# Patient Record
Sex: MALE | Race: WHITE | NOT HISPANIC OR LATINO | Employment: OTHER | URBAN - METROPOLITAN AREA
[De-identification: names, ages, dates, MRNs, and addresses within clinical notes are randomized per-mention and may not be internally consistent; named-entity substitution may affect disease eponyms.]

---

## 2017-02-25 ENCOUNTER — RADIANT APPOINTMENT (OUTPATIENT)
Dept: GENERAL RADIOLOGY | Facility: CLINIC | Age: 32
End: 2017-02-25
Attending: NURSE PRACTITIONER
Payer: COMMERCIAL

## 2017-02-25 ENCOUNTER — OFFICE VISIT (OUTPATIENT)
Dept: URGENT CARE | Facility: URGENT CARE | Age: 32
End: 2017-02-25
Payer: COMMERCIAL

## 2017-02-25 VITALS
HEART RATE: 84 BPM | OXYGEN SATURATION: 97 % | SYSTOLIC BLOOD PRESSURE: 141 MMHG | RESPIRATION RATE: 20 BRPM | TEMPERATURE: 99 F | DIASTOLIC BLOOD PRESSURE: 81 MMHG

## 2017-02-25 DIAGNOSIS — Z98.890 HISTORY OF CARPAL TUNNEL SURGERY: ICD-10-CM

## 2017-02-25 DIAGNOSIS — M25.531 RIGHT WRIST PAIN: Primary | ICD-10-CM

## 2017-02-25 DIAGNOSIS — M25.531 RIGHT WRIST PAIN: ICD-10-CM

## 2017-02-25 PROCEDURE — 73110 X-RAY EXAM OF WRIST: CPT | Mod: RT

## 2017-02-25 PROCEDURE — 99213 OFFICE O/P EST LOW 20 MIN: CPT | Performed by: NURSE PRACTITIONER

## 2017-02-25 NOTE — MR AVS SNAPSHOT
After Visit Summary   2/25/2017    Larry Bangura    MRN: [id number]           Patient Information     Date Of Birth          1985        Visit Information        Provider Department      2/25/2017 12:55 PM Sydnie Jackson APRN CNP [facility]        Today's Diagnoses     Right wrist pain    -  1    History of carpal tunnel surgery          Care Instructions    Rest your wrist. Take Ibuprofen and/or tylenol for pain.  See your primary care provider this next week for further plan of care.  This may be occupational therapy, bracing or referral to a different specialist but this needs to be discussed with your family provider.    We will call you if the radiologist sees anything on the x-ray that is abnormal.    Follow-up with your primary care provider next week and as needed.    Indications for emergent return to emergency department discussed with patient, who verbalized good understanding and agreement.  Patient understands the limitations of today's evaluation.               Follow-ups after your visit        Follow-up notes from your care team     See patient instructions section of the AVS Return in about 3 days (around 2/28/2017) for Follow up with your primary care provider.      Who to contact     If you have questions or need follow up information about today's clinic visit or your schedule please contact [facility] directly at [phone].  Normal or non-critical lab and imaging results will be communicated to you by MyChart, letter or phone within 4 business days after the clinic has received the results. If you do not hear from us within 7 days, please contact the clinic through [employer] or phone. If you have a critical or abnormal lab result, we will notify you by phone as soon as possible.  Submit refill requests through [employer] or call your pharmacy and they will forward the refill request to us. Please allow 3  business days for your refill to be completed.          Additional Information About Your Visit        MyChart Information     [employer] gives you secure access to your electronic health record. If you see a primary care provider, you can also send messages to your care team and make appointments. If you have questions, please call your primary care clinic.  If you do not have a primary care provider, please call [phone] and they will assist you.        Care EveryWhere ID     This is your Care EveryWhere ID. This could be used by other organizations to access your [place] medical records  [id number]        Your Vitals Were     Pulse Temperature Respirations Pulse Oximetry          84 99  F (37.2  C) (Tympanic) 20 97%         Blood Pressure from Last 3 Encounters:   02/25/17 141/81   11/17/16 130/88   10/21/16 129/76    Weight from Last 3 Encounters:   11/17/16 (!) 376 lb (170.6 kg)   09/13/16 (!) 379 lb 9.6 oz (172.2 kg)   09/07/16 (!) 379 lb 6.4 oz (172.1 kg)                 Today's Medication Changes          These changes are accurate as of: 2/25/17  2:44 PM.  If you have any questions, ask your nurse or doctor.               These medicines have changed or have updated prescriptions.        Dose/Directions    fluticasone 50 MCG/ACT spray   Commonly known as:  FLONASE   This may have changed:    - when to take this  - reasons to take this   Used for:  Seasonal allergic rhinitis        Dose:  1-2 spray   Spray 1-2 sprays into both nostrils daily   Quantity:  1 Package   Refills:  11                Primary Care Provider Office Phone # Fax #    CHANTALE Amador [phone]       [facility] [address] [facility] [address]        Thank you!     Thank you for choosing [facility]  for your care. Our goal is always to provide you with excellent care. Hearing back from our patients is one way we can continue to improve our services.  Please take a few minutes to complete the written survey that you may receive in the mail after your visit with us. Thank you!             Your Updated Medication List - Protect others around you: Learn how to safely use, store and throw away your medicines at www.disposemymeds.org.          This list is accurate as of: 2/25/17  2:44 PM.  Always use your most recent med list.                   Brand Name Dispense Instructions for use    blood glucose monitoring lancets     1 Box    Use to test blood sugar 1 times daily or as directed.  Ok to substitute alternative if insurance prefers.       blood glucose monitoring test strip    ACCU-CHEK CHERELLE PLUS    180 each    Use to test blood sugar 2 times daily or as directed.       fluticasone 50 MCG/ACT spray    FLONASE    1 Package    Spray 1-2 sprays into both nostrils daily       IBUPROFEN PO      Take 400 mg by mouth every 4 hours as needed for moderate pain       lisinopril-hydrochlorothiazide 10-12.5 MG per tablet    PRINZIDE/ZESTORETIC    90 tablet    Take 1 tablet by mouth daily       order for DME      Patient received a [employer] Airsense 10  Auto. Pressures were set at 13 cm H2O.       STATIN NOT PRESCRIBED (INTENTIONAL)      Statin not prescribed intentionally due to Other Statin not indicated per Dr. ward (This option does not exclude patient from measure).

## 2017-02-25 NOTE — PROGRESS NOTES
SUBJECTIVE:                                                    Larry Bangura is a 31 year old male who presents to clinic today for the following health issues:  Chief Complaint   Patient presents with     Musculoskeletal Problem     hx of carpal tunnel       States he had a malpractice lawsuit against hand specialist. Right wrist hurts and he wants an xray just to make sure it is not broken. No injury he is aware of.  Requests disc of his xray.          Problem list and histories reviewed & adjusted, as indicated.  Additional history: as documented    Patient Active Problem List   Diagnosis     Morbid obesity (H)     Seasonal allergic rhinitis     Obstructive sleep apnea     Type 2 diabetes mellitus without complication (H)     CARDIOVASCULAR SCREENING; LDL GOAL LESS THAN 160     Essential hypertension with goal blood pressure less than 140/90     Esophageal reflux     Past Surgical History   Procedure Laterality Date     Endoscopic release carpal tunnel  12/6/2013     Procedure: ENDOSCOPIC RELEASE CARPAL TUNNEL;  Right Hand Endoscopic Carpal Tunnel Release;  Surgeon: Kody Pittman MD;  Location: [facility] OR     Endoscopic release carpal tunnel  1/21/2014     Procedure: ENDOSCOPIC RELEASE CARPAL TUNNEL;  Left endoscopic carpal tunnel release, possible open;  Surgeon: Kody Pittman MD;  Location: [facility] OR       Social History   Substance Use Topics     Smoking status: Never Smoker     Smokeless tobacco: Never Used     Alcohol use No     Family History   Problem Relation Age of Onset     DIABETES Father      CANCER Sister      C.A.D. Maternal Grandfather          Current Outpatient Prescriptions   Medication Sig Dispense Refill     lisinopril-hydrochlorothiazide (PRINZIDE,ZESTORETIC) 10-12.5 MG per tablet Take 1 tablet by mouth daily 90 tablet 3     STATIN NOT PRESCRIBED, INTENTIONAL, Statin not prescribed intentionally due to Other Statin not indicated per Dr. ward (This option does not exclude patient from  measure).  0     blood glucose monitoring (ACCU-CHEK CHERELLE PLUS) test strip Use to test blood sugar 2 times daily or as directed. 180 each 11     blood glucose monitoring (ACCU-CHEK FASTCLIX) lancets Use to test blood sugar 1 times daily or as directed.  Ok to substitute alternative if insurance prefers. 1 Box prn     IBUPROFEN PO Take 400 mg by mouth every 4 hours as needed for moderate pain       ORDER FOR DME, SET TO FAX, Patient received a Resmed Airsense 10  Auto. Pressures were set at 13 cm H2O.       fluticasone (FLONASE) 50 MCG/ACT nasal spray Spray 1-2 sprays into both nostrils daily (Patient taking differently: Spray 1-2 sprays into both nostrils daily as needed ) 1 Package 11     No Known Allergies  Problem list, Medication list, Allergies, and Medical/Social/Surgical histories reviewed in [facility] and updated as appropriate.    ROS:  Constitutional, HEENT, cardiovascular, pulmonary, GI, , musculoskeletal, neuro, skin, endocrine and psych systems are negative, except as otherwise noted.    OBJECTIVE:                                                    /81  Pulse 84  Temp 99  F (37.2  C) (Tympanic)  Resp 20  SpO2 97%  There is no height or weight on file to calculate BMI.  GENERAL: healthy, alert and no distress, nontoxic in appearance  EYES: Eyes grossly normal to inspection,  conjunctivae and sclerae normal  HENT: normocephalic  NECK:supple with full ROM  Right wrist is nonswollen and nontender to palpation. Mildly limited ROM.    Diagnostic Test Results:  No results found for this or any previous visit (from the past 24 hour(s)).     ASSESSMENT/PLAN:                                                      Problem List Items Addressed This Visit     None      Visit Diagnoses     Right wrist pain    -  Primary    Relevant Orders    XR Wrist Right G/E 3 Views    History of carpal tunnel surgery                   Patient Instructions   Rest your wrist. Take Ibuprofen and/or tylenol for pain.  See your  primary care provider this next week for further plan of care.  This may be occupational therapy, bracing or referral to a different specialist but this needs to be discussed with your family provider.    We will call you if the radiologist sees anything on the x-ray that is abnormal.    Follow-up with your primary care provider next week and as needed.    Indications for emergent return to emergency department discussed with patient, who verbalized good understanding and agreement.  Patient understands the limitations of today's evaluation.           CIARAN Ortega    FLNB SAME DAY PROVIDER  [facility]

## 2018-02-14 ENCOUNTER — OFFICE VISIT (OUTPATIENT)
Dept: SLEEP MEDICINE | Facility: CLINIC | Age: 33
End: 2018-02-14
Payer: MEDICAID

## 2018-02-14 VITALS
BODY MASS INDEX: 45.1 KG/M2 | WEIGHT: 315 LBS | HEART RATE: 80 BPM | OXYGEN SATURATION: 96 % | HEIGHT: 70 IN | DIASTOLIC BLOOD PRESSURE: 89 MMHG | SYSTOLIC BLOOD PRESSURE: 148 MMHG

## 2018-02-14 DIAGNOSIS — G47.33 OSA (OBSTRUCTIVE SLEEP APNEA): Primary | ICD-10-CM

## 2018-02-14 PROCEDURE — 99214 OFFICE O/P EST MOD 30 MIN: CPT | Performed by: PHYSICIAN ASSISTANT

## 2018-02-14 NOTE — PATIENT INSTRUCTIONS

## 2018-02-14 NOTE — MR AVS SNAPSHOT
After Visit Summary   2/14/2018    Larry Bangura    MRN: [id number]           Patient Information     Date Of Birth          1985        Visit Information        Provider Department      2/14/2018 9:00 AM Tri Vasquez PA [facility]        Today's Diagnoses     IRWIN (obstructive sleep apnea)    -  1      Care Instructions      Your BMI is Body mass index is 53.95 kg/(m^2).  Weight management is a personal decision.  If you are interested in exploring weight loss strategies, the following discussion covers the approaches that may be successful. Body mass index (BMI) is one way to tell whether you are at a healthy weight, overweight, or obese. It measures your weight in relation to your height.  A BMI of 18.5 to 24.9 is in the healthy range. A person with a BMI of 25 to 29.9 is considered overweight, and someone with a BMI of 30 or greater is considered obese. More than two-thirds of American adults are considered overweight or obese.  Being overweight or obese increases the risk for further weight gain. Excess weight may lead to heart disease and diabetes.  Creating and following plans for healthy eating and physical activity may help you improve your health.  Weight control is part of healthy lifestyle and includes exercise, emotional health, and healthy eating habits. Careful eating habits lifelong are the mainstay of weight control. Though there are significant health benefits from weight loss, long-term weight loss with diet alone may be very difficult to achieve- studies show long-term success with dietary management in less than 10% of people. Attaining a healthy weight may be especially difficult to achieve in those with severe obesity. In some cases, medications, devices and surgical management might be considered.  What can you do?  If you are overweight or obese and are interested in methods for weight loss, you should discuss this with your provider.     Consider  reducing daily calorie intake by 500 calories.     Keep a food journal.     Avoiding skipping meals, consider cutting portions instead.    Diet combined with exercise helps maintain muscle while optimizing fat loss. Strength training is particularly important for building and maintaining muscle mass. Exercise helps reduce stress, increase energy, and improves fitness. Increasing exercise without diet control, however, may not burn enough calories to loose weight.       Start walking three days a week 10-20 minutes at a time    Work towards walking thirty minutes five days a week     Eventually, increase the speed of your walking for 1-2 minutes at time    In addition, we recommend that you review healthy lifestyles and methods for weight loss available through the National Institutes of Health patient information sites:  http://win.niddk.nih.gov/publications/index.htm    And look into health and wellness programs that may be available through your health insurance provider, employer, local community center, or [employer].    Weight management plan: Patient was referred to their PCP to discuss a diet and exercise plan.        Your Body mass index is 53.95 kg/(m^2).  Weight management is a personal decision.  If you are interested in exploring weight loss strategies, the following discussion covers the approaches that may be successful. Body mass index (BMI) is one way to tell whether you are at a healthy weight, overweight, or obese. It measures your weight in relation to your height.  A BMI of 18.5 to 24.9 is in the healthy range. A person with a BMI of 25 to 29.9 is considered overweight, and someone with a BMI of 30 or greater is considered obese. More than two-thirds of American adults are considered overweight or obese.  Being overweight or obese increases the risk for further weight gain. Excess weight may lead to heart disease and diabetes.  Creating and following plans for healthy eating and physical activity  may help you improve your health.  Weight control is part of healthy lifestyle and includes exercise, emotional health, and healthy eating habits. Careful eating habits lifelong are the mainstay of weight control. Though there are significant health benefits from weight loss, long-term weight loss with diet alone may be very difficult to achieve- studies show long-term success with dietary management in less than 10% of people. Attaining a healthy weight may be especially difficult to achieve in those with severe obesity. In some cases, medications, devices and surgical management might be considered.  What can you do?  If you are overweight or obese and are interested in methods for weight loss, you should discuss this with your provider.     Consider reducing daily calorie intake by 500 calories.     Keep a food journal.     Avoiding skipping meals, consider cutting portions instead.    Diet combined with exercise helps maintain muscle while optimizing fat loss. Strength training is particularly important for building and maintaining muscle mass. Exercise helps reduce stress, increase energy, and improves fitness. Increasing exercise without diet control, however, may not burn enough calories to loose weight.       Start walking three days a week 10-20 minutes at a time    Work towards walking thirty minutes five days a week     Eventually, increase the speed of your walking for 1-2 minutes at time    In addition, we recommend that you review healthy lifestyles and methods for weight loss available through the National Institutes of Health patient information sites:  http://win.niddk.nih.gov/publications/index.htm    And look into health and wellness programs that may be available through your health insurance provider, employer, local community center, or [employer].    Weight management plan: Patient was referred to their PCP to discuss a diet and exercise plan.          Follow-ups after your visit       "  Follow-up notes from your care team     Return in 1 year (on 2/14/2019) for PAP follow up.      Who to contact     If you have questions or need follow up information about today's clinic visit or your schedule please contact [facility] directly at [phone].  Normal or non-critical lab and imaging results will be communicated to you by MyChart, letter or phone within 4 business days after the clinic has received the results. If you do not hear from us within 7 days, please contact the clinic through [employer] or phone. If you have a critical or abnormal lab result, we will notify you by phone as soon as possible.  Submit refill requests through [employer] or call your pharmacy and they will forward the refill request to us. Please allow 3 business days for your refill to be completed.          Additional Information About Your Visit        MyChart Information     [employer] gives you secure access to your electronic health record. If you see a primary care provider, you can also send messages to your care team and make appointments. If you have questions, please call your primary care clinic.  If you do not have a primary care provider, please call [phone] and they will assist you.        Care EveryWhere ID     This is your Care EveryWhere ID. This could be used by other organizations to access your [place] medical records  [id number]        Your Vitals Were     Pulse Height Pulse Oximetry BMI (Body Mass Index)          80 1.778 m (5' 10\") 96% 53.95 kg/m2         Blood Pressure from Last 3 Encounters:   02/14/18 148/89   02/25/17 141/81   11/17/16 130/88    Weight from Last 3 Encounters:   02/14/18 (!) 170.6 kg (376 lb)   11/17/16 (!) 170.6 kg (376 lb)   09/13/16 (!) 172.2 kg (379 lb 9.6 oz)              We Performed the Following     Sleep Comprehensive DME          Today's Medication Changes          These changes are accurate as of 2/14/18  9:40 AM.  If you have any questions, ask your " nurse or doctor.               These medicines have changed or have updated prescriptions.        Dose/Directions    fluticasone 50 MCG/ACT spray   Commonly known as:  FLONASE   This may have changed:    - when to take this  - reasons to take this   Used for:  Seasonal allergic rhinitis        Dose:  1-2 spray   Spray 1-2 sprays into both nostrils daily   Quantity:  1 Package   Refills:  11                Primary Care Provider Office Phone # Fax #    Vahe Eason [phone] [address] [place] [id number]        Equal Access to Services     [facility] : Hadii aad ku hadasho Soomaali, waaxda luqadaha, qaybta kaalmada adeegyada, waxandrea blood . So [facility] [phone].    ATENCIÓN: Si habla español, tiene a alba disposición servicios gratuitos de asistencia lingüística. [facility] [phone].    We comply with applicable federal civil rights laws and [place] laws. We do not discriminate on the basis of race, color, national origin, age, disability, sex, sexual orientation, or gender identity.            Thank you!     Thank you for choosing [facility]  for your care. Our goal is always to provide you with excellent care. Hearing back from our patients is one way we can continue to improve our services. Please take a few minutes to complete the written survey that you may receive in the mail after your visit with us. Thank you!             Your Updated Medication List - Protect others around you: Learn how to safely use, store and throw away your medicines at www.disposemymeds.org.          This list is accurate as of 2/14/18  9:40 AM.  Always use your most recent med list.                   Brand Name Dispense Instructions for use Diagnosis    blood glucose monitoring lancets     1 Box    Use to test blood sugar 1 times daily or as directed.  Ok to substitute alternative if insurance prefers.    Type 2 diabetes mellitus without complication  (H)       blood glucose monitoring test strip    ACCU-CHEK CHERELLE PLUS    180 each    Use to test blood sugar 2 times daily or as directed.    Type 2 diabetes mellitus without complication (H)       fluticasone 50 MCG/ACT spray    FLONASE    1 Package    Spray 1-2 sprays into both nostrils daily    Seasonal allergic rhinitis       IBUPROFEN PO      Take 400 mg by mouth every 4 hours as needed for moderate pain        lisinopril-hydrochlorothiazide 10-12.5 MG per tablet    PRINZIDE/ZESTORETIC    90 tablet    Take 1 tablet by mouth daily    Essential hypertension with goal blood pressure less than 140/90       order for DME      Patient received a Resmed Airsense 10  Auto. Pressures were set at 13 cm H2O.        STATIN NOT PRESCRIBED (INTENTIONAL)      Statin not prescribed intentionally due to Other Statin not indicated per Dr. ward (This option does not exclude patient from measure).

## 2018-02-14 NOTE — PROGRESS NOTES
Obstructive Sleep Apnea- PAP Follow-Up Visit:    Chief Complaint   Patient presents with     CPAP Follow Up     Annual CPAP follow up       Larry Bangura comes in today for follow-up of their severe sleep apnea, managed with CPAP.     Larry Bangura is a 32 year old male with PMH significant for morbid obesity, asthma, HTN and GERD. He initially presented in clinic for evaluation of snoring, witnessed apnea and daytime sleepiness.     Sleep study recommended and completed on 6/2/015. AHI was 122.6. The lowest oxygen saturation was 51% and would frequently not normalize between events with zenith SpO2 often in high 60's to high 70's. .8. Periodic Limb Movement Index 0.0/hour.    Transcutaneous CO2 monitoring with pre-study ABG was performed. His ABG prior to the sleep study showed: pH: 7.39, pCO2 51, pO2 62, HCO3 31 and consistent with compensated respiratory acidosis and hypoxemia. Of note, head of bed elevated without appreciable change in events/saturations.   CPAP was titrated to zenith of 15 cm with CPAP of 13 cm with head of bed elevated 30 degrees appearing largely optimal including supine REM with elimination of apneas, hypopnea, desaturations and TCM stable in the high 50's. Trial of 14 cm with head of bed flat with residual events in supine REM. Brief trial of bilevel at end of study, 16/11 cm appeared effective in lateral NREM and REM. During the CPAP titration, the PLM index was 58.9.  June 11, 2015.    Patient received a Resmed Airsense 10 Auto. Pressures were set at 13 cm H2O. Changed to CPAP 20 on subsequent follow ups.     Overall, the patient rates his experience with PAP as 10 (0 poor, 10 great). The mask is comfortable. The mask is not leaking.  He is not snoring with the mask on. He is not having gasp arousals. He is not having any oral or nasal dryness. The pressure settings feel low.     His PAP interface is Nasal Mask.    Bedtime is typically 10 PM. Usually it takes about 10 minutes to fall  "asleep with the mask on. Wake time is typically 7 AM.  Patient is using PAP therapy 8-9 hours per night. The patient is usually getting 8 hours of sleep per night.    He does feel rested in the morning.    Total score - [place]: 1 (2/14/2018  9:14 AM)    ResMed   CPAP 20.0 cmH2O 30 day usage data:  94% of days with > 4 hours of use. 5/90days with no use. Average use 8 hours and 52 minutes per day.   95%ile Leak 20.35 L/min.   AHI 0.56 events per hour.       Patient reports that his CPAP 20 cm/H20 feels low, but not an old machine that he got from his dad also set to 20 cm/h20. He did not bring his CPAP to evaluate it today.     Past medical/surgical history, family history, social history, medications and allergies were reviewed.      Problem List:  Patient Active Problem List    Diagnosis Date Noted     Esophageal reflux 12/03/2013     Priority: Medium     Essential hypertension with goal blood pressure less than 140/90 09/10/2013     Priority: Medium     CARDIOVASCULAR SCREENING; LDL GOAL LESS THAN 160 08/14/2013     Priority: Medium     Seasonal allergic rhinitis 08/07/2013     Priority: Medium     Obstructive sleep apnea 08/07/2013     Priority: Medium     Severe IRWIN: PSG  6/2/2015 ( 414 lbs, , , adithya O2 51%.)  7/15/2016:Uses CPAP every day.          Type 2 diabetes mellitus without complication (H) 08/07/2013     Priority: Medium     Morbid obesity (H) 05/14/2012     Priority: Medium        /89  Pulse 80  Ht 1.778 m (5' 10\")  Wt (!) 170.6 kg (376 lb)  SpO2 96%  BMI 53.95 kg/m2      Impression/Plan:    1. Very severe obstructive sleep apnea-  Good compliance and appears well controlled on CPAP 20 cm/H20.  Continue current treatment.   He will bring his CPAP and meet with the DME provider to troubleshoot pressure delivery. If CPAP is working well, I will recommend an all night bilevel titration polysomnogram.     Larry Bangura will follow up in about 1 year, sooner if questions/concerns. "     Twenty-five minutes spent with patient, all of which were spent face-to-face counseling, consulting, coordinating plan of care regarding IRWIN.      Tri Vasquez PA-C    CC:  Vahe Eason

## 2018-02-14 NOTE — NURSING NOTE
"Chief Complaint   Patient presents with     CPAP Follow Up     Annual CPAP follow up       Initial BP (!) 165/100  Pulse 80  Ht 1.778 m (5' 10\")  Wt (!) 170.6 kg (376 lb)  SpO2 96%  BMI 53.95 kg/m2 Estimated body mass index is 53.95 kg/(m^2) as calculated from the following:    Height as of this encounter: 1.778 m (5' 10\").    Weight as of this encounter: 170.6 kg (376 lb).  Medication Reconciliation: complete    "

## 2018-02-15 DIAGNOSIS — E11.9 TYPE 2 DIABETES MELLITUS WITHOUT COMPLICATION (H): Primary | Chronic | ICD-10-CM

## 2018-02-16 ENCOUNTER — DOCUMENTATION ONLY (OUTPATIENT)
Dept: SLEEP MEDICINE | Facility: CLINIC | Age: 33
End: 2018-02-16
Payer: MEDICAID

## 2018-02-16 NOTE — PROGRESS NOTES
Patient came to [facility] for mask fitting appointment on February 16, 2018. Patient requested to switch masks because general discomfort .  Patient tried on the followings masks: ESON 2 MEDIUM AND LARGE, WISP LARGE AND XL, AIRFIT N20 LARGE.   Patient selected  [employer], type ESON 2 Nasal mask Medium. I ALSO CHECKED THE PRESSURES ON TERESA'S MACHINE TO MAKE SURE THEY ARE CORRECT. THE PRESSURES ARE ACCURATE TO THE SETTINGS, BUT WE TURNED OFF HIS EPR ON HIS AIRSENSE 10 SINCE HE FELT LIKE HE NEEDED MORE PRESSURE. HE WILL CALL BACK IF THAT DOESN'T HELP AND SCHEDULE A TITRATION STUDY PER ABASS' RECOMMENDATION.

## 2018-02-23 ENCOUNTER — TELEPHONE (OUTPATIENT)
Dept: SLEEP MEDICINE | Facility: CLINIC | Age: 33
End: 2018-02-23

## 2018-02-23 NOTE — TELEPHONE ENCOUNTER
RETURNED PATIENTS CALL REGARDING HIS MASK. PATIENT GOT A NEW MASK JUST LAST Friday (02/16/18) AND STATES THAT IT IS NOT WORKING AS WELL AS HIS OLD STYLE AND WOULD LIKE TO DO A 30 DAY MASK EXCHANGE TO GO BACK TO ORDER HIS PREVIOUS STYLE. 30 MASK EXCHANGE APPOINTMENT IS SCHEDULED FOR 02/28 AT 2:30 PM. PATIENT ALSO SAID HE WANTED TO REDO HIS SLEEP STUDY AND I TOLD HIM CONTACT CENTRAL SCHEDULING TO DO SO.

## 2018-02-28 ENCOUNTER — DOCUMENTATION ONLY (OUTPATIENT)
Dept: SLEEP MEDICINE | Facility: CLINIC | Age: 33
End: 2018-02-28
Payer: MEDICAID

## 2018-02-28 NOTE — PROGRESS NOTES
Patient came to [facility] for mask fitting appointment on February 28, 2018. Patient requested to switch masks because general discomfort .  Patient tried on the followings masks: DID NOT TRY ON MASKS, WANTED TO CONTINUE USING THE STYLE HE HAD PREVIOUSLY USED.   Patient selected  Resmed, type [employer] FX Nasal mask Wide.

## 2018-06-03 ENCOUNTER — HOSPITAL ENCOUNTER (EMERGENCY)
Facility: CLINIC | Age: 33
Discharge: HOME | End: 2018-06-03
Attending: NURSE PRACTITIONER | Admitting: NURSE PRACTITIONER
Payer: COMMERCIAL

## 2018-06-03 VITALS
TEMPERATURE: 98.3 F | DIASTOLIC BLOOD PRESSURE: 115 MMHG | SYSTOLIC BLOOD PRESSURE: 161 MMHG | OXYGEN SATURATION: 99 % | HEIGHT: 71 IN | HEART RATE: 89 BPM

## 2018-06-03 DIAGNOSIS — R06.2 WHEEZING: ICD-10-CM

## 2018-06-03 DIAGNOSIS — J01.90 ACUTE SINUSITIS WITH SYMPTOMS > 10 DAYS: ICD-10-CM

## 2018-06-03 PROCEDURE — 99213 OFFICE O/P EST LOW 20 MIN: CPT | Performed by: NURSE PRACTITIONER

## 2018-06-03 PROCEDURE — G0463 HOSPITAL OUTPT CLINIC VISIT: HCPCS

## 2018-06-03 RX ORDER — PREDNISONE 20 MG/1
TABLET ORAL
Qty: 10 TABLET | Refills: 0 | Status: SHIPPED | OUTPATIENT
Start: 2018-06-03 | End: 2018-09-10

## 2018-06-03 NOTE — DISCHARGE INSTRUCTIONS
Discharge Instructions  Sinus Infection    You have acute sinusitis, or an infection of the sinuses. The sinuses are the hollow areas within the facial bones that are connected to the nasal opening. The most common cause of acute sinusitis is a virus infection associated with the common cold. Bacterial sinusitis occurs much less commonly, usually as a complication of viral sinusitis. Experts say that most sinusitis is caused by a virus within the first 7-10 days of illness. Antibiotics do nothing to help with virus infections, so most people do not need antibiotics for acute sinusitis.     Return to the Emergency Department if:    Your vision changes.    You are confused or have difficulty thinking clearly.    You have swelling around your eye.    You develop a severe headache or neck stiffness.    You have a fever over 101 degrees.    Your symptoms get worse and you are unable to see your primary doctor.    Follow-up with your doctor:     See your primary doctor in one week if not improving.    Treatment:    Pain relief -- Non-prescription pain medications, such as Tylenol  (acetaminophen) or Motrin  or Advil  (ibuprofen) are recommended for pain.  Do not use a medicine that you are allergic to, or if your doctor has told you not to use it.       Nasal irrigation -- Flushing the nose and sinuses with a saline solution several times per day can help to decrease pain caused by congestion.    Nasal decongestants -- Nasal decongestant sprays, including Afrin  (oxymetazoline) and Chapito-Synephrine  (phenylephrine) can be used to temporarily treat congestion. However, these sprays should not be used for more than two to three days due to the risk of rebound congestion (when the nose is congested constantly unless the medication is used repeatedly).    Nasal glucocorticoids -- These are prescription steroids delivered by a nasal spray that can help to reduce swelling inside the nose, usually within two to three days. These  "drugs have few side effects and dramatically relieve symptoms in most people.  If you use these in conjunction with Afrin  you will need to use at least 15 minutes prior to the nasal decongestant.      Do I need an antibiotic? -- Sometimes, but not always, antibiotics are used along with the above treatments.    Antibiotic Warning:     If you have been placed on antibiotics - watch for signs of allergic reaction.  These include rash, lip swelling, difficulty breathing, wheezing, and dizziness.  If you develop any of these symptoms, stop the antibiotic immediately and go to an Emergency Department or Urgent Care for evaluation.    Probiotics: If you have been given an antibiotic, you may want to also take a probiotic pill or eat yogurt with live cultures. Probiotics have \"good bacteria\" to help your intestines stay healthy. Studies have shown that probiotics help prevent diarrhea and other intestine problems (including C. diff infection) when you take antibiotics. You can buy these without a prescription in the pharmacy section of the store.   If you were given a prescription for medicine here today, be sure to read all of the information (including the package insert) that comes with your prescription.  This will include important information about the medicine, its side effects, and any warnings that you need to know about.  The pharmacist who fills the prescription can provide more information and answer questions you may have about the medicine.  If you have questions or concerns that the pharmacist cannot address, please call or return to the Emergency Department.     Remember that you can always come back to the Emergency Department if you are not able to see your regular doctor in the amount of time listed above, if you get any new symptoms, or if there is anything that worries you.    "

## 2018-06-03 NOTE — ED AVS SNAPSHOT
[facility] Emergency Department    [address]    [facility] [place] [address]    Phone:  [phone]    Fax:  [phone]                                       Larry Bangura   MRN: [id number]    Department:  [facility] Emergency Department   Date of Visit:  6/3/2018           Patient Information     Date Of Birth          1985        Your diagnoses for this visit were:     Acute sinusitis with symptoms > 10 days     Wheezing        You were seen by Beatrice Wright APRN CNP.      Follow-up Information     Follow up with Vahe Eason MD.    Specialty:  Family Practice    Why:  As needed    Contact information:    [address] [place] [address]  [phone]          Discharge Instructions       Discharge Instructions  Sinus Infection    You have acute sinusitis, or an infection of the sinuses. The sinuses are the hollow areas within the facial bones that are connected to the nasal opening. The most common cause of acute sinusitis is a virus infection associated with the common cold. Bacterial sinusitis occurs much less commonly, usually as a complication of viral sinusitis. Experts say that most sinusitis is caused by a virus within the first 7-10 days of illness. Antibiotics do nothing to help with virus infections, so most people do not need antibiotics for acute sinusitis.     Return to the Emergency Department if:    Your vision changes.    You are confused or have difficulty thinking clearly.    You have swelling around your eye.    You develop a severe headache or neck stiffness.    You have a fever over 101 degrees.    Your symptoms get worse and you are unable to see your primary doctor.    Follow-up with your doctor:     See your primary doctor in one week if not improving.    Treatment:    Pain relief -- Non-prescription pain medications, such as Tylenol  (acetaminophen) or Motrin  or Advil  (ibuprofen) are recommended for pain.  Do not use a medicine that you are allergic  "to, or if your doctor has told you not to use it.       Nasal irrigation -- Flushing the nose and sinuses with a saline solution several times per day can help to decrease pain caused by congestion.    Nasal decongestants -- Nasal decongestant sprays, including Afrin  (oxymetazoline) and Chapito-Synephrine  (phenylephrine) can be used to temporarily treat congestion. However, these sprays should not be used for more than two to three days due to the risk of rebound congestion (when the nose is congested constantly unless the medication is used repeatedly).    Nasal glucocorticoids -- These are prescription steroids delivered by a nasal spray that can help to reduce swelling inside the nose, usually within two to three days. These drugs have few side effects and dramatically relieve symptoms in most people.  If you use these in conjunction with Afrin  you will need to use at least 15 minutes prior to the nasal decongestant.      Do I need an antibiotic? -- Sometimes, but not always, antibiotics are used along with the above treatments.    Antibiotic Warning:     If you have been placed on antibiotics - watch for signs of allergic reaction.  These include rash, lip swelling, difficulty breathing, wheezing, and dizziness.  If you develop any of these symptoms, stop the antibiotic immediately and go to an Emergency Department or Urgent Care for evaluation.    Probiotics: If you have been given an antibiotic, you may want to also take a probiotic pill or eat yogurt with live cultures. Probiotics have \"good bacteria\" to help your intestines stay healthy. Studies have shown that probiotics help prevent diarrhea and other intestine problems (including C. diff infection) when you take antibiotics. You can buy these without a prescription in the pharmacy section of the store.   If you were given a prescription for medicine here today, be sure to read all of the information (including the package insert) that comes with your " prescription.  This will include important information about the medicine, its side effects, and any warnings that you need to know about.  The pharmacist who fills the prescription can provide more information and answer questions you may have about the medicine.  If you have questions or concerns that the pharmacist cannot address, please call or return to the Emergency Department.     Remember that you can always come back to the Emergency Department if you are not able to see your regular doctor in the amount of time listed above, if you get any new symptoms, or if there is anything that worries you.      24 Hour Appointment Hotline       To make an appointment at any [facility], call [phone] ([phone]). If you don't have a family doctor or clinic, we will help you find one. [place] clinics are conveniently located to serve the needs of you and your family.             Review of your medicines      START taking        Dose / Directions Last dose taken    amoxicillin-clavulanate 875-125 MG per tablet   Commonly known as:  AUGMENTIN   Dose:  1 tablet   Quantity:  20 tablet        Take 1 tablet by mouth 2 times daily   Refills:  0        predniSONE 20 MG tablet   Commonly known as:  DELTASONE   Quantity:  10 tablet        Take two tablets (= 40mg) each day for 5 (five) days   Refills:  0          Our records show that you are taking the medicines listed below. If these are incorrect, please call your family doctor or clinic.        Dose / Directions Last dose taken    blood glucose monitoring lancets   Quantity:  1 Box        Use to test blood sugar 1 times daily or as directed.  Ok to substitute alternative if insurance prefers.   Refills:  prn        blood glucose monitoring test strip   Commonly known as:  ACCU-CHEK CHERELLE PLUS   Quantity:  180 each        Use to test blood sugar 2 times daily or as directed.   Refills:  11        fluticasone 50 MCG/ACT spray   Commonly known as:  FLONASE    Dose:  1-2 spray   Quantity:  1 Package        Spray 1-2 sprays into both nostrils daily   Refills:  11        IBUPROFEN PO   Dose:  400 mg        Take 400 mg by mouth every 4 hours as needed for moderate pain   Refills:  0        order for DME        Patient received a Resmed Airsense 10  Auto. Pressures were set at 13 cm H2O.   Refills:  0        STATIN NOT PRESCRIBED (INTENTIONAL)        Statin not prescribed intentionally due to Other Statin not indicated per Dr. ward (This option does not exclude patient from measure).   Refills:  0                Prescriptions were sent or printed at these locations (2 Prescriptions)                   [facility] [place], [place] - [address]   [address] [facility] [address] [facility] [address]    Telephone:  [phone]   Fax:  [phone]   Hours:                  E-Prescribed (2 of 2)         amoxicillin-clavulanate (AUGMENTIN) 875-125 MG per tablet               predniSONE (DELTASONE) 20 MG tablet                Orders Needing Specimen Collection     None      Pending Results     No orders found from 6/1/2018 to 6/4/2018.            Pending Culture Results     No orders found from 6/1/2018 to 6/4/2018.            Pending Results Instructions     If you had any lab results that were not finalized at the time of your Discharge, you can call the ED Lab Result RN at [phone]. You will be contacted by this team for any positive Lab results or changes in treatment. The nurses are available 7 days a week from 10A to 6:30P.  You can leave a message 24 hours per day and they will return your call.        Test Results From Your Hospital Stay               Thank you for choosing [facility]       Thank you for choosing [facility] for your care. Our goal is always to provide you with excellent care. Hearing back from our patients is one way we can continue to improve our services. Please take a few minutes to complete the written survey that you may receive in the mail after  you visit with us. Thank you!        [employer] Information     [facility] gives you secure access to your electronic health record. If you see a primary care provider, you can also send messages to your care team and make appointments. If you have questions, please call your primary care clinic.  If you do not have a primary care provider, please call [phone] and they will assist you.        Care EveryWhere ID     This is your Care EveryWhere ID. This could be used by other organizations to access your [facility] medical records  [id number]        Equal Access to Services     MICHAEL GRAFF : Yulisa Byrne, wadelia iqbal, andrew de la cruzalalirio ashraf, veena blood . So [facility] [phone].    ATENCIÓN: Si habla español, tiene a alba disposición servicios gratuitos de asistencia lingüística. Llame al [phone].    We comply with applicable federal civil rights laws and [place] laws. We do not discriminate on the basis of race, color, national origin, age, disability, sex, sexual orientation, or gender identity.            After Visit Summary       This is your record. Keep this with you and show to your community pharmacist(s) and doctor(s) at your next visit.

## 2018-06-03 NOTE — ED AVS SNAPSHOT
[facility] Emergency Department    [address] [facility] [address]    Phone:  [phone]    Fax:  [phone]                                       Larry Bangura   MRN: [id number]    Department:  [facility] Emergency Department   Date of Visit:  6/3/2018           After Visit Summary Signature Page     I have received my discharge instructions, and my questions have been answered. I have discussed any challenges I see with this plan with the nurse or doctor.    ..........................................................................................................................................  Patient/Patient Representative Signature      ..........................................................................................................................................  Patient Representative Print Name and Relationship to Patient    ..................................................               ................................................  Date                                            Time    ..........................................................................................................................................  Reviewed by Signature/Title    ...................................................              ..............................................  Date                                                            Time

## 2018-06-03 NOTE — ED TRIAGE NOTES
Patient here for URI, symptoms started 14 days ago.  Patient presents ambulatory to the urgent care.

## 2018-06-03 NOTE — ED PROVIDER NOTES
History     Chief Complaint   Patient presents with     Cough     sinus congestion and pressure.     HPI  Larry Bangura is a 33 year old male with history of htn, obesity, type II DM, and esophageal reflux who since to urgent care for evaluation of sinus congestion, sinus pressure, and cough.  Symptoms started 12 days ago.  Denies fever chills.  Denies chest pain or shortness of breath.  Cough is productive.  Denies nausea or vomiting.  Tolerating fluids.  Treating symptoms with over-the-counter cold medication.  Patient states he thought he was getting better 2 days ago, but worse today.  Exposed to wife who has similar symptoms.  Also exposed to his daughter who was in the hospital for a week with pneumonia.    Problem List:    Patient Active Problem List    Diagnosis Date Noted     Esophageal reflux 12/03/2013     Priority: Medium     Essential hypertension with goal blood pressure less than 140/90 09/10/2013     Priority: Medium     CARDIOVASCULAR SCREENING; LDL GOAL LESS THAN 160 08/14/2013     Priority: Medium     Seasonal allergic rhinitis 08/07/2013     Priority: Medium     Obstructive sleep apnea 08/07/2013     Priority: Medium     Severe IRWIN: PSG  6/2/2015 ( 414 lbs, , , adithya O2 51%.)  7/15/2016:Uses CPAP every day.          Type 2 diabetes mellitus without complication (H) 08/07/2013     Priority: Medium     Morbid obesity (H) 05/14/2012     Priority: Medium        Past Medical History:    Past Medical History:   Diagnosis Date     HTN (hypertension) 9/10/2013     Morbid obesity (H)        Past Surgical History:    Past Surgical History:   Procedure Laterality Date     ENDOSCOPIC RELEASE CARPAL TUNNEL  12/6/2013    Procedure: ENDOSCOPIC RELEASE CARPAL TUNNEL;  Right Hand Endoscopic Carpal Tunnel Release;  Surgeon: Kody Pittman MD;  Location: [facility]     ENDOSCOPIC RELEASE CARPAL TUNNEL  1/21/2014    Procedure: ENDOSCOPIC RELEASE CARPAL TUNNEL;  Left endoscopic carpal tunnel release,  "possible open;  Surgeon: Kody Pittman MD;  Location: [facility]       Family History:    Family History   Problem Relation Age of Onset     DIABETES Father      CANCER Sister      C.A.D. Maternal Grandfather        Social History:  Marital Status:  Single [1]  Social History   Substance Use Topics     Smoking status: Never Smoker     Smokeless tobacco: Never Used     Alcohol use No        No current facility-administered medications on file prior to encounter.   Current Outpatient Prescriptions on File Prior to Encounter:  blood glucose monitoring (ACCU-CHEK CHERELLE PLUS) test strip Use to test blood sugar 2 times daily or as directed.   blood glucose monitoring (ACCU-CHEK FASTCLIX) lancets Use to test blood sugar 1 times daily or as directed.  Ok to substitute alternative if insurance prefers.   fluticasone (FLONASE) 50 MCG/ACT nasal spray Spray 1-2 sprays into both nostrils daily (Patient taking differently: Spray 1-2 sprays into both nostrils daily as needed )   IBUPROFEN PO Take 400 mg by mouth every 4 hours as needed for moderate pain   ORDER FOR DME, SET TO FAX, Patient received a [employer] Airsense 10  Auto. Pressures were set at 13 cm H2O.   STATIN NOT PRESCRIBED, INTENTIONAL, Statin not prescribed intentionally due to Other Statin not indicated per Dr. ward (This option does not exclude patient from measure).       Review of Systems  As mentioned above in the history present illness. All other systems were reviewed and are negative.    Physical Exam   BP: (!) 161/115  Pulse: 89  Temp: 98.3  F (36.8  C)  Height: 180.3 cm (5' 11\")  SpO2: 99 %      Physical Exam    GENERAL APPEARANCE: healthy, alert and no distress  EYES: EOMI,  PERRL, conjunctiva clear  HENT: ear canals and TM's normal.  Rhinorrhea.  Mouth without ulcers, erythema or lesions  NECK: supple, nontender, no lymphadenopathy  RESP: Bilateral expiratory wheezing throughout.  No rales or rhonchi.  No tachypnea.  CV: regular rates and rhythm, normal S1 " S2, no murmur noted    ED Course     ED Course     Procedures             No results found for this or any previous visit (from the past 24 hour(s)).    Medications - No data to display    Assessments & Plan (with Medical Decision Making)   Acute sinusitis with symptoms >10 days:  -low suspicion for pneumonia (afebrile, no tachypnea, no tachycardia, no auscultated rales)  -likely bacterial sinusitis given the length of symptoms and sudden worsening.  -prescription for Augmentin and prednisone (due to wheezing)  -noted elevated BP today, history of htn but is not being treated. Instructed to follow-up with his PCP to address this and recheck BP when he is feeling well.    I have reviewed the nursing notes.    I have reviewed the findings, diagnosis, plan and need for follow up with the patient.    New Prescriptions    AMOXICILLIN-CLAVULANATE (AUGMENTIN) 875-125 MG PER TABLET    Take 1 tablet by mouth 2 times daily    PREDNISONE (DELTASONE) 20 MG TABLET    Take two tablets (= 40mg) each day for 5 (five) days       Final diagnoses:   Acute sinusitis with symptoms > 10 days   Wheezing       6/3/2018   [facility] EMERGENCY DEPARTMENT     Beatrice Wright APRN CNP  06/03/18 [id number]

## 2018-09-10 ENCOUNTER — OFFICE VISIT (OUTPATIENT)
Dept: FAMILY MEDICINE | Facility: CLINIC | Age: 33
End: 2018-09-10
Payer: COMMERCIAL

## 2018-09-10 VITALS
HEART RATE: 84 BPM | BODY MASS INDEX: 54.12 KG/M2 | SYSTOLIC BLOOD PRESSURE: 148 MMHG | DIASTOLIC BLOOD PRESSURE: 84 MMHG | TEMPERATURE: 98.5 F | WEIGHT: 315 LBS | RESPIRATION RATE: 18 BRPM

## 2018-09-10 DIAGNOSIS — J30.2 CHRONIC SEASONAL ALLERGIC RHINITIS, UNSPECIFIED TRIGGER: ICD-10-CM

## 2018-09-10 DIAGNOSIS — I10 ESSENTIAL HYPERTENSION WITH GOAL BLOOD PRESSURE LESS THAN 140/90: Chronic | ICD-10-CM

## 2018-09-10 DIAGNOSIS — E11.9 TYPE 2 DIABETES MELLITUS WITHOUT COMPLICATION, WITHOUT LONG-TERM CURRENT USE OF INSULIN (H): Primary | Chronic | ICD-10-CM

## 2018-09-10 DIAGNOSIS — Z83.2 FH: BLOOD DISORDER: ICD-10-CM

## 2018-09-10 LAB
ERYTHROCYTE [DISTWIDTH] IN BLOOD BY AUTOMATED COUNT: 13.7 % (ref 10–15)
HBA1C MFR BLD: 11.9 % (ref 0–5.6)
HCT VFR BLD AUTO: 42.7 % (ref 40–53)
HGB BLD-MCNC: 14.3 G/DL (ref 13.3–17.7)
MCH RBC QN AUTO: 28.1 PG (ref 26.5–33)
MCHC RBC AUTO-ENTMCNC: 33.5 G/DL (ref 31.5–36.5)
MCV RBC AUTO: 84 FL (ref 78–100)
PLATELET # BLD AUTO: 372 10E9/L (ref 150–450)
RBC # BLD AUTO: 5.08 10E12/L (ref 4.4–5.9)
WBC # BLD AUTO: 9.1 10E9/L (ref 4–11)

## 2018-09-10 PROCEDURE — 36415 COLL VENOUS BLD VENIPUNCTURE: CPT | Performed by: FAMILY MEDICINE

## 2018-09-10 PROCEDURE — 85027 COMPLETE CBC AUTOMATED: CPT | Performed by: FAMILY MEDICINE

## 2018-09-10 PROCEDURE — 83036 HEMOGLOBIN GLYCOSYLATED A1C: CPT | Performed by: FAMILY MEDICINE

## 2018-09-10 PROCEDURE — 99214 OFFICE O/P EST MOD 30 MIN: CPT | Performed by: FAMILY MEDICINE

## 2018-09-10 RX ORDER — LANCETS
EACH MISCELLANEOUS
Qty: 100 EACH | Refills: 3 | Status: SHIPPED | OUTPATIENT
Start: 2018-09-10 | End: 2018-12-10

## 2018-09-10 RX ORDER — LANCETS
EACH MISCELLANEOUS
Status: CANCELLED | OUTPATIENT
Start: 2018-09-10

## 2018-09-10 RX ORDER — FLUTICASONE PROPIONATE 50 MCG
1-2 SPRAY, SUSPENSION (ML) NASAL DAILY
Status: CANCELLED | OUTPATIENT
Start: 2018-09-10

## 2018-09-10 RX ORDER — LISINOPRIL 20 MG/1
20 TABLET ORAL DAILY
Qty: 90 TABLET | Refills: 3 | Status: SHIPPED | OUTPATIENT
Start: 2018-09-10 | End: 2018-10-13

## 2018-09-10 ASSESSMENT — PAIN SCALES - GENERAL: PAINLEVEL: NO PAIN (0)

## 2018-09-10 NOTE — PROGRESS NOTES
SUBJECTIVE:   Larry Bangura is a 33 year old male who presents to clinic today for the following health issues:      Diabetes Follow-up      Patient is checking blood sugars: not at all    Diabetic concerns: other - Urinating a lot, states has improved some but usually going once per hour     Symptoms of hypoglycemia (low blood sugar): none     Paresthesias (numbness or burning in feet) or sores: No     Date of last diabetic eye exam: [facility] 2/9/2018    BP Readings from Last 2 Encounters:   06/03/18 (!) 161/115   02/14/18 148/89     Hemoglobin A1C (%)   Date Value   11/17/2016 6.4 (H)   07/15/2016 6.8 (H)     LDL Cholesterol Calculated (mg/dL)   Date Value   07/15/2016 100 (H)   08/13/2013 139 (H)       Diabetes Management Resources    Hypertension Follow-up      Outpatient blood pressures are being checked at home occasional.  Results are 140's/100's.    Low Salt Diet: not monitoring salt        S :Larry Bangura is a 33 year old male, has been ignoring DM2.  More urination, more tired.    Problem list, med list, additional histories reviewed and updated, as indicated.      DM2: willing to get on meds.  Hasn't been on in past    Htn: elevated, willing to get back on BP meds.  lisionopril    Hyperlipidemia: not on statin    Problem list, med list, additional histories reviewed and updated, as indicated.      No cp or sob    O:/84 (BP Location: Right arm, Patient Position: Chair, Cuff Size: Adult Large)  Pulse 84  Temp 98.5  F (36.9  C) (Tympanic)  Resp 18  Wt (!) 388 lb (176 kg)  BMI 54.12 kg/m2  GEN: Alert and oriented, in no acute distress    A: DM2, not controlled      Htn, not at goal       Morbid obesity, BMi 54    P: metformin.  Back on lisinopril.  F/u in a few months, titrate to full dose metformin.      Weight management plan: diet/exercise

## 2018-09-10 NOTE — NURSING NOTE
"Chief Complaint   Patient presents with     Hypertension     Diabetes       Initial /84 (BP Location: Right arm, Patient Position: Chair, Cuff Size: Adult Large)  Pulse 84  Temp 98.5  F (36.9  C) (Tympanic)  Resp 18  Wt (!) 388 lb (176 kg)  BMI 54.12 kg/m2 Estimated body mass index is 54.12 kg/(m^2) as calculated from the following:    Height as of 6/3/18: 5' 11\" (1.803 m).    Weight as of this encounter: 388 lb (176 kg).      Health Maintenance that is potentially due pending provider review:  KIARRA Jama MA  11:31 AM 9/10/2018  .        "

## 2018-09-10 NOTE — MR AVS SNAPSHOT
After Visit Summary   9/10/2018    Larry Bangura    MRN: [id number]           Patient Information     Date Of Birth          1985        Visit Information        Provider Department      9/10/2018 11:20 AM Shelton Mcconnell MD [facility] [address]        Today's Diagnoses     Type 2 diabetes mellitus without complication, without long-term current use of insulin (H)    -  1    FH: blood disorder        Chronic seasonal allergic rhinitis, unspecified trigger        Essential hypertension with goal blood pressure less than 140/90           Follow-ups after your visit        Your next 10 appointments already scheduled     Dec 10, 2018 11:20 AM CST   Office Visit with Shelton Mcconnell MD   [facility] [address] ([facility])    [address] [place] [id number] [phone]           Bring a current list of meds and any records pertaining to this visit. For Physicals, please bring immunization records and any forms needing to be filled out. Please arrive 10 minutes early to complete paperwork.              Who to contact     If you have questions or need follow up information about today's clinic visit or your schedule please contact [facility] directly at [phone].  Normal or non-critical lab and imaging results will be communicated to you by MyChart, letter or phone within 4 business days after the clinic has received the results. If you do not hear from us within 7 days, please contact the clinic through [employer] or phone. If you have a critical or abnormal lab result, we will notify you by phone as soon as possible.  Submit refill requests through [employer] or call your pharmacy and they will forward the refill request to us. Please allow 3 business days for your refill to be completed.          Additional Information About Your Visit        MyChart Information     [employer] gives you secure access to your electronic health record. If  you see a primary care provider, you can also send messages to your care team and make appointments. If you have questions, please call your primary care clinic.  If you do not have a primary care provider, please call [phone] and they will assist you.        Care EveryWhere ID     This is your Care EveryWhere ID. This could be used by other organizations to access your [facility] medical records  [id number]        Your Vitals Were     Pulse Temperature Respirations BMI (Body Mass Index)          84 98.5  F (36.9  C) (Tympanic) 18 54.12 kg/m2         Blood Pressure from Last 3 Encounters:   09/10/18 148/84   06/03/18 (!) 161/115   02/14/18 148/89    Weight from Last 3 Encounters:   09/10/18 (!) 388 lb (176 kg)   02/14/18 (!) 376 lb (170.6 kg)   11/17/16 (!) 376 lb (170.6 kg)              We Performed the Following     CBC with platelets     Hemoglobin A1c          Today's Medication Changes          These changes are accurate as of 9/10/18 12:20 PM.  If you have any questions, ask your nurse or doctor.               Start taking these medicines.        Dose/Directions    lisinopril 20 MG tablet   Commonly known as:  PRINIVIL/ZESTRIL   Used for:  Type 2 diabetes mellitus without complication, without long-term current use of insulin (H)   Started by:  Shelton Mcconnell MD        Dose:  20 mg   Take 1 tablet (20 mg) by mouth daily   Quantity:  90 tablet   Refills:  3       metFORMIN 500 MG tablet   Commonly known as:  GLUCOPHAGE   Used for:  Type 2 diabetes mellitus without complication, without long-term current use of insulin (H)   Started by:  Shelton Mcconnell MD        Dose:  500 mg   Take 1 tablet (500 mg) by mouth 2 times daily (with meals)   Quantity:  60 tablet   Refills:  2            Where to get your medicines      These medications were sent to [facility] [address] [address]     Phone:  [phone]     blood glucose  monitoring lancets    blood glucose monitoring test strip    lisinopril 20 MG tablet    metFORMIN 500 MG tablet                Primary Care Provider Office Phone # Fax #    Vahe Eason [phone] [address] [place] [address]        Equal Access to Services     JOSSHELLEN PRERNA : Hadii aad ku hadjamaicao Soomaali, waaxda luqadaha, qaybta kaalmada adeegyada, waxay idiin haygusn aderekha chacko lamargretmarilin yovany. So [facility] [phone].    ATENCIÓN: Si habla español, tiene a alba disposición servicios gratuitos de asistencia lingüística. Llame al [phone].    We comply with applicable federal civil rights laws and [place] laws. We do not discriminate on the basis of race, color, national origin, age, disability, sex, sexual orientation, or gender identity.            Thank you!     Thank you for choosing [facility]  for your care. Our goal is always to provide you with excellent care. Hearing back from our patients is one way we can continue to improve our services. Please take a few minutes to complete the written survey that you may receive in the mail after your visit with us. Thank you!             Your Updated Medication List - Protect others around you: Learn how to safely use, store and throw away your medicines at www.disposemymeds.org.          This list is accurate as of 9/10/18 12:20 PM.  Always use your most recent med list.                   Brand Name Dispense Instructions for use Diagnosis    blood glucose monitoring lancets     100 each    Use to test blood sugar 1 times daily or as directed.  Ok to substitute alternative if insurance prefers.    Type 2 diabetes mellitus without complication, without long-term current use of insulin (H)       blood glucose monitoring test strip    ACCU-CHEK CHERELLE PLUS    180 each    Use to test blood sugar 2 times daily or as directed.    Type 2 diabetes mellitus without complication, without long-term current use of insulin (H)        fluticasone 50 MCG/ACT spray    FLONASE    1 Package    Spray 1-2 sprays into both nostrils daily    Seasonal allergic rhinitis       IBUPROFEN PO      Take 400 mg by mouth every 4 hours as needed for moderate pain        lisinopril 20 MG tablet    PRINIVIL/ZESTRIL    90 tablet    Take 1 tablet (20 mg) by mouth daily    Type 2 diabetes mellitus without complication, without long-term current use of insulin (H)       metFORMIN 500 MG tablet    GLUCOPHAGE    60 tablet    Take 1 tablet (500 mg) by mouth 2 times daily (with meals)    Type 2 diabetes mellitus without complication, without long-term current use of insulin (H)       order for DME      Patient received a [employer] Airsense 10  Auto. Pressures were set at 13 cm H2O.        STATIN NOT PRESCRIBED (INTENTIONAL)      Statin not prescribed intentionally due to Other Statin not indicated per Dr. ward (This option does not exclude patient from measure).

## 2018-09-19 ENCOUNTER — TELEPHONE (OUTPATIENT)
Dept: FAMILY MEDICINE | Facility: CLINIC | Age: 33
End: 2018-09-19

## 2018-09-19 NOTE — TELEPHONE ENCOUNTER
Dr. Mcconnell,  The patient says that since starting the Metformin he has had severe diarrhea from it, says is going every hour every time he takes it. Is taking the metformin with food, says he has abdominal pain only when defecating but the abdominal pain is not severe. Patient says is a little nauseated, says feels sick to his stomach and feels weak from all the diarrhea. Last dose is at lunch today, is taking 1000 mg daily. Please advise your recommendations and can the patient take anything for the diarrhea OTC?     DANNIELLE Morgan

## 2018-09-19 NOTE — TELEPHONE ENCOUNTER
Back to 500mg bid for a few weeks, then go to 1000mg AM, 500mg PM for a few weeks, then go to 1000mg bid again.  See if going slower on moving up dose helps his body get used to it.

## 2018-09-19 NOTE — TELEPHONE ENCOUNTER
Patient is calling stating he was started on Metformin 2 weeks ago and has had violent diarrhea every day since he has started taking it. Please advise.    Janny [place]

## 2018-09-19 NOTE — TELEPHONE ENCOUNTER
Dr. Mcconnell was consulted and PCP says to tell the patient it may take time for his body to adjust and to tell the patient to be taking the 500 mg twice a day for now, may use OTC Imodium.  Patient called and gave assurance per Dr. Mcconnell and advised him to be on the 500 mg twice per day and may use Imodium, patient says he does take the 500 mg twice a day already and thought it may be his body is adjusting. Patient told to monitor for signs of dehydration and patient says is drinking plenty of fluids. Told to call back if condition worsens.    DANNIELLE Morgan

## 2018-10-01 ENCOUNTER — TELEPHONE (OUTPATIENT)
Dept: FAMILY MEDICINE | Facility: CLINIC | Age: 33
End: 2018-10-01

## 2018-10-01 DIAGNOSIS — A60.01 HERPES SIMPLEX INFECTION OF PENIS: Primary | ICD-10-CM

## 2018-10-01 NOTE — TELEPHONE ENCOUNTER
Reason for Call:  Other     Detailed comments: Patient says he is waiting for the RX for Valtrex to be refilled - He talked about it at his last visit with Dr. Mcconnell but Dr. Eason gave it to him a year ago.- please call pt    Phone Number Patient can be reached at: Home number on file [phone] (home)    Best Time:     Can we leave a detailed message on this number? YES    Call taken on 10/1/2018 at 4:56 PM by Claire Alcantara

## 2018-10-02 NOTE — TELEPHONE ENCOUNTER
Valtrex not on medication list, here is dictation from herpes culture from 11/2016    The viral test did show you have the herpes virus.  It is not specific for type 1 or 2 but this is what was causing your penis rash.   PLAN: This infection should clear without treatment.  If you have recurrent infections, there are anti-viral medications that if used early in the infection could shorten the duration and lessen the severity.  If you have interest in having this on hand, I could send a prescription for you.     ITZ ALBERT MD

## 2018-10-03 PROBLEM — A60.01 HERPES SIMPLEX INFECTION OF PENIS: Status: ACTIVE | Noted: 2018-10-03

## 2018-10-03 RX ORDER — VALACYCLOVIR HYDROCHLORIDE 500 MG/1
500 TABLET, FILM COATED ORAL 2 TIMES DAILY
Qty: 6 TABLET | Refills: 3 | Status: SHIPPED | OUTPATIENT
Start: 2018-10-03 | End: 2018-10-13

## 2018-10-03 RX ORDER — VALACYCLOVIR HYDROCHLORIDE 500 MG/1
500 TABLET, FILM COATED ORAL 2 TIMES DAILY
Qty: 6 TABLET | Refills: 3 | Status: SHIPPED | OUTPATIENT
Start: 2018-10-03 | End: 2018-10-03

## 2018-10-03 NOTE — TELEPHONE ENCOUNTER
Done.  Sent to pharmacy, but [facility] said it failed.  May need to print out and fax?  Call pharmacy?

## 2018-10-13 ENCOUNTER — OFFICE VISIT (OUTPATIENT)
Dept: URGENT CARE | Facility: URGENT CARE | Age: 33
End: 2018-10-13
Payer: COMMERCIAL

## 2018-10-13 VITALS
SYSTOLIC BLOOD PRESSURE: 138 MMHG | WEIGHT: 315 LBS | HEART RATE: 88 BPM | DIASTOLIC BLOOD PRESSURE: 64 MMHG | BODY MASS INDEX: 53.56 KG/M2 | RESPIRATION RATE: 18 BRPM | TEMPERATURE: 99.1 F

## 2018-10-13 DIAGNOSIS — K42.9 UMBILICAL HERNIA WITHOUT OBSTRUCTION AND WITHOUT GANGRENE: ICD-10-CM

## 2018-10-13 DIAGNOSIS — A60.01 HERPES SIMPLEX INFECTION OF PENIS: ICD-10-CM

## 2018-10-13 DIAGNOSIS — I10 ESSENTIAL HYPERTENSION WITH GOAL BLOOD PRESSURE LESS THAN 140/90: Primary | Chronic | ICD-10-CM

## 2018-10-13 PROCEDURE — 99214 OFFICE O/P EST MOD 30 MIN: CPT | Performed by: FAMILY MEDICINE

## 2018-10-13 RX ORDER — VALACYCLOVIR HYDROCHLORIDE 500 MG/1
500 TABLET, FILM COATED ORAL 2 TIMES DAILY
Qty: 10 TABLET | Refills: 3 | Status: SHIPPED | OUTPATIENT
Start: 2018-10-13 | End: 2018-10-17

## 2018-10-13 RX ORDER — LISINOPRIL 20 MG/1
20 TABLET ORAL DAILY
Qty: 90 TABLET | Refills: 3 | Status: SHIPPED | OUTPATIENT
Start: 2018-10-13 | End: 2019-10-28

## 2018-10-13 ASSESSMENT — PAIN SCALES - GENERAL: PAINLEVEL: MILD PAIN (2)

## 2018-10-13 NOTE — NURSING NOTE
"Chief Complaint   Patient presents with     Abdominal Pain     Started yesterday in center of abdomen.  Slight bump.  Slightly improved today.        Initial /64 (BP Location: Right arm, Cuff Size: Adult Large)  Pulse 88  Temp 99.1  F (37.3  C) (Tympanic)  Resp 18  Wt (!) 384 lb (174.2 kg)  BMI 53.56 kg/m2 Estimated body mass index is 53.56 kg/(m^2) as calculated from the following:    Height as of 6/3/18: 5' 11\" (1.803 m).    Weight as of this encounter: 384 lb (174.2 kg).    Patient presents to the clinic using No DME    Health Maintenance that is potentially due pending provider review:  NONE    n/a    Is there anyone who you would like to be able to receive your results? Not Applicable  If yes have patient fill out GLADYS  Wild Shepherd M.A.      "

## 2018-10-13 NOTE — PATIENT INSTRUCTIONS
Hernia (Adult)    A hernia can happen when there is a weakness or defect in the wall of the abdomen or groin. Intestines or nearby tissues may move from their usual location and push through the weakness in the wall. This can cause a hernia (bulge) you may see or feel.  Causes and risk factors   A hernia may be present at birth. Or it may be caused by the wear and tear of daily living. Certain factors can make a hernia more likely. These can include:    Heavy lifting    Straining, whether from lifting, movement, or constipation    Chronic cough    Injury to the abdominal wall    Excess weight    Pregnancy    Prior surgery    Older age    Family history of hernia  Symptoms  Symptoms of a hernia may come on suddenly. Or they may appear slowly over time. Some common symptoms include:    Bulge in the groin area, around the navel, or in the scrotum (the bulge may get bigger when you stand and go away when you lie down)    Pain or pressure around the bulge    Pain during activities such as lifting, coughing, or sneezing    A feeling of weakness or pressure in the groin    Pain or swelling in the scrotum  Types of hernias  There are different types of hernia. The type you have depends on its location:    Inguinal. This type is in the groin or scrotum. It is more common in men.    Femoral. This type is in the groin, upper thigh (where the leg bends), or labia. It is more common in women.    Ventral. This type is in the abdominal wall.    Umbilical. This type occurs around the navel (belly button).    Incisional. This type occurs at the site of a previous surgery.  The condition of the hernia can help determine how urgently it needs to be treated.    Reducible. It goes back in by itself, or it can be pushed back in.    Irreducible. It can t be pushed back in.    Incarcerated/strangulated. The intestine is trapped (incarcerated). If this happens, you won t be able to push the bulge back in. If the incarcerated hernia isn t  treated, it may become strangulated. This means the area loses blood supply and the tissue may die. This requires emergency surgery. You need treatment right away.  In most cases, a hernia will not heal on its own. Surgery is usually needed to repair the defect in the abdominal wall or groin. You ll be told more about surgery, if needed.  If your symptoms are not severe, treatment may sometimes be delayed. In such cases, regular follow-up visits with the provider will be needed. You ll be asked to keep track of your symptoms and to watch for signs of more serious problems. You may also be given guidelines similar to the home care instructions below.  Home care  To help keep a hernia from getting worse, you may be advised to:    Avoid heavy lifting and straining as directed.    Take steps to prevent constipation, such as eating more fiber and drinking more water. This may help reduce straining that can occur when having a bowel movement. Reducing straining may help keep your symptoms from getting worse.    Maintain a healthy weight or lose excess weight. This can help reduce strain on abdominal muscles and tissues.    Stop smoking. This can help prevent coughing that may also strain abdominal muscles and tissues.  Follow-up care  Follow up with your healthcare provider, or as directed. If imaging tests were done, they will be reviewed a doctor. You will be told the results and any new findings that may affect your care.  When to seek medical advice  Call your healthcare provider right away if any of these occur:    Hernia hardens, swells, or grows larger    Hernia can no longer be pushed back in    Pain moves to the lower right abdomen (just below the waistline), or spreads to the back  Call 911  Call 911 if any of these occur:    Nausea and vomiting    Severe pain, redness, or tenderness in the area near the hernia    Pain worsens quickly and doesn t get better    Inability to have a bowel movement or pass  gas    Fever of 100.4 F (38 C) or higher, or as directed by your healthcare provider    Trouble breathing    Fainting    Rapid heart rate    Vomiting blood    Large amounts of blood in stool  Date Last Reviewed: 6/9/2015 2000-2017 [employer]. [address] [place] [address], [place] [address]. All rights reserved. This information is not intended as a substitute for professional medical care. Always follow your healthcare professional's instructions.

## 2018-10-13 NOTE — MR AVS SNAPSHOT
After Visit Summary   10/13/2018    Larry Bangura    MRN: [id number]           Patient Information     Date Of Birth          1985        Visit Information        Provider Department      10/13/2018 2:35 PM Veronica Nguyen MD [facility]        Today's Diagnoses     Essential hypertension with goal blood pressure less than 140/90    -  1    Herpes simplex infection of penis        Umbilical hernia without obstruction and without gangrene          Care Instructions      Hernia (Adult)    A hernia can happen when there is a weakness or defect in the wall of the abdomen or groin. Intestines or nearby tissues may move from their usual location and push through the weakness in the wall. This can cause a hernia (bulge) you may see or feel.  Causes and risk factors   A hernia may be present at birth. Or it may be caused by the wear and tear of daily living. Certain factors can make a hernia more likely. These can include:    Heavy lifting    Straining, whether from lifting, movement, or constipation    Chronic cough    Injury to the abdominal wall    Excess weight    Pregnancy    Prior surgery    Older age    Family history of hernia  Symptoms  Symptoms of a hernia may come on suddenly. Or they may appear slowly over time. Some common symptoms include:    Bulge in the groin area, around the navel, or in the scrotum (the bulge may get bigger when you stand and go away when you lie down)    Pain or pressure around the bulge    Pain during activities such as lifting, coughing, or sneezing    A feeling of weakness or pressure in the groin    Pain or swelling in the scrotum  Types of hernias  There are different types of hernia. The type you have depends on its location:    Inguinal. This type is in the groin or scrotum. It is more common in men.    Femoral. This type is in the groin, upper thigh (where the leg bends), or labia. It is more common in women.    Ventral.  This type is in the abdominal wall.    Umbilical. This type occurs around the navel (belly button).    Incisional. This type occurs at the site of a previous surgery.  The condition of the hernia can help determine how urgently it needs to be treated.    Reducible. It goes back in by itself, or it can be pushed back in.    Irreducible. It can t be pushed back in.    Incarcerated/strangulated. The intestine is trapped (incarcerated). If this happens, you won t be able to push the bulge back in. If the incarcerated hernia isn t treated, it may become strangulated. This means the area loses blood supply and the tissue may die. This requires emergency surgery. You need treatment right away.  In most cases, a hernia will not heal on its own. Surgery is usually needed to repair the defect in the abdominal wall or groin. You ll be told more about surgery, if needed.  If your symptoms are not severe, treatment may sometimes be delayed. In such cases, regular follow-up visits with the provider will be needed. You ll be asked to keep track of your symptoms and to watch for signs of more serious problems. You may also be given guidelines similar to the home care instructions below.  Home care  To help keep a hernia from getting worse, you may be advised to:    Avoid heavy lifting and straining as directed.    Take steps to prevent constipation, such as eating more fiber and drinking more water. This may help reduce straining that can occur when having a bowel movement. Reducing straining may help keep your symptoms from getting worse.    Maintain a healthy weight or lose excess weight. This can help reduce strain on abdominal muscles and tissues.    Stop smoking. This can help prevent coughing that may also strain abdominal muscles and tissues.  Follow-up care  Follow up with your healthcare provider, or as directed. If imaging tests were done, they will be reviewed a doctor. You will be told the results and any new findings  that may affect your care.  When to seek medical advice  Call your healthcare provider right away if any of these occur:    Hernia hardens, swells, or grows larger    Hernia can no longer be pushed back in    Pain moves to the lower right abdomen (just below the waistline), or spreads to the back  Call 911  Call 911 if any of these occur:    Nausea and vomiting    Severe pain, redness, or tenderness in the area near the hernia    Pain worsens quickly and doesn t get better    Inability to have a bowel movement or pass gas    Fever of 100.4 F (38 C) or higher, or as directed by your healthcare provider    Trouble breathing    Fainting    Rapid heart rate    Vomiting blood    Large amounts of blood in stool  Date Last Reviewed: 6/9/2015 2000-2017 [employer]. [address] [place] [address]. All rights reserved. This information is not intended as a substitute for professional medical care. Always follow your healthcare professional's instructions.                Follow-ups after your visit        Additional Services     GENERAL SURG ADULT REFERRAL       Your provider has referred you to: [facility]: [facility] [phone]   [email or web address]    Please be aware that coverage of these services is subject to the terms and limitations of your health insurance plan.  Call member services at your health plan with any benefit or coverage questions.      Please bring the following with you to your appointment:    (1) Any X-Rays, CTs or MRIs which have been performed.  Contact the facility where they were done to arrange for  prior to your scheduled appointment.   (2) List of current medications   (3) This referral request   (4) Any documents/labs given to you for this referral                  Your next 10 appointments already scheduled     Oct 17, 2018 10:20 AM CDT   SHORT with Shelton Mcconnell MD   [facility])    [address] [place] [id number]   [phone]            Dec 10, 2018 11:20 AM CST   Office Visit with Shelton Mcconnell MD   [facility] ([facility])    [address] [place] [id number]   [phone]           Bring a current list of meds and any records pertaining to this visit. For Physicals, please bring immunization records and any forms needing to be filled out. Please arrive 10 minutes early to complete paperwork.              Who to contact     If you have questions or need follow up information about today's clinic visit or your schedule please contact [facility] directly at [phone].  Normal or non-critical lab and imaging results will be communicated to you by [employer], letter or phone within 4 business days after the clinic has received the results. If you do not hear from us within 7 days, please contact the clinic through [employer] or phone. If you have a critical or abnormal lab result, we will notify you by phone as soon as possible.  Submit refill requests through [employer] or call your pharmacy and they will forward the refill request to us. Please allow 3 business days for your refill to be completed.          Additional Information About Your Visit        [employer] Information     [employer] gives you secure access to your electronic health record. If you see a primary care provider, you can also send messages to your care team and make appointments. If you have questions, please call your primary care clinic.  If you do not have a primary care provider, please call [phone] and they will assist you.        Care EveryWhere ID     This is your Care EveryWhere ID. This could be used by other organizations to access your [place] medical records  [id number]        Your Vitals Were     Pulse Temperature Respirations BMI (Body Mass Index)          88 99.1  F (37.3  C) (Tympanic) 18  53.56 kg/m2         Blood Pressure from Last 3 Encounters:   10/13/18 138/64   09/10/18 148/84   06/03/18 (!) 161/115    Weight from Last 3 Encounters:   10/13/18 (!) 384 lb (174.2 kg)   09/10/18 (!) 388 lb (176 kg)   02/14/18 (!) 376 lb (170.6 kg)              We Performed the Following     GENERAL SURG ADULT REFERRAL          Where to get your medicines      These medications were sent to [facility] [address] [place] [address] [address] [facility]  [address] [facility] [address]     Phone:  [phone]     lisinopril 20 MG tablet    valACYclovir 500 MG tablet          Primary Care Provider Office Phone # Fax #    Vahe Eason [phone] [address] [address]        Equal Access to Services     [facility]: Hadii ever domingo hadasho Soomaali, waaxda luqadaha, qaybta kaalmada adeegyada, veena bolod . So [facility] [phone].    ATENCIÓN: Si habla español, tiene a alba disposición servicios gratuitos de asistencia lingüística. Llame al [phone].    We comply with applicable federal civil rights laws and [place] laws. We do not discriminate on the basis of race, color, national origin, age, disability, sex, sexual orientation, or gender identity.            Thank you!     Thank you for choosing [facility]  for your care. Our goal is always to provide you with excellent care. Hearing back from our patients is one way we can continue to improve our services. Please take a few minutes to complete the written survey that you may receive in the mail after your visit with us. Thank you!             Your Updated Medication List - Protect others around you: Learn how to safely use, store and throw away your medicines at www.disposemymeds.org.          This list is accurate as of 10/13/18  2:57 PM.  Always use your most recent med list.                   Brand Name Dispense Instructions for use Diagnosis     blood glucose monitoring lancets     100 each    Use to test blood sugar 1 times daily or as directed.  Ok to substitute alternative if insurance prefers.    Type 2 diabetes mellitus without complication, without long-term current use of insulin (H)       blood glucose monitoring test strip    ACCU-CHEK CHERELLE PLUS    180 each    Use to test blood sugar 2 times daily or as directed.    Type 2 diabetes mellitus without complication, without long-term current use of insulin (H)       fluticasone 50 MCG/ACT spray    FLONASE    1 Package    Spray 1-2 sprays into both nostrils daily    Seasonal allergic rhinitis       IBUPROFEN PO      Take 400 mg by mouth every 4 hours as needed for moderate pain        lisinopril 20 MG tablet    PRINIVIL/ZESTRIL    90 tablet    Take 1 tablet (20 mg) by mouth daily        * metFORMIN 500 MG tablet    GLUCOPHAGE    60 tablet    Take 1 tablet (500 mg) by mouth 2 times daily (with meals)    Type 2 diabetes mellitus without complication, without long-term current use of insulin (H)       * metFORMIN 1000 MG tablet    GLUCOPHAGE    180 tablet    Take 1 tablet (1,000 mg) by mouth 2 times daily (with meals)    Type 2 diabetes mellitus without complication, without long-term current use of insulin (H)       order for DME      Patient received a Resmed Airsense 10  Auto. Pressures were set at 13 cm H2O.        STATIN NOT PRESCRIBED (INTENTIONAL)      Statin not prescribed intentionally due to Other Statin not indicated per Dr. ward (This option does not exclude patient from measure).        valACYclovir 500 MG tablet    VALTREX    10 tablet    Take 1 tablet (500 mg) by mouth 2 times daily for 5 days For 3 days for herpes outbreak    Herpes simplex infection of penis       * Notice:  This list has 2 medication(s) that are the same as other medications prescribed for you. Read the directions carefully, and ask your doctor or other care provider to review them with you.

## 2018-10-13 NOTE — PROGRESS NOTES
SUBJECTIVE  HPI:  Larry Bangura is a 33 year old male who presents with the CC of abdominal/pelvic pain.    Pain is located in the periumbilical area, with radiation to None.  The pain is characterized as dull, aching and burning, and at worst is a level 3-4 on a scale of 1-10.  Pain has been present for 3 day(s) and is stable.  EXACERBATING FACTORS: movement/walking and position   RELIEVING FACTORS: nothing.  ASSOCIATED SX: none.  Also wants refill on his blood pressure medication and Valtrex  Past Medical History:   Diagnosis Date     HTN (hypertension) 9/10/2013     Morbid obesity (H)      Current Outpatient Prescriptions   Medication Sig Dispense Refill     blood glucose monitoring (ACCU-CHEK CHERELLE PLUS) test strip Use to test blood sugar 2 times daily or as directed. 180 each 11     blood glucose monitoring (ACCU-CHEK FASTCLIX) lancets Use to test blood sugar 1 times daily or as directed.  Ok to substitute alternative if insurance prefers. 100 each 3     IBUPROFEN PO Take 400 mg by mouth every 4 hours as needed for moderate pain       lisinopril (PRINIVIL/ZESTRIL) 20 MG tablet Take 1 tablet (20 mg) by mouth daily 90 tablet 3     metFORMIN (GLUCOPHAGE) 1000 MG tablet Take 1 tablet (1,000 mg) by mouth 2 times daily (with meals) 180 tablet 3     metFORMIN (GLUCOPHAGE) 500 MG tablet Take 1 tablet (500 mg) by mouth 2 times daily (with meals) 60 tablet 2     ORDER FOR DME, SET TO FAX, Patient received a Resmed Airsense 10  Auto. Pressures were set at 13 cm H2O.       STATIN NOT PRESCRIBED, INTENTIONAL, Statin not prescribed intentionally due to Other Statin not indicated per Dr. ward (This option does not exclude patient from measure).  0     valACYclovir (VALTREX) 500 MG tablet Take 1 tablet (500 mg) by mouth 2 times daily For 3 days for herpes outbreak 6 tablet 3     fluticasone (FLONASE) 50 MCG/ACT nasal spray Spray 1-2 sprays into both nostrils daily (Patient not taking: Reported on 9/10/2018) 1 Package 11      Social History   Substance Use Topics     Smoking status: Never Smoker     Smokeless tobacco: Never Used     Alcohol use No       ROS:  CONSTITUTIONAL:NEGATIVE for fever, chills, change in weight  INTEGUMENTARY/SKIN: NEGATIVE for worrisome rashes, moles or lesions  EYES: NEGATIVE for vision changes or irritation  ENT/MOUTH: NEGATIVE for ear, mouth and throat problems  RESP:NEGATIVE for significant cough or SOB  CV: NEGATIVE for chest pain, palpitations or peripheral edema  GI: POSITIVE for abdominal pain periumbilical  MUSCULOSKELETAL: NEGATIVE for significant arthralgias or myalgia  NEURO: NEGATIVE for weakness, dizziness or paresthesias  PSYCHIATRIC: NEGATIVE for changes in mood or affect    OBJECTIVE:  /64 (BP Location: Right arm, Cuff Size: Adult Large)  Pulse 88  Temp 99.1  F (37.3  C) (Tympanic)  Resp 18  Wt (!) 384 lb (174.2 kg)  BMI 53.56 kg/m2  GENERAL APPEARANCE: healthy, alert and no distress  EYES: EOMI,  PERRL, conjunctiva clear  HENT: ear canals and TM's normal.  Nose and mouth without ulcers, erythema or lesions  NECK: supple, nontender, no lymphadenopathy  RESP: lungs clear to auscultation - no rales, rhonchi or wheezes  CV: regular rates and rhythm, normal S1 S2, no murmur noted  ABDOMEN: obese, soft, normal bowel sounds, tenderness mild and voluntary guarding periumbilical   NEURO: Normal strength and tone, sensory exam grossly normal,  normal speech and mentation  SKIN: no suspicious lesions or rashes    ASSESSMENT:  Umbilical hernia- general surgery referral  Hypertension - Medication refilled   Herpes- medication refilled    See Orders in Epic

## 2018-10-17 ENCOUNTER — OFFICE VISIT (OUTPATIENT)
Dept: FAMILY MEDICINE | Facility: CLINIC | Age: 33
End: 2018-10-17
Payer: COMMERCIAL

## 2018-10-17 VITALS
RESPIRATION RATE: 18 BRPM | TEMPERATURE: 98.2 F | WEIGHT: 315 LBS | SYSTOLIC BLOOD PRESSURE: 132 MMHG | HEART RATE: 64 BPM | BODY MASS INDEX: 53 KG/M2 | DIASTOLIC BLOOD PRESSURE: 78 MMHG

## 2018-10-17 DIAGNOSIS — A60.01 HERPES SIMPLEX INFECTION OF PENIS: ICD-10-CM

## 2018-10-17 DIAGNOSIS — E78.5 HYPERLIPIDEMIA LDL GOAL <100: ICD-10-CM

## 2018-10-17 DIAGNOSIS — I10 ESSENTIAL HYPERTENSION WITH GOAL BLOOD PRESSURE LESS THAN 140/90: Chronic | ICD-10-CM

## 2018-10-17 DIAGNOSIS — E66.01 MORBID OBESITY (H): Primary | Chronic | ICD-10-CM

## 2018-10-17 DIAGNOSIS — E11.9 TYPE 2 DIABETES MELLITUS WITHOUT COMPLICATION, WITHOUT LONG-TERM CURRENT USE OF INSULIN (H): Chronic | ICD-10-CM

## 2018-10-17 PROCEDURE — 99214 OFFICE O/P EST MOD 30 MIN: CPT | Performed by: FAMILY MEDICINE

## 2018-10-17 PROCEDURE — 99207 C FOOT EXAM  NO CHARGE: CPT | Performed by: FAMILY MEDICINE

## 2018-10-17 RX ORDER — VALACYCLOVIR HYDROCHLORIDE 500 MG/1
500 TABLET, FILM COATED ORAL 2 TIMES DAILY
Qty: 14 TABLET | Refills: 7 | Status: SHIPPED | OUTPATIENT
Start: 2018-10-17 | End: 2019-10-28

## 2018-10-17 ASSESSMENT — PAIN SCALES - GENERAL: PAINLEVEL: NO PAIN (0)

## 2018-10-17 NOTE — NURSING NOTE
"Chief Complaint   Patient presents with     Recheck Medication     Diabetes from 9/10/18       Initial /78 (BP Location: Right arm, Patient Position: Chair, Cuff Size: Adult Large)  Pulse 64  Temp 98.2  F (36.8  C) (Tympanic)  Resp 18  Wt (!) 380 lb (172.4 kg)  BMI 53 kg/m2 Estimated body mass index is 53 kg/(m^2) as calculated from the following:    Height as of 6/3/18: 5' 11\" (1.803 m).    Weight as of this encounter: 380 lb (172.4 kg).    Patient presents to the clinic using No DME    Health Maintenance that is potentially due pending provider review:  NONE    n/a        "

## 2018-10-17 NOTE — MR AVS SNAPSHOT
After Visit Summary   10/17/2018    Larry Bangura    MRN: [id number]           Patient Information     Date Of Birth          1985        Visit Information        Provider Department      10/17/2018 10:20 AM Shelton Mcconnell MD [facility]        Today's Diagnoses     Morbid obesity (H)    -  1    Type 2 diabetes mellitus without complication, without long-term current use of insulin (H)        Herpes simplex infection of penis        Hyperlipidemia LDL goal <100        Essential hypertension with goal blood pressure less than 140/90           Follow-ups after your visit        Your next 10 appointments already scheduled     Oct 22, 2018  9:15 AM CDT   New Visit with Jasen Solorzano MD   [facility] ([facility]    [address] [facility] [address]   [phone]            Dec 10, 2018 11:20 AM CST   Office Visit with Shelton Mcconnell MD   [facility] ([facility])    [address] [place] [id number] [phone]           Bring a current list of meds and any records pertaining to this visit. For Physicals, please bring immunization records and any forms needing to be filled out. Please arrive 10 minutes early to complete paperwork.              Who to contact     If you have questions or need follow up information about today's clinic visit or your schedule please contact [facility] directly at [phone].  Normal or non-critical lab and imaging results will be communicated to you by MyChart, letter or phone within 4 business days after the clinic has received the results. If you do not hear from us within 7 days, please contact the clinic through MyChart or phone. If you have a critical or abnormal lab result, we will notify you by phone as soon as possible.  Submit refill requests through [employer] or call your pharmacy and they will forward the refill request to us.  Please allow 3 business days for your refill to be completed.          Additional Information About Your Visit        MyChart Information     [employer] gives you secure access to your electronic health record. If you see a primary care provider, you can also send messages to your care team and make appointments. If you have questions, please call your primary care clinic.  If you do not have a primary care provider, please call [phone] and they will assist you.        Care EveryWhere ID     This is your Care EveryWhere ID. This could be used by other organizations to access your [place] medical records  [id number]        Your Vitals Were     Pulse Temperature Respirations BMI (Body Mass Index)          64 98.2  F (36.8  C) (Tympanic) 18 53 kg/m2         Blood Pressure from Last 3 Encounters:   10/17/18 132/78   10/13/18 138/64   09/10/18 148/84    Weight from Last 3 Encounters:   10/17/18 (!) 380 lb (172.4 kg)   10/13/18 (!) 384 lb (174.2 kg)   09/10/18 (!) 388 lb (176 kg)              We Performed the Following     Basic metabolic panel  (Ca, Cl, CO2, Creat, Gluc, K, Na, BUN)     FOOT EXAM          Today's Medication Changes          These changes are accurate as of 10/17/18 10:58 AM.  If you have any questions, ask your nurse or doctor.               These medicines have changed or have updated prescriptions.        Dose/Directions    metFORMIN 1000 MG tablet   Commonly known as:  GLUCOPHAGE   This may have changed:  Another medication with the same name was removed. Continue taking this medication, and follow the directions you see here.   Used for:  Type 2 diabetes mellitus without complication, without long-term current use of insulin (H)   Changed by:  Shelton Mcconnell MD        Dose:  1000 mg   Take 1 tablet (1,000 mg) by mouth 2 times daily (with meals)   Quantity:  180 tablet   Refills:  3       valACYclovir 500 MG tablet   Commonly known as:  VALTREX   This may have changed:  additional instructions    Used for:  Herpes simplex infection of penis   Changed by:  Shelton Mcconnell MD        Dose:  500 mg   Take 1 tablet (500 mg) by mouth 2 times daily   Quantity:  14 tablet   Refills:  7         Stop taking these medicines if you haven't already. Please contact your care team if you have questions.     fluticasone 50 MCG/ACT spray   Commonly known as:  FLONASE   Stopped by:  Shetlon Mcconnell MD                Where to get your medicines      These medications were sent to [facility] [address] [place] [address] [address] [facility]  [address] [facility] [address]     Phone:  [phone]     metFORMIN 1000 MG tablet    valACYclovir 500 MG tablet                Primary Care Provider Office Phone # Fax #    Vahe Eason [phone] [address] [place] [address]        Equal Access to Services     [facility]: Hadii ever domingo hadasho Soomaali, waaxda luqadaha, qaybta kaalmada nikhilyanadiya, veena blood . So [facility] [phone].    ATENCIÓN: Si habla español, tiene a alba disposición servicios gratuitos de asistencia lingüística. [facility] [phone].    We comply with applicable federal civil rights laws and [place] laws. We do not discriminate on the basis of race, color, national origin, age, disability, sex, sexual orientation, or gender identity.            Thank you!     Thank you for choosing [facility]  for your care. Our goal is always to provide you with excellent care. Hearing back from our patients is one way we can continue to improve our services. Please take a few minutes to complete the written survey that you may receive in the mail after your visit with us. Thank you!             Your Updated Medication List - Protect others around you: Learn how to safely use, store and throw away your medicines at www.disposemymeds.org.          This list is accurate as of 10/17/18 10:58 AM.  Always use your most recent med list.                    Brand Name Dispense Instructions for use Diagnosis    aspirin 81 MG tablet     30 tablet    Take 1 tablet (81 mg) by mouth daily        blood glucose monitoring lancets     100 each    Use to test blood sugar 1 times daily or as directed.  Ok to substitute alternative if insurance prefers.    Type 2 diabetes mellitus without complication, without long-term current use of insulin (H)       blood glucose monitoring test strip    ACCU-CHEK CHERELLE PLUS    180 each    Use to test blood sugar 2 times daily or as directed.    Type 2 diabetes mellitus without complication, without long-term current use of insulin (H)       IBUPROFEN PO      Take 400 mg by mouth every 4 hours as needed for moderate pain        lisinopril 20 MG tablet    PRINIVIL/ZESTRIL    90 tablet    Take 1 tablet (20 mg) by mouth daily        metFORMIN 1000 MG tablet    GLUCOPHAGE    180 tablet    Take 1 tablet (1,000 mg) by mouth 2 times daily (with meals)    Type 2 diabetes mellitus without complication, without long-term current use of insulin (H)       order for DME      Patient received a [employer] Airsense 10  Auto. Pressures were set at 13 cm H2O.        STATIN NOT PRESCRIBED (INTENTIONAL)      Statin not prescribed intentionally due to Other Statin not indicated per Dr. ward (This option does not exclude patient from measure).        valACYclovir 500 MG tablet    VALTREX    14 tablet    Take 1 tablet (500 mg) by mouth 2 times daily    Herpes simplex infection of penis

## 2018-10-17 NOTE — PROGRESS NOTES
SUBJECTIVE:   Larry Bangura is a 33 year old male who presents to clinic today for the following health issues:      Diabetes Follow-up      Patient is checking blood sugars: 2 times a week Ranging around 160's    Diabetic concerns: None     Symptoms of hypoglycemia (low blood sugar): none     Paresthesias (numbness or burning in feet) or sores: No     Date of last diabetic eye exam: 2/9/18 at [facility]    BP Readings from Last 2 Encounters:   10/13/18 138/64   09/10/18 148/84     Hemoglobin A1C (%)   Date Value   09/10/2018 11.9 (H)   11/17/2016 6.4 (H)     LDL Cholesterol Calculated (mg/dL)   Date Value   07/15/2016 100 (H)   08/13/2013 139 (H)       Diabetes Management Resources        S: Larry Bangura is a 33 year old male with DM2.  11.9 on A1C a month ago.  Has totally changed diet, now tolerating metformin.  Sugars almost always under 200 at home now.  Needs titration to 1000mg bid.    Htn: improved on lisionopril.  Update met panel    Genital herpes.: needs longer dose when has outbreak, 3 days not tenough.     Morbid obesity: improved a bit, lost 8 pounds.  Working on diet, motivated now    Willing to take ASA, statin discussion next visit    Problem list, med list, additional histories reviewed and updated, as indicated.      No cp or sob    O:/78 (BP Location: Right arm, Patient Position: Chair, Cuff Size: Adult Large)  Pulse 64  Temp 98.2  F (36.8  C) (Tympanic)  Resp 18  Wt (!) 380 lb (172.4 kg)  BMI 53 kg/m2  GEN: Alert and oriented, in no acute distress      A: DM2, improved on metformin and diet      Htn, improved on lisinopril       Herpes, new dose sent to pharmacy for outbreak medication        Morbid obesity, some mild improvement    P: going in right direction.  Up to 1000mg on metformin.  Continue lisinopril.  Back in 2 months for A1C and statin discussion.    Add asa

## 2018-10-22 ENCOUNTER — OFFICE VISIT (OUTPATIENT)
Dept: SURGERY | Facility: CLINIC | Age: 33
End: 2018-10-22
Payer: COMMERCIAL

## 2018-10-22 VITALS
SYSTOLIC BLOOD PRESSURE: 120 MMHG | WEIGHT: 315 LBS | BODY MASS INDEX: 44.1 KG/M2 | HEART RATE: 71 BPM | DIASTOLIC BLOOD PRESSURE: 74 MMHG | TEMPERATURE: 98 F | HEIGHT: 71 IN

## 2018-10-22 DIAGNOSIS — K42.9 UMBILICAL HERNIA WITHOUT OBSTRUCTION AND WITHOUT GANGRENE: Primary | ICD-10-CM

## 2018-10-22 PROCEDURE — 99203 OFFICE O/P NEW LOW 30 MIN: CPT | Performed by: SURGERY

## 2018-10-22 ASSESSMENT — PAIN SCALES - GENERAL: PAINLEVEL: MILD PAIN (2)

## 2018-10-22 NOTE — MR AVS SNAPSHOT
After Visit Summary   10/22/2018    Larry Bangura    MRN: [id number]           Patient Information     Date Of Birth          1985        Visit Information        Provider Department      10/22/2018 9:15 AM Jasen Solorzano MD [facility]        Today's Diagnoses     Umbilical hernia without obstruction and without gangrene    -  1      Care Instructions    Per Physician's instructions            Follow-ups after your visit        Your next 10 appointments already scheduled     Dec 10, 2018 11:20 AM CST   [facility]    [address] [place] [id number]   [phone]           Bring a current list of meds and any records pertaining to this visit. For Physicals, please bring immunization records and any forms needing to be filled out. Please arrive 10 minutes early to complete paperwork.              Who to contact     If you have questions or need follow up information about today's clinic visit or your schedule please contact [facility] directly at [phone].  Normal or non-critical lab and imaging results will be communicated to you by EventRegisthart, letter or phone within 4 business days after the clinic has received the results. If you do not hear from us within 7 days, please contact the clinic through Nichet or phone. If you have a critical or abnormal lab result, we will notify you by phone as soon as possible.  Submit refill requests through eVariant or call your pharmacy and they will forward the refill request to us. Please allow 3 business days for your refill to be completed.          Additional Information About Your Visit        EventRegisthart Information     eV[employer] gives you secure access to your electronic health record. If you see a primary care provider, you can also send messages to your care team and make appointments. If you have questions, please call your  "primary care clinic.  If you do not have a primary care provider, please call [phone] and they will assist you.        Care EveryWhere ID     This is your Care EveryWhere ID. This could be used by other organizations to access your [place] medical records  [id number]        Your Vitals Were     Pulse Temperature Height BMI (Body Mass Index)          71 98  F (36.7  C) (Tympanic) 1.803 m (5' 11\") 52.16 kg/m2         Blood Pressure from Last 3 Encounters:   10/22/18 120/74   10/17/18 132/78   10/13/18 138/64    Weight from Last 3 Encounters:   10/22/18 (!) 169.6 kg (374 lb)   10/17/18 (!) 172.4 kg (380 lb)   10/13/18 (!) 174.2 kg (384 lb)              Today, you had the following     No orders found for display       Primary Care Provider Office Phone # Fax #    Vahe Eason [phone] [address] [place], [place] [id number]        Equal Access to Services     [facility]: Hadii ever domingo hadasho Sostella, waaxda luqadaha, qaybta kaalmada andriy, veena blood . So [facility] [phone].    ATENCIÓN: Si habla español, tiene a alba disposición servicios gratuitos de asistencia lingüística. Llame al [phone].    We comply with applicable federal civil rights laws and [place] laws. We do not discriminate on the basis of race, color, national origin, age, disability, sex, sexual orientation, or gender identity.            Thank you!     Thank you for choosing [facility]  for your care. Our goal is always to provide you with excellent care. Hearing back from our patients is one way we can continue to improve our services. Please take a few minutes to complete the written survey that you may receive in the mail after your visit with us. Thank you!             Your Updated Medication List - Protect others around you: Learn how to safely use, store and throw away your medicines at www.disposemymeds.org.          This list is accurate as of 10/22/18  " 9:51 AM.  Always use your most recent med list.                   Brand Name Dispense Instructions for use Diagnosis    aspirin 81 MG tablet     30 tablet    Take 1 tablet (81 mg) by mouth daily        blood glucose monitoring lancets     100 each    Use to test blood sugar 1 times daily or as directed.  Ok to substitute alternative if insurance prefers.    Type 2 diabetes mellitus without complication, without long-term current use of insulin (H)       blood glucose monitoring test strip    ACCU-CHEK CHERELLE PLUS    180 each    Use to test blood sugar 2 times daily or as directed.    Type 2 diabetes mellitus without complication, without long-term current use of insulin (H)       IBUPROFEN PO      Take 400 mg by mouth every 4 hours as needed for moderate pain        lisinopril 20 MG tablet    PRINIVIL/ZESTRIL    90 tablet    Take 1 tablet (20 mg) by mouth daily        metFORMIN 1000 MG tablet    GLUCOPHAGE    180 tablet    Take 1 tablet (1,000 mg) by mouth 2 times daily (with meals)    Type 2 diabetes mellitus without complication, without long-term current use of insulin (H)       order for DME      Patient received a [employer] Airsense 10  Auto. Pressures were set at 13 cm H2O.        STATIN NOT PRESCRIBED (INTENTIONAL)      Statin not prescribed intentionally due to Other Statin not indicated per Dr. ward (This option does not exclude patient from measure).        valACYclovir 500 MG tablet    VALTREX    14 tablet    Take 1 tablet (500 mg) by mouth 2 times daily    Herpes simplex infection of penis

## 2018-10-22 NOTE — NURSING NOTE
"Initial /74 (BP Location: Right arm, Patient Position: Sitting, Cuff Size: Adult Large)  Pulse 71  Temp 98  F (36.7  C) (Tympanic)  Ht 1.803 m (5' 11\")  Wt (!) 169.6 kg (374 lb)  BMI 52.16 kg/m2 Estimated body mass index is 52.16 kg/(m^2) as calculated from the following:    Height as of this encounter: 1.803 m (5' 11\").    Weight as of this encounter: 169.6 kg (374 lb). .    Katiana Rivera CMA    "

## 2018-10-22 NOTE — LETTER
10/22/2018         RE: Larry Bangura  [address] [address]        Dear Colleague,    Thank you for referring your patient, Larry Bangura, to the [facility]. Please see a copy of my visit note below.    PCP:  Vahe Eason    Chief complaint: Umbilical hernia    History of Present Illness: Patient is 33-year-old man with morbid obesity, obstructive sleep apnea, and type 2 diabetes who presents with a symptomatic umbilical hernia. He only noticed this about a week or 10 days ago. It caused him some discomfort for a couple days and lately it has bothered him at all. He feels a lump at the superior aspect of his umbilicus. It doesn't go beyond skin level. No bowel or bladder obstructive symptoms. He does lifting at work.    He is noted with umbilical hernias as his father had lung, and his child had one at 2 year of age.    Other than the couple of days last week, he doesn't report significant discomfort with this hernia.    Histories:  Past Medical History:   Diagnosis Date     HTN (hypertension) 9/10/2013     Morbid obesity (H)        Past Surgical History:   Procedure Laterality Date     ENDOSCOPIC RELEASE CARPAL TUNNEL  12/6/2013    Procedure: ENDOSCOPIC RELEASE CARPAL TUNNEL;  Right Hand Endoscopic Carpal Tunnel Release;  Surgeon: Kody Pittman MD;  Location: [facility]     ENDOSCOPIC RELEASE CARPAL TUNNEL  1/21/2014    Procedure: ENDOSCOPIC RELEASE CARPAL TUNNEL;  Left endoscopic carpal tunnel release, possible open;  Surgeon: Kody Pittman MD;  Location: [facility] OR       Family History   Problem Relation Age of Onset     Diabetes Father      Cancer Sister      C.A.D. Maternal Grandfather        Social History   Substance Use Topics     Smoking status: Never Smoker     Smokeless tobacco: Never Used     Alcohol use No       Current Outpatient Prescriptions   Medication Sig Dispense Refill     aspirin 81 MG tablet Take 1 tablet (81 mg) by mouth daily 30 tablet      blood  glucose monitoring (ACCU-CHEK CHERELLE PLUS) test strip Use to test blood sugar 2 times daily or as directed. 180 each 11     blood glucose monitoring (ACCU-CHEK FASTCLIX) lancets Use to test blood sugar 1 times daily or as directed.  Ok to substitute alternative if insurance prefers. 100 each 3     IBUPROFEN PO Take 400 mg by mouth every 4 hours as needed for moderate pain       lisinopril (PRINIVIL/ZESTRIL) 20 MG tablet Take 1 tablet (20 mg) by mouth daily 90 tablet 3     metFORMIN (GLUCOPHAGE) 1000 MG tablet Take 1 tablet (1,000 mg) by mouth 2 times daily (with meals) 180 tablet 3     ORDER FOR DME, SET TO FAX, Patient received a [employer] Airsense 10  Auto. Pressures were set at 13 cm H2O.       STATIN NOT PRESCRIBED, INTENTIONAL, Statin not prescribed intentionally due to Other Statin not indicated per Dr. ward (This option does not exclude patient from measure).  0     valACYclovir (VALTREX) 500 MG tablet Take 1 tablet (500 mg) by mouth 2 times daily 14 tablet 7       No Known Allergies    Images:  No results found for this or any previous visit (from the past 744 hour(s)).    Labs:  Results for orders placed or performed in visit on 09/10/18   Hemoglobin A1c   Result Value Ref Range    Hemoglobin A1C 11.9 (H) 0 - 5.6 %   CBC with platelets   Result Value Ref Range    WBC 9.1 4.0 - 11.0 10e9/L    RBC Count 5.08 4.4 - 5.9 10e12/L    Hemoglobin 14.3 13.3 - 17.7 g/dL    Hematocrit 42.7 40.0 - 53.0 %    MCV 84 78 - 100 fl    MCH 28.1 26.5 - 33.0 pg    MCHC 33.5 31.5 - 36.5 g/dL    RDW 13.7 10.0 - 15.0 %    Platelet Count 372 150 - 450 10e9/L       ROS:  Constitutional - Denies fevers, weight loss, malaise, lethargy  Neuro - Denies tremors or seizures  Pulmon - Denies SOB, dyspnea, hemoptysis, chronic cough or use of an inhaler  CV - Denies CP, SOB, lower extremity edema, difficulty w/ stairs, has never used NTG  GI - Denies hematemesis, BRBPR, melena, chronic diarrhea   - Denies hematuria, difficulty voiding, h/o  "STDs  Hematology - Denies blood clotting disorders, chronic anemias  Dermatology - No melanomas or skin cancers  Rheumatology - No h/o RA  Pysch - Denies depression, bipolar d/o or schizophrenia    /74 (BP Location: Right arm, Patient Position: Sitting, Cuff Size: Adult Large)  Pulse 71  Temp 98  F (36.7  C) (Tympanic)  Ht 1.803 m (5' 11\")  Wt (!) 169.6 kg (374 lb)  BMI 52.16 kg/m2    Exam:  General - Alert and Oriented X4, NAD, well nourished  Neck - supple, no LAD  Lungs - Clear to auscultation bilaterally with good inspiratory effort, no tactile fremitus  CV - Heart RRR  Abdomen - Soft, non-tender, +BS, no hepatosplenomegaly, small reducible hernia on the superior aspect of the umbilicus  Groins - deferred  Rectal -deferred  Neuro - Full ROM, Strength 5/5 and major muscle groups, sensation intact  Extremities - No cyanosis, clubbing or edema.      Assessment and Plan: Small umbilical hernia which is marginally symptomatic. At this point he is not interested in repair and I don't see a reason to recommend. Doesn't bother him, is clearly not at risk for bowel involvement because of its small size.    Recommendation is to continue to monitor and let me know in the future when he would like it repaired. He was warned about incarceration and what to do if that should happen.        Jasen Solorzano MD FACS            Again, thank you for allowing me to participate in the care of your patient.        Sincerely,        Jasen Solorzano MD    "

## 2018-10-22 NOTE — PROGRESS NOTES
PCP:  Vahe Eason    Chief complaint: Umbilical hernia    History of Present Illness: Patient is 33-year-old man with morbid obesity, obstructive sleep apnea, and type 2 diabetes who presents with a symptomatic umbilical hernia. He only noticed this about a week or 10 days ago. It caused him some discomfort for a couple days and lately it has bothered him at all. He feels a lump at the superior aspect of his umbilicus. It doesn't go beyond skin level. No bowel or bladder obstructive symptoms. He does lifting at work.    He is noted with umbilical hernias as his father had lung, and his child had one at 2 year of age.    Other than the couple of days last week, he doesn't report significant discomfort with this hernia.    Histories:  Past Medical History:   Diagnosis Date     HTN (hypertension) 9/10/2013     Morbid obesity (H)        Past Surgical History:   Procedure Laterality Date     ENDOSCOPIC RELEASE CARPAL TUNNEL  12/6/2013    Procedure: ENDOSCOPIC RELEASE CARPAL TUNNEL;  Right Hand Endoscopic Carpal Tunnel Release;  Surgeon: Kody Pittman MD;  Location: [facility]     ENDOSCOPIC RELEASE CARPAL TUNNEL  1/21/2014    Procedure: ENDOSCOPIC RELEASE CARPAL TUNNEL;  Left endoscopic carpal tunnel release, possible open;  Surgeon: Kody Pittman MD;  Location: [facility] OR       Family History   Problem Relation Age of Onset     Diabetes Father      Cancer Sister      C.A.D. Maternal Grandfather        Social History   Substance Use Topics     Smoking status: Never Smoker     Smokeless tobacco: Never Used     Alcohol use No       Current Outpatient Prescriptions   Medication Sig Dispense Refill     aspirin 81 MG tablet Take 1 tablet (81 mg) by mouth daily 30 tablet      blood glucose monitoring (ACCU-CHEK CHERELLE PLUS) test strip Use to test blood sugar 2 times daily or as directed. 180 each 11     blood glucose monitoring (ACCU-CHEK FASTCLIX) lancets Use to test blood sugar 1 times daily or as directed.  Ok to  substitute alternative if insurance prefers. 100 each 3     IBUPROFEN PO Take 400 mg by mouth every 4 hours as needed for moderate pain       lisinopril (PRINIVIL/ZESTRIL) 20 MG tablet Take 1 tablet (20 mg) by mouth daily 90 tablet 3     metFORMIN (GLUCOPHAGE) 1000 MG tablet Take 1 tablet (1,000 mg) by mouth 2 times daily (with meals) 180 tablet 3     ORDER FOR DME, SET TO FAX, Patient received a [employer] Airsense 10  Auto. Pressures were set at 13 cm H2O.       STATIN NOT PRESCRIBED, INTENTIONAL, Statin not prescribed intentionally due to Other Statin not indicated per Dr. ward (This option does not exclude patient from measure).  0     valACYclovir (VALTREX) 500 MG tablet Take 1 tablet (500 mg) by mouth 2 times daily 14 tablet 7       No Known Allergies    Images:  No results found for this or any previous visit (from the past 744 hour(s)).    Labs:  Results for orders placed or performed in visit on 09/10/18   Hemoglobin A1c   Result Value Ref Range    Hemoglobin A1C 11.9 (H) 0 - 5.6 %   CBC with platelets   Result Value Ref Range    WBC 9.1 4.0 - 11.0 10e9/L    RBC Count 5.08 4.4 - 5.9 10e12/L    Hemoglobin 14.3 13.3 - 17.7 g/dL    Hematocrit 42.7 40.0 - 53.0 %    MCV 84 78 - 100 fl    MCH 28.1 26.5 - 33.0 pg    MCHC 33.5 31.5 - 36.5 g/dL    RDW 13.7 10.0 - 15.0 %    Platelet Count 372 150 - 450 10e9/L       ROS:  Constitutional - Denies fevers, weight loss, malaise, lethargy  Neuro - Denies tremors or seizures  Pulmon - Denies SOB, dyspnea, hemoptysis, chronic cough or use of an inhaler  CV - Denies CP, SOB, lower extremity edema, difficulty w/ stairs, has never used NTG  GI - Denies hematemesis, BRBPR, melena, chronic diarrhea   - Denies hematuria, difficulty voiding, h/o STDs  Hematology - Denies blood clotting disorders, chronic anemias  Dermatology - No melanomas or skin cancers  Rheumatology - No h/o RA  Pysch - Denies depression, bipolar d/o or schizophrenia    /74 (BP Location: Right arm, Patient  "Position: Sitting, Cuff Size: Adult Large)  Pulse 71  Temp 98  F (36.7  C) (Tympanic)  Ht 1.803 m (5' 11\")  Wt (!) 169.6 kg (374 lb)  BMI 52.16 kg/m2    Exam:  General - Alert and Oriented X4, NAD, well nourished  Neck - supple, no LAD  Lungs - Clear to auscultation bilaterally with good inspiratory effort, no tactile fremitus  CV - Heart RRR  Abdomen - Soft, non-tender, +BS, no hepatosplenomegaly, small reducible hernia on the superior aspect of the umbilicus  Groins - deferred  Rectal -deferred  Neuro - Full ROM, Strength 5/5 and major muscle groups, sensation intact  Extremities - No cyanosis, clubbing or edema.      Assessment and Plan: Small umbilical hernia which is marginally symptomatic. At this point he is not interested in repair and I don't see a reason to recommend. Doesn't bother him, is clearly not at risk for bowel involvement because of its small size.    Recommendation is to continue to monitor and let me know in the future when he would like it repaired. He was warned about incarceration and what to do if that should happen.        Jasen Solorzano MD FACS          "

## 2018-12-10 ENCOUNTER — OFFICE VISIT (OUTPATIENT)
Dept: FAMILY MEDICINE | Facility: CLINIC | Age: 33
End: 2018-12-10
Payer: COMMERCIAL

## 2018-12-10 VITALS
RESPIRATION RATE: 18 BRPM | WEIGHT: 315 LBS | SYSTOLIC BLOOD PRESSURE: 124 MMHG | BODY MASS INDEX: 44.1 KG/M2 | HEART RATE: 68 BPM | DIASTOLIC BLOOD PRESSURE: 78 MMHG | TEMPERATURE: 97.8 F | HEIGHT: 71 IN

## 2018-12-10 DIAGNOSIS — I10 ESSENTIAL HYPERTENSION WITH GOAL BLOOD PRESSURE LESS THAN 140/90: Chronic | ICD-10-CM

## 2018-12-10 DIAGNOSIS — E78.5 HYPERLIPIDEMIA LDL GOAL <100: ICD-10-CM

## 2018-12-10 DIAGNOSIS — E11.9 TYPE 2 DIABETES MELLITUS WITHOUT COMPLICATION, WITHOUT LONG-TERM CURRENT USE OF INSULIN (H): Primary | Chronic | ICD-10-CM

## 2018-12-10 LAB — HBA1C MFR BLD: 7.5 % (ref 0–5.6)

## 2018-12-10 PROCEDURE — 83036 HEMOGLOBIN GLYCOSYLATED A1C: CPT | Performed by: FAMILY MEDICINE

## 2018-12-10 PROCEDURE — 36415 COLL VENOUS BLD VENIPUNCTURE: CPT | Performed by: FAMILY MEDICINE

## 2018-12-10 PROCEDURE — 99214 OFFICE O/P EST MOD 30 MIN: CPT | Performed by: FAMILY MEDICINE

## 2018-12-10 RX ORDER — LANCETS
EACH MISCELLANEOUS
Qty: 100 EACH | Refills: 3 | Status: SHIPPED | OUTPATIENT
Start: 2018-12-10 | End: 2020-09-30

## 2018-12-10 ASSESSMENT — MIFFLIN-ST. JEOR: SCORE: 2645.9

## 2018-12-10 NOTE — PROGRESS NOTES
"  SUBJECTIVE:   Larry Bangura is a 33 year old male who presents to clinic today for the following health issues:      Diabetes Follow-up      Patient is checking blood sugars: 3 times a week. 146-170    Diabetic concerns: None     Symptoms of hypoglycemia (low blood sugar): none     Paresthesias (numbness or burning in feet) or sores: No     Date of last diabetic eye exam:  [facility]- Due in January     BP Readings from Last 2 Encounters:   10/22/18 120/74   10/17/18 132/78     Hemoglobin A1C (%)   Date Value   09/10/2018 11.9 (H)   11/17/2016 6.4 (H)     LDL Cholesterol Calculated (mg/dL)   Date Value   07/15/2016 100 (H)   08/13/2013 139 (H)       Diabetes Management Resources  Hypertension Follow-up      Outpatient blood pressures are not being checked.    Low Salt Diet: low salt      Amount of exercise or physical activity: just work    Problems taking medications regularly: No    Medication side effects: lots of diarreha from the metformin     Diet: low salt        S: Larry Bangura is a 33 year old male with htn.  Doing well.    DM2 :recheck, 7.5 A1C today much improved.   Hyperlipidemia: 34 y/o not on statin.    Morbid obesity: ongoing.  Dropped a few points.      He has stopped drinking soda pop, so that's a big change    Problem list, med list, additional histories reviewed and updated, as indicated.      No cp or sob    O:/78 (Cuff Size: Adult Large)   Pulse 68   Temp 97.8  F (36.6  C) (Tympanic)   Resp 18   Ht 1.803 m (5' 11\")   Wt (!) 167.4 kg (369 lb)   BMI 51.47 kg/m    GEN: Alert and oriented, in no acute distress  CV: RRR, no murmur  EXT: no edema or lesions noted in lower extremities    A :  htn.  Doing well.    DM2 :recheck, 7.5 A1C today much improved.   Hyperlipidemia: 34 y/o not on statin.    Morbid obesity: ongoing.      P: continue meds.  Encouraged him on his improvement.  Hold off on asa/statin for now given age.    F/u 3 months    Weight management plan: diet/exercise     "

## 2019-05-02 ENCOUNTER — TELEPHONE (OUTPATIENT)
Dept: SLEEP MEDICINE | Facility: CLINIC | Age: 34
End: 2019-05-02

## 2019-05-02 DIAGNOSIS — G47.33 OBSTRUCTIVE SLEEP APNEA (ADULT) (PEDIATRIC): Primary | ICD-10-CM

## 2019-05-02 DIAGNOSIS — G47.33 OSA (OBSTRUCTIVE SLEEP APNEA): Primary | ICD-10-CM

## 2019-05-02 NOTE — TELEPHONE ENCOUNTER
Reason for call:  Other   Patient called regarding (reason for call): prescription  Additional comments: Patient is requesting a new prescription for his mask as soon as possible. He is scheduled to see DELL Lopez on 05/20/19, first available as of today. Please call patient to discuss if this can be done.    Phone number to reach patient:  Cell number on file:    Telephone Information:   Mobile [phone]       Best Time:  any    Can we leave a detailed message on this number?  YES     Isabel DOBSON  Central Scheduler

## 2019-10-28 DIAGNOSIS — E11.9 TYPE 2 DIABETES MELLITUS WITHOUT COMPLICATION, WITHOUT LONG-TERM CURRENT USE OF INSULIN (H): Chronic | ICD-10-CM

## 2019-10-28 DIAGNOSIS — I10 ESSENTIAL HYPERTENSION WITH GOAL BLOOD PRESSURE LESS THAN 140/90: Primary | ICD-10-CM

## 2019-10-28 DIAGNOSIS — A60.01 HERPES SIMPLEX INFECTION OF PENIS: ICD-10-CM

## 2019-10-28 NOTE — TELEPHONE ENCOUNTER
"ValACYclovir 500MG  TAB  Last Written Prescription Date:  10/17/18  Last Fill Quantity: 14,  # refills: 7   Last office visit: 12/10/2018 with prescribing provider:  RAMÍREZ   Future Office Visit:        Metformin  Last Written Prescription Date:  10/17/2018  Last Fill Quantity: 180,  # refills: 3   Last office visit: 12/10/2018 with prescribing provider:  RAMÍREZ   Future Office Visit:    Requested Prescriptions   Pending Prescriptions Disp Refills     valACYclovir (VALTREX) 500 MG tablet [Pharmacy Med Name: ValACYclovir 500MG  TAB] 14 tablet 7     Sig: TAKE 1 TABLET BY MOUTH TWICE DAILY       Antivirals for Herpes Protocol Failed - 10/28/2019  5:51 PM        Failed - Normal serum creatinine on file in past 12 months     Recent Labs   Lab Test 07/15/16  1417   CR 0.67             Passed - Patient is age 12 or older        Passed - Recent (12 mo) or future (30 days) visit within the authorizing provider's specialty     Patient has had an office visit with the authorizing provider or a provider within the authorizing providers department within the previous 12 mos or has a future within next 30 days. See \"Patient Info\" tab in inbasket, or \"Choose Columns\" in Meds & Orders section of the refill encounter.              Passed - Medication is active on med list        metFORMIN (GLUCOPHAGE) 1000 MG tablet [Pharmacy Med Name: METFORMIN 1000MG TAB] 60 tablet 11     Sig: TAKE 1 TABLET BY MOUTH TWICE DAILY WITH MEALS       Biguanide Agents Failed - 10/28/2019  5:51 PM        Failed - Blood pressure less than 140/90 in past 6 months     BP Readings from Last 3 Encounters:   12/10/18 124/78   10/22/18 120/74   10/17/18 132/78                 Failed - Patient has documented LDL within the past 12 mos.     Recent Labs   Lab Test 07/15/16  1417   *             Failed - Patient has had a Microalbumin in the past 15 mos.     No lab results found.          Failed - Patient has documented A1c within the specified period of time.     If " "HgbA1C is 8 or greater, it needs to be on file within the past 3 months.  If less than 8, must be on file within the past 6 months.     Recent Labs   Lab Test 12/10/18  1153   A1C 7.5*             Failed - Patient's CR is NOT>1.4 OR Patient's EGFR is NOT<45 within past 12 mos.     Recent Labs   Lab Test 07/15/16  1417   GFRESTIMATED >90  Non  GFR Calc     GFRESTBLACK >90   GFR Calc         Recent Labs   Lab Test 07/15/16  1417   CR 0.67             Failed - Recent (6 mo) or future (30 days) visit within the authorizing provider's specialty     Patient had office visit in the last 6 months or has a visit in the next 30 days with authorizing provider or within the authorizing provider's specialty.  See \"Patient Info\" tab in inbasket, or \"Choose Columns\" in Meds & Orders section of the refill encounter.            Passed - Patient is age 10 or older        Passed - Patient does NOT have a diagnosis of CHF.        Passed - Medication is active on med list          "

## 2019-10-29 RX ORDER — VALACYCLOVIR HYDROCHLORIDE 500 MG/1
TABLET, FILM COATED ORAL
Qty: 14 TABLET | Refills: 0 | Status: SHIPPED | OUTPATIENT
Start: 2019-10-29 | End: 2019-11-30

## 2019-10-29 RX ORDER — LISINOPRIL 20 MG/1
TABLET ORAL
Qty: 30 TABLET | Refills: 0 | Status: SHIPPED | OUTPATIENT
Start: 2019-10-29 | End: 2019-11-30

## 2019-10-29 NOTE — TELEPHONE ENCOUNTER
"Requested Prescriptions   Pending Prescriptions Disp Refills     lisinopril (PRINIVIL/ZESTRIL) 20 MG tablet [Pharmacy Med Name: LISINOPRIL 20MG     TAB] 30 tablet 11     Sig: TAKE 1 TABLET BY MOUTH ONCE DAILY       ACE Inhibitors (Including Combos) Protocol Failed - 10/28/2019  5:51 PM        Failed - Recent (12 mo) or future (30 days) visit within the authorizing provider's specialty     Patient has had an office visit with the authorizing provider or a provider within the authorizing providers department within the previous 12 mos or has a future within next 30 days. See \"Patient Info\" tab in inbasket, or \"Choose Columns\" in Meds & Orders section of the refill encounter.              Failed - Normal serum creatinine on file in past 12 months     Recent Labs   Lab Test 07/15/16  1417   CR 0.67             Failed - Normal serum potassium on file in past 12 months     Recent Labs   Lab Test 07/15/16  1417   POTASSIUM 4.0             Passed - Blood pressure under 140/90 in past 12 months     BP Readings from Last 3 Encounters:   12/10/18 124/78   10/22/18 120/74   10/17/18 132/78                 Passed - Medication is active on med list        Passed - Patient is age 18 or older        Last Written Prescription Date:  10/13/2018  Last Fill Quantity: 90,  # refills: 3   Last office visit: 10/13/2018 with prescribing provider:  Patrick    Future Office Visit:      "

## 2019-11-07 ENCOUNTER — HEALTH MAINTENANCE LETTER (OUTPATIENT)
Age: 34
End: 2019-11-07

## 2019-11-30 DIAGNOSIS — A60.01 HERPES SIMPLEX INFECTION OF PENIS: ICD-10-CM

## 2019-11-30 DIAGNOSIS — I10 ESSENTIAL HYPERTENSION WITH GOAL BLOOD PRESSURE LESS THAN 140/90: ICD-10-CM

## 2019-11-30 DIAGNOSIS — E11.9 TYPE 2 DIABETES MELLITUS WITHOUT COMPLICATION, WITHOUT LONG-TERM CURRENT USE OF INSULIN (H): Chronic | ICD-10-CM

## 2019-11-30 NOTE — TELEPHONE ENCOUNTER
"Requested Prescriptions   Pending Prescriptions Disp Refills     metFORMIN (GLUCOPHAGE) 1000 MG tablet [Pharmacy Med Name: METFORMIN 1000MG TAB] 60 tablet 0     Sig: TAKE 1 TABLET BY MOUTH TWICE DAILY WITH MEALS . APPOINTMENT REQUIRED FOR FUTURE REFILLS .       Biguanide Agents Failed - 11/30/2019 10:06 AM        Failed - Blood pressure less than 140/90 in past 6 months     BP Readings from Last 3 Encounters:   12/10/18 124/78   10/22/18 120/74   10/17/18 132/78                 Failed - Patient has documented LDL within the past 12 mos.     Recent Labs   Lab Test 07/15/16  1417   *             Failed - Patient has had a Microalbumin in the past 15 mos.     No lab results found.          Failed - Patient has documented A1c within the specified period of time.     If HgbA1C is 8 or greater, it needs to be on file within the past 3 months.  If less than 8, must be on file within the past 6 months.     Recent Labs   Lab Test 12/10/18  1153   A1C 7.5*             Failed - Patient's CR is NOT>1.4 OR Patient's EGFR is NOT<45 within past 12 mos.     Recent Labs   Lab Test 07/15/16  1417   GFRESTIMATED >90  Non  GFR Calc     GFRESTBLACK >90   GFR Calc         Recent Labs   Lab Test 07/15/16  1417   CR 0.67             Failed - Recent (6 mo) or future (30 days) visit within the authorizing provider's specialty     Patient had office visit in the last 6 months or has a visit in the next 30 days with authorizing provider or within the authorizing provider's specialty.  See \"Patient Info\" tab in inbasket, or \"Choose Columns\" in Meds & Orders section of the refill encounter.      Last Written Prescription Date:  10/29/19  Last Fill Quantity: 60,  # refills: 0   Last office visit: 12/10/2018 with prescribing provider:     Future Office Visit:              Passed - Patient is age 10 or older        Passed - Patient does NOT have a diagnosis of CHF.        Passed - Medication is active on med " "list        valACYclovir (VALTREX) 500 MG tablet [Pharmacy Med Name: ValACYclovir 500MG  TAB] 14 tablet 0     Sig: TAKE 1 TABLET BY MOUTH TWICE DAILY . APPOINTMENT REQUIRED FOR FUTURE REFILLS       Antivirals for Herpes Protocol Failed - 11/30/2019 10:06 AM        Failed - Normal serum creatinine on file in past 12 months     Recent Labs   Lab Test 07/15/16  1417   CR 0.67             Passed - Patient is age 12 or older        Passed - Recent (12 mo) or future (30 days) visit within the authorizing provider's specialty     Patient has had an office visit with the authorizing provider or a provider within the authorizing providers department within the previous 12 mos or has a future within next 30 days. See \"Patient Info\" tab in inbasket, or \"Choose Columns\" in Meds & Orders section of the refill encounter.      Last Written Prescription Date:  10/29/19  Last Fill Quantity: 14,  # refills: 0   Last office visit: 12/10/2018 with prescribing provider:     Future Office Visit:                Passed - Medication is active on med list        lisinopril (PRINIVIL/ZESTRIL) 20 MG tablet [Pharmacy Med Name: LISINOPRIL 20MG     TAB] 30 tablet 0     Sig: TAKE 1 TABLET BY MOUTH ONCE DAILY . APPOINTMENT REQUIRED FOR FUTURE REFILLS       ACE Inhibitors (Including Combos) Protocol Failed - 11/30/2019 10:06 AM        Failed - Normal serum creatinine on file in past 12 months     Recent Labs   Lab Test 07/15/16  1417   CR 0.67             Failed - Normal serum potassium on file in past 12 months     Recent Labs   Lab Test 07/15/16  1417   POTASSIUM 4.0             Passed - Blood pressure under 140/90 in past 12 months     BP Readings from Last 3 Encounters:   12/10/18 124/78   10/22/18 120/74   10/17/18 132/78                 Passed - Recent (12 mo) or future (30 days) visit within the authorizing provider's specialty     Patient has had an office visit with the authorizing provider or a provider within the authorizing providers " "department within the previous 12 mos or has a future within next 30 days. See \"Patient Info\" tab in inbasket, or \"Choose Columns\" in Meds & Orders section of the refill encounter.      Last Written Prescription Date:  10/29/19  Last Fill Quantity: 30,  # refills: 0   Last office visit: 12/10/2018 with prescribing provider:     Future Office Visit:                Passed - Medication is active on med list        Passed - Patient is age 18 or older          "

## 2019-12-02 RX ORDER — LISINOPRIL 20 MG/1
TABLET ORAL
Qty: 30 TABLET | Refills: 0 | Status: SHIPPED | OUTPATIENT
Start: 2019-12-02 | End: 2019-12-11

## 2019-12-02 RX ORDER — VALACYCLOVIR HYDROCHLORIDE 500 MG/1
TABLET, FILM COATED ORAL
Qty: 14 TABLET | Refills: 0 | Status: SHIPPED | OUTPATIENT
Start: 2019-12-02 | End: 2019-12-11

## 2019-12-11 ENCOUNTER — OFFICE VISIT (OUTPATIENT)
Dept: FAMILY MEDICINE | Facility: CLINIC | Age: 34
End: 2019-12-11
Payer: COMMERCIAL

## 2019-12-11 VITALS
BODY MASS INDEX: 44.1 KG/M2 | WEIGHT: 315 LBS | HEIGHT: 71 IN | TEMPERATURE: 98.6 F | DIASTOLIC BLOOD PRESSURE: 76 MMHG | OXYGEN SATURATION: 99 % | HEART RATE: 82 BPM | SYSTOLIC BLOOD PRESSURE: 134 MMHG

## 2019-12-11 DIAGNOSIS — Z23 NEED FOR PROPHYLACTIC VACCINATION AND INOCULATION AGAINST INFLUENZA: ICD-10-CM

## 2019-12-11 DIAGNOSIS — E11.9 TYPE 2 DIABETES MELLITUS WITHOUT COMPLICATION, WITHOUT LONG-TERM CURRENT USE OF INSULIN (H): Chronic | ICD-10-CM

## 2019-12-11 DIAGNOSIS — A60.01 HERPES SIMPLEX INFECTION OF PENIS: ICD-10-CM

## 2019-12-11 DIAGNOSIS — E66.01 MORBID OBESITY (H): Primary | Chronic | ICD-10-CM

## 2019-12-11 DIAGNOSIS — G47.33 OBSTRUCTIVE SLEEP APNEA: Chronic | ICD-10-CM

## 2019-12-11 DIAGNOSIS — I10 ESSENTIAL HYPERTENSION WITH GOAL BLOOD PRESSURE LESS THAN 140/90: ICD-10-CM

## 2019-12-11 LAB
ANION GAP SERPL CALCULATED.3IONS-SCNC: 3 MMOL/L (ref 3–14)
BUN SERPL-MCNC: 12 MG/DL (ref 7–30)
CALCIUM SERPL-MCNC: 9.1 MG/DL (ref 8.5–10.1)
CHLORIDE SERPL-SCNC: 105 MMOL/L (ref 94–109)
CO2 SERPL-SCNC: 31 MMOL/L (ref 20–32)
CREAT SERPL-MCNC: 0.66 MG/DL (ref 0.66–1.25)
CREAT UR-MCNC: 110 MG/DL
ERYTHROCYTE [DISTWIDTH] IN BLOOD BY AUTOMATED COUNT: 14.1 % (ref 10–15)
GFR SERPL CREATININE-BSD FRML MDRD: >90 ML/MIN/{1.73_M2}
GLUCOSE SERPL-MCNC: 157 MG/DL (ref 70–99)
HBA1C MFR BLD: 6.5 % (ref 0–5.6)
HCT VFR BLD AUTO: 42.8 % (ref 40–53)
HGB BLD-MCNC: 13.8 G/DL (ref 13.3–17.7)
MCH RBC QN AUTO: 27.7 PG (ref 26.5–33)
MCHC RBC AUTO-ENTMCNC: 32.2 G/DL (ref 31.5–36.5)
MCV RBC AUTO: 86 FL (ref 78–100)
MICROALBUMIN UR-MCNC: 14 MG/L
MICROALBUMIN/CREAT UR: 12.36 MG/G CR (ref 0–17)
PLATELET # BLD AUTO: 363 10E9/L (ref 150–450)
POTASSIUM SERPL-SCNC: 4.2 MMOL/L (ref 3.4–5.3)
RBC # BLD AUTO: 4.99 10E12/L (ref 4.4–5.9)
SODIUM SERPL-SCNC: 139 MMOL/L (ref 133–144)
WBC # BLD AUTO: 9.7 10E9/L (ref 4–11)

## 2019-12-11 PROCEDURE — 85027 COMPLETE CBC AUTOMATED: CPT | Performed by: FAMILY MEDICINE

## 2019-12-11 PROCEDURE — 80048 BASIC METABOLIC PNL TOTAL CA: CPT | Performed by: FAMILY MEDICINE

## 2019-12-11 PROCEDURE — 90471 IMMUNIZATION ADMIN: CPT | Performed by: FAMILY MEDICINE

## 2019-12-11 PROCEDURE — 90732 PPSV23 VACC 2 YRS+ SUBQ/IM: CPT | Performed by: FAMILY MEDICINE

## 2019-12-11 PROCEDURE — 90686 IIV4 VACC NO PRSV 0.5 ML IM: CPT | Performed by: FAMILY MEDICINE

## 2019-12-11 PROCEDURE — 83036 HEMOGLOBIN GLYCOSYLATED A1C: CPT | Performed by: FAMILY MEDICINE

## 2019-12-11 PROCEDURE — 36415 COLL VENOUS BLD VENIPUNCTURE: CPT | Performed by: FAMILY MEDICINE

## 2019-12-11 PROCEDURE — 99214 OFFICE O/P EST MOD 30 MIN: CPT | Mod: 25 | Performed by: FAMILY MEDICINE

## 2019-12-11 PROCEDURE — 90472 IMMUNIZATION ADMIN EACH ADD: CPT | Performed by: FAMILY MEDICINE

## 2019-12-11 PROCEDURE — 82043 UR ALBUMIN QUANTITATIVE: CPT | Performed by: FAMILY MEDICINE

## 2019-12-11 RX ORDER — VALACYCLOVIR HYDROCHLORIDE 500 MG/1
TABLET, FILM COATED ORAL
Qty: 90 TABLET | Refills: 3 | Status: SHIPPED | OUTPATIENT
Start: 2019-12-11 | End: 2020-11-09

## 2019-12-11 RX ORDER — LISINOPRIL 20 MG/1
TABLET ORAL
Qty: 90 TABLET | Refills: 3 | Status: SHIPPED | OUTPATIENT
Start: 2019-12-11 | End: 2020-09-21

## 2019-12-11 RX ORDER — METFORMIN HCL 500 MG
2000 TABLET, EXTENDED RELEASE 24 HR ORAL
Qty: 360 TABLET | Refills: 3 | Status: SHIPPED | OUTPATIENT
Start: 2019-12-11 | End: 2020-02-26 | Stop reason: SINTOL

## 2019-12-11 ASSESSMENT — MIFFLIN-ST. JEOR: SCORE: 2722.55

## 2019-12-11 NOTE — LETTER
December 11, 2019      Larry Bangura  [address] [id number]        Dear ,    We are writing to inform you of your test results.    Diabetes improved, good job.  Other labs OK, blood counts normal too.     I hope things are going well.  See you in 6 months.     Resulted Orders   Hemoglobin A1c   Result Value Ref Range    Hemoglobin A1C 6.5 (H) 0 - 5.6 %      Comment:      Normal <5.7% Prediabetes 5.7-6.4%  Diabetes 6.5% or higher - adopted from ADA   consensus guidelines.     Basic metabolic panel  (Ca, Cl, CO2, Creat, Gluc, K, Na, BUN)   Result Value Ref Range    Sodium 139 133 - 144 mmol/L    Potassium 4.2 3.4 - 5.3 mmol/L    Chloride 105 94 - 109 mmol/L    Carbon Dioxide 31 20 - 32 mmol/L    Anion Gap 3 3 - 14 mmol/L    Glucose 157 (H) 70 - 99 mg/dL      Comment:      Non Fasting    Urea Nitrogen 12 7 - 30 mg/dL    Creatinine 0.66 0.66 - 1.25 mg/dL    GFR Estimate >90 >60 mL/min/[1.73_m2]      Comment:      Non  GFR Calc  Starting 12/18/2018, serum creatinine based estimated GFR (eGFR) will be   calculated using the Chronic Kidney Disease Epidemiology Collaboration   (CKD-EPI) equation.      GFR Estimate If Black >90 >60 mL/min/[1.73_m2]      Comment:       GFR Calc  Starting 12/18/2018, serum creatinine based estimated GFR (eGFR) will be   calculated using the Chronic Kidney Disease Epidemiology Collaboration   (CKD-EPI) equation.      Calcium 9.1 8.5 - 10.1 mg/dL   CBC with platelets   Result Value Ref Range    WBC 9.7 4.0 - 11.0 10e9/L      Comment:      CORRECTED ON 12/11 AT 1030: PREVIOUSLY REPORTED AS 9.5    RBC Count 4.99 4.4 - 5.9 10e12/L      Comment:      CORRECTED ON 12/11 AT 1030: PREVIOUSLY REPORTED AS 4.95    Hemoglobin 13.8 13.3 - 17.7 g/dL      Comment:      CORRECTED ON 12/11 AT 1030: PREVIOUSLY REPORTED AS 13.7    Hematocrit 42.8 40.0 - 53.0 %      Comment:      CORRECTED ON 12/11 AT 1030: PREVIOUSLY REPORTED AS 42.6    MCV 86 78 - 100 fl     MCH 27.7 26.5 - 33.0 pg    MCHC 32.2 31.5 - 36.5 g/dL    RDW 14.1 10.0 - 15.0 %      Comment:      CORRECTED ON 12/11 AT 1030: PREVIOUSLY REPORTED AS 14.0    Platelet Count 363 150 - 450 10e9/L      Comment:      CORRECTED ON 12/11 AT 1030: PREVIOUSLY REPORTED        If you have any questions or concerns, please call the clinic at the number listed above.       Sincerely,        Shelton Mcconnell MD / elsa

## 2019-12-11 NOTE — PROGRESS NOTES
"Larry Bangura is a 34 year old male comes in today with the following concerns.      1. Diabetes follow up. Does not check blood sugars. Denies any numbness, burning, or sores in feet. Pt reports eye exam completed earlier this year. No concerns today.    Discuss pneumovax.    Vanessa Schmidt CMA([facility])    *    S: Larry Bangura is a 34 year old male with DM2.  Recheck. Metformin. Takes all at night, likes doingit that way, need to switch to long acting    Htn: stable on meds.  Fills and labs    Morbid obesity: wants to talk about bariatric surgery    Hsv: valtrex suppression is what he wants, outbreaks are a burden    Sleep apnea: cpap    Problem list, med list, additional histories reviewed and updated, as indicated.      No cp or sob    O:/76   Pulse 82   Temp 98.6  F (37  C) (Tympanic)   Ht 1.803 m (5' 11\")   Wt (!) 175.5 kg (387 lb)   SpO2 99%   BMI 53.98 kg/m    GEN: Alert and oriented, in no acute distress  CV: RRR, no murmur  RESP: lungs clear bilaterally, good effort  EXT: no edema or lesions noted in lower extremities    A:   DM2.  Recheck. Metformin. recheck  Htn: stable on meds.  Fills and labs  Morbid obesity: wants to talk about bariatric surgery  Hsv: valtrex suppression is what he wants, outbreaks are a burden  Sleep apnea: cpap    P: labs.  Fills.  Valtrex now daily  Bariatric referral, I think this is medically necessary given comorbidities and 54 BMI.  Add glipizide if A1C up over 8    Weight management plan: diet/exercise    "

## 2019-12-11 NOTE — NURSING NOTE
"Chief Complaint   Patient presents with     Diabetes       Initial /76   Pulse 82   Temp 98.6  F (37  C) (Tympanic)   Ht 1.803 m (5' 11\")   Wt (!) 175.5 kg (387 lb)   SpO2 99%   BMI 53.98 kg/m   Estimated body mass index is 53.98 kg/m  as calculated from the following:    Height as of this encounter: 1.803 m (5' 11\").    Weight as of this encounter: 175.5 kg (387 lb).    Patient presents to the clinic using No DME    Health Maintenance that is potentially due pending provider review:  Labs    Possibly completing today per provider review.    Is there anyone who you would like to be able to receive your results? No  If yes have patient fill out GLADYS    Vanessa Schmidt CMA([facility])    "

## 2020-02-25 ENCOUNTER — TELEPHONE (OUTPATIENT)
Dept: FAMILY MEDICINE | Facility: CLINIC | Age: 35
End: 2020-02-25

## 2020-02-25 DIAGNOSIS — E11.9 TYPE 2 DIABETES MELLITUS WITHOUT COMPLICATION, WITHOUT LONG-TERM CURRENT USE OF INSULIN (H): Chronic | ICD-10-CM

## 2020-02-25 DIAGNOSIS — A60.01 HERPES SIMPLEX INFECTION OF PENIS: Primary | ICD-10-CM

## 2020-02-25 NOTE — TELEPHONE ENCOUNTER
Please advise. Pt complaint is not listed as a s/e of metformin XR, valacyclovir or lisinopril, according to [employer].    metFORMIN (GLUCOPHAGE-XR)  Common  Endocrine metabolic: Cobalamin deficiency (7% to 17.4% )  Gastrointestinal: Diarrhea (53% (immediate-release) ; 10% to 13% (extended-release) ), Flatulence (12% ), Indigestion (7% ), Malabsorption syndrome (Up to 9.9% ), Nausea (7% (extended-release) ), Vomiting (Up to 25.5% )  Neurologic: Asthenia (9% ), Headache (6% )  Serious  Endocrine metabolic: Lactic acidosis  Hepatic: Hepatitis    valACYclovir (VALTREX)   Common  Dermatologic: Rash (8% )  Gastrointestinal: Abdominal pain (1% to 11% ), Nausea (5% to 15% ), Vomiting (less than 1% to 6% )  Neurologic: Headache (13% to 38% )  Other: Fatigue (8% )  Serious  Hematologic: Thrombotic thrombocytopenic purpura  Neurologic: Aseptic meningitis, Encephalopathy, Seizure  Renal: Hemolytic uremic syndrome    lisinopril (PRINIVIL/ZESTRIL) 20 MG tablet  Common  Cardiovascular: Chest pain, Hypotension (up to 11% ), Syncope (5% to 7% )  Neurologic: Dizziness (12% to 19% ), Headache  Respiratory: Cough  Serious  Cardiovascular: Hypotension (Severe) (9% )  Dermatologic: Culver-Luis Alfredo syndrome (1% or more ), Toxic epidermal necrolysis (1% or more )  Endocrine metabolic: Hyperkalemia (2.2% to 6% )  Gastrointestinal: Intestinal angioedema  Immunologic: Anaphylaxis due to hymenoptera venom, Dialysis membrane-induced anaphylactoid reaction  Renal: Acute renal failure, Renal impairment (2.4% )  Other: Angioedema, Head and Neck  France KHAN RN

## 2020-02-25 NOTE — TELEPHONE ENCOUNTER
Reason for call:  Patient reporting a symptom    Symptom or request: Pt has not been sleeping as well. Pt was recently switched to Metformin time Released medication.  Pt said he would like to go back to regular Metformin 2 tabs of 1000 mg  At bedtime. Pt said he feels better on this medication.  Pt is aware Dr. Mcconnell is not in until 2/26/20      Phone Number patient can be reached at:  Home number on file [phone] (home)    Best Time:  Any Time      Can we leave a detailed message on this number:  YES    Call taken on 2/25/2020 at 1:15 PM by Anaya Iglesias

## 2020-02-26 RX ORDER — VALACYCLOVIR HYDROCHLORIDE 1 G/1
TABLET, FILM COATED ORAL
Qty: 12 TABLET | Refills: 1 | Status: SHIPPED | OUTPATIENT
Start: 2020-02-26 | End: 2020-06-01

## 2020-02-26 NOTE — TELEPHONE ENCOUNTER
Metformin is a 12 hour acting medication.  If he wants the immediate release, he needs to take that twice daily, 1000mg bid.    If he wants to take all at once, he needs the metformin XR.   He says that's not helping him with sleep, so if he wants to go back to regular acting, needs to be bid as above.     OK to fill long term once you figure out what he wants.

## 2020-05-01 DIAGNOSIS — G47.33 OBSTRUCTIVE SLEEP APNEA (ADULT) (PEDIATRIC): Primary | ICD-10-CM

## 2020-05-30 DIAGNOSIS — A60.01 HERPES SIMPLEX INFECTION OF PENIS: ICD-10-CM

## 2020-06-01 RX ORDER — VALACYCLOVIR HYDROCHLORIDE 1 G/1
TABLET, FILM COATED ORAL
Qty: 12 TABLET | Refills: 0 | Status: SHIPPED | OUTPATIENT
Start: 2020-06-01 | End: 2020-08-04

## 2020-08-04 DIAGNOSIS — A60.01 HERPES SIMPLEX INFECTION OF PENIS: ICD-10-CM

## 2020-08-04 RX ORDER — VALACYCLOVIR HYDROCHLORIDE 1 G/1
TABLET, FILM COATED ORAL
Qty: 12 TABLET | Refills: 0 | Status: SHIPPED | OUTPATIENT
Start: 2020-08-04 | End: 2020-11-09

## 2020-08-12 ENCOUNTER — OFFICE VISIT (OUTPATIENT)
Dept: URGENT CARE | Facility: URGENT CARE | Age: 35
End: 2020-08-12
Payer: COMMERCIAL

## 2020-08-12 ENCOUNTER — ANCILLARY PROCEDURE (OUTPATIENT)
Dept: GENERAL RADIOLOGY | Facility: CLINIC | Age: 35
End: 2020-08-12
Attending: NURSE PRACTITIONER
Payer: COMMERCIAL

## 2020-08-12 VITALS
HEIGHT: 71 IN | HEART RATE: 86 BPM | OXYGEN SATURATION: 98 % | SYSTOLIC BLOOD PRESSURE: 142 MMHG | BODY MASS INDEX: 44.1 KG/M2 | RESPIRATION RATE: 18 BRPM | DIASTOLIC BLOOD PRESSURE: 96 MMHG | WEIGHT: 315 LBS

## 2020-08-12 DIAGNOSIS — S99.911A ANKLE INJURY, RIGHT, INITIAL ENCOUNTER: ICD-10-CM

## 2020-08-12 DIAGNOSIS — S93.401A SPRAIN OF RIGHT ANKLE, UNSPECIFIED LIGAMENT, INITIAL ENCOUNTER: Primary | ICD-10-CM

## 2020-08-12 PROCEDURE — 73610 X-RAY EXAM OF ANKLE: CPT | Mod: RT

## 2020-08-12 PROCEDURE — 99214 OFFICE O/P EST MOD 30 MIN: CPT | Performed by: NURSE PRACTITIONER

## 2020-08-12 ASSESSMENT — MIFFLIN-ST. JEOR: SCORE: 2753.84

## 2020-08-12 NOTE — PROGRESS NOTES
Subjective     Larry Bangura is a 35 year old male who presents to clinic today for the following health issues:    HPI     Chief Complaint   Patient presents with     Musculoskeletal Problem     right ankle pain for a few weeks, sharp pains, swollen              Patient Active Problem List   Diagnosis     Morbid obesity (H)     Seasonal allergic rhinitis     Obstructive sleep apnea     Type 2 diabetes mellitus without complication (H)     CARDIOVASCULAR SCREENING; LDL GOAL LESS THAN 160     Essential hypertension with goal blood pressure less than 140/90     Esophageal reflux     Herpes simplex infection of penis     Hyperlipidemia LDL goal <100     Past Surgical History:   Procedure Laterality Date     ENDOSCOPIC RELEASE CARPAL TUNNEL  12/6/2013    Procedure: ENDOSCOPIC RELEASE CARPAL TUNNEL;  Right Hand Endoscopic Carpal Tunnel Release;  Surgeon: Kody Pittman MD;  Location: [facility]     ENDOSCOPIC RELEASE CARPAL TUNNEL  1/21/2014    Procedure: ENDOSCOPIC RELEASE CARPAL TUNNEL;  Left endoscopic carpal tunnel release, possible open;  Surgeon: Kody Pittman MD;  Location: [facility]       Social History     Tobacco Use     Smoking status: Never Smoker     Smokeless tobacco: Never Used   Substance Use Topics     Alcohol use: No     Alcohol/week: 0.0 standard drinks     Family History   Problem Relation Age of Onset     Diabetes Father      Cancer Sister      C.A.D. Maternal Grandfather          Current Outpatient Medications   Medication Sig Dispense Refill     IBUPROFEN PO Take 400 mg by mouth every 4 hours as needed for moderate pain       lisinopril (PRINIVIL/ZESTRIL) 20 MG tablet TAKE 1 TABLET BY MOUTH ONCE DAILY . 90 tablet 3     metFORMIN (GLUCOPHAGE) 1000 MG tablet Take one tablet twice daily with meals 180 tablet 1     valACYclovir (VALTREX) 1000 mg tablet TAKE 1 TABLET BY MOUTH TWICE DAILY FOR 2 DAYS FOR  OUTBREAKS 12 tablet 0     valACYclovir (VALTREX) 500 MG tablet TAKE 1 TABLET BY MOUTH TWICE DAILY 90  "tablet 3     aspirin 81 MG tablet Take 1 tablet (81 mg) by mouth daily 30 tablet      blood glucose monitoring (ACCU-CHEK CHERELLE PLUS) test strip Use to test blood sugar 2 times daily or as directed. (Patient not taking: Reported on 12/11/2019) 180 each 11     blood glucose monitoring (ACCU-CHEK FASTCLIX) lancets Use to test blood sugar 1 times daily or as directed.  Ok to substitute alternative if insurance prefers. (Patient not taking: Reported on 12/11/2019) 100 each 3     ORDER FOR DME, SET TO FAX, Patient received a [employer] Airsense 10  Auto. Pressures were set at 13 cm H2O.       STATIN NOT PRESCRIBED, INTENTIONAL, Statin not prescribed intentionally due to Other Statin not indicated per Dr. ward (This option does not exclude patient from measure).  0     No Known Allergies    Reviewed and updated as needed this visit by Provider  Tobacco  Allergies  Meds  Problems  Med Hx  Surg Hx  Fam Hx         Review of Systems   Constitutional, HEENT, cardiovascular, pulmonary, GI, , musculoskeletal, neuro, skin, endocrine and psych systems are negative, except as otherwise noted.      Objective    BP (!) 142/96   Pulse 86   Resp 18   Ht 1.803 m (5' 11\")   Wt (!) 179.2 kg (395 lb)   SpO2 98%   BMI 55.09 kg/m    Body mass index is 55.09 kg/m .  Physical Exam   GENERAL: healthy, alert and no distress, nontoxic in appearance  EYES: Eyes grossly normal to inspection, PERRL and conjunctivae and sclerae normal  HENT: normocephalic  NECK: supple with full ROM  ABDOMEN: soft, nontender  MS: no gross musculoskeletal defects noted, no edema, right ankle is tender on and in front of medial malleolus. It is mildly tender to palpation. No swelling or redness or bruising.    Diagnostic Test Results: I do not see any fractures.    XR ANKLE RT G/E 3 VW 8/12/2020 4:59 PM      HISTORY: Pain following injury.     COMPARISON: None.                                                                      IMPRESSION: No acute " "fractures are identified. Minimal hypertrophic  change in the tibiotalar joint. The ankle mortise is intact. The talar  dome is unremarkable.      ALFRED MANZO MD  Labs reviewed in Epic  No results found for this or any previous visit (from the past 24 hour(s)).        Assessment & Plan  Reviewed case and xrays with Dr. Stephenson who is in clinic today. Most likely ligamentous injury and will give him a triloc ankle brace and he will follow up in 2-3 weeks with PCP if worse or no better. 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 He understands and will try.  Problem List Items Addressed This Visit     None      Visit Diagnoses     Sprain of right ankle, unspecified ligament, initial encounter    -  Primary    Relevant Orders    Ankle/Foot Bracing Supplies Order for DME - ONLY FOR DME    Ankle injury, right, initial encounter        Relevant Orders    XR Ankle Right G/E 3 Views (Completed)             BMI:   Estimated body mass index is 55.09 kg/m  as calculated from the following:    Height as of this encounter: 1.803 m (5' 11\").    Weight as of this encounter: 179.2 kg (395 lb).   Weight management plan: Patient was referred to their PCP to discuss a diet and exercise plan.          Patient Instructions     Follow up in 2-3 weeks with primary care provider if worse or no better.    Follow-up with your primary care provider next week and as needed.    Indications for emergent return to emergency department discussed with patient, who verbalized good understanding and agreement.  Patient understands the limitations of today's evaluation.         Patient Education     Understanding Ankle Sprain    The ankle is the joint where the leg and foot meet. Bones are held in place by connective tissue called ligaments. When ankle ligaments are stretched to the point of pain and injury, it is called an ankle sprain. A sprain can tear the " ligaments. These tears can be very small but still cause pain. Ankle sprains can be mild or severe.  What causes an ankle sprain?  A sprain may occur when you twist your ankle or bend it too far. This can happen when you stumble or fall. Things that can make an ankle sprain more likely include:    Having had an ankle sprain before    Playing sports that involve running and jumping. Or playing contact sports such as football or hockey.    Wearing shoes that don t support your feet and ankles well    Having ankles with poor strength and flexibility  Symptoms of an ankle sprain  Symptoms may include:    Pain or soreness in the ankle    Swelling    Redness or bruising    Not being able to walk or put weight on the affected foot    Reduced range of motion in the ankle    A popping or tearing feeling at the time the sprain occurs    An abnormal or dislocated look to the ankle    Instability or too much range of motion in the ankle  Treatment for an ankle sprain  Treatment focuses on reducing pain and swelling, and avoiding further injury. Treatments may include:    Resting the ankle. Avoid putting weight on it. This may mean using crutches until the sprain heals.    Prescription or over-the-counter pain medicines. These help reduce swelling and pain.    Cold packs. These help reduce pain and swelling.    Raising your ankle above your heart. This helps reduce swelling.    Wrapping the ankle with an elastic bandage or ankle brace. This helps reduce swelling and gives some support to the ankle. In rare cases, you may need a cast or boot.    Stretching and other exercises. These improve flexibility and strength.    Heat packs. These may be recommended before doing ankle exercises.  Possible complications of an ankle sprain  An ankle that has been weakened by a sprain can be more likely to have repeated sprains afterward. Doing exercises to strengthen your ankle and improve balance can reduce your risk for repeated sprains.  Other possible complications are long-term (chronic) pain or an ankle that remains unstable.  When to call your healthcare provider  Call your healthcare provider right away if you have any of these:    Fever of 100.4 F (38 C) or higher, or as directed    Pain, numbness, discoloration, or coldness in the foot or toes    Pain that gets worse    Symptoms that don t get better, or get worse    New symptoms   Date Last Reviewed: 3/10/2016    2322-7830 [employer]. [address] [place] [address]. All rights reserved. This information is not intended as a substitute for professional medical care. Always follow your healthcare professional's instructions.           Patient Education     Treating Ankle Sprains  Treatment will depend on how bad your sprain is. For a severe sprain, healing may take 3 months or more.  Right after your injury: Use R.I.C.E.    BIG: Rest: At first, keep weight off the ankle as much as you can. You may be given crutches to help you walk without putting weight on the ankle.    BIG: Ice: Put an ice pack on the ankle for 20 minutes. Remove the pack and wait at least 30 minutes. Repeat for up to 3 days. This helps reduce swelling.    BIG: Compression: To reduce swelling and keep the joint stable, you may need to wrap the ankle with an elastic bandage. For more severe sprains, you may need an ankle brace, a boot, or a cast.    BIG: Elevation: To reduce swelling, keep your ankle raised above your heart when you sit or lie down.  Medicine  Your healthcare provider may suggest oral nonsteroidal anti-inflammatory medicine (NSAIDs), such as ibuprofen. This relieves the pain and helps reduce swelling. Be sure to take your medicine as directed.  Exercises    After about 2 to 3 weeks, you may be given exercises to strengthen the ligaments and muscles in the ankle. Doing these exercises will help prevent another ankle sprain. Exercises may include standing on your toes and then on your  heels and doing ankle curls.    Sit on the edge of a sturdy table or lie on your back.    Pull your toes toward you. Then point them away from you. Repeat for 2 to 3 minutes.  Date Last Reviewed: 1/1/2018 2000-2019 [employer]. [address] [place] [id number]. All rights reserved. This information is not intended as a substitute for professional medical care. Always follow your healthcare professional's instructions.           Patient Education     Ankle Sprain (Adult)    An ankle sprain is a stretching or tearing of the ligaments that hold the ankle joint together. There are no broken bones.  An ankle sprain is a common injury for both children and adults. It happens when the ankle turns, twists, or rolls in an awkward way. This can be caused by a sports injury. Or it can happen from doing something as simple as stepping on an uneven surface.  Ligaments are made of tough connective tissue. Normally, ligaments stretch a certain amount and then go back to their normal place. A sprain happens when a ligament is forced to stretch more than the normal amount. A severe sprain can actually tear the ligaments. If you have a severe sprain, you may have felt or heard something like a pop when you were injured.  Ankle sprains are given a grade depending on whether they are mild, moderate, or severe:    Grade 1 sprain. A mild sprain with minor stretching and damage to the ligament.    Grade 2 sprain. A moderate sprain where the ligament is partly torn.    Grade 3 sprain. The most severe kind of sprain. The ligament is completely torn.  Most sprains take about 4 to 6 weeks to heal. A severe sprain can take several months to recover.  Your healthcare provider may order X-rays to be sure you don t have a fracture, or broken bone.  The injured area will feel sore. Swelling and pain may make it hard to walk. You may need crutches if walking is painful. Or your provider may have you use a cast boot or air  splint. This will depend on the grade of ankle sprain that you have.  Home care    For a Grade 1 sprain, use RICE (rest, ice, compression, and elevation):    Rest your ankle. Don t walk on it.    Ice should be used right away to help control swelling. Place an ice pack over the injured area for 20 minutes. Do this every 3 to 6 hours for the first 24 to 48 hours. Keep using ice packs to ease pain and swelling as needed. To make an ice pack, put ice cubes in a plastic bag that seals at the top. Wrap the bag in a clean, thin towel or cloth. Never put ice or an ice pack directly on the skin. The ice pack can be put right on the cast, bandage, or splint. As the ice melts, be careful that the cast, bandage, or splint doesn t get wet. If you have a boot, open it to apply an ice pack, unless told otherwise by your provider.    Compression devices help to control swelling. They also keep the ankle from moving and support your injured ankle. These devices include dressings, bandages, and wraps.    Elevate or raise your ankle above the level of your heart when sitting or lying down. This is very important for the first 48 hours.    Follow the RICE guidelines for a Grade 2 sprain. This type of sprain will take longer to heal. Your provider may have you wear a splint, cast, or brace to keep your ankle from moving.     If you have a Grade 3 sprain, you are at risk for long-term ankle instability. In rare cases, surgery may be needed. Your provider may have you wear a short leg cast or a walking boot for 2 to 3 weeks.    After 48 hours, it may be helpful to apply heat for 20 minutes several times a day. You can do this with a heating pad or warm compress. Or you may want to go back and forth between using ice and heat. Never apply heat directly to the skin. Always wrap the heating pad or warm compress in a clean, thin towel or cloth.    You may use over-the-counter pain medicine (NSAIDS or nonsteroidal anti-inflammatory drugs) to  control pain, unless another pain medicine was prescribed. Talk with your provider before using these medicines if you have chronic liver or kidney disease, or have ever had a stomach ulcer or gastrointestinal bleeding.    Follow any rehabilitation exercises your provider gives you. These can help you be more flexible and improve your balance and coordination. This is helpful in preventing long-term ankle problems.  Prevention  To help prevent ankle sprains, it s important to have good strength, balance, and flexibility. Be sure to:    Always warm up before you exercise or do something very active    Be careful when walking or running on uneven or cracked surfaces    Wear shoes that are in good condition and fit well    Listen to your body s signals to slow down when you are in pain or tired  Follow-up care  Any X-rays you had today don t show any broken bones, breaks, or fractures. Sometimes fractures don t show up on the first X-ray. Bruises and sprains can sometimes hurt as much as a fracture. These injuries can take time to heal completely. If your symptoms don t get better or they get worse, talk with your healthcare provider. You may need a repeat X-ray.  Follow up with your healthcare provider, or as advised. Check for any warning signs listed below.  When to seek medical advice  Call your healthcare provider right away if any of these occur:    Fever of 100.4 F (38 C) or higher, or as directed by your healthcare provider    Chills    The injury doesn t seem to be healing    The swelling comes back    The cast or splint has a bad smell    The plaster cast or splint gets wet or soft    The fiberglass cast or splint gets wet and does not dry for 24 hours    The pain or swelling increases, or redness appears    Your toes become cold, blue, numb, or tingly    The skin is discolored (looks blue, purple, or gray), has blisters, or is irritated    You re-injure your ankle  Date Last Reviewed: 5/1/2018 2000-2019  [employer] [address] [place] [address], [place] [address]. All rights reserved. This information is not intended as a substitute for professional medical care. Always follow your healthcare professional's instructions.             Return in about 3 weeks (around 9/2/2020) for Follow up with your primary care provider.    CHANTALE Barillas [facility]

## 2020-08-12 NOTE — PATIENT INSTRUCTIONS
Follow up in 2-3 weeks with primary care provider if worse or no better.    Follow-up with your primary care provider next week and as needed.    Indications for emergent return to emergency department discussed with patient, who verbalized good understanding and agreement.  Patient understands the limitations of today's evaluation.         Patient Education     Understanding Ankle Sprain    The ankle is the joint where the leg and foot meet. Bones are held in place by connective tissue called ligaments. When ankle ligaments are stretched to the point of pain and injury, it is called an ankle sprain. A sprain can tear the ligaments. These tears can be very small but still cause pain. Ankle sprains can be mild or severe.  What causes an ankle sprain?  A sprain may occur when you twist your ankle or bend it too far. This can happen when you stumble or fall. Things that can make an ankle sprain more likely include:    Having had an ankle sprain before    Playing sports that involve running and jumping. Or playing contact sports such as football or hockey.    Wearing shoes that don t support your feet and ankles well    Having ankles with poor strength and flexibility  Symptoms of an ankle sprain  Symptoms may include:    Pain or soreness in the ankle    Swelling    Redness or bruising    Not being able to walk or put weight on the affected foot    Reduced range of motion in the ankle    A popping or tearing feeling at the time the sprain occurs    An abnormal or dislocated look to the ankle    Instability or too much range of motion in the ankle  Treatment for an ankle sprain  Treatment focuses on reducing pain and swelling, and avoiding further injury. Treatments may include:    Resting the ankle. Avoid putting weight on it. This may mean using crutches until the sprain heals.    Prescription or over-the-counter pain medicines. These help reduce swelling and pain.    Cold packs. These help reduce pain and  swelling.    Raising your ankle above your heart. This helps reduce swelling.    Wrapping the ankle with an elastic bandage or ankle brace. This helps reduce swelling and gives some support to the ankle. In rare cases, you may need a cast or boot.    Stretching and other exercises. These improve flexibility and strength.    Heat packs. These may be recommended before doing ankle exercises.  Possible complications of an ankle sprain  An ankle that has been weakened by a sprain can be more likely to have repeated sprains afterward. Doing exercises to strengthen your ankle and improve balance can reduce your risk for repeated sprains. Other possible complications are long-term (chronic) pain or an ankle that remains unstable.  When to call your healthcare provider  Call your healthcare provider right away if you have any of these:    Fever of 100.4 F (38 C) or higher, or as directed    Pain, numbness, discoloration, or coldness in the foot or toes    Pain that gets worse    Symptoms that don t get better, or get worse    New symptoms   Date Last Reviewed: 3/10/2016    1566-8979 [employer]. [address] [place] [address]. All rights reserved. This information is not intended as a substitute for professional medical care. Always follow your healthcare professional's instructions.           Patient Education     Treating Ankle Sprains  Treatment will depend on how bad your sprain is. For a severe sprain, healing may take 3 months or more.  Right after your injury: Use R.I.C.E.    BIG: Rest: At first, keep weight off the ankle as much as you can. You may be given crutches to help you walk without putting weight on the ankle.    BIG: Ice: Put an ice pack on the ankle for 20 minutes. Remove the pack and wait at least 30 minutes. Repeat for up to 3 days. This helps reduce swelling.    BIG: Compression: To reduce swelling and keep the joint stable, you may need to wrap the ankle with an elastic  bandage. For more severe sprains, you may need an ankle brace, a boot, or a cast.    BIG: Elevation: To reduce swelling, keep your ankle raised above your heart when you sit or lie down.  Medicine  Your healthcare provider may suggest oral nonsteroidal anti-inflammatory medicine (NSAIDs), such as ibuprofen. This relieves the pain and helps reduce swelling. Be sure to take your medicine as directed.  Exercises    After about 2 to 3 weeks, you may be given exercises to strengthen the ligaments and muscles in the ankle. Doing these exercises will help prevent another ankle sprain. Exercises may include standing on your toes and then on your heels and doing ankle curls.    Sit on the edge of a sturdy table or lie on your back.    Pull your toes toward you. Then point them away from you. Repeat for 2 to 3 minutes.  Date Last Reviewed: 1/1/2018 2000-2019 [employer]. [address] [place] [address]. All rights reserved. This information is not intended as a substitute for professional medical care. Always follow your healthcare professional's instructions.           Patient Education     Ankle Sprain (Adult)    An ankle sprain is a stretching or tearing of the ligaments that hold the ankle joint together. There are no broken bones.  An ankle sprain is a common injury for both children and adults. It happens when the ankle turns, twists, or rolls in an awkward way. This can be caused by a sports injury. Or it can happen from doing something as simple as stepping on an uneven surface.  Ligaments are made of tough connective tissue. Normally, ligaments stretch a certain amount and then go back to their normal place. A sprain happens when a ligament is forced to stretch more than the normal amount. A severe sprain can actually tear the ligaments. If you have a severe sprain, you may have felt or heard something like a pop when you were injured.  Ankle sprains are given a grade depending on whether  they are mild, moderate, or severe:    Grade 1 sprain. A mild sprain with minor stretching and damage to the ligament.    Grade 2 sprain. A moderate sprain where the ligament is partly torn.    Grade 3 sprain. The most severe kind of sprain. The ligament is completely torn.  Most sprains take about 4 to 6 weeks to heal. A severe sprain can take several months to recover.  Your healthcare provider may order X-rays to be sure you don t have a fracture, or broken bone.  The injured area will feel sore. Swelling and pain may make it hard to walk. You may need crutches if walking is painful. Or your provider may have you use a cast boot or air splint. This will depend on the grade of ankle sprain that you have.  Home care    For a Grade 1 sprain, use RICE (rest, ice, compression, and elevation):    Rest your ankle. Don t walk on it.    Ice should be used right away to help control swelling. Place an ice pack over the injured area for 20 minutes. Do this every 3 to 6 hours for the first 24 to 48 hours. Keep using ice packs to ease pain and swelling as needed. To make an ice pack, put ice cubes in a plastic bag that seals at the top. Wrap the bag in a clean, thin towel or cloth. Never put ice or an ice pack directly on the skin. The ice pack can be put right on the cast, bandage, or splint. As the ice melts, be careful that the cast, bandage, or splint doesn t get wet. If you have a boot, open it to apply an ice pack, unless told otherwise by your provider.    Compression devices help to control swelling. They also keep the ankle from moving and support your injured ankle. These devices include dressings, bandages, and wraps.    Elevate or raise your ankle above the level of your heart when sitting or lying down. This is very important for the first 48 hours.    Follow the RICE guidelines for a Grade 2 sprain. This type of sprain will take longer to heal. Your provider may have you wear a splint, cast, or brace to keep  your ankle from moving.     If you have a Grade 3 sprain, you are at risk for long-term ankle instability. In rare cases, surgery may be needed. Your provider may have you wear a short leg cast or a walking boot for 2 to 3 weeks.    After 48 hours, it may be helpful to apply heat for 20 minutes several times a day. You can do this with a heating pad or warm compress. Or you may want to go back and forth between using ice and heat. Never apply heat directly to the skin. Always wrap the heating pad or warm compress in a clean, thin towel or cloth.    You may use over-the-counter pain medicine (NSAIDS or nonsteroidal anti-inflammatory drugs) to control pain, unless another pain medicine was prescribed. Talk with your provider before using these medicines if you have chronic liver or kidney disease, or have ever had a stomach ulcer or gastrointestinal bleeding.    Follow any rehabilitation exercises your provider gives you. These can help you be more flexible and improve your balance and coordination. This is helpful in preventing long-term ankle problems.  Prevention  To help prevent ankle sprains, it s important to have good strength, balance, and flexibility. Be sure to:    Always warm up before you exercise or do something very active    Be careful when walking or running on uneven or cracked surfaces    Wear shoes that are in good condition and fit well    Listen to your body s signals to slow down when you are in pain or tired  Follow-up care  Any X-rays you had today don t show any broken bones, breaks, or fractures. Sometimes fractures don t show up on the first X-ray. Bruises and sprains can sometimes hurt as much as a fracture. These injuries can take time to heal completely. If your symptoms don t get better or they get worse, talk with your healthcare provider. You may need a repeat X-ray.  Follow up with your healthcare provider, or as advised. Check for any warning signs listed below.  When to seek medical  advice  Call your healthcare provider right away if any of these occur:    Fever of 100.4 F (38 C) or higher, or as directed by your healthcare provider    Chills    The injury doesn t seem to be healing    The swelling comes back    The cast or splint has a bad smell    The plaster cast or splint gets wet or soft    The fiberglass cast or splint gets wet and does not dry for 24 hours    The pain or swelling increases, or redness appears    Your toes become cold, blue, numb, or tingly    The skin is discolored (looks blue, purple, or gray), has blisters, or is irritated    You re-injure your ankle  Date Last Reviewed: 5/1/2018 2000-2019 [employer]. [address] [place] [address], [place] [address]. All rights reserved. This information is not intended as a substitute for professional medical care. Always follow your healthcare professional's instructions.

## 2020-09-11 ENCOUNTER — TRANSFERRED RECORDS (OUTPATIENT)
Dept: HEALTH INFORMATION MANAGEMENT | Facility: CLINIC | Age: 35
End: 2020-09-11

## 2020-09-11 LAB — RETINOPATHY: NEGATIVE

## 2020-09-14 ENCOUNTER — TELEPHONE (OUTPATIENT)
Dept: FAMILY MEDICINE | Facility: CLINIC | Age: 35
End: 2020-09-14

## 2020-09-14 NOTE — TELEPHONE ENCOUNTER
Panel Management Review      Patient has the following on his problem list:     Diabetes      Summary:        Action needed:   Patient needs office visit for DM per Dr Mcconnell.    Type of outreach:    Phone, spoke to patient.  appointment schedule    Questions for provider review:    None                                                                                                                                         Chart routed to Care Team .

## 2020-09-21 ENCOUNTER — OFFICE VISIT (OUTPATIENT)
Dept: FAMILY MEDICINE | Facility: CLINIC | Age: 35
End: 2020-09-21
Payer: COMMERCIAL

## 2020-09-21 VITALS
RESPIRATION RATE: 18 BRPM | WEIGHT: 315 LBS | SYSTOLIC BLOOD PRESSURE: 130 MMHG | DIASTOLIC BLOOD PRESSURE: 78 MMHG | BODY MASS INDEX: 55.59 KG/M2 | HEART RATE: 90 BPM | TEMPERATURE: 97.7 F

## 2020-09-21 DIAGNOSIS — G47.33 OBSTRUCTIVE SLEEP APNEA: Chronic | ICD-10-CM

## 2020-09-21 DIAGNOSIS — Z23 NEED FOR PROPHYLACTIC VACCINATION AND INOCULATION AGAINST INFLUENZA: ICD-10-CM

## 2020-09-21 DIAGNOSIS — E78.5 HYPERLIPIDEMIA LDL GOAL <100: ICD-10-CM

## 2020-09-21 DIAGNOSIS — E11.9 TYPE 2 DIABETES MELLITUS WITHOUT COMPLICATION, WITHOUT LONG-TERM CURRENT USE OF INSULIN (H): Chronic | ICD-10-CM

## 2020-09-21 DIAGNOSIS — I10 ESSENTIAL HYPERTENSION WITH GOAL BLOOD PRESSURE LESS THAN 140/90: ICD-10-CM

## 2020-09-21 DIAGNOSIS — E66.01 MORBID OBESITY (H): Primary | Chronic | ICD-10-CM

## 2020-09-21 LAB
ANION GAP SERPL CALCULATED.3IONS-SCNC: 5 MMOL/L (ref 3–14)
BUN SERPL-MCNC: 10 MG/DL (ref 7–30)
CALCIUM SERPL-MCNC: 8.8 MG/DL (ref 8.5–10.1)
CHLORIDE SERPL-SCNC: 101 MMOL/L (ref 94–109)
CHOLEST SERPL-MCNC: 155 MG/DL
CO2 SERPL-SCNC: 30 MMOL/L (ref 20–32)
CREAT SERPL-MCNC: 0.64 MG/DL (ref 0.66–1.25)
GFR SERPL CREATININE-BSD FRML MDRD: >90 ML/MIN/{1.73_M2}
GLUCOSE SERPL-MCNC: 198 MG/DL (ref 70–99)
HBA1C MFR BLD: 7.7 % (ref 0–5.6)
HDLC SERPL-MCNC: 42 MG/DL
LDLC SERPL CALC-MCNC: 76 MG/DL
NONHDLC SERPL-MCNC: 113 MG/DL
POTASSIUM SERPL-SCNC: 4 MMOL/L (ref 3.4–5.3)
SODIUM SERPL-SCNC: 136 MMOL/L (ref 133–144)
TRIGL SERPL-MCNC: 184 MG/DL

## 2020-09-21 PROCEDURE — 83036 HEMOGLOBIN GLYCOSYLATED A1C: CPT | Performed by: FAMILY MEDICINE

## 2020-09-21 PROCEDURE — 90471 IMMUNIZATION ADMIN: CPT | Performed by: FAMILY MEDICINE

## 2020-09-21 PROCEDURE — 99214 OFFICE O/P EST MOD 30 MIN: CPT | Mod: 25 | Performed by: FAMILY MEDICINE

## 2020-09-21 PROCEDURE — 80061 LIPID PANEL: CPT | Performed by: FAMILY MEDICINE

## 2020-09-21 PROCEDURE — 90686 IIV4 VACC NO PRSV 0.5 ML IM: CPT | Performed by: FAMILY MEDICINE

## 2020-09-21 PROCEDURE — 99207 C FOOT EXAM  NO CHARGE: CPT | Performed by: FAMILY MEDICINE

## 2020-09-21 PROCEDURE — 36415 COLL VENOUS BLD VENIPUNCTURE: CPT | Performed by: FAMILY MEDICINE

## 2020-09-21 PROCEDURE — 80048 BASIC METABOLIC PNL TOTAL CA: CPT | Performed by: FAMILY MEDICINE

## 2020-09-21 RX ORDER — LISINOPRIL 20 MG/1
TABLET ORAL
Qty: 90 TABLET | Refills: 3 | Status: SHIPPED | OUTPATIENT
Start: 2020-09-21 | End: 2021-10-07

## 2020-09-21 NOTE — PROGRESS NOTES
"Larry Bangura is a 35 year old male comes in today with the following concerns.      *Diabetes     *    S: Larry Bangura is a 35 year old male with every day metformin.  Due for fills.  A1C due t oo.  Willing to go to bid on metformin    Morbid obesity: 55 BMI.  Interested in bariatric surgery.  \"weight not coming off otherwise.\"     Hyperlipidemia: not needing statin due to age.  Will recheck lipids to make sure not deo high    Htn: lisinopril.  Fills/labs    Edgar: cpap daily    Problem list, med list, additional histories reviewed and updated, as indicated.      No cp or sob    O:/78 (BP Location: Right arm, Patient Position: Sitting, Cuff Size: Adult Large)   Pulse 90   Temp 97.7  F (36.5  C) (Tympanic)   Resp 18   Wt (!) 180.8 kg (398 lb 9.6 oz)   BMI 55.59 kg/m    GEN: Alert and oriented, in no acute distress  CV: RRR, no murmur  RESP: lungs clear bilaterally, good effort  EXT: no edema or lesions noted in lower extremities    A: DM2: stable, recheck       Htn, stable, recheck       Hyperlipidemia, recheck       Morbid obesity: ongoing       Sleep apnea, cpap, stable    P: up to 1000mg bid on metformin.  Labs and fills.  Bariatric surg referral.  F/u in 6 months.    Weight management plan: diet/exercise     "

## 2020-09-21 NOTE — LETTER
September 21, 2020      Larry Bangura  [address] [facility] [address]        Dear ,    We are writing to inform you of your test results.    A1C took a jump, so good you've gone to 1000mg twice daily on the metformin.  I hope things are going well.     Resulted Orders   Lipid panel reflex to direct LDL Fasting   Result Value Ref Range    Cholesterol 155 <200 mg/dL    Triglycerides 184 (H) <150 mg/dL      Comment:      Borderline high:  150-199 mg/dl  High:             200-499 mg/dl  Very high:       >499 mg/dl  Non Fasting      HDL Cholesterol 42 >39 mg/dL    LDL Cholesterol Calculated 76 <100 mg/dL      Comment:      Desirable:       <100 mg/dl    Non HDL Cholesterol 113 <130 mg/dL   Hemoglobin A1c   Result Value Ref Range    Hemoglobin A1C 7.7 (H) 0 - 5.6 %      Comment:      Normal <5.7% Prediabetes 5.7-6.4%  Diabetes 6.5% or higher - adopted from ADA   consensus guidelines.     Basic metabolic panel  (Ca, Cl, CO2, Creat, Gluc, K, Na, BUN)   Result Value Ref Range    Sodium 136 133 - 144 mmol/L    Potassium 4.0 3.4 - 5.3 mmol/L    Chloride 101 94 - 109 mmol/L    Carbon Dioxide 30 20 - 32 mmol/L    Anion Gap 5 3 - 14 mmol/L    Glucose 198 (H) 70 - 99 mg/dL      Comment:      Non Fasting    Urea Nitrogen 10 7 - 30 mg/dL    Creatinine 0.64 (L) 0.66 - 1.25 mg/dL    GFR Estimate >90 >60 mL/min/[1.73_m2]      Comment:      Non  GFR Calc  Starting 12/18/2018, serum creatinine based estimated GFR (eGFR) will be   calculated using the Chronic Kidney Disease Epidemiology Collaboration   (CKD-EPI) equation.      GFR Estimate If Black >90 >60 mL/min/[1.73_m2]      Comment:       GFR Calc  Starting 12/18/2018, serum creatinine based estimated GFR (eGFR) will be   calculated using the Chronic Kidney Disease Epidemiology Collaboration   (CKD-EPI) equation.      Calcium 8.8 8.5 - 10.1 mg/dL       If you have any questions or concerns, please call the clinic at the number listed  above.       Sincerely,        Shelton Mcconnell MD

## 2020-09-21 NOTE — PROGRESS NOTES
Larry Willem is a 35 year old male comes in today with the following concerns.      *Diabetes     *flu shot

## 2020-09-29 ENCOUNTER — TELEPHONE (OUTPATIENT)
Dept: SURGERY | Facility: CLINIC | Age: 35
End: 2020-09-29

## 2020-09-30 ENCOUNTER — VIRTUAL VISIT (OUTPATIENT)
Dept: SURGERY | Facility: CLINIC | Age: 35
End: 2020-09-30
Payer: COMMERCIAL

## 2020-09-30 VITALS — WEIGHT: 315 LBS | BODY MASS INDEX: 44.1 KG/M2 | HEIGHT: 71 IN

## 2020-09-30 DIAGNOSIS — E66.813 CLASS 3 SEVERE OBESITY DUE TO EXCESS CALORIES WITH SERIOUS COMORBIDITY AND BODY MASS INDEX (BMI) OF 50.0 TO 59.9 IN ADULT (H): ICD-10-CM

## 2020-09-30 DIAGNOSIS — G89.29 CHRONIC PAIN OF BOTH KNEES: ICD-10-CM

## 2020-09-30 DIAGNOSIS — M25.561 CHRONIC PAIN OF BOTH KNEES: ICD-10-CM

## 2020-09-30 DIAGNOSIS — Z71.3 NUTRITIONAL COUNSELING: ICD-10-CM

## 2020-09-30 DIAGNOSIS — M25.562 CHRONIC PAIN OF BOTH KNEES: ICD-10-CM

## 2020-09-30 DIAGNOSIS — R53.81 PHYSICAL DECONDITIONING: ICD-10-CM

## 2020-09-30 DIAGNOSIS — E66.01 CLASS 3 SEVERE OBESITY DUE TO EXCESS CALORIES WITH SERIOUS COMORBIDITY AND BODY MASS INDEX (BMI) OF 50.0 TO 59.9 IN ADULT (H): Primary | ICD-10-CM

## 2020-09-30 DIAGNOSIS — E66.01 CLASS 3 SEVERE OBESITY DUE TO EXCESS CALORIES WITH SERIOUS COMORBIDITY AND BODY MASS INDEX (BMI) OF 50.0 TO 59.9 IN ADULT (H): ICD-10-CM

## 2020-09-30 DIAGNOSIS — E66.01 MORBID OBESITY (H): Primary | Chronic | ICD-10-CM

## 2020-09-30 DIAGNOSIS — E66.813 CLASS 3 SEVERE OBESITY DUE TO EXCESS CALORIES WITH SERIOUS COMORBIDITY AND BODY MASS INDEX (BMI) OF 50.0 TO 59.9 IN ADULT (H): Primary | ICD-10-CM

## 2020-09-30 DIAGNOSIS — E11.9 TYPE 2 DIABETES MELLITUS WITHOUT COMPLICATION, WITHOUT LONG-TERM CURRENT USE OF INSULIN (H): Chronic | ICD-10-CM

## 2020-09-30 RX ORDER — PHENTERMINE HYDROCHLORIDE 15 MG/1
15 CAPSULE ORAL EVERY MORNING
Qty: 30 CAPSULE | Refills: 0 | Status: SHIPPED | OUTPATIENT
Start: 2020-09-30 | End: 2020-12-27

## 2020-09-30 RX ORDER — TOPIRAMATE 25 MG/1
TABLET, FILM COATED ORAL
Qty: 90 TABLET | Refills: 1 | Status: SHIPPED | OUTPATIENT
Start: 2020-09-30 | End: 2020-12-30

## 2020-09-30 ASSESSMENT — MIFFLIN-ST. JEOR: SCORE: 2776.52

## 2020-09-30 ASSESSMENT — PAIN SCALES - GENERAL: PAINLEVEL: SEVERE PAIN (6)

## 2020-09-30 NOTE — LETTER
"9/30/2020       RE: Larry Bangura  [address] [facility] [address]     Dear Colleague,    Thank you for referring your patient, Larry Bangura, to the [facility] SURG[facility]NAGEMENT at [facility]. Please see a copy of my visit note below.  New Bariatric Nutrition Consultation Note    Reason For Visit: Nutrition Assessment    Larry Bangura is a 35 year old presenting today for new bariatric nutrition consult.   Pt is interested in laparoscopic sleeve gastrectomy with Dr. Liriano expected surgery in March 2021.  Patient is accompanied by self.  This is pt's first of 6 required nutrition visits prior to surgery.     Pt referred by DELL Duckworth on September 30, 2020.  Patient with Co-morbidities of obesity including:  Type II DM yes  Renal Failure no  Sleep apnea yes  Hypertension yes   Dyslipidemia yes  Joint pain no  Back pain no  GERD no     Support System Reviewed With Patient 9/29/2020   Who do you have in your support network that can be available to help you for the first 2 weeks after surgery? wife   Who can you count on for support throughout your weight loss surgery journey? wife       ANTHROPOMETRICS:  Estimated body mass index is 55.79 kg/m  as calculated from the following:    Height as of an earlier encounter on 9/30/20: 1.803 m (5' 11\").    Weight as of an earlier encounter on 9/30/20: 181.4 kg (400 lb).    Required weight loss goal pre-op: 40 lbs from initial consult weight (goal weight 360 lbs or less before surgery)       9/29/2020   I have tried the following methods to lose weight Watching portions or calories, Exercise, Weight Watchers, Atkins type diet (low carb/high protein), Pre packaged meals ex: Nutrisystem, Slimfast     Weight Loss Questions Reviewed With Patient 9/29/2020   How long have you been overweight? 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 He typically picks up breakfast on his way to work, 2 [place] sausage egg burritos and a diet coke: sometimes a muffin. For lunch it is leftovers from dinner OR fast food, usually the sandwich not the fries and a diet coke.   Dinner is a meat vegetables and bread and he tries to have popsicle after dinner for his snack.      He has been sleeping better since he started using his CPAP machine.     Recall Diet Questions Reviewed With Patient 9/29/2020   Describe what you typically consume for breakfast (typical or most recent): 2 sausage burritos diet coke   Describe what you typically consume for lunch (typical or most recent): leftovers   Describe what you typically consume for supper (typical or most recent): meet veg bread   Describe what you typically consume as snacks (typical or most recent): chocolate covered almans   How many ounces of water, or other low calorie drinks, do you drink daily (8 oz=1 glass)? 32 oz- water   How many ounces of caffeine (coffee, tea, pop) do you drink daily (8 oz=1 glass)? 32 oz- soda    How many ounces of carbonated (pop, beer, sparkling water) drinks do you drinky daily (8 oz=1 glass)? 32 oz- dt mt dew and diet coke    How many ounces of juice, pop, sweet tea, sports drinks, protein drinks, other sweetened drinks, do you drink daily (8 oz=1 glass)? 8 oz- not usually    How many ounces of milk do you drink daily (8 oz=1 glass) 8 oz- with cereal    Please indicate the type of milk: skim   How often do you drink alcohol? Monthly or less- not usually.    If you do drink alcohol, how many drinks might you have in a day? (one drink = 5 oz. wine, 1 can/bottle of beer, 1 shot liquor) 1 or 2     Eating Habits 9/29/2020   Do you have any dietary restrictions? No   Do you currently binge eat (eat a large amount of food in a short time)? Yes   Are you an emotional eater? Yes   Do you get up to eat after falling asleep? No   What foods do you crave? all     ADDITIONAL  "INFORMATION:  Dining Out History Reviewed With Patient 9/29/2020   How often do you dine out? Nearly every day.   Where do you dine out? (select all that apply) fast food chains, take out, other   What types of food do you order when you dine out? salvador reese     Physical Activity Reviewed With Patient 9/29/2020   How often do you exercise? Never   What keeps you from being more active?  Pain, I should be more active but I just have not gotten around to it, Lack of Time, Too tired     MALNUTRITION  Video Visit: Visual NFPE  % Intake: No decreased intake noted  % Weight Loss: None noted  Subcutaneous Fat Loss: None observed  Muscle Loss: None observed  Fluid Accumulation/Edema: Unable to assess  Malnutrition Diagnosis: Patient does not meet two of the established criteria necessary for diagnosing malnutrition    NUTRITION DIAGNOSIS:  Obesity r/t long history of self-monitoring deficit and excessive energy intake aeb BMI >30 kg/m2.    INTERVENTION:  Intervention Provided/Education Provided on post-op diet guidelines, vitamins/minerals essential post-operatively, GI anatomy of bariatric surgeries, ways to help prepare for post-op diet guidelines pre-operatively, portion/calorie-control, mindful eating and sources of protein.  Provided pt with list of goals RD contact information.      Questions Reviewed With Patient 9/29/2020   How ready are you to make changes regarding your weight? Number 1 = Not ready at all to make changes up to 10 = very ready. 10   How confident are you that you can change? 1 = Not confident that you will be successful making changes up to 10 = very confident. 8       Patient Understanding: good  Expected Compliance: fair-good    GOALS:  Relating To Eating:  Eat slowly (20-30 minutes per meal), chewing foods well (25 chews per bite/applesauce consistency)  9\" Plate method (1/2 plate non-starchy vegetables/fruit, 1/4 plate lean protein, 1/4 plate whole grain starch - no more than 1/2 cup " carb/meal)    Relating to beverages:  Reduce caffeine/carbonation/calorie containing beverages  Separate fluids from meals by 30 minutes before, during, and after eating    Relating to activity:  Increase activity as able    Initial Consult / Weight Loss     My Plate Planner_English - Pt Education  [email or web address]    Protein Sources for Weight Loss  [email or web address]     Carbohydrates  [email or web address]     Time spent with patient: 24 minutes.    Again, thank you for allowing me to participate in the care of your patient.  Sincerely,    Amy Guthrie, MS, RD, LD

## 2020-09-30 NOTE — NURSING NOTE
"Chief Complaint   Patient presents with     Consult     New appointment.       Vitals:    09/30/20 0749   Weight: (!) 181.4 kg (400 lb)   Height: 1.803 m (5' 11\")       Body mass index is 55.79 kg/m .                            MARZENA WHITNEY, EMT    "

## 2020-09-30 NOTE — PATIENT INSTRUCTIONS
"Thank you for allowing us the privilege of caring for you. We hope we provided you with the excellent service you deserve.   Please let us know if there is anything else we can do for you so that we can be sure you are completely satisfied with your care experience.    To ensure the quality of our services you may be receiving a patient satisfaction survey from an independent patient satisfaction monitoring company.    The greatest compliment you can give is a \"Likely to Recommend\"    Your visit was with Sada Beach PA-C today.    Instructions per today's visit:     If you have not already watched our online seminar please go to [email or web address]    Bariatric labs ordered, call for a lab only appointment at [facility] lab. To find a lab location near you, please call [phone].    Schedule bariatric psych eval as soon as possible.  List of psychologists will be sent to you via [employer] or given to you in clinic.     Call Oliver Judd at [phone] to discuss insurance coverage for bariatric surgery.  Please check with your insurance regarding bariatric surgery coverage also. Oliver can also help you with scheduling psych eval if you are having difficulties.    The following clearance letters are needed: Letters will be sent to you via [employer] or given to you in clinic  - Sleep clearance    A  should reach out to you to schedule the following appointments.  If they do not reach you please call [phone] to schedule the following appointments:    -See dietitian in 1 month and monthly for 6 months    -See Lauren Bloch MTM pharmacist in 1 month to follow up on weight loss medications    -See Sada in 3 months to follow up on pre-op weight loss and weight loss medications    -See Dr Liriano in 1 month for bariatric surgeon visit    -[facility] referral entered today, knee pain, deconditioning    Bariatric Task List  Status:  Is patient a candidate for bariatric surgery?:  patient is a " "candidate for bariatric surgery -     Cleared to schedule surgeon consult?:    -     Status:  surgery evaluation in process -     Surgeon: Heri -     Tentative surgery month/year: March 2021 -        Insurance: Insurance:  [employer] -        Patient Info: Initial Weight:  400 -     Date of Initial Weight/Height:  9/30/2020 -     Goal Weight (lbs):  360 -     Required Weight Loss:  40 -     Surgery Type:  sleeve gastrectomy -        Dietician Visits: Structured weight loss required by insurance?:  structured weight loss required -     Dietician Visit 1:  Completed -     Dietician Visit 2:  Needed -     Dietician Visit 3:  Needed -     Dietician Visit 4:  Needed -     Dietician Visit 5:  Needed -     Dietician Visit 6:  Needed -        Psychological Evaluation: Psych eval:  Needed -        Lab Work: Complete Blood Count:  Needed -     Comprehensive Metabolic Panel:  Needed -     Vitamin D:  Needed -     Hgb A1c:  Completed -     PTH:  Needed -        Consults/ Clearance: Sleep Medicine:  Needed - planning to follow up at [facility] sleep clinic, already established in  and uses CPAP nightly      Testing:    PCP: Establish care with PCP:  Completed -     PCP letter of support:  Needed -        Smoking: Quit tobacco use (3 months smoke free)?:    -     Quit date:    -        Patient Education:  Information Session:  Completed -     Given \"Making your decision\" handout?:  Given \"\"Making your decision\"\" handout? -     Given support group information?:  support group contact information provided -     Attended support group?:    -     Support plan in place?:  Completed -     Research consents signed?:  research consent not signed -        Final Tasks:  Before surgery online class:  Needed -     Before surgery online class website link:  [email or web address]   After surgery online class:  Needed -     After surgery online class website link:  [email or web address]   Nurse visit " for weigh-in and information:  Needed -     Pre-assessment clinic visit with anesthesia team for H&P:  Needed -     Final labs (Hgb, plt, T&S, UA):  Needed -        Notes:   -           Please call our contact center at [phone] to schedule your next appointments.  To find a lab location near you, please call [phone].  For any nursing questions or concerns call Dorys Donahue LPN at [phone] or Latisha Chavez RN at [phone]  Please call during clinic hours Monday through Friday 8:00a - 4:00p if you have questions or you can contact us via [employer] at anytime and we will reply during clinic hours.    Lab results will be communicated through My Chart or letter (if My Chart not used). Please call the clinic if you have not received communication after 1 week or if you have any questions.?  Clinic Fax: [phone]  ______________________________________________________________________________________________________________________  Meal Replacement Products:    Here is the link to our new e-store where you can purchase our meal replacement products    [facility] E-Store  [email or web address]    The one week starter kit is a great way to sample a variety of products and see what works for you.    If you want more information about the product go to: [email or web address]    If you are an employee or [facility] or [facility] please contact your care team for a 10% estore discount    Free Shipping for orders over $75     Benefits of meal replacements products:    Portion and calorie control  Improved nutrition  Structured eating  Simplified food choices  Avoid contact with trigger foods  _________________________________________________________________________________________________________________________________  Interested in working with a health ?  Health coaches work with you to improve your overall health and wellbeing.  They look at  the whole person, and may involve discussion of different areas of life, including, but not limited to the four pillars of health (sleep, exercise, nutrition, and stress management). Discuss with your care team if you would like to start working a health .  Health Coaching-3 Pack: Schedule by calling [phone]    $99 for three health coaching visits    Visits may be done in person or via phone    Coaching is a partnership between the  and the client; Coaches do not prescribe or diagnose    Coaching helps inspire the client to reach his/her personal goals   _________________________________________________________________________________________________________________________________  Healthy Lifestyle Support Group   [facility] Weight Management Program  Virtual Support Group Now Available    60 minutes of small group guided discussion, support and resources    Led by Health , Galilea Duncan    For questions, and to receive the invite information, contact Galilea at [email or web address]    All our welcome!    One Friday per month, 12:30pm to 1:30pm  SELF COMPASSION: July 31st  CREATING MY WELLNESS VISION: August 28th  MINDFULNESS PRACTICES FOR A CALM BODY & MIND: September 25th  MINDFUL EATING TOOLS: October 30th  HEALTHY& HAPPY HOLIDAYS: November 20th  OPEN FORUM: December 18th  _______________________________________________________________________________________________________________________________  Connections: Bariatric Care support group  Due to the Covid-19 restrictions, we will be doing our support group virtually using Microsoft Teams. You will need to get an invitation to the group from Gino Cordova, Ph.D., LP at [email or web address] and to learn about using Microsoft Teams. The next meeting is on Tuesday, July 14 between 6:30 and 8 PM and there will be no formal speaker.    This group meets the second Tuesday of each month from 6:30 to 8 PM and will be held again at [facility] in the [address] [place] [address] (A-B room) when the Covid-19 restrictions are lifted. The group is led by Gino Cordova, Ph.D., Licensed Psychologist, [facility] Comprehensive Weight Management Program. There is no cost for group participation and is open to all [facility] (and those external to this program) pre- and post-operative bariatric surgery patients as well as their support system.   _________________________________________________________________________________________________________________________________  [place] [facility] Get Moving Program  Our team of physical therapists is trained to help you understand and take control of your condition. They will perform a thorough evaluation to determine your ability for activity and develop a customized plan to fit your goals and physical ability.  Scheduling: Unsure if the Get Moving program is right for you? Discuss the program with your medical provider or diabetes educator. You can also call us at [phone] to ask questions or schedule an appointment.   [facility] Get Moving Program  ______________________________________________________________________________________________________________________  [employer] Diabetes Prevention Program (DPP)  If you have prediabetes and Medicare please contact us by MyChart or phone to learn more about our Diabetes Prevention Program  _______________________________________________________________________________________________________________________  Bluetooth Scale:    We hope to provide you with high quality telephone and virtual healthcare visits while social distancing for COVID-19 is necessary, as well as in the future when virtual visits may be more convenient for you.     Our technology team made it possible for Bluetooth scales to send weight measurements to our electronic medical record. This allows weights from you weighing at home to securely flow  into the medical record, which will improve telephone and virtual visits.   Additionally, studies have shown that adults actually lose more weight when their weights are automatically sent to someone else, and also that this process is not stressful for those adults.    Below is a link for purchasing the scale, with a discount code for our patients. You may call your insurance company to see if they will reimburse you for the cost of the scale, as a piece of durable medical equipment. The scales only go up to a weight of 400 pounds. This is an issue and we are working with the developer on increasing this. We found no scales that go over 400lb that have blue-tooth for connecting to [employer].    Scale to purchase: the [employer]  Body  Scale: [email or web address].ds    Discount Code: We have a discount code for our patients to bring the cost down to $50, Discount code is: [place]_Scale_20%off  Thank you,   [facility] Comprehensive Weight Management Team                              MEDICATION STARTED AT THIS APPOINTMENT    We are starting Phentermine. Take one tablet in the morning.  Call the nurse at [phone] if you have any questions or concerns. (Do not stop taking it if you don't think it's working. For some people it works without them knowing it.)    Phentermine is being prescribed because you identified hunger as one of the main causes for your extra weight.      Our patients on Phentermine find that they:    >feel less hunger    >find it easier to push the plate away   >have an easier time eating less    For some of our patients, these feelings are very real and immediate. For other patients, the feelings are less obvious. They don't feel much of a change but find they've lost weight. Like all weight loss medications, Phentermine  works best when you help it work. This means:  1. Having less tempting high  calorie (fattening) food around the house or office. (For people with strong cravings this is very important.)   2. Staying away from situations or people that may trigger your cravings .   3. Eating out only one time or less each week.  4. Eating your meals at a table with the TV or computer off.    Side-effects. Phentermine is generally well tolerated. The main side-effects we see are feelings of racing pulse or rapid heart beat. Some people can get an elevated blood pressure. Because of this we may have you come back within a week or so of starting the medication for a blood pressure check.         In order to get refills of this or any medication we prescribe you must be seen in the medical weight mgmt clinic every 2-3 months. Please have your pharmacy fax a refill request to [phone].      MEDICATION STARTED AT THIS APPOINTMENT  We are starting topiramate at bedtime.  Start one tab, 25 mg, for a week. Go up to 50 mg (2 tabs) for the next week. At the third week, take   3 tabs (75 mg).  Stay at 3 tabs until you are seen again. Call the nurse at [phone] if you have any questions or concerns. (Do not stop taking it if you don't think it's working. For some people it works even though they do not feel much different.)    Topiramate (Topamax) is a medication that is used most often to treat migraine headaches or for seizures. It has also been found to help with weight loss. Although it's not currently FDA approved for weight loss, it has been used safely for a number of years to help people who are carrying extra weight.     Just how topiramate helps with weight loss has not been exactly determined. However it seems to work on areas of the brain to quiet down signals related to eating.      Topiramate may make you:    >feel less interest in eating in between meals   >think less about food and eating   >find it easier to push the plate away   >find giving up pop easier    >have an easier time eating  less    For some of our patients, the pills work right away. They feel and think quite differently about food. Other patients don't feel much of a change but find in fact they have lost weight! Like all weight loss medications, topiramate works best when you help it work.  This means:    1) Have less tempting high calorie (fattening) food around the house or office    2) Have lower calorie food (fruits, vegetables,low fat meats and dairy) for snacks    3) Eat out only one time or less each week.   4) Eat your meals at a table with the TV or computer off.    Side-effects. Topiramate is generally well tolerated. The main side-effects we see are:   Tingling in hands,feet, or face (usually not very troublesome)   Mental confusion and word finding trouble (about 10% of patients have this.)     Feeling sleepy or a bit dopey- this goes away very soon after starting.    One of the dangers of topiramate is the possibility of birth defects--if you get pregnant when you are on it, there is the risk that your baby will be born with a cleft lip or palate.  If you are on topiramate and of child bearing age, you need to be on a reliable form of birth control or refrain from sexual intercourse.     Please refer to the pharmacy insert for more information on side-effects. Since many pharmacists are not familiar with the use of topiramate in weight loss, calling the clinic will get you the most accurate information on the use of this medication for weight loss.     In order to get refills of this or any medication we prescribe you must be seen in the medical weight mgmt clinic every 2-3 months. Please have your pharmacy fax a refill request to [phone].

## 2020-09-30 NOTE — LETTER
"2020       RE: Larry Bangura  [address] [facility] [address]     Dear Colleague,    Thank you for referring your patient, Larry Bangura, to the [facility] SURGICAL WEIGHT MANAGEMENT at [facility]. Please see a copy of my visit note below.  New Bariatric Surgery Consultation Note    2020    RE: Larry Bangura  MR#: [id number]  : 1985    Referring provider:       2020   Who referred you? Dr Mcconnell       Chief Complaint/Reason for visit: evaluation for possible weight loss surgery    Dear Shelton Mcconnell MD (General),    I had the pleasure of seeing your patient, Larry Bangura, to evaluate his obesity and consider him for possible weight loss surgery. As you know, Larry Bangura is 35 year old.  He has a height of 5' 11\", a weight of 400 lbs 0 oz, and calculated Body mass index is 55.79 kg/m .    Referred by Dr Shelton Naik [facility]  He has thought about surgery since his 20's  Lost 150 lbs senior year of high school        HISTORY OF PRESENT ILLNESS:  Weight Loss History Reviewed with Patient 2020   How long have you been overweight? Since early childhood   What is the most that you have ever weighed? 450   What is the most weight you have lost? 150   I have tried the following methods to lose weight Watching portions or calories, Exercise, Weight Watchers, Atkins type diet (low carb/high protein), Pre packaged meals ex: Nutrisystem, Slimfast   Have you ever had weight loss surgery? No     CO-MORBIDITIES OF OBESITY INCLUDE:     2020   I have the following health issues associated with obesity: Type II Diabetes, Pre-Diabetes, High Blood Pressure, High Cholesterol, Sleep Apnea, Stress Incontinence   T2 DM takes metformin 1000 mg twice daily. Recent A1C 7.7  HTN takes lisinopril  IRWIN: CPAP daily. Goes to [facility], now has to go to [facility] for follow up.    PCP last week 130/78  Weight 398 lbs    No hx of kidney stones or " glaucoma    PAST MEDICAL HISTORY:  Past Medical History:   Diagnosis Date     HTN (hypertension) 9/10/2013     Morbid obesity (H)      PAST SURGICAL HISTORY:  Past Surgical History:   Procedure Laterality Date     ENDOSCOPIC RELEASE CARPAL TUNNEL  12/6/2013    Procedure: ENDOSCOPIC RELEASE CARPAL TUNNEL;  Right Hand Endoscopic Carpal Tunnel Release;  Surgeon: Kody Pittman MD;  Location: [facility] OR     ENDOSCOPIC RELEASE CARPAL TUNNEL  1/21/2014    Procedure: ENDOSCOPIC RELEASE CARPAL TUNNEL;  Left endoscopic carpal tunnel release, possible open;  Surgeon: Kody Pittman MD;  Location: [facility] OR     FAMILY HISTORY:   Family History   Problem Relation Age of Onset     Diabetes Father      Cancer Sister      C.A.D. 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 Yes   Who do you have in your support network that can be available to help you for the first 2 weeks after surgery? wife   Who can you count on for support throughout your weight loss surgery journey? wife   Can you afford 3 meals a day?  Yes   Can you afford 50-60 dollars a month for vitamins? Yes   Self employed   2 children ages 6 and 7    HABITS:     9/29/2020   How often do you drink alcohol? Monthly or less   If you do drink alcohol, how many drinks might you have in a day? (one drink = 5 oz. wine, 1 can/bottle of beer, 1 shot liquor) 1 or 2   Have you or are you currently using street drugs or prescription strength medication for which you do not have a prescription for? No   Do you have a history of chemical dependency (alcohol or drug abuse)? No   Smoked when he was in his 20's when drinking. Quit 4-5 years ago    PSYCHOLOGICAL HISTORY:    Psychological History Reviewed With Patient 9/29/2020   Have you ever attempted suicide? Never.   Have you had thoughts of suicide in the past year? No   Have you ever been hospitalized for mental illness or a suicide attempt? Never.   Do you have a history of chronic pain? Yes   Have you ever been diagnosed with fibromyalgia? No   Are you currently seeing a therapist or counselor?  No   Are you currently seeing a psychiatrist? No     ROS:     9/29/2020   Skin:  None of the above   HEENT: Dizziness/lightheadedness, Missing teeth   Musculoskeletal: Joint Pain, Back pain, Limited mobility, Swelling of legs, Arthritis   Cardiovascular: Shortness of breath with activity   Pulmonary: Snoring, People have told me I stop breathing while asleep   Gastrointestinal: Heartburn, Diarrhea, Constipation   Genitourinary: None of the above   Hematological: None of the above   Neurological: None of the above     EATING BEHAVIORS:     9/29/2020   Have you or anyone else thought that you had an eating disorder? Yes   If you answered yes to the previous eating disorder question, select the types that apply from this list: Binge Eating   Do you currently binge eat (eat a large amount of food in a short time)? Yes   Are you an emotional eater? Yes   Do you get up to eat after falling asleep? No     EXERCISE:     9/29/2020   How often do you exercise? Never   What keeps you from being more active?  Pain, I should be more active but I just have not gotten around to it, Lack of Time, Too tired     MEDICATIONS:  Current Outpatient Medications   Medication Sig Dispense Refill     lisinopril (ZESTRIL) 20 MG tablet TAKE 1 TABLET BY MOUTH ONCE DAILY . 90 tablet 3     metFORMIN (GLUCOPHAGE) 1000 MG tablet Take one tablet twice daily with meals 180 tablet 3     ORDER FOR DME, SET TO FAX, Patient received a [employer] 10  Auto. Pressures were set at 13 cm H2O.       valACYclovir (VALTREX) 1000 mg tablet TAKE 1 TABLET BY MOUTH TWICE DAILY FOR  "2 DAYS FOR  OUTBREAKS 12 tablet 0     valACYclovir (VALTREX) 500 MG tablet TAKE 1 TABLET BY MOUTH TWICE DAILY 90 tablet 3     ALLERGIES:  No Known Allergies    LABS/IMAGING/MEDICAL RECORDS REVIEW:   Results for LARRY FIGUEROA (MRN [id number]) as of 9/30/2020 08:33   Ref. Range 9/21/2020 08:51   Sodium Latest Ref Range: 133 - 144 mmol/L 136   Potassium Latest Ref Range: 3.4 - 5.3 mmol/L 4.0   Chloride Latest Ref Range: 94 - 109 mmol/L 101   Carbon Dioxide Latest Ref Range: 20 - 32 mmol/L 30   Urea Nitrogen Latest Ref Range: 7 - 30 mg/dL 10   Creatinine Latest Ref Range: 0.66 - 1.25 mg/dL 0.64 (L)   GFR Estimate Latest Ref Range: >60 mL/min/1.73_m2 >90   GFR Estimate If Black Latest Ref Range: >60 mL/min/1.73_m2 >90   Calcium Latest Ref Range: 8.5 - 10.1 mg/dL 8.8   Anion Gap Latest Ref Range: 3 - 14 mmol/L 5   Hemoglobin A1C Latest Ref Range: 0 - 5.6 % 7.7 (H)   Cholesterol Latest Ref Range: <200 mg/dL 155   HDL Cholesterol Latest Ref Range: >39 mg/dL 42   LDL Cholesterol Calculated Latest Ref Range: <100 mg/dL 76   Non HDL Cholesterol Latest Ref Range: <130 mg/dL 113   Triglycerides Latest Ref Range: <150 mg/dL 184 (H)   Glucose Latest Ref Range: 70 - 99 mg/dL 198 (H)     PHYSICAL EXAM:  Ht 1.803 m (5' 11\")   Wt (!) 181.4 kg (400 lb)   BMI 55.79 kg/m      In summary, Larry Figueroa has Class III obesity with a body mass index of Body mass index is 55.79 kg/m . kg/m2 and the comorbidities stated above. He completed an informational seminar and is a candidate for the laparoscopic gastric sleeve.  He will have to complete the following pre-requisites:    If you have not already watched our online seminar please go to [email or web address]  Bariatric labs ordered, call for a lab only appointment at [facility]. To find a lab location near you, please call [phone].    Schedule bariatric psych eval as soon as possible.  List of psychologists will be sent to you via Perfect Channel or given to you in clinic.     Call " Oliver Judd at [phone] to discuss insurance coverage for bariatric surgery.  Please check with your insurance regarding bariatric surgery coverage also. Oliver can also help you with scheduling psych eval if you are having difficulties.    The following clearance letters are needed: Letters will be sent to you via [email or web address] or given to you in clinic  - Sleep clearance    A  should reach out to you to schedule the following appointments.  If they do not reach you please call [phone] to schedule the following appointments:    -See dietitian in 1 month and monthly for 6 months    -See Lauren Bloch MTM pharmacist in 1 month to follow up on weight loss medications    -See Sada in 3 months to follow up on pre-op weight loss and weight loss medications    -See Dr Liriano in 1 month for bariatric surgeon visit    -ALEISHA referral entered today, knee pain, deconditioning    Bariatric Task List  Status:  Is patient a candidate for bariatric surgery?:  patient is a candidate for bariatric surgery -     Cleared to schedule surgeon consult?:    -     Status:  surgery evaluation in process -     Surgeon: Heri -     Tentative surgery month/year: March 2021 -        Insurance: Insurance:  Blue Plus -        Patient Info: Initial Weight:  400 -     Date of Initial Weight/Height:  9/30/2020 -     Goal Weight (lbs):  360 -     Required Weight Loss:  40 -     Surgery Type:  sleeve gastrectomy -        Dietician Visits: Structured weight loss required by insurance?:  structured weight loss required -     Dietician Visit 1:  Completed -     Dietician Visit 2:  Needed -     Dietician Visit 3:  Needed -     Dietician Visit 4:  Needed -     Dietician Visit 5:  Needed -     Dietician Visit 6:  Needed -        Psychological Evaluation: Psych eval:  Needed -        Lab Work: Complete Blood Count:  Needed -     Comprehensive Metabolic Panel:  Needed -     Vitamin D:  Needed -     Hgb A1c:  Completed -     PTH:  Needed -    "     Consults/ Clearance: Sleep Medicine:  Needed - planning to follow up at [facility] sleep clinic, already established in  and uses CPAP nightly      Testing:    PCP: Establish care with PCP:  Completed -     PCP letter of support:  Needed -        Smoking: Quit tobacco use (3 months smoke free)?:    -     Quit date:    -        Patient Education:  Information Session:  Completed -     Given \"Making your decision\" handout?:  Given \"\"Making your decision\"\" handout? -     Given support group information?:  support group contact information provided -     Attended support group?:    -     Support plan in place?:  Completed -     Research consents signed?:  research consent not signed -        Final Tasks:  Before surgery online class:  Needed -     Before surgery online class website link:  [email or web address]   After surgery online class:  Needed -     After surgery online class website link:  [email or web address]   Nurse visit for weigh-in and information:  Needed -     Pre-assessment clinic visit with anesthesia team for H&P:  Needed -     Final labs (Hgb, plt, T&S, UA):  Needed -        Notes:   -       Is patient currently taking narcotic/opioid medications? no    Today in the office we discussed gastric sleeve surgery. Preoperative, perioperative, and postoperative processes, management, and follow up were addressed.  Risks and benefits were outlined including the risk of death, staple line leak (1-2%), PE, DVT, ulcer, worsening GERD, N/V, stricture, hernia, wound infection, weight regain, and vitamin deficiencies. I emphasized exercise and activity along with appropriate food choice as the main foundation for weight loss with surgery providing surgical reinforcement of this.  All questions were answered.  A goal sheet and support group handout were given to the patient.      Once the patient has completed the requirements in their task list and there are no further " recommendations, the pt will be allowed to see the surgeon of her choice for consultation on the laparoscopic gastric sleeve surgery. Patient verbalizes understanding of the process to surgery and expectations for the postoperative period including the need for lifelong lifestyle changes, vitamin supplementation, and laboratory monitoring.      Again, thank you for allowing me to participate in the care of your patient.  Sincerely,    Sada Beach PA-C

## 2020-09-30 NOTE — Clinical Note
New Bariatric pt I saw today:    Oliver, please check insurance and confirm 6 RD needed  Flor, please add to dashboard  Latisha, please send all needed My Chart messages  Clinic schedulers please call pt to schedule 1 mo with Dr ross Pitt and RD and me in 3 months    THanks! Sada

## 2020-09-30 NOTE — LETTER
"9/30/2020       RE: Larry Bangura  [address] [facility] [address]     Dear Colleague,    Thank you for referring your patient, Larry Bangura, to the [facility] SURGICAL WEIGHT MANAGEMENT at [facility]. Please see a copy of my visit note below.    Larry Bangura is a 35 year old male who is being evaluated via a billable video visit.      The patient has been notified of following:     \"This video visit will be conducted via a call between you and your physician/provider. We have found that certain health care needs can be provided without the need for an in-person physical exam.  This service lets us provide the care you need with a video conversation.  If a prescription is necessary we can send it directly to your pharmacy.  If lab work is needed we can place an order for that and you can then stop by our lab to have the test done at a later time.    Video visits are billed at different rates depending on your insurance coverage.  Please reach out to your insurance provider with any questions.    If during the course of the call the physician/provider feels a video visit is not appropriate, you will not be charged for this service.\"    Patient has given verbal consent for Video visit? Yes  How would you like to obtain your AVS? MyChart  If you are dropped from the video visit, the video invite should be resent to: Text to cell phone: [phone]  Will anyone else be joining your video visit? No        Video-Visit Details    Type of service:  Video Visit    Video Start Time: 8:36 AM  Video End Time: 9:00 AM    Originating Location (pt. Location): Home    Distant Location (provider location):  [facility] SURGICAL WEIGHT MANAGEMENT     Platform used for Video Visit: Tapingo    During this virtual visit the patient is located in [place], patient verifies this as the location during the entirety of this visit.     New Bariatric Nutrition Consultation Note    Reason For Visit: Nutrition " "Assessment    Larry Bangura is a 35 year old presenting today for new bariatric nutrition consult.   Pt is interested in laparoscopic sleeve gastrectomy with Dr. Liriano expected surgery in March 2021.  Patient is accompanied by self.  This is pt's first of 6 required nutrition visits prior to surgery.     Pt referred by DELL Duckworth on September 30, 2020.  Patient with Co-morbidities of obesity including:  Type II DM yes  Renal Failure no  Sleep apnea yes  Hypertension yes   Dyslipidemia yes  Joint pain no  Back pain no  GERD no     Support System Reviewed With Patient 9/29/2020   Who do you have in your support network that can be available to help you for the first 2 weeks after surgery? wife   Who can you count on for support throughout your weight loss surgery journey? wife       ANTHROPOMETRICS:  Estimated body mass index is 55.79 kg/m  as calculated from the following:    Height as of an earlier encounter on 9/30/20: 1.803 m (5' 11\").    Weight as of an earlier encounter on 9/30/20: 181.4 kg (400 lb).    Required weight loss goal pre-op: 40 lbs from initial consult weight (goal weight 360 lbs or less before surgery)       9/29/2020   I have tried the following methods to lose weight Watching portions or calories, Exercise, Weight Watchers, Atkins type diet (low carb/high protein), Pre packaged meals ex: Nutrisystem, Slimfast       Weight Loss Questions Reviewed With Patient 9/29/2020   How long have you been overweight? 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 He typically picks up breakfast on his way to work, 2 [place] sausage egg burritos and a diet coke: sometimes a muffin. For lunch it is leftovers from dinner OR fast food, usually the sandwich not the fries and a diet coke.   Dinner is a meat vegetables and bread and he tries to have popsicle after dinner for his snack.      He has been " sleeping better since he started using his CPAP machine.     Recall Diet Questions Reviewed With Patient 9/29/2020   Describe what you typically consume for breakfast (typical or most recent): 2 sausage burritos diet coke   Describe what you typically consume for lunch (typical or most recent): leftovers   Describe what you typically consume for supper (typical or most recent): meet veg bread   Describe what you typically consume as snacks (typical or most recent): chocolate covered almans   How many ounces of water, or other low calorie drinks, do you drink daily (8 oz=1 glass)? 32 oz- water   How many ounces of caffeine (coffee, tea, pop) do you drink daily (8 oz=1 glass)? 32 oz- soda    How many ounces of carbonated (pop, beer, sparkling water) drinks do you drinky daily (8 oz=1 glass)? 32 oz- dt mt dew and diet coke    How many ounces of juice, pop, sweet tea, sports drinks, protein drinks, other sweetened drinks, do you drink daily (8 oz=1 glass)? 8 oz- not usually    How many ounces of milk do you drink daily (8 oz=1 glass) 8 oz- with cereal    Please indicate the type of milk: skim   How often do you drink alcohol? Monthly or less- not usually.    If you do drink alcohol, how many drinks might you have in a day? (one drink = 5 oz. wine, 1 can/bottle of beer, 1 shot liquor) 1 or 2       Eating Habits 9/29/2020   Do you have any dietary restrictions? No   Do you currently binge eat (eat a large amount of food in a short time)? Yes   Are you an emotional eater? Yes   Do you get up to eat after falling asleep? No   What foods do you crave? all       ADDITIONAL INFORMATION:     Dining Out History Reviewed With Patient 9/29/2020   How often do you dine out? Nearly every day.   Where do you dine out? (select all that apply) fast food chains, take out, other   What types of food do you order when you dine out? salvador reese       Physical Activity Reviewed With Patient 9/29/2020   How often do you exercise? Never  "  What keeps you from being more active?  Pain, I should be more active but I just have not gotten around to it, Lack of Time, Too tired       MALNUTRITION  Video Visit: Visual NFPE  % Intake: No decreased intake noted  % Weight Loss: None noted  Subcutaneous Fat Loss: None observed  Muscle Loss: None observed  Fluid Accumulation/Edema: Unable to assess  Malnutrition Diagnosis: Patient does not meet two of the established criteria necessary for diagnosing malnutrition    NUTRITION DIAGNOSIS:  Obesity r/t long history of self-monitoring deficit and excessive energy intake aeb BMI >30 kg/m2.    INTERVENTION:  Intervention Provided/Education Provided on post-op diet guidelines, vitamins/minerals essential post-operatively, GI anatomy of bariatric surgeries, ways to help prepare for post-op diet guidelines pre-operatively, portion/calorie-control, mindful eating and sources of protein.  Provided pt with list of goals RD contact information.      Questions Reviewed With Patient 9/29/2020   How ready are you to make changes regarding your weight? Number 1 = Not ready at all to make changes up to 10 = very ready. 10   How confident are you that you can change? 1 = Not confident that you will be successful making changes up to 10 = very confident. 8       Patient Understanding: good  Expected Compliance: fair-good    GOALS:  Relating To Eating:  Eat slowly (20-30 minutes per meal), chewing foods well (25 chews per bite/applesauce consistency)  9\" Plate method (1/2 plate non-starchy vegetables/fruit, 1/4 plate lean protein, 1/4 plate whole grain starch - no more than 1/2 cup carb/meal)    Relating to beverages:  Reduce caffeine/carbonation/calorie containing beverages  Separate fluids from meals by 30 minutes before, during, and after eating    Relating to activity:  Increase activity as able    Initial Consult / Weight Loss     My Plate Planner_English - Pt Education  [email or web address]    Protein Sources " for Weight Loss  [email or web address]     Carbohydrates  [email or web address]     Time spent with patient: 24 minutes.  Amy Guthrie MS, RD, LD        Again, thank you for allowing me to participate in the care of your patient.      Sincerely,    Amy Guthrie RD

## 2020-09-30 NOTE — PATIENT INSTRUCTIONS
"GOALS:  Relating To Eating:  Eat slowly (20-30 minutes per meal), chewing foods well (25 chews per bite/applesauce consistency)  9\" Plate method (1/2 plate non-starchy vegetables/fruit, 1/4 plate lean protein, 1/4 plate whole grain starch - no more than 1/2 cup carb/meal)    Relating to beverages:  Reduce caffeine/carbonation/calorie containing beverages  Separate fluids from meals by 30 minutes before, during, and after eating    Relating to activity:  Increase activity as able    Initial Consult / Weight Loss     My Plate Planner_English - Pt Education  [email or web address]    Protein Sources for Weight Loss  [email or web address]     Carbohydrates  [email or web address]     Follow up with RD in one month    Amy Guthrie, MS, RD, LD  If you need to schedule or reschedule with a dietitian please call [phone].  "

## 2020-09-30 NOTE — PROGRESS NOTES
"Larry Bangura is a 35 year old male who is being evaluated via a billable video visit.      The patient has been notified of following:     \"This video visit will be conducted via a call between you and your physician/provider. We have found that certain health care needs can be provided without the need for an in-person physical exam.  This service lets us provide the care you need with a video conversation.  If a prescription is necessary we can send it directly to your pharmacy.  If lab work is needed we can place an order for that and you can then stop by our lab to have the test done at a later time.    Video visits are billed at different rates depending on your insurance coverage.  Please reach out to your insurance provider with any questions.    If during the course of the call the physician/provider feels a video visit is not appropriate, you will not be charged for this service.\"    Patient has given verbal consent for Video visit? Yes  How would you like to obtain your AVS? MyChart  If you are dropped from the video visit, the video invite should be resent to: Text to cell phone: [phone]  Will anyone else be joining your video visit? No    During this virtual visit the patient is located in [place], patient verifies this as the location during the entirety of this visit.     New Bariatric Surgery Consultation Note    2020    RE: Larry Bangura  MR#: [id number]  : 1985      Referring provider:       2020   Who referred you? Dr Mcconnell       Chief Complaint/Reason for visit: evaluation for possible weight loss surgery    Dear Shelton Mcconnell MD (General),    I had the pleasure of seeing your patient, Larry Bangura, to evaluate his obesity and consider him for possible weight loss surgery. As you know, Larry Bangura is 35 year old.  He has a height of 5' 11\", a weight of 400 lbs 0 oz, and calculated Body mass index is 55.79 kg/m .      Referred by Dr Pe[facility]  He has " thought about surgery since his 20's  Lost 150 lbs senior year of high school      HISTORY OF PRESENT ILLNESS:  Weight Loss History Reviewed with Patient 9/29/2020   How long have you been overweight? Since early childhood   What is the most that you have ever weighed? 450   What is the most weight you have lost? 150   I have tried the following methods to lose weight Watching portions or calories, Exercise, Weight Watchers, Atkins type diet (low carb/high protein), Pre packaged meals ex: Nutrisystem, Slimfast   Have you ever had weight loss surgery? No       CO-MORBIDITIES OF OBESITY INCLUDE:     9/29/2020   I have the following health issues associated with obesity: Type II Diabetes, Pre-Diabetes, High Blood Pressure, High Cholesterol, Sleep Apnea, Stress Incontinence   T2 DM takes metformin 1000 mg twice daily. Recent A1C 7.7  HTN takes lisinopril  IRWIN: CPAP daily. Goes to [facility], now has to go to [facility] for follow up.    PCP last week 130/78  Weight 398 lbs    No hx of kidney stones or glaucoma      PAST MEDICAL HISTORY:  Past Medical History:   Diagnosis Date     HTN (hypertension) 9/10/2013     Morbid obesity (H)        PAST SURGICAL HISTORY:  Past Surgical History:   Procedure Laterality Date     ENDOSCOPIC RELEASE CARPAL TUNNEL  12/6/2013    Procedure: ENDOSCOPIC RELEASE CARPAL TUNNEL;  Right Hand Endoscopic Carpal Tunnel Release;  Surgeon: Kody Pittman MD;  Location: [facility] OR     ENDOSCOPIC RELEASE CARPAL TUNNEL  1/21/2014    Procedure: ENDOSCOPIC RELEASE CARPAL TUNNEL;  Left endoscopic carpal tunnel release, possible open;  Surgeon: Kody Pittman MD;  Location: [facility] OR       FAMILY HISTORY:   Family History   Problem Relation Age of Onset     Diabetes Father      Cancer Sister      C.A.D. 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 Yes   Who do you have in your support network that can be available to help you for the first 2 weeks after surgery? wife   Who can you count on for support throughout your weight loss surgery journey? wife   Can you afford 3 meals a day?  Yes   Can you afford 50-60 dollars a month for vitamins? Yes   Self employed   2 children ages 6 and 7    HABITS:     9/29/2020   How often do you drink alcohol? Monthly or less   If you do drink alcohol, how many drinks might you have in a day? (one drink = 5 oz. wine, 1 can/bottle of beer, 1 shot liquor) 1 or 2   Have you or are you currently using street drugs or prescription strength medication for which you do not have a prescription for? No   Do you have a history of chemical dependency (alcohol or drug abuse)? No   Smoked when he was in his 20's when drinking. Quit 4-5 years ago    PSYCHOLOGICAL HISTORY:   Psychological History Reviewed With Patient 9/29/2020   Have you ever attempted suicide? Never.   Have you had thoughts of suicide in the past year? No   Have you ever been hospitalized for mental illness or a suicide attempt? Never.   Do you have a history of chronic pain? Yes   Have you ever been diagnosed with fibromyalgia? No   Are you currently seeing a therapist or counselor?  No   Are you currently seeing a psychiatrist? No       ROS:     9/29/2020   Skin:  None of the above   HEENT: Dizziness/lightheadedness, Missing teeth   Musculoskeletal: Joint Pain, Back pain, Limited mobility, Swelling of legs, Arthritis   Cardiovascular: Shortness of breath with activity   Pulmonary: Snoring, People have told me I stop breathing while asleep   Gastrointestinal: Heartburn, Diarrhea, Constipation   Genitourinary: None of the above   Hematological: None of the above   Neurological: None of the above        EATING BEHAVIORS:     9/29/2020   Have you or anyone else thought that you had an eating disorder? Yes   If you answered yes to the previous eating disorder question, select the types that apply from this list: Binge Eating   Do you currently binge eat (eat a large amount of food in a short time)? Yes   Are you an emotional eater? Yes   Do you get up to eat after falling asleep? No       EXERCISE:     9/29/2020   How often do you exercise? Never   What keeps you from being more active?  Pain, I should be more active but I just have not gotten around to it, Lack of Time, Too tired       MEDICATIONS:  Current Outpatient Medications   Medication Sig Dispense Refill     lisinopril (ZESTRIL) 20 MG tablet TAKE 1 TABLET BY MOUTH ONCE DAILY . 90 tablet 3     metFORMIN (GLUCOPHAGE) 1000 MG tablet Take one tablet twice daily with meals 180 tablet 3     ORDER FOR DME, SET TO FAX, Patient received a reQwip Airsense 10  Auto. Pressures were set at 13 cm H2O.       valACYclovir (VALTREX) 1000 mg tablet TAKE 1 TABLET BY MOUTH TWICE DAILY FOR 2 DAYS FOR  OUTBREAKS 12 tablet 0     valACYclovir (VALTREX) 500 MG tablet TAKE 1 TABLET BY MOUTH TWICE DAILY 90 tablet 3       ALLERGIES:  No Known Allergies    LABS/IMAGING/MEDICAL RECORDS REVIEW:   Results for HENRY TERESA (MRN [id number]) as of 9/30/2020 08:33   Ref. Range 9/21/2020 08:51   Sodium Latest Ref Range: 133 - 144 mmol/L 136   Potassium Latest Ref Range: 3.4 - 5.3 mmol/L 4.0   Chloride Latest Ref Range: 94 - 109 mmol/L 101   Carbon Dioxide Latest Ref Range: 20 - 32 mmol/L 30   Urea Nitrogen Latest Ref Range: 7 - 30 mg/dL 10   Creatinine Latest Ref Range: 0.66 - 1.25 mg/dL 0.64 (L)   GFR Estimate Latest Ref Range: >60 mL/min/1.73_m2 >90   GFR Estimate If Black Latest Ref Range: >60 mL/min/1.73_m2 >90   Calcium Latest Ref Range: 8.5 - 10.1 mg/dL 8.8   Anion Gap Latest Ref Range: 3 - 14 mmol/L 5   Hemoglobin A1C Latest Ref Range: 0 - 5.6 % 7.7 (H)   Cholesterol Latest Ref  "Range: <200 mg/dL 155   HDL Cholesterol Latest Ref Range: >39 mg/dL 42   LDL Cholesterol Calculated Latest Ref Range: <100 mg/dL 76   Non HDL Cholesterol Latest Ref Range: <130 mg/dL 113   Triglycerides Latest Ref Range: <150 mg/dL 184 (H)   Glucose Latest Ref Range: 70 - 99 mg/dL 198 (H)       PHYSICAL EXAM:  Ht 1.803 m (5' 11\")   Wt (!) 181.4 kg (400 lb)   BMI 55.79 kg/m        In summary, Larry Bangura has Class III obesity with a body mass index of Body mass index is 55.79 kg/m . kg/m2 and the comorbidities stated above. He completed an informational seminar and is a candidate for the laparoscopic gastric sleeve.  He will have to complete the following pre-requisites:    If you have not already watched our online seminar please go to [email or web address]    Bariatric labs ordered, call for a lab only appointment at [facility] lab. To find a lab location near you, please call [phone].    Schedule bariatric psych eval as soon as possible.  List of psychologists will be sent to you via mobiDEOS or given to you in clinic.     Call Oliver Judd at [phone] to discuss insurance coverage for bariatric surgery.  Please check with your insurance regarding bariatric surgery coverage also. Oliver can also help you with scheduling psych eval if you are having difficulties.    The following clearance letters are needed: Letters will be sent to you via mobiDEOS or given to you in clinic  - Sleep clearance    A  should reach out to you to schedule the following appointments.  If they do not reach you please call [phone] to schedule the following appointments:    -See dietitian in 1 month and monthly for 6 months    -See Lauren Bloch MTM pharmacist in 1 month to follow up on weight loss medications    -See Sada in 3 months to follow up on pre-op weight loss and weight loss medications    -See Dr Liriano in 1 month for bariatric surgeon visit    -ALEISHA referral entered today, knee pain, " "deconditioning    Bariatric Task List  Status:  Is patient a candidate for bariatric surgery?:  patient is a candidate for bariatric surgery -     Cleared to schedule surgeon consult?:    -     Status:  surgery evaluation in process -     Surgeon: Heri -     Tentative surgery month/year: March 2021 -        Insurance: Insurance:  [employer] Plus -        Patient Info: Initial Weight:  400 -     Date of Initial Weight/Height:  9/30/2020 -     Goal Weight (lbs):  360 -     Required Weight Loss:  40 -     Surgery Type:  sleeve gastrectomy -        Dietician Visits: Structured weight loss required by insurance?:  structured weight loss required -     Dietician Visit 1:  Completed -     Dietician Visit 2:  Needed -     Dietician Visit 3:  Needed -     Dietician Visit 4:  Needed -     Dietician Visit 5:  Needed -     Dietician Visit 6:  Needed -        Psychological Evaluation: Psych eval:  Needed -        Lab Work: Complete Blood Count:  Needed -     Comprehensive Metabolic Panel:  Needed -     Vitamin D:  Needed -     Hgb A1c:  Completed -     PTH:  Needed -        Consults/ Clearance: Sleep Medicine:  Needed - planning to follow up at [facility] sleep clinic, already established in  and uses CPAP nightly      Testing:    PCP: Establish care with PCP:  Completed -     PCP letter of support:  Needed -        Smoking: Quit tobacco use (3 months smoke free)?:    -     Quit date:    -        Patient Education:  Information Session:  Completed -     Given \"Making your decision\" handout?:  Given \"\"Making your decision\"\" handout? -     Given support group information?:  support group contact information provided -     Attended support group?:    -     Support plan in place?:  Completed -     Research consents signed?:  research consent not signed -        Final Tasks:  Before surgery online class:  Needed -     Before surgery online class website link:  [email or web address]   After surgery online class:  " Needed -     After surgery online class website link:  [email or web address]   Nurse visit for weigh-in and information:  Needed -     Pre-assessment clinic visit with anesthesia team for H&P:  Needed -     Final labs (Hgb, plt, T&S, UA):  Needed -        Notes:   -         Is patient currently taking narcotic/opioid medications? no    Today in the office we discussed gastric sleeve surgery. Preoperative, perioperative, and postoperative processes, management, and follow up were addressed.  Risks and benefits were outlined including the risk of death, staple line leak (1-2%), PE, DVT, ulcer, worsening GERD, N/V, stricture, hernia, wound infection, weight regain, and vitamin deficiencies. I emphasized exercise and activity along with appropriate food choice as the main foundation for weight loss with surgery providing surgical reinforcement of this.  All questions were answered.  A goal sheet and support group handout were given to the patient.      Once the patient has completed the requirements in their task list and there are no further recommendations, the pt will be allowed to see the surgeon of her choice for consultation on the laparoscopic gastric sleeve surgery. Patient verbalizes understanding of the process to surgery and expectations for the postoperative period including the need for lifelong lifestyle changes, vitamin supplementation, and laboratory monitoring.      Sincerely,     Sada Beach PA-C      Video-Visit Details    Type of service:  Video Visit    Video Start Time: 802  Video End Time: 832    Originating Location (pt. Location): Home    Distant Location (provider location):  [facility]     Platform used for Video Visit: [facility]    Sada Beach PA-C

## 2020-09-30 NOTE — PROGRESS NOTES
"Larry Bangura is a 35 year old male who is being evaluated via a billable video visit.      The patient has been notified of following:     \"This video visit will be conducted via a call between you and your physician/provider. We have found that certain health care needs can be provided without the need for an in-person physical exam.  This service lets us provide the care you need with a video conversation.  If a prescription is necessary we can send it directly to your pharmacy.  If lab work is needed we can place an order for that and you can then stop by our lab to have the test done at a later time.    Video visits are billed at different rates depending on your insurance coverage.  Please reach out to your insurance provider with any questions.    If during the course of the call the physician/provider feels a video visit is not appropriate, you will not be charged for this service.\"    Patient has given verbal consent for Video visit? Yes  How would you like to obtain your AVS? MyChart  If you are dropped from the video visit, the video invite should be resent to: Text to cell phone: [phone]  Will anyone else be joining your video visit? No        Video-Visit Details    Type of service:  Video Visit    Video Start Time: 8:36 AM  Video End Time: 9:00 AM    Originating Location (pt. Location): Home    Distant Location (provider location):  [facility]     Platform used for Video Visit: [employer]    During this virtual visit the patient is located in [place], patient verifies this as the location during the entirety of this visit.     New Bariatric Nutrition Consultation Note    Reason For Visit: Nutrition Assessment    Larry Bangura is a 35 year old presenting today for new bariatric nutrition consult.   Pt is interested in laparoscopic sleeve gastrectomy with Dr. Liriano expected surgery in March 2021.  Patient is accompanied by self.  This is pt's first of 6 required nutrition visits " "prior to surgery.     Pt referred by DELL Duckworth on September 30, 2020.  Patient with Co-morbidities of obesity including:  Type II DM yes  Renal Failure no  Sleep apnea yes  Hypertension yes   Dyslipidemia yes  Joint pain no  Back pain no  GERD no     Support System Reviewed With Patient 9/29/2020   Who do you have in your support network that can be available to help you for the first 2 weeks after surgery? wife   Who can you count on for support throughout your weight loss surgery journey? wife       ANTHROPOMETRICS:  Estimated body mass index is 55.79 kg/m  as calculated from the following:    Height as of an earlier encounter on 9/30/20: 1.803 m (5' 11\").    Weight as of an earlier encounter on 9/30/20: 181.4 kg (400 lb).    Required weight loss goal pre-op: 40 lbs from initial consult weight (goal weight 360 lbs or less before surgery)       9/29/2020   I have tried the following methods to lose weight Watching portions or calories, Exercise, Weight Watchers, Atkins type diet (low carb/high protein), Pre packaged meals ex: Nutrisystem, Slimfast       Weight Loss Questions Reviewed With Patient 9/29/2020   How long have you been overweight? 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 He typically picks up breakfast on his way to work, 2 [place] sausage egg burritos and a diet coke: sometimes a muffin. For lunch it is leftovers from dinner OR fast food, usually the sandwich not the fries and a diet coke.   Dinner is a meat vegetables and bread and he tries to have popsicle after dinner for his snack.      He has been sleeping better since he started using his CPAP machine.     Recall Diet Questions Reviewed With Patient 9/29/2020   Describe what you typically consume for breakfast (typical or most recent): 2 sausage burritos diet coke   Describe what you typically consume for lunch (typical or most " recent): leftovers   Describe what you typically consume for supper (typical or most recent): meet veg bread   Describe what you typically consume as snacks (typical or most recent): chocolate covered almans   How many ounces of water, or other low calorie drinks, do you drink daily (8 oz=1 glass)? 32 oz- water   How many ounces of caffeine (coffee, tea, pop) do you drink daily (8 oz=1 glass)? 32 oz- soda    How many ounces of carbonated (pop, beer, sparkling water) drinks do you drinky daily (8 oz=1 glass)? 32 oz- dt mt dew and diet coke    How many ounces of juice, pop, sweet tea, sports drinks, protein drinks, other sweetened drinks, do you drink daily (8 oz=1 glass)? 8 oz- not usually    How many ounces of milk do you drink daily (8 oz=1 glass) 8 oz- with cereal    Please indicate the type of milk: skim   How often do you drink alcohol? Monthly or less- not usually.    If you do drink alcohol, how many drinks might you have in a day? (one drink = 5 oz. wine, 1 can/bottle of beer, 1 shot liquor) 1 or 2       Eating Habits 9/29/2020   Do you have any dietary restrictions? No   Do you currently binge eat (eat a large amount of food in a short time)? Yes   Are you an emotional eater? Yes   Do you get up to eat after falling asleep? No   What foods do you crave? all       ADDITIONAL INFORMATION:     Dining Out History Reviewed With Patient 9/29/2020   How often do you dine out? Nearly every day.   Where do you dine out? (select all that apply) fast food chains, take out, other   What types of food do you order when you dine out? salvador reese       Physical Activity Reviewed With Patient 9/29/2020   How often do you exercise? Never   What keeps you from being more active?  Pain, I should be more active but I just have not gotten around to it, Lack of Time, Too tired       MALNUTRITION  Video Visit: Visual NFPE  % Intake: No decreased intake noted  % Weight Loss: None noted  Subcutaneous Fat Loss: None  "observed  Muscle Loss: None observed  Fluid Accumulation/Edema: Unable to assess  Malnutrition Diagnosis: Patient does not meet two of the established criteria necessary for diagnosing malnutrition    NUTRITION DIAGNOSIS:  Obesity r/t long history of self-monitoring deficit and excessive energy intake aeb BMI >30 kg/m2.    INTERVENTION:  Intervention Provided/Education Provided on post-op diet guidelines, vitamins/minerals essential post-operatively, GI anatomy of bariatric surgeries, ways to help prepare for post-op diet guidelines pre-operatively, portion/calorie-control, mindful eating and sources of protein.  Provided pt with list of goals RD contact information.      Questions Reviewed With Patient 9/29/2020   How ready are you to make changes regarding your weight? Number 1 = Not ready at all to make changes up to 10 = very ready. 10   How confident are you that you can change? 1 = Not confident that you will be successful making changes up to 10 = very confident. 8       Patient Understanding: good  Expected Compliance: fair-good    GOALS:  Relating To Eating:  Eat slowly (20-30 minutes per meal), chewing foods well (25 chews per bite/applesauce consistency)  9\" Plate method (1/2 plate non-starchy vegetables/fruit, 1/4 plate lean protein, 1/4 plate whole grain starch - no more than 1/2 cup carb/meal)    Relating to beverages:  Reduce caffeine/carbonation/calorie containing beverages  Separate fluids from meals by 30 minutes before, during, and after eating    Relating to activity:  Increase activity as able    Initial Consult / Weight Loss     My Plate Planner_English - Pt Education  [email or web address]    Protein Sources for Weight Loss  [email or web address]     Carbohydrates  [email or web address]     Time spent with patient: 24 minutes.  Amy Guthrie, MS, RD, LD      "

## 2020-09-30 NOTE — LETTER
"2020       RE: Larry Bangura  [address] [address]     Dear Colleague,    Thank you for referring your patient, Larry Bangura, to the [facility] SURGICAL WEIGHT MANAGEMENT at [facility]. Please see a copy of my visit note below.    Larry Bangura is a 35 year old male who is being evaluated via a billable video visit.      The patient has been notified of following:     \"This video visit will be conducted via a call between you and your physician/provider. We have found that certain health care needs can be provided without the need for an in-person physical exam.  This service lets us provide the care you need with a video conversation.  If a prescription is necessary we can send it directly to your pharmacy.  If lab work is needed we can place an order for that and you can then stop by our lab to have the test done at a later time.    Video visits are billed at different rates depending on your insurance coverage.  Please reach out to your insurance provider with any questions.    If during the course of the call the physician/provider feels a video visit is not appropriate, you will not be charged for this service.\"    Patient has given verbal consent for Video visit? Yes  How would you like to obtain your AVS? MyChart  If you are dropped from the video visit, the video invite should be resent to: Text to cell phone: [phone]  Will anyone else be joining your video visit? No    During this virtual visit the patient is located in [place], patient verifies this as the location during the entirety of this visit.     New Bariatric Surgery Consultation Note    2020    RE: Larry Bangura  MR#: [id number]  : 1985      Referring provider:       2020   Who referred you? Dr Mcconnell       Chief Complaint/Reason for visit: evaluation for possible weight loss surgery    Dear Shelton Mcconnell MD (General),    I had the pleasure of seeing your patient, " "Larry Bangura, to evaluate his obesity and consider him for possible weight loss surgery. As you know, Larry Bangura is 35 year old.  He has a height of 5' 11\", a weight of 400 lbs 0 oz, and calculated Body mass index is 55.79 kg/m .      Referred by Dr Shelton Naik [facility]  He has thought about surgery since his 20's  Lost 150 lbs senior year of high school      HISTORY OF PRESENT ILLNESS:  Weight Loss History Reviewed with Patient 9/29/2020   How long have you been overweight? Since early childhood   What is the most that you have ever weighed? 450   What is the most weight you have lost? 150   I have tried the following methods to lose weight Watching portions or calories, Exercise, Weight Watchers, Atkins type diet (low carb/high protein), Pre packaged meals ex: Nutrisystem, Slimfast   Have you ever had weight loss surgery? No       CO-MORBIDITIES OF OBESITY INCLUDE:     9/29/2020   I have the following health issues associated with obesity: Type II Diabetes, Pre-Diabetes, High Blood Pressure, High Cholesterol, Sleep Apnea, Stress Incontinence   T2 DM takes metformin 1000 mg twice daily. Recent A1C 7.7  HTN takes lisinopril  IRWIN: CPAP daily. Goes to [facility], now has to go to [facility] for follow up.    PCP last week 130/78  Weight 398 lbs    No hx of kidney stones or glaucoma      PAST MEDICAL HISTORY:  Past Medical History:   Diagnosis Date     HTN (hypertension) 9/10/2013     Morbid obesity (H)        PAST SURGICAL HISTORY:  Past Surgical History:   Procedure Laterality Date     ENDOSCOPIC RELEASE CARPAL TUNNEL  12/6/2013    Procedure: ENDOSCOPIC RELEASE CARPAL TUNNEL;  Right Hand Endoscopic Carpal Tunnel Release;  Surgeon: Kody Pittman MD;  Location: [facility] OR     ENDOSCOPIC RELEASE CARPAL TUNNEL  1/21/2014    Procedure: ENDOSCOPIC RELEASE CARPAL TUNNEL;  Left endoscopic carpal tunnel release, possible open;  Surgeon: Kody Pittman MD;  Location: [facility] OR       FAMILY HISTORY:   Family " History   Problem Relation Age of Onset     Diabetes Father      Cancer Sister      C.A.D. 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 Yes   Who do you have in your support network that can be available to help you for the first 2 weeks after surgery? wife   Who can you count on for support throughout your weight loss surgery journey? wife   Can you afford 3 meals a day?  Yes   Can you afford 50-60 dollars a month for vitamins? Yes   Self employed   2 children ages 6 and 7    HABITS:     9/29/2020   How often do you drink alcohol? Monthly or less   If you do drink alcohol, how many drinks might you have in a day? (one drink = 5 oz. wine, 1 can/bottle of beer, 1 shot liquor) 1 or 2   Have you or are you currently using street drugs or prescription strength medication for which you do not have a prescription for? No   Do you have a history of chemical dependency (alcohol or drug abuse)? No   Smoked when he was in his 20's when drinking. Quit 4-5 years ago    PSYCHOLOGICAL HISTORY:   Psychological History Reviewed With Patient 9/29/2020   Have you ever attempted suicide? Never.   Have you had thoughts of suicide in the past year? No   Have you ever been hospitalized for mental illness or a suicide attempt? Never.   Do you have a history of chronic pain? Yes   Have you ever been diagnosed with fibromyalgia? No   Are you currently seeing a therapist or counselor?  No   Are you currently seeing a psychiatrist? No       ROS:     9/29/2020   Skin:  None of the above   HEENT: Dizziness/lightheadedness, Missing teeth   Musculoskeletal: Joint Pain, Back pain, Limited mobility, Swelling of legs, Arthritis    Cardiovascular: Shortness of breath with activity   Pulmonary: Snoring, People have told me I stop breathing while asleep   Gastrointestinal: Heartburn, Diarrhea, Constipation   Genitourinary: None of the above   Hematological: None of the above   Neurological: None of the above       EATING BEHAVIORS:     9/29/2020   Have you or anyone else thought that you had an eating disorder? Yes   If you answered yes to the previous eating disorder question, select the types that apply from this list: Binge Eating   Do you currently binge eat (eat a large amount of food in a short time)? Yes   Are you an emotional eater? Yes   Do you get up to eat after falling asleep? No       EXERCISE:     9/29/2020   How often do you exercise? Never   What keeps you from being more active?  Pain, I should be more active but I just have not gotten around to it, Lack of Time, Too tired       MEDICATIONS:  Current Outpatient Medications   Medication Sig Dispense Refill     lisinopril (ZESTRIL) 20 MG tablet TAKE 1 TABLET BY MOUTH ONCE DAILY . 90 tablet 3     metFORMIN (GLUCOPHAGE) 1000 MG tablet Take one tablet twice daily with meals 180 tablet 3     ORDER FOR DME, SET TO FAX, Patient received a [employer] Airsense 10  Auto. Pressures were set at 13 cm H2O.       valACYclovir (VALTREX) 1000 mg tablet TAKE 1 TABLET BY MOUTH TWICE DAILY FOR 2 DAYS FOR  OUTBREAKS 12 tablet 0     valACYclovir (VALTREX) 500 MG tablet TAKE 1 TABLET BY MOUTH TWICE DAILY 90 tablet 3       ALLERGIES:  No Known Allergies    LABS/IMAGING/MEDICAL RECORDS REVIEW:   Results for HENRY TERESA (MRN [id number]) as of 9/30/2020 08:33   Ref. Range 9/21/2020 08:51   Sodium Latest Ref Range: 133 - 144 mmol/L 136   Potassium Latest Ref Range: 3.4 - 5.3 mmol/L 4.0   Chloride Latest Ref Range: 94 - 109 mmol/L 101   Carbon Dioxide Latest Ref Range: 20 - 32 mmol/L 30   Urea Nitrogen Latest Ref Range: 7 - 30 mg/dL 10   Creatinine Latest Ref Range: 0.66 - 1.25 mg/dL 0.64 (L)   GFR Estimate  "Latest Ref Range: >60 mL/min/1.73_m2 >90   GFR Estimate If Black Latest Ref Range: >60 mL/min/1.73_m2 >90   Calcium Latest Ref Range: 8.5 - 10.1 mg/dL 8.8   Anion Gap Latest Ref Range: 3 - 14 mmol/L 5   Hemoglobin A1C Latest Ref Range: 0 - 5.6 % 7.7 (H)   Cholesterol Latest Ref Range: <200 mg/dL 155   HDL Cholesterol Latest Ref Range: >39 mg/dL 42   LDL Cholesterol Calculated Latest Ref Range: <100 mg/dL 76   Non HDL Cholesterol Latest Ref Range: <130 mg/dL 113   Triglycerides Latest Ref Range: <150 mg/dL 184 (H)   Glucose Latest Ref Range: 70 - 99 mg/dL 198 (H)       PHYSICAL EXAM:  Ht 1.803 m (5' 11\")   Wt (!) 181.4 kg (400 lb)   BMI 55.79 kg/m        In summary, Larry Bangura has Class III obesity with a body mass index of Body mass index is 55.79 kg/m . kg/m2 and the comorbidities stated above. He completed an informational seminar and is a candidate for the laparoscopic gastric sleeve.  He will have to complete the following pre-requisites:    If you have not already watched our online seminar please go to [email or web address]    Bariatric labs ordered, call for a lab only appointment at [facility] lab. To find a lab location near you, please call [phone].    Schedule bariatric psych eval as soon as possible.  List of psychologists will be sent to you via [employer] or given to you in clinic.     Call Oliver Judd at [phone] to discuss insurance coverage for bariatric surgery.  Please check with your insurance regarding bariatric surgery coverage also. Oliver can also help you with scheduling psych eval if you are having difficulties.    The following clearance letters are needed: Letters will be sent to you via [employer] or given to you in clinic  - Sleep clearance    A  should reach out to you to schedule the following appointments.  If they do not reach you please call [phone] to schedule the following appointments:    -See dietitian in 1 month and monthly for 6 months    -See Sweetie " "Bloch MTM pharmacist in 1 month to follow up on weight loss medications    -See Sada in 3 months to follow up on pre-op weight loss and weight loss medications    -See Dr Liriano in 1 month for bariatric surgeon visit    -ALEISHA referral entered today, knee pain, deconditioning    Bariatric Task List  Status:  Is patient a candidate for bariatric surgery?:  patient is a candidate for bariatric surgery -     Cleared to schedule surgeon consult?:    -     Status:  surgery evaluation in process -     Surgeon: Heri -     Tentative surgery month/year: March 2021 -        Insurance: Insurance:  [employer] -        Patient Info: Initial Weight:  400 -     Date of Initial Weight/Height:  9/30/2020 -     Goal Weight (lbs):  360 -     Required Weight Loss:  40 -     Surgery Type:  sleeve gastrectomy -        Dietician Visits: Structured weight loss required by insurance?:  structured weight loss required -     Dietician Visit 1:  Completed -     Dietician Visit 2:  Needed -     Dietician Visit 3:  Needed -     Dietician Visit 4:  Needed -     Dietician Visit 5:  Needed -     Dietician Visit 6:  Needed -        Psychological Evaluation: Psych eval:  Needed -        Lab Work: Complete Blood Count:  Needed -     Comprehensive Metabolic Panel:  Needed -     Vitamin D:  Needed -     Hgb A1c:  Completed -     PTH:  Needed -        Consults/ Clearance: Sleep Medicine:  Needed - planning to follow up at [facility] sleep clinic, already established in  and uses CPAP nightly      Testing:    PCP: Establish care with PCP:  Completed -     PCP letter of support:  Needed -        Smoking: Quit tobacco use (3 months smoke free)?:    -     Quit date:    -        Patient Education:  Information Session:  Completed -     Given \"Making your decision\" handout?:  Given \"\"Making your decision\"\" handout? -     Given support group information?:  support group contact information provided -     Attended support group?:    -     Support " plan in place?:  Completed -     Research consents signed?:  research consent not signed -        Final Tasks:  Before surgery online class:  Needed -     Before surgery online class website link:  [email or web address]   After surgery online class:  Needed -     After surgery online class website link:  [email or web address]   Nurse visit for weigh-in and information:  Needed -     Pre-assessment clinic visit with anesthesia team for H&P:  Needed -     Final labs (Hgb, plt, T&S, UA):  Needed -        Notes:   -         Is patient currently taking narcotic/opioid medications? no    Today in the office we discussed gastric sleeve surgery. Preoperative, perioperative, and postoperative processes, management, and follow up were addressed.  Risks and benefits were outlined including the risk of death, staple line leak (1-2%), PE, DVT, ulcer, worsening GERD, N/V, stricture, hernia, wound infection, weight regain, and vitamin deficiencies. I emphasized exercise and activity along with appropriate food choice as the main foundation for weight loss with surgery providing surgical reinforcement of this.  All questions were answered.  A goal sheet and support group handout were given to the patient.      Once the patient has completed the requirements in their task list and there are no further recommendations, the pt will be allowed to see the surgeon of her choice for consultation on the laparoscopic gastric sleeve surgery. Patient verbalizes understanding of the process to surgery and expectations for the postoperative period including the need for lifelong lifestyle changes, vitamin supplementation, and laboratory monitoring.      Sincerely,     Sada Beach PA-C      Video-Visit Details    Type of service:  Video Visit    Video Start Time: 802  Video End Time: 832    Originating Location (pt. Location): Home    Distant Location (provider location):  [facility] SURGICAL WEIGHT  MANAGEMENT     Platform used for Video Visit: Jenny Beach PA-C        Again, thank you for allowing me to participate in the care of your patient.      Sincerely,    Sada Beach PA-C

## 2020-10-14 ENCOUNTER — TELEPHONE (OUTPATIENT)
Dept: SURGERY | Facility: CLINIC | Age: 35
End: 2020-10-14

## 2020-11-05 DIAGNOSIS — A60.01 HERPES SIMPLEX INFECTION OF PENIS: ICD-10-CM

## 2020-11-09 RX ORDER — VALACYCLOVIR HYDROCHLORIDE 1 G/1
TABLET, FILM COATED ORAL
Qty: 12 TABLET | Refills: 0 | Status: SHIPPED | OUTPATIENT
Start: 2020-11-09 | End: 2021-01-06

## 2020-11-09 RX ORDER — VALACYCLOVIR HYDROCHLORIDE 500 MG/1
TABLET, FILM COATED ORAL
Qty: 90 TABLET | Refills: 0 | Status: SHIPPED | OUTPATIENT
Start: 2020-11-09 | End: 2021-02-03

## 2020-11-26 ENCOUNTER — HOSPITAL ENCOUNTER (EMERGENCY)
Facility: CLINIC | Age: 35
Discharge: HOME | End: 2020-11-26
Attending: EMERGENCY MEDICINE | Admitting: EMERGENCY MEDICINE
Payer: COMMERCIAL

## 2020-11-26 VITALS
BODY MASS INDEX: 44.1 KG/M2 | TEMPERATURE: 98.1 F | HEIGHT: 71 IN | RESPIRATION RATE: 22 BRPM | OXYGEN SATURATION: 94 % | WEIGHT: 315 LBS | HEART RATE: 125 BPM | SYSTOLIC BLOOD PRESSURE: 187 MMHG | DIASTOLIC BLOOD PRESSURE: 105 MMHG

## 2020-11-26 DIAGNOSIS — M54.41 ACUTE RIGHT-SIDED LOW BACK PAIN WITH RIGHT-SIDED SCIATICA: ICD-10-CM

## 2020-11-26 PROCEDURE — 99282 EMERGENCY DEPT VISIT SF MDM: CPT | Performed by: EMERGENCY MEDICINE

## 2020-11-26 PROCEDURE — 99284 EMERGENCY DEPT VISIT MOD MDM: CPT | Performed by: EMERGENCY MEDICINE

## 2020-11-26 RX ORDER — HYDROCODONE BITARTRATE AND ACETAMINOPHEN 5; 325 MG/1; MG/1
1 TABLET ORAL EVERY 6 HOURS PRN
Qty: 10 TABLET | Refills: 0 | Status: SHIPPED | OUTPATIENT
Start: 2020-11-26 | End: 2020-11-30

## 2020-11-26 RX ORDER — LORAZEPAM 1 MG/1
1 TABLET ORAL EVERY 8 HOURS PRN
Qty: 5 TABLET | Refills: 0 | Status: SHIPPED | OUTPATIENT
Start: 2020-11-26 | End: 2020-11-30

## 2020-11-26 ASSESSMENT — ENCOUNTER SYMPTOMS
WEAKNESS: 0
SHORTNESS OF BREATH: 0
DIFFICULTY URINATING: 0
FEVER: 0
DIARRHEA: 0
CONFUSION: 0
ABDOMINAL PAIN: 0
CHILLS: 0
BRUISES/BLEEDS EASILY: 0
CONSTIPATION: 0
BACK PAIN: 1

## 2020-11-26 ASSESSMENT — MIFFLIN-ST. JEOR: SCORE: 2685.8

## 2020-11-26 NOTE — ED AVS SNAPSHOT
[facility] Emergency Dept  [address] [address]  Phone: [phone]  Fax: [phone]                                    Larry Bangura   MRN: [id number]    Department: [facility] Emergency Dept   Date of Visit: 11/26/2020           After Visit Summary Signature Page    I have received my discharge instructions, and my questions have been answered. I have discussed any challenges I see with this plan with the nurse or doctor.    ..........................................................................................................................................  Patient/Patient Representative Signature      ..........................................................................................................................................  Patient Representative Print Name and Relationship to Patient    ..................................................               ................................................  Date                                   Time    ..........................................................................................................................................  Reviewed by Signature/Title    ...................................................              ..............................................  Date                                               Time          22EPIC Rev 08/18

## 2020-11-26 NOTE — DISCHARGE INSTRUCTIONS
Do not take Norco and Ativan within six hours of each other.    Do not mix them with alcohol. Do not drive while taking these. Do not operated equipment while taking these.     Please rest today.

## 2020-11-26 NOTE — ED PROVIDER NOTES
History     Chief Complaint   Patient presents with     Back Pain     since last friday. has known herniated disc.      HPI  Larry Bangura is a 35 year old male who presents with back pain.  has known umbar disc protrusion.  He hurt his back moving something last week and this improved over the next few days.  Yesterday he got up and the injury reoccurred.  He was confined to bed today because of pain.  Denies urinary incontinence or retention, bowel dysfunction, difficulty walking, loss of sensation in his perianal region.     He has a history of lower back pain with documented lumbar disc protrusion.    Denies fevers, chills, IVDU, abdominal pain, diarrhea, cp, sob, congestion, vision changes, weakness, bleeding problems, immunocompromised state, confusion, drug or drinking problems, and rash.    Of note, I am not related to this patient to the best of my knowledge and have not met him before.      Allergies:  No Known Allergies    Problem List:    Patient Active Problem List    Diagnosis Date Noted     Hyperlipidemia LDL goal <100 10/17/2018     Priority: Medium     Statin at 35-40.  Same with ASA.         Herpes simplex infection of penis 10/03/2018     Priority: Medium     Esophageal reflux 12/03/2013     Priority: Medium     Essential hypertension with goal blood pressure less than 140/90 09/10/2013     Priority: Medium     CARDIOVASCULAR SCREENING; LDL GOAL LESS THAN 160 08/14/2013     Priority: Medium     Seasonal allergic rhinitis 08/07/2013     Priority: Medium     Obstructive sleep apnea 08/07/2013     Priority: Medium     Severe IRWIN: PSG  6/2/2015 ( 414 lbs, , , adithya O2 51%.)  7/15/2016:Uses CPAP every day.          Type 2 diabetes mellitus without complication (H) 08/07/2013     Priority: Medium     Morbid obesity (H) 05/14/2012     Priority: Medium        Past Medical History:    Past Medical History:   Diagnosis Date     HTN (hypertension) 9/10/2013     Morbid obesity (H)        Past  Surgical History:    Past Surgical History:   Procedure Laterality Date     ENDOSCOPIC RELEASE CARPAL TUNNEL  12/6/2013    Procedure: ENDOSCOPIC RELEASE CARPAL TUNNEL;  Right Hand Endoscopic Carpal Tunnel Release;  Surgeon: Kody Pittman MD;  Location: [facility] OR     ENDOSCOPIC RELEASE CARPAL TUNNEL  1/21/2014    Procedure: ENDOSCOPIC RELEASE CARPAL TUNNEL;  Left endoscopic carpal tunnel release, possible open;  Surgeon: Kody Pittman MD;  Location: [facility] OR       Family History:    Family History   Problem Relation Age of Onset     Diabetes Father      Cancer Sister      C.A.D. Maternal Grandfather        Social History:  Marital Status:  Single [1]  Social History     Tobacco Use     Smoking status: Never Smoker     Smokeless tobacco: Never Used   Substance Use Topics     Alcohol use: No     Alcohol/week: 0.0 standard drinks     Drug use: No        Medications:         HYDROcodone-acetaminophen (NORCO) 5-325 MG tablet       LORazepam (ATIVAN) 1 MG tablet       lisinopril (ZESTRIL) 20 MG tablet       metFORMIN (GLUCOPHAGE) 1000 MG tablet       ORDER FOR DME, SET TO FAX,       phentermine (ADIPEX-P) 15 MG capsule       topiramate (TOPAMAX) 25 MG tablet       valACYclovir (VALTREX) 1000 mg tablet       valACYclovir (VALTREX) 500 MG tablet          Review of Systems   Constitutional: Negative for chills and fever.   HENT: Negative for congestion.    Eyes: Negative for visual disturbance.   Respiratory: Negative for shortness of breath.    Cardiovascular: Negative for chest pain.   Gastrointestinal: Negative for abdominal pain, constipation and diarrhea.   Genitourinary: Negative for decreased urine volume and difficulty urinating.        Denies loss of sensation in perianal region when wiping   Musculoskeletal: Positive for back pain. Negative for gait problem.   Skin: Negative for rash.   Allergic/Immunologic: Negative for immunocompromised state.   Neurological: Negative for weakness.   Hematological: Does not  "bruise/bleed easily.   Psychiatric/Behavioral: Negative for confusion.       Physical Exam   BP: (!) 187/105  Pulse: 125  Temp: 98.1  F (36.7  C)  Resp: 22  Height: 180.3 cm (5' 11\")  Weight: (!) 172.4 kg (380 lb)  SpO2: 94 %      Physical Exam  Constitutional:       General: He is not in acute distress.     Appearance: Normal appearance. He is obese. He is not ill-appearing.   HENT:      Right Ear: External ear normal.      Left Ear: External ear normal.      Nose: No congestion.      Mouth/Throat:      Mouth: Mucous membranes are moist.   Eyes:      General:         Right eye: No discharge.   Cardiovascular:      Rate and Rhythm: Tachycardia present.   Pulmonary:      Breath sounds: No stridor.   Abdominal:      Palpations: Abdomen is soft.   Musculoskeletal:         General: Tenderness present. No swelling.      Comments: TTP over right lower lumbar region and buttock  No midline ttp  Able to flex gluteal muscles  Walking with some pain   Lymphadenopathy:      Cervical: No cervical adenopathy.   Skin:     Coloration: Skin is not pale.   Neurological:      General: No focal deficit present.      Mental Status: He is oriented to person, place, and time.      Gait: Gait normal.      Comments: Normal gait, some pain per patient   Psychiatric:         Mood and Affect: Mood normal.         ED Course       245 - gave patient Rxs for Ativan (5 tabs) and Norco (10 tabs) and cautioned patient about mixing with alcohol, other meds, together (I specifically said not to take them within six hours of each other as it could be incredibly dangerous), or driving or machinery. He expressed understanding of this. Non-toxic. Walking normally, ok to discharge at this time.        No results found for this or any previous visit (from the past 24 hour(s)).    Medications - No data to display    Assessments & Plan (with Medical Decision Making)       No signs of central cord issue or infection. Non-toxic. Benign neuro exam. Hx " consistent with lumbar strain/muscle spasm/unlitaral disc issue. I did advise him to lose weight. Given how debilitating this condition is for him, I'm going to give him Norco and Ativan (and explained never to take them within six hours of each other) and advised resting today. He is clearly in a great deal of pain and will need some medication to feel well enough to get back to work. Strong likelihood of muscle spasm component, thus ativan is reasonable. I gave him strict return precautions and he expressed understanding.    I have reviewed the nursing notes.    I have reviewed the findings, diagnosis, plan and need for follow up with the patient.      Discharge Medication List as of 11/26/2020  2:47 PM      START taking these medications    Details   HYDROcodone-acetaminophen (NORCO) 5-325 MG tablet Take 1 tablet by mouth every 6 hours as needed for pain, Disp-10 tablet, R-0, Local Print      LORazepam (ATIVAN) 1 MG tablet Take 1 tablet (1 mg) by mouth every 8 hours as needed for muscle spasms or pain (DO NOT MIX WITH ALCOHOL OR OTHER MEDICATIONS), Disp-5 tablet, R-0, Local Print             Final diagnoses:   Acute right-sided low back pain with right-sided sciatica       11/26/2020   [facility] EMERGENCY DEPT     Js Bangura MD  11/27/20 0656

## 2020-11-26 NOTE — ED NOTES
Patient standing in room, states to uncomfortable to sit. C/o low back pain. Injured back a few years ago at work. Tylenol and Motrin not working for pain control. MRI on file.

## 2020-11-29 ENCOUNTER — HEALTH MAINTENANCE LETTER (OUTPATIENT)
Age: 35
End: 2020-11-29

## 2020-11-30 ENCOUNTER — OFFICE VISIT (OUTPATIENT)
Dept: FAMILY MEDICINE | Facility: CLINIC | Age: 35
End: 2020-11-30
Payer: COMMERCIAL

## 2020-11-30 VITALS
OXYGEN SATURATION: 99 % | BODY MASS INDEX: 44.1 KG/M2 | TEMPERATURE: 99.4 F | SYSTOLIC BLOOD PRESSURE: 138 MMHG | DIASTOLIC BLOOD PRESSURE: 88 MMHG | HEIGHT: 71 IN | WEIGHT: 315 LBS | HEART RATE: 96 BPM | RESPIRATION RATE: 16 BRPM

## 2020-11-30 DIAGNOSIS — S39.012S STRAIN OF LUMBAR REGION, SEQUELA: Primary | ICD-10-CM

## 2020-11-30 PROCEDURE — 99213 OFFICE O/P EST LOW 20 MIN: CPT | Performed by: FAMILY MEDICINE

## 2020-11-30 RX ORDER — LORAZEPAM 1 MG/1
1 TABLET ORAL EVERY 8 HOURS PRN
Qty: 7 TABLET | Refills: 0 | Status: SHIPPED | OUTPATIENT
Start: 2020-11-30 | End: 2021-03-08

## 2020-11-30 RX ORDER — HYDROCODONE BITARTRATE AND ACETAMINOPHEN 5; 325 MG/1; MG/1
1 TABLET ORAL EVERY 6 HOURS PRN
Qty: 30 TABLET | Refills: 0 | Status: SHIPPED | OUTPATIENT
Start: 2020-11-30 | End: 2021-03-08

## 2020-11-30 ASSESSMENT — MIFFLIN-ST. JEOR: SCORE: 2722.09

## 2020-12-01 NOTE — PROGRESS NOTES
"S :Larry Bangura is a 35 year old male with back pain, recurrent.  Feels like previous.  Improving some, slow.  Went to ED, given  Some pain meds, some ativan.  Helped. Needs some more.      Morbid obesity.  Ongoing.  Has dropped some wt.  Doing gastric bypass  Soon.  Motivated for that    No bowel/bladder issues or leg weakness.  Pain/spasm main issue    Problem list, med list, additional histories reviewed and updated, as indicated.      O:/88   Pulse 96   Temp 99.4  F (37.4  C) (Tympanic)   Resp 16   Ht 1.803 m (5' 11\")   Wt (!) 176 kg (388 lb)   SpO2 99%   BMI 54.12 kg/m    GEN: Alert and oriented, in no acute distress  Gait slow, but steady.  Hard to bend over.  Legs with equal strength    A: lumbar strain  Morbid obesity    P: 30 more vicodin. Not giving more.  3/day on avg for 7-10 more days is reasonable.  He aagres.      He really has benefitted from ativan at night, doesn't like the opioids at night.  Told him I would do 7 more tabs.  He agreed that was reasonable.    F/u if not improving.      "

## 2020-12-27 ENCOUNTER — VIRTUAL VISIT (OUTPATIENT)
Dept: URGENT CARE | Facility: CLINIC | Age: 35
End: 2020-12-27
Payer: COMMERCIAL

## 2020-12-27 DIAGNOSIS — Z20.822 EXPOSURE TO COVID-19 VIRUS: Primary | ICD-10-CM

## 2020-12-27 PROCEDURE — 99213 OFFICE O/P EST LOW 20 MIN: CPT | Mod: 95 | Performed by: NURSE PRACTITIONER

## 2020-12-27 NOTE — PROGRESS NOTES
"  The patient has been notified of following:     \"This telephone visit will be conducted via a call between you and your physician/provider. We have found that certain health care needs can be provided without the need for a physical exam.  This service lets us provide the care you need with a short phone conversation.  If a prescription is necessary we can send it directly to your pharmacy.  If lab work is needed we can place an order for that and you can then stop by our lab to have the test done at a later time.    Telephone visits are billed at different rates depending on your insurance coverage. During this emergency period, for some insurers they may be billed the same as an in-person visit.  Please reach out to your insurance provider with any questions.    If during the course of the call the physician/provider feels a telephone visit is not appropriate, you will not be charged for this service.\"    Patient has given verbal consent for Telephone visit?  Yes    What phone number would you like to be contacted at? cell    How would you like to obtain your AVS? MyChart    Subjective   CC: Larry Bangura  is a 35 year old male who presents via phone visit today for the following health issues:   Chief Complaint   Patient presents with     Infection     friend with exposure     Infection        Concern for COVID-19  About how many days ago did these symptoms start? 3-4 days  Is this your first visit for this illness? Yes  In the 14 days before your symptoms started, have you had close contact with someone with COVID-19 (Coronavirus)? Yes, I have been in contact with someone who has COVID-19/Coronavirus (confirmed by lab test).  Do you have a fever or chills? Yes, the highest temperature was 99  Are you having new or worsening difficulty breathing? No  Do you have new or worsening cough? No  Have you had any new or unexplained body aches? No    Have you experienced any of the following NEW symptoms?    Headache: " YES    Sore throat: No    Loss of taste or smell: No    Chest pain: No    Diarrhea: No    Rash: No  What treatments have you tried? Tylenol   Who do you live with? Wife and 2 children  Are you, or a household member, a healthcare worker or a ? No  Do you live in a nursing home, group home, or shelter? No  Do you have a way to get food/medications if quarantined? Yes, I have a friend or family member who can help me.    Reviewed and updated as needed this visit by Provider   Allergies               Review of Systems   Constitutional, HEENT, cardiovascular, pulmonary, gi and gu systems are negative, except as otherwise noted.      Objective    Gen: Patient is alert, oriented            Assessment/Plan:  1. Exposure to COVID-19 virus  OTC and home intervention reviewed. Normal progression and complications reviewed. If no improvement follow up for a face to face visit, if chest pain and/or difficulty breathing go to ER.   - Symptomatic COVID-19 Virus (Coronavirus) by PCR; Future    Phone call duration:  12 minutes    CHANTALE Coombs CNP

## 2020-12-27 NOTE — PATIENT INSTRUCTIONS
Instructions for Patients  It is recommended that you have a test for coronavirus (COVID-19). This illness can cause fever, cough and trouble breathing. Many people get a mild case and get better on their own. Some people can get very sick.     Please follow these steps:    1. We will call to schedule your test.  2. A member of our care team will ask you some questions. Then, they will use a swab to collect samples from your nose and throat.     Our testing team will send you your test results.    How can I protect others?    Stay home and away from others (self-isolate) until:    You ve had no fever--and no medicine that reduces fever--for 1 full day (24 hours). And      Your other symptoms have resolved (gotten better). For example, your cough or breathing has improved. And     At least 10 days have passed since your symptoms started.    Stay at least 6 feet away from others. (If someone will drive you to your test, stay in the backseat, as far away from the  as you can.)     Don t go to work, school or anywhere else. When it s time for your test, go straight to the testing site. Don t make any stops on the way there or back.     Wash your hands and face often. Use soap and water.     Cover your mouth and nose with a mask, tissue or washcloth.     Don t touch anyone. No hugging, kissing or handshakes.    How can I take care of myself?    1. Get lots of rest. Drink extra fluids (unless a doctor has told you not to).     2. Take Tylenol (acetaminophen) for fever or pain. If you have liver or kidney problems, ask your family doctor if it's okay to take Tylenol.     Adults can take either:     650 mg (two 325 mg pills) every 4 to 6 hours, or     1,000 mg (two 500 mg pills) every 8 hours as needed.     Note: Don't take more than 3,000 mg in one day.   Acetaminophen is found in many medicines (both prescribed and over-the-counter medicines). Read all labels to be sure you don't take too much.   For children,  check the Tylenol bottle for the right dose. The dose is based on  the child's age or weight.    3. If you have other health problems (like cancer, heart failure, an organ transplant or severe kidney disease): Call your specialty clinic if you don't feel better in the next 2 days.    4. Know when to call 911: If your breathing is so bad that it keeps you from doing normal activities, call 911 or go to the emergency room. Tell them that you've been staying home and may have COVID-19.      Thank you for limiting contact with others, wearing a simple mask to cover your cough, practice good hand hygiene habits and accessing our virtual services where possible to limit the spread of this virus.    For more information about COVID19 and options for caring for yourself at home, please visit the CDC website at https://www.cdc.gov/coronavirus/2019-ncov/about/steps-when-sick.html  For more options for care at [facility], please visit our website at [email or web address]

## 2020-12-28 DIAGNOSIS — Z20.822 EXPOSURE TO COVID-19 VIRUS: ICD-10-CM

## 2020-12-28 PROCEDURE — U0003 INFECTIOUS AGENT DETECTION BY NUCLEIC ACID (DNA OR RNA); SEVERE ACUTE RESPIRATORY SYNDROME CORONAVIRUS 2 (SARS-COV-2) (CORONAVIRUS DISEASE [COVID-19]), AMPLIFIED PROBE TECHNIQUE, MAKING USE OF HIGH THROUGHPUT TECHNOLOGIES AS DESCRIBED BY CMS-2020-01-R: HCPCS | Performed by: NURSE PRACTITIONER

## 2020-12-29 LAB
SARS-COV-2 RNA SPEC QL NAA+PROBE: NOT DETECTED
SPECIMEN SOURCE: NORMAL

## 2020-12-30 DIAGNOSIS — E66.01 CLASS 3 SEVERE OBESITY DUE TO EXCESS CALORIES WITH SERIOUS COMORBIDITY AND BODY MASS INDEX (BMI) OF 50.0 TO 59.9 IN ADULT (H): ICD-10-CM

## 2020-12-30 DIAGNOSIS — E66.813 CLASS 3 SEVERE OBESITY DUE TO EXCESS CALORIES WITH SERIOUS COMORBIDITY AND BODY MASS INDEX (BMI) OF 50.0 TO 59.9 IN ADULT (H): ICD-10-CM

## 2020-12-30 RX ORDER — TOPIRAMATE 25 MG/1
75 TABLET, FILM COATED ORAL AT BEDTIME
Qty: 90 TABLET | Refills: 0 | Status: SHIPPED | OUTPATIENT
Start: 2020-12-30 | End: 2021-01-07

## 2020-12-30 NOTE — TELEPHONE ENCOUNTER
Refill request from pharmacy for Topiramate. Patient has appointment with Sada Beach PA-C on 1/7/21. Sending refill to pharmacy.

## 2021-01-04 DIAGNOSIS — A60.01 HERPES SIMPLEX INFECTION OF PENIS: ICD-10-CM

## 2021-01-05 NOTE — TELEPHONE ENCOUNTER
"Requested Prescriptions   Pending Prescriptions Disp Refills     valACYclovir (VALTREX) 1000 mg tablet [Pharmacy Med Name: valACYclovir HCl 1 GM Oral Tablet] 12 tablet 0     Sig: TAKE 1 TABLET BY MOUTH TWICE DAILY FOR 2 DAYS FOR  OUTBREAKS       Antivirals for Herpes Protocol Failed - 1/4/2021  4:47 PM        Failed - Normal serum creatinine on file in past 12 months     Recent Labs   Lab Test 09/21/20  0851   CR 0.64*       Ok to refill medication if creatinine is low          Passed - Patient is age 12 or older        Passed - Recent (12 mo) or future (30 days) visit within the authorizing provider's specialty     Patient has had an office visit with the authorizing provider or a provider within the authorizing providers department within the previous 12 mos or has a future within next 30 days. See \"Patient Info\" tab in inbasket, or \"Choose Columns\" in Meds & Orders section of the refill encounter.              Passed - Medication is active on med list             "

## 2021-01-06 RX ORDER — VALACYCLOVIR HYDROCHLORIDE 1 G/1
TABLET, FILM COATED ORAL
Qty: 12 TABLET | Refills: 3 | Status: SHIPPED | OUTPATIENT
Start: 2021-01-06 | End: 2022-03-16

## 2021-01-07 ENCOUNTER — VIRTUAL VISIT (OUTPATIENT)
Dept: ENDOCRINOLOGY | Facility: CLINIC | Age: 36
End: 2021-01-07
Payer: COMMERCIAL

## 2021-01-07 VITALS — WEIGHT: 315 LBS | BODY MASS INDEX: 44.1 KG/M2 | HEIGHT: 71 IN

## 2021-01-07 DIAGNOSIS — E66.01 CLASS 3 SEVERE OBESITY DUE TO EXCESS CALORIES WITH SERIOUS COMORBIDITY AND BODY MASS INDEX (BMI) OF 50.0 TO 59.9 IN ADULT (H): ICD-10-CM

## 2021-01-07 DIAGNOSIS — E66.813 CLASS 3 SEVERE OBESITY DUE TO EXCESS CALORIES WITH SERIOUS COMORBIDITY AND BODY MASS INDEX (BMI) OF 50.0 TO 59.9 IN ADULT (H): Primary | ICD-10-CM

## 2021-01-07 DIAGNOSIS — E11.9 TYPE 2 DIABETES MELLITUS WITHOUT COMPLICATION, WITHOUT LONG-TERM CURRENT USE OF INSULIN (H): Chronic | ICD-10-CM

## 2021-01-07 DIAGNOSIS — E66.813 CLASS 3 SEVERE OBESITY DUE TO EXCESS CALORIES WITH SERIOUS COMORBIDITY AND BODY MASS INDEX (BMI) OF 50.0 TO 59.9 IN ADULT (H): ICD-10-CM

## 2021-01-07 DIAGNOSIS — Z71.3 NUTRITIONAL COUNSELING: ICD-10-CM

## 2021-01-07 DIAGNOSIS — E66.01 CLASS 3 SEVERE OBESITY DUE TO EXCESS CALORIES WITH SERIOUS COMORBIDITY AND BODY MASS INDEX (BMI) OF 50.0 TO 59.9 IN ADULT (H): Primary | ICD-10-CM

## 2021-01-07 DIAGNOSIS — E11.9 TYPE 2 DIABETES MELLITUS WITHOUT COMPLICATION, WITHOUT LONG-TERM CURRENT USE OF INSULIN (H): ICD-10-CM

## 2021-01-07 DIAGNOSIS — G47.33 OBSTRUCTIVE SLEEP APNEA: Primary | Chronic | ICD-10-CM

## 2021-01-07 PROCEDURE — 97803 MED NUTRITION INDIV SUBSEQ: CPT | Mod: 95 | Performed by: DIETITIAN, REGISTERED

## 2021-01-07 PROCEDURE — 99214 OFFICE O/P EST MOD 30 MIN: CPT | Mod: 95 | Performed by: PHYSICIAN ASSISTANT

## 2021-01-07 RX ORDER — PHENTERMINE HYDROCHLORIDE 15 MG/1
15 CAPSULE ORAL EVERY MORNING
Qty: 30 CAPSULE | Refills: 1 | Status: SHIPPED | OUTPATIENT
Start: 2021-01-07 | End: 2021-01-11

## 2021-01-07 RX ORDER — TOPIRAMATE 25 MG/1
50 TABLET, FILM COATED ORAL 2 TIMES DAILY
Qty: 120 TABLET | Refills: 3 | Status: SHIPPED | OUTPATIENT
Start: 2021-01-07 | End: 2021-04-07

## 2021-01-07 ASSESSMENT — PAIN SCALES - GENERAL: PAINLEVEL: NO PAIN (0)

## 2021-01-07 ASSESSMENT — MIFFLIN-ST. JEOR: SCORE: 2699.41

## 2021-01-07 NOTE — PROGRESS NOTES
"Larry Bangura is a 35 year old male who is being evaluated via a billable video visit.      The patient has been notified of following:     \"This video visit will be conducted via a call between you and your physician/provider. We have found that certain health care needs can be provided without the need for an in-person physical exam.  This service lets us provide the care you need with a video conversation.  If a prescription is necessary we can send it directly to your pharmacy.  If lab work is needed we can place an order for that and you can then stop by our lab to have the test done at a later time.    Video visits are billed at different rates depending on your insurance coverage.  Please reach out to your insurance provider with any questions.    If during the course of the call the physician/provider feels a video visit is not appropriate, you will not be charged for this service.\"    Patient has given verbal consent for Video visit? Yes  How would you like to obtain your AVS? MyChart  If you are dropped from the video visit, the video invite should be resent to: Text to cell phone: [phone]  Will anyone else be joining your video visit? No        Video-Visit Details    Type of service:  Video Visit    Video Start Time: 10:11 AM  Video End Time: 10:29 AM    Originating Location (pt. Location): Home    Distant Location (provider location):  [facility]     Platform used for Video Visit: AngelPrime    During this virtual visit the patient is located in [place], patient verifies this as the location during the entirety of this visit.     Return Bariatric Nutrition Consultation Note    Reason For Visit: Nutrition Assessment    Larry Bangura is a 35 year old presenting today for follow-up bariatric nutrition consult.   Pt is interested in laparoscopic sleeve gastrectomy with Dr. Liriano expected surgery in March 2021.  Patient is accompanied by self.  This is pt's 2nd of 6 required nutrition " "visits prior to surgery.     - Insurance missed a month    Pt referred by DELL Duckworth on September 30, 2020.  Patient with Co-morbidities of obesity including:  Type II DM yes  Renal Failure no  Sleep apnea yes  Hypertension yes   Dyslipidemia yes  Joint pain no  Back pain no  GERD no     Support System Reviewed With Patient 9/29/2020   Who do you have in your support network that can be available to help you for the first 2 weeks after surgery? wife   Who can you count on for support throughout your weight loss surgery journey? wife       ANTHROPOMETRICS:  Estimated body mass index is 54.12 kg/m  as calculated from the following:    Height as of 11/30/20: 1.803 m (5' 11\").    Weight as of 11/30/20: 176 kg (388 lb).   Current weight : 383 lb (per pt's report)    Required weight loss goal pre-op: 40 lbs from initial consult weight (goal weight 360 lbs or less before surgery)       9/29/2020   I have tried the following methods to lose weight Watching portions or calories, Exercise, Weight Watchers, Atkins type diet (low carb/high protein), Pre packaged meals ex: Nutrisystem, Slimfast       Weight Loss Questions Reviewed With Patient 9/29/2020   How long have you been overweight? 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 He typically picks up breakfast on his way to work, 2 [place] sausage egg burritos and a diet coke: sometimes a muffin. For lunch it is leftovers from dinner OR fast food, usually the sandwich not the fries and a diet coke.   Dinner is a meat vegetables and bread and he tries to have popsicle after dinner for his snack.     Today: Pt states he is doing well, focused a lot on decreasing soda, use to drink ~1 case per day, drinking only a few sodas per week.     Progress Towards Previous Goals:  Relating To Eating:  Eat slowly (20-30 minutes per meal), chewing foods " "well (25 chews per bite/applesauce consistency)-Continues,   9\" Plate method (1/2 plate non-starchy vegetables/fruit, 1/4 plate lean protein, 1/4 plate whole grain starch - no more than 1/2 cup carb/meal)-Continues, still having a little trouble on     Relating to beverages:  Reduce caffeine/carbonation/calorie containing beverages-Improving, cut out pop completely, water and flavored packet, most days no diet coke.   Separate fluids from meals by 30 minutes before, during, and after eating-Improving, drink less with meals.     Relating to activity:  Increase activity as able-No Change.      Recall Diet Questions Reviewed With Patient 9/29/2020   Describe what you typically consume for breakfast (typical or most recent): 2 sausage burritos diet coke   Describe what you typically consume for lunch (typical or most recent): leftovers   Describe what you typically consume for supper (typical or most recent): meet veg bread   Describe what you typically consume as snacks (typical or most recent): chocolate covered almans   How many ounces of water, or other low calorie drinks, do you drink daily (8 oz=1 glass)? 32 oz- water   How many ounces of caffeine (coffee, tea, pop) do you drink daily (8 oz=1 glass)? 32 oz- soda    How many ounces of carbonated (pop, beer, sparkling water) drinks do you drinky daily (8 oz=1 glass)? 32 oz- dt mt dew and diet coke    How many ounces of juice, pop, sweet tea, sports drinks, protein drinks, other sweetened drinks, do you drink daily (8 oz=1 glass)? 8 oz- not usually    How many ounces of milk do you drink daily (8 oz=1 glass) 8 oz- with cereal    Please indicate the type of milk: skim   How often do you drink alcohol? Monthly or less- not usually.    If you do drink alcohol, how many drinks might you have in a day? (one drink = 5 oz. wine, 1 can/bottle of beer, 1 shot liquor) 1 or 2       Eating Habits 9/29/2020   Do you have any dietary restrictions? No   Do you currently binge eat " (eat a large amount of food in a short time)? Yes   Are you an emotional eater? Yes   Do you get up to eat after falling asleep? No   What foods do you crave? all       ADDITIONAL INFORMATION:     Dining Out History Reviewed With Patient 9/29/2020   How often do you dine out? Nearly every day.   Where do you dine out? (select all that apply) fast food chains, take out, other   What types of food do you order when you dine out? salvador reese       Physical Activity Reviewed With Patient 9/29/2020   How often do you exercise? Never   What keeps you from being more active?  Pain, I should be more active but I just have not gotten around to it, Lack of Time, Too tired       NUTRITION DIAGNOSIS:  Obesity r/t long history of self-monitoring deficit and excessive energy intake aeb BMI >30 kg/m2.- Improving     INTERVENTION:  Intervention Provided/Education:   1. Reviewed and modified goals  2. Reviewed on post-op diet guidelines, vitamins/minerals essential post-operatively, GI anatomy of bariatric surgeries, ways to help prepare for post-op diet guidelines pre-operatively, portion/calorie-control, mindful eating and sources of protein.   3. Praised patient for behavior and diet modifications.   4. AVS via [employer]     Questions Reviewed With Patient 9/29/2020   How ready are you to make changes regarding your weight? Number 1 = Not ready at all to make changes up to 10 = very ready. 10   How confident are you that you can change? 1 = Not confident that you will be successful making changes up to 10 = very confident. 8       Patient Understanding: good  Expected Compliance: fair-good    GOALS:  Relating To Eating:  Eat slowly (20-30 minutes per meal), chewing foods well (25 chews per bite/applesauce consistency)  - Try setting a timer   - Try pausing residential through your meal: take a deep breath. Notice if you are satisfied, hungry or overfull and/or are you feeling tired, sluggish, energized, neutral.   - Try a remind with  "a sticky note near wear you eat.  9\" Plate method (1/2 plate non-starchy vegetables/fruit, 1/4 plate lean protein, 1/4 plate whole grain starch - no more than 1/2 cup carb/meal)    Relating to beverages:  Reduce caffeine/carbonation/calorie containing beverages  Separate fluids from meals by 30 minutes before, during, and after eating    Relating to activity:  Increase activity as able    Initial Consult / Weight Loss     My Plate Planner_English - Pt Education  [email or web address]    Protein Sources for Weight Loss  [email or web address]     Carbohydrates  [email or web address]     Time spent with patient: 18 minutes.  Amy Guthrie, MS, RD, LD    "

## 2021-01-07 NOTE — LETTER
2021       RE: Larry Bangura  [address] [facility] [id number]     Dear Colleague,    Thank you for referring your patient, Larry Bangura, to the [facility] WEIGHT MANAGEMENT CLINIC [place] at [facility]. Please see a copy of my visit note below.      Pre-Bariatric Surgery Note    Shelton Mcconnell    Date: 2021     RE: Larry Bangura    MR#: [id number]   : 1985   Date of Visit: 2021    REASON FOR VISIT: Pre-operative evaluation for possible weight loss surgery    Dear Shelton Cota,    I had the pleasure of seeing your patient, Larry Bangura, in my preoperative bariatric clinic.    As you know, he is morbidly obese and considering weight loss surgery to treat obesity in association with his medical conditions of obesity.  His consult weight was 400.  He has lost 18 pounds since his consult weight. He has not met his required pre-surgery weight. Please refer to initial consult note from date 20 for patient's weight history and co-morbidities.    Assessment & Plan   Problem List Items Addressed This Visit        Endocrine Diagnoses    Type 2 diabetes mellitus without complication (H) (Chronic)     Managed on metformin 1000 mg daily  Last A1C increased to 7.7 three mo ago and PCP recommended metformin twice daily but patient not able to tolerate due to diarrhea side effect  Could consider GLP1 if needed  Will recheck A1C with bariatric labs. May have decreased with recent weight loss.         Relevant Orders    Hemoglobin A1c    Class 3 severe obesity due to excess calories with serious comorbidity and body mass index (BMI) of 50.0 to 59.9 in adult (H)     Pre bariatric surgery visit 20 Sada Beach  Lost 18 lbs since consult  Needs to schedule follow up visits and continue with tasklist for surgery.  Increased topiramate to 50mg BID and started phentermine 15mg daily         Relevant Medications    phentermine (ADIPEX-P) 15 MG  capsule    topiramate (TOPAMAX) 25 MG tablet       Other    Obstructive sleep apnea - Primary (Chronic)     Need clearance for bariatric surgery  Uses CPAP regularly         Relevant Orders    SLEEP EVALUATION & MANAGEMENT REFERRAL - ADULT -[facility] [place]  [phone] (Age 15 and up)         30 minutes spent on the date of the encounter doing chart review, history and exam, documentation and further activities as noted above  0956    See RD in February, March, April, May, June, July  See Dr Liriano in 1 month video visit bariatric surgeon visit  See [facility] pharmacist Lauren Bloch in 1 month to follow up phentermine/topiramate  See Sada in 3 months pre bariatric surgery visit  See sleep clinic for clearance for bariatric surgery  Increase topiramate to 50mg twice daily  Start phentermine 15mg daily in the am  Check blood pressure 1 week at any  pharmacy  Call to schedule psych eval as soon as possible.  Need letter of support from primary care provider  Bariatric labs still needed.  Go to any [facility] lab. To find a lab location near you, please call [phone].    Reviewed tasklist with patient: yes    Most recent weights:  Wt Readings from Last 4 Encounters:   01/07/21 (!) 173.7 kg (383 lb)   11/30/20 (!) 176 kg (388 lb)   11/26/20 (!) 172.4 kg (380 lb)   09/30/20 (!) 181.4 kg (400 lb)         ROS    Past Medical History:   Diagnosis Date     HTN (hypertension) 9/10/2013     Morbid obesity (H)        Past Surgical History:   Procedure Laterality Date     ENDOSCOPIC RELEASE CARPAL TUNNEL  12/6/2013    Procedure: ENDOSCOPIC RELEASE CARPAL TUNNEL;  Right Hand Endoscopic Carpal Tunnel Release;  Surgeon: Kody Pittman MD;  Location: [facility] OR     ENDOSCOPIC RELEASE CARPAL TUNNEL  1/21/2014    Procedure: ENDOSCOPIC RELEASE CARPAL TUNNEL;  Left endoscopic carpal tunnel release, possible open;  Surgeon: Kody Pittman MD;  Location: [facility] OR       Current Outpatient Medications   Medication  "    HYDROcodone-acetaminophen (NORCO) 5-325 MG tablet     lisinopril (ZESTRIL) 20 MG tablet     LORazepam (ATIVAN) 1 MG tablet     metFORMIN (GLUCOPHAGE) 1000 MG tablet     ORDER FOR DME, SET TO FAX,     phentermine (ADIPEX-P) 15 MG capsule     topiramate (TOPAMAX) 25 MG tablet     valACYclovir (VALTREX) 1000 mg tablet     valACYclovir (VALTREX) 500 MG tablet     No current facility-administered medications for this visit.        No Known Allergies        PHYSICAL EXAM:  Objective    Ht 1.803 m (5' 11\")   Wt (!) 173.7 kg (383 lb)   BMI 53.42 kg/m    Vitals - Patient Reported  Pain Score: No Pain (0)        Physical Exam   GENERAL: Healthy, alert and no distress  EYES: Eyes grossly normal to inspection.  No discharge or erythema, or obvious scleral/conjunctival abnormalities.  RESP: No audible wheeze, cough, or visible cyanosis.  No visible retractions or increased work of breathing.    SKIN: Visible skin clear. No significant rash, abnormal pigmentation or lesions.  NEURO: Cranial nerves grossly intact.  Mentation and speech appropriate for age.  PSYCH: Mentation appears normal, affect normal/bright, judgement and insight intact, normal speech and appearance well-groomed.    Sleeve Gastrectomy: Risks and Side Effects    The complications or risks of surgery include but are not limited to: death, heart attack, infection in the surgical site (wound infection), abdomen (abscess), bladder (urinary tract infection), lungs (pneumonia), clots in legs (deep vein thrombosis) or lungs (pulmonary emboli),  injury to the bowels or other organs, bowel obstruction, hernia at the incision and gastrointestional bleeding.    More specific risks related to vertical sleeve gastrectomy were detailed at the bariatric informational seminar and include the following: leak at the vertical sleeve staple line, leak at the anastomoses,  nausea, vomiting, and dehydration for several months,  adhesions causing bowel obstruction, rapid weight " loss causing a higher rate of gallstone formation during the first 6 months after surgery, decreased absorption of vitamins and protein because of the reduced stomach size, weight regain if inappropriate food intake occurs, stricture, injury to other organs, hernia,  and ulcers.       Side effects of bariatric surgery include but are not limited to: abdominal pain, cramping, bloating, constipation, nausea, vomiting, diarrhea, difficulty swallowing,  dehydration, hair loss, excess skin, protein, iron and vitamin deficiencies, heartburn, transfer of addictions, increased anxiety and worsening depression.       I emphasized exercise and activity behavior along with appropriate food choice as the main foundation for weight loss with surgery providing surgical reinforcement of the appropriate behavior set.        Review of general surgery weight loss process    1. Complete preoperative requirements, including weight loss.  Final weight check to confirm MANDATORY weight loss requirement must be documented on a clinic scale.    2. Discuss prior authorization with .    3. History and physical evaluation by PCP of PAC clinic within 30 days of surgery date, preoperative class, and weight check (weigh-in visit) to be scheduled by patient.  Pre-anesthesia clinic for risk evaluation to be scheduled by anesthesia clinic.    4. We cannot guarantee that patient will qualify for surgery unless all preoperative requirements are met, prior authorization from primary insurance company is granted, and insurance changes do not occur.    5. It is possible for patients to regain all weight after weight loss surgery unless they follow guidelines prescribed by our bariatric center.    6. All patients with gastrointestinal complaints after weight loss surgery must have complaints conveyed to the bariatric team for appropriate treatment.    7. Vitamin deficiencies may develop post-bariatric surgery and annual laboratory  testing should be performed.    8. Persistent nausea/vomiting after bariatric surgery entails risk of thiamine deficiency and should be treated early.  Vitamin B12 deficiency may develop, especially after gastric bypass surgery and must be recognized.        If you have any questions about our plans please don't hesitate to contact me.    Sincerely,    Sada Beach PA-C

## 2021-01-07 NOTE — LETTER
"1/7/2021       RE: Larry Bangura  [address] [facility] [address]     Dear Colleague,    Thank you for referring your patient, Larry Bangura, to the [facility] WEIGHT MANAGEMENT CLINIC [place] at [facility]. Please see a copy of my visit note below.    Larry Bangura is a 35 year old male who is being evaluated via a billable video visit.      The patient has been notified of following:     \"This video visit will be conducted via a call between you and your physician/provider. We have found that certain health care needs can be provided without the need for an in-person physical exam.  This service lets us provide the care you need with a video conversation.  If a prescription is necessary we can send it directly to your pharmacy.  If lab work is needed we can place an order for that and you can then stop by our lab to have the test done at a later time.    Video visits are billed at different rates depending on your insurance coverage.  Please reach out to your insurance provider with any questions.    If during the course of the call the physician/provider feels a video visit is not appropriate, you will not be charged for this service.\"    Patient has given verbal consent for Video visit? Yes  How would you like to obtain your AVS? MyChart  If you are dropped from the video visit, the video invite should be resent to: Text to cell phone: [phone]  Will anyone else be joining your video visit? No        Video-Visit Details    Type of service:  Video Visit    Video Start Time: 10:11 AM  Video End Time: 10:29 AM    Originating Location (pt. Location): Home    Distant Location (provider location):  [facility] SURGICAL WEIGHT MANAGEMENT     Platform used for Video Visit: Pixowl    During this virtual visit the patient is located in [place], patient verifies this as the location during the entirety of this visit.     Return Bariatric Nutrition Consultation " "Note    Reason For Visit: Nutrition Assessment    Larry Bangura is a 35 year old presenting today for follow-up bariatric nutrition consult.   Pt is interested in laparoscopic sleeve gastrectomy with Dr. Liirano expected surgery in March 2021.  Patient is accompanied by self.  This is pt's 2nd of 6 required nutrition visits prior to surgery.     - Insurance missed a month    Pt referred by DELL Duckworth on September 30, 2020.  Patient with Co-morbidities of obesity including:  Type II DM yes  Renal Failure no  Sleep apnea yes  Hypertension yes   Dyslipidemia yes  Joint pain no  Back pain no  GERD no     Support System Reviewed With Patient 9/29/2020   Who do you have in your support network that can be available to help you for the first 2 weeks after surgery? wife   Who can you count on for support throughout your weight loss surgery journey? wife       ANTHROPOMETRICS:  Estimated body mass index is 54.12 kg/m  as calculated from the following:    Height as of 11/30/20: 1.803 m (5' 11\").    Weight as of 11/30/20: 176 kg (388 lb).   Current weight : 383 lb (per pt's report)    Required weight loss goal pre-op: 40 lbs from initial consult weight (goal weight 360 lbs or less before surgery)       9/29/2020   I have tried the following methods to lose weight Watching portions or calories, Exercise, Weight Watchers, Atkins type diet (low carb/high protein), Pre packaged meals ex: Nutrisystem, Slimfast       Weight Loss Questions Reviewed With Patient 9/29/2020   How long have you been overweight? 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 He typically picks up breakfast on his way to work, 2 [place] sausage egg burritos and a diet coke: sometimes a muffin. For lunch it is leftovers from dinner OR fast food, usually the sandwich not the fries and a diet coke.   Dinner is a meat " "vegetables and bread and he tries to have popsicle after dinner for his snack.     Today: Pt states he is doing well, focused a lot on decreasing soda, use to drink ~1 case per day, drinking only a few sodas per week.     Progress Towards Previous Goals:  Relating To Eating:  Eat slowly (20-30 minutes per meal), chewing foods well (25 chews per bite/applesauce consistency)-Continues,   9\" Plate method (1/2 plate non-starchy vegetables/fruit, 1/4 plate lean protein, 1/4 plate whole grain starch - no more than 1/2 cup carb/meal)-Continues, still having a little trouble on     Relating to beverages:  Reduce caffeine/carbonation/calorie containing beverages-Improving, cut out pop completely, water and flavored packet, most days no diet coke.   Separate fluids from meals by 30 minutes before, during, and after eating-Improving, drink less with meals.     Relating to activity:  Increase activity as able-No Change.      Recall Diet Questions Reviewed With Patient 9/29/2020   Describe what you typically consume for breakfast (typical or most recent): 2 jackson escalera diet coke   Describe what you typically consume for lunch (typical or most recent): leftovers   Describe what you typically consume for supper (typical or most recent): meet veg bread   Describe what you typically consume as snacks (typical or most recent): chocolate covered almans   How many ounces of water, or other low calorie drinks, do you drink daily (8 oz=1 glass)? 32 oz- water   How many ounces of caffeine (coffee, tea, pop) do you drink daily (8 oz=1 glass)? 32 oz- soda    How many ounces of carbonated (pop, beer, sparkling water) drinks do you drinky daily (8 oz=1 glass)? 32 oz- dt mt dew and diet coke    How many ounces of juice, pop, sweet tea, sports drinks, protein drinks, other sweetened drinks, do you drink daily (8 oz=1 glass)? 8 oz- not usually    How many ounces of milk do you drink daily (8 oz=1 glass) 8 oz- with cereal    Please indicate " the type of milk: skim   How often do you drink alcohol? Monthly or less- not usually.    If you do drink alcohol, how many drinks might you have in a day? (one drink = 5 oz. wine, 1 can/bottle of beer, 1 shot liquor) 1 or 2       Eating Habits 9/29/2020   Do you have any dietary restrictions? No   Do you currently binge eat (eat a large amount of food in a short time)? Yes   Are you an emotional eater? Yes   Do you get up to eat after falling asleep? No   What foods do you crave? all       ADDITIONAL INFORMATION:     Dining Out History Reviewed With Patient 9/29/2020   How often do you dine out? Nearly every day.   Where do you dine out? (select all that apply) fast food chains, take out, other   What types of food do you order when you dine out? salvador reese       Physical Activity Reviewed With Patient 9/29/2020   How often do you exercise? Never   What keeps you from being more active?  Pain, I should be more active but I just have not gotten around to it, Lack of Time, Too tired       NUTRITION DIAGNOSIS:  Obesity r/t long history of self-monitoring deficit and excessive energy intake aeb BMI >30 kg/m2.- Improving     INTERVENTION:  Intervention Provided/Education:   1. Reviewed and modified goals  2. Reviewed on post-op diet guidelines, vitamins/minerals essential post-operatively, GI anatomy of bariatric surgeries, ways to help prepare for post-op diet guidelines pre-operatively, portion/calorie-control, mindful eating and sources of protein.   3. Praised patient for behavior and diet modifications.   4. AVS via [employer]     Questions Reviewed With Patient 9/29/2020   How ready are you to make changes regarding your weight? Number 1 = Not ready at all to make changes up to 10 = very ready. 10   How confident are you that you can change? 1 = Not confident that you will be successful making changes up to 10 = very confident. 8       Patient Understanding: good  Expected Compliance: fair-good    GOALS:  Relating  "To Eating:  Eat slowly (20-30 minutes per meal), chewing foods well (25 chews per bite/applesauce consistency)  - Try setting a timer   - Try pausing retirement through your meal: take a deep breath. Notice if you are satisfied, hungry or overfull and/or are you feeling tired, sluggish, energized, neutral.   - Try a remind with a sticky note near wear you eat.  9\" Plate method (1/2 plate non-starchy vegetables/fruit, 1/4 plate lean protein, 1/4 plate whole grain starch - no more than 1/2 cup carb/meal)    Relating to beverages:  Reduce caffeine/carbonation/calorie containing beverages  Separate fluids from meals by 30 minutes before, during, and after eating    Relating to activity:  Increase activity as able    Initial Consult / Weight Loss     My Plate Planner_English - Pt Education  [email or web address]    Protein Sources for Weight Loss  [email or web address]     Carbohydrates  [email or web address]     Time spent with patient: 18 minutes.  Amy Guthrie, MS, RD, LD      "

## 2021-01-07 NOTE — ASSESSMENT & PLAN NOTE
Managed on metformin 1000 mg daily  Last A1C increased to 7.7 three mo ago and PCP recommended metformin twice daily but patient not able to tolerate due to diarrhea side effect  Could consider GLP1 if needed  Will recheck A1C with bariatric labs. May have decreased with recent weight loss.

## 2021-01-07 NOTE — NURSING NOTE
"Chief Complaint   Patient presents with     RECHECK     Pre surg follow up.       Vitals:    01/07/21 0907   Weight: (!) 173.7 kg (383 lb)   Height: 1.803 m (5' 11\")       Body mass index is 53.42 kg/m .                            MARZENA WHITNEY, EMT    "

## 2021-01-07 NOTE — PATIENT INSTRUCTIONS
"GOALS:  Relating To Eating:  Eat slowly (20-30 minutes per meal), chewing foods well (25 chews per bite/applesauce consistency)  - Try setting a timer   - Try pausing FPC through your meal: take a deep breath. Notice if you are satisfied, hungry or overfull and/or are you feeling tired, sluggish, energized, neutral.   - Try a remind with a sticky note near wear you eat.  9\" Plate method (1/2 plate non-starchy vegetables/fruit, 1/4 plate lean protein, 1/4 plate whole grain starch - no more than 1/2 cup carb/meal)    Relating to beverages:  Reduce caffeine/carbonation/calorie containing beverages  Separate fluids from meals by 30 minutes before, during, and after eating    Relating to activity:  Increase activity as able    Initial Consult / Weight Loss     My Plate Planner_English - Pt Education  [email or web address]    Protein Sources for Weight Loss  [email or web address]     Carbohydrates  [email or web address]     Follow up with RD in one month    Amy Guthrie, MS, RD, LD  If you need to schedule or reschedule with a dietitian please call [phone].  "

## 2021-01-07 NOTE — ASSESSMENT & PLAN NOTE
Pre bariatric surgery visit 1/7/20 Sada Beach  Lost 18 lbs since consult  Needs to schedule follow up visits and continue with tasklist for surgery.  Increased topiramate to 50mg BID and started phentermine 15mg daily

## 2021-01-07 NOTE — PATIENT INSTRUCTIONS
See RD in February, March, April, May, June, July (need 6 consecutive)  See Dr Liriano in 1 month video visit bariatric surgeon visit  See [facility] pharmacist Lauren Bloch in 1 month to follow up phentermine/topiramate  See Sada in 3 months pre bariatric surgery visit  See sleep clinic for clearance for bariatric surgery  Increase topiramate to 50mg twice daily  Start phentermine 15mg daily in the am  Check blood pressure 1 week at any  pharmacy  Call to schedule psych eval as soon as possible.  Need letter of support from primary care provider  Bariatric labs still needed.  Go to any [facility] lab. To find a lab location near you, please call [phone].      MEDICATION STARTED AT THIS APPOINTMENT    We are starting Phentermine. Take one tablet in the morning. Contact the nurse via [employer] or call [phone] if you have any questions or concerns. (Do not stop taking it if you don't think it's working. For some people it works without them knowing it.)    Phentermine is being prescribed because you identified hunger as one of the main causes for your extra weight.      Our patients on Phentermine find that they:    >feel less hunger    >find it easier to push the plate away   >have an easier time eating less    For some of our patients, these feelings are very real and immediate. For other patients, the feelings are less obvious. They don't feel much of a change but find they've lost weight. Like all weight loss medications, Phentermine  works best when you help it work. This means:  1. Having less tempting high calorie (fattening) food around the house or office. (For people with strong cravings this is very important.)   2. Staying away from situations or people that may trigger your cravings .   3. Eating out only one time or less each week.  4. Eating your meals at a table with the TV or computer off.    Side-effects. Phentermine is generally well tolerated. The main side-effects we see are feelings of racing  pulse or rapid heart beat. Some people can get an elevated blood pressure. Because of this we may have you come back within a week or so of starting the medication for a blood pressure check.         In order to get refills of this or any medication we prescribe you must be seen in the medical weight mgmt clinic every 2-3 months.

## 2021-01-07 NOTE — PROGRESS NOTES
Larry is a 35 year old who is being evaluated via a billable video visit.      How would you like to obtain your AVS? MyChart  If the video visit is dropped, the invitation should be resent by: Text to cell phone: cell  Will anyone else be joining your video visit? No    Video Start Time:   Video-Visit Details    Type of service:  Video Visit    Video End Time:102    Originating Location (pt. Location): Home    Distant Location (provider location):  [facility] [place]     Platform used for Video Visit: Doximity    Pre-Bariatric Surgery Note    Shelton Mcconnell    Date: 2021     RE: Larry Bangura    MR#: [id number]   : 1985   Date of Visit: 2021    REASON FOR VISIT: Pre-operative evaluation for possible weight loss surgery    Dear Shelton Cota,    I had the pleasure of seeing your patient, Larry Bangura, in my preoperative bariatric clinic.    As you know, he is morbidly obese and considering weight loss surgery to treat obesity in association with his medical conditions of obesity.  His consult weight was 400.  He has lost 18 pounds since his consult weight. He has not met his required pre-surgery weight. Please refer to initial consult note from date 20 for patient's weight history and co-morbidities.    Assessment & Plan   Problem List Items Addressed This Visit        Endocrine Diagnoses    Type 2 diabetes mellitus without complication (H) (Chronic)     Managed on metformin 1000 mg daily  Last A1C increased to 7.7 three mo ago and PCP recommended metformin twice daily but patient not able to tolerate due to diarrhea side effect  Could consider GLP1 if needed  Will recheck A1C with bariatric labs. May have decreased with recent weight loss.         Relevant Orders    Hemoglobin A1c    Class 3 severe obesity due to excess calories with serious comorbidity and body mass index (BMI) of 50.0 to 59.9 in adult (H)     Pre bariatric surgery visit 20  Sada Beach  Lost 18 lbs since consult  Needs to schedule follow up visits and continue with tasklist for surgery.  Increased topiramate to 50mg BID and started phentermine 15mg daily         Relevant Medications    phentermine (ADIPEX-P) 15 MG capsule    topiramate (TOPAMAX) 25 MG tablet       Other    Obstructive sleep apnea - Primary (Chronic)     Need clearance for bariatric surgery  Uses CPAP regularly         Relevant Orders    SLEEP EVALUATION & MANAGEMENT REFERRAL - [facility]  [phone] (Age 15 and up)         30 minutes spent on the date of the encounter doing chart review, history and exam, documentation and further activities as noted above  0908    See RD in February, March, April, May, June, July  See Dr Liriano in 1 month video visit bariatric surgeon visit  See [facility] pharmacist Lauren Bloch in 1 month to follow up phentermine/topiramate  See Sada in 3 months pre bariatric surgery visit  See sleep clinic for clearance for bariatric surgery  Increase topiramate to 50mg twice daily  Start phentermine 15mg daily in the am  Check blood pressure 1 week at any [facility] pharmacy  Call to schedule psych eval as soon as possible.  Need letter of support from primary care provider  Bariatric labs still needed.  Go to any [facility] lab. To find a lab location near you, please call [phone].    Reviewed tasklist with patient: yes    Most recent weights:  Wt Readings from Last 4 Encounters:   01/07/21 (!) 173.7 kg (383 lb)   11/30/20 (!) 176 kg (388 lb)   11/26/20 (!) 172.4 kg (380 lb)   09/30/20 (!) 181.4 kg (400 lb)         ROS    Past Medical History:   Diagnosis Date     HTN (hypertension) 9/10/2013     Morbid obesity (H)        Past Surgical History:   Procedure Laterality Date     ENDOSCOPIC RELEASE CARPAL TUNNEL  12/6/2013    Procedure: ENDOSCOPIC RELEASE CARPAL TUNNEL;  Right Hand Endoscopic Carpal Tunnel Release;  Surgeon: Kody Pittman MD;  Location: [facility] OR      "ENDOSCOPIC RELEASE CARPAL TUNNEL  1/21/2014    Procedure: ENDOSCOPIC RELEASE CARPAL TUNNEL;  Left endoscopic carpal tunnel release, possible open;  Surgeon: Kody Pittman MD;  Location: [facility] OR       Current Outpatient Medications   Medication     HYDROcodone-acetaminophen (NORCO) 5-325 MG tablet     lisinopril (ZESTRIL) 20 MG tablet     LORazepam (ATIVAN) 1 MG tablet     metFORMIN (GLUCOPHAGE) 1000 MG tablet     ORDER FOR DME, SET TO FAX,     phentermine (ADIPEX-P) 15 MG capsule     topiramate (TOPAMAX) 25 MG tablet     valACYclovir (VALTREX) 1000 mg tablet     valACYclovir (VALTREX) 500 MG tablet     No current facility-administered medications for this visit.        No Known Allergies        PHYSICAL EXAM:  Objective    Ht 1.803 m (5' 11\")   Wt (!) 173.7 kg (383 lb)   BMI 53.42 kg/m    Vitals - Patient Reported  Pain Score: No Pain (0)        Physical Exam   GENERAL: Healthy, alert and no distress  EYES: Eyes grossly normal to inspection.  No discharge or erythema, or obvious scleral/conjunctival abnormalities.  RESP: No audible wheeze, cough, or visible cyanosis.  No visible retractions or increased work of breathing.    SKIN: Visible skin clear. No significant rash, abnormal pigmentation or lesions.  NEURO: Cranial nerves grossly intact.  Mentation and speech appropriate for age.  PSYCH: Mentation appears normal, affect normal/bright, judgement and insight intact, normal speech and appearance well-groomed.    Sleeve Gastrectomy: Risks and Side Effects    The complications or risks of surgery include but are not limited to: death, heart attack, infection in the surgical site (wound infection), abdomen (abscess), bladder (urinary tract infection), lungs (pneumonia), clots in legs (deep vein thrombosis) or lungs (pulmonary emboli),  injury to the bowels or other organs, bowel obstruction, hernia at the incision and gastrointestional bleeding.    More specific risks related to vertical sleeve gastrectomy were " detailed at the bariatric informational seminar and include the following: leak at the vertical sleeve staple line, leak at the anastomoses,  nausea, vomiting, and dehydration for several months,  adhesions causing bowel obstruction, rapid weight loss causing a higher rate of gallstone formation during the first 6 months after surgery, decreased absorption of vitamins and protein because of the reduced stomach size, weight regain if inappropriate food intake occurs, stricture, injury to other organs, hernia,  and ulcers.       Side effects of bariatric surgery include but are not limited to: abdominal pain, cramping, bloating, constipation, nausea, vomiting, diarrhea, difficulty swallowing,  dehydration, hair loss, excess skin, protein, iron and vitamin deficiencies, heartburn, transfer of addictions, increased anxiety and worsening depression.       I emphasized exercise and activity behavior along with appropriate food choice as the main foundation for weight loss with surgery providing surgical reinforcement of the appropriate behavior set.        Review of general surgery weight loss process    1. Complete preoperative requirements, including weight loss.  Final weight check to confirm MANDATORY weight loss requirement must be documented on a clinic scale.    2. Discuss prior authorization with .    3. History and physical evaluation by PCP of PAC clinic within 30 days of surgery date, preoperative class, and weight check (weigh-in visit) to be scheduled by patient.  Pre-anesthesia clinic for risk evaluation to be scheduled by anesthesia clinic.    4. We cannot guarantee that patient will qualify for surgery unless all preoperative requirements are met, prior authorization from primary insurance company is granted, and insurance changes do not occur.    5. It is possible for patients to regain all weight after weight loss surgery unless they follow guidelines prescribed by our bariatric  [place].    6. All patients with gastrointestinal complaints after weight loss surgery must have complaints conveyed to the bariatric team for appropriate treatment.    7. Vitamin deficiencies may develop post-bariatric surgery and annual laboratory testing should be performed.    8. Persistent nausea/vomiting after bariatric surgery entails risk of thiamine deficiency and should be treated early.  Vitamin B12 deficiency may develop, especially after gastric bypass surgery and must be recognized.        If you have any questions about our plans please don't hesitate to contact me.    Sincerely,    Sada Beach PA-C

## 2021-01-11 ENCOUNTER — TELEPHONE (OUTPATIENT)
Dept: SURGERY | Facility: CLINIC | Age: 36
End: 2021-01-11

## 2021-01-11 ENCOUNTER — TELEPHONE (OUTPATIENT)
Dept: ENDOCRINOLOGY | Facility: CLINIC | Age: 36
End: 2021-01-11

## 2021-01-11 DIAGNOSIS — E66.813 CLASS 3 SEVERE OBESITY DUE TO EXCESS CALORIES WITH SERIOUS COMORBIDITY AND BODY MASS INDEX (BMI) OF 50.0 TO 59.9 IN ADULT (H): ICD-10-CM

## 2021-01-11 DIAGNOSIS — E66.01 CLASS 3 SEVERE OBESITY DUE TO EXCESS CALORIES WITH SERIOUS COMORBIDITY AND BODY MASS INDEX (BMI) OF 50.0 TO 59.9 IN ADULT (H): ICD-10-CM

## 2021-01-11 RX ORDER — PHENTERMINE HYDROCHLORIDE 15 MG/1
15 CAPSULE ORAL EVERY MORNING
Qty: 30 CAPSULE | Refills: 1 | Status: SHIPPED | OUTPATIENT
Start: 2021-01-11 | End: 2021-02-03 | Stop reason: SINTOL

## 2021-01-11 NOTE — TELEPHONE ENCOUNTER
Reason for call:  Other   Patient called regarding (reason for call): returning call  Additional comments: Patient returning call from DANNIELLE Magallanes. Please call the patient back.    Phone number to reach patient:  Home number on file [phone] (home)    Best Time:  Anytime     Can we leave a detailed message on this number?  YES    Travel screening: Not Applicable

## 2021-01-11 NOTE — TELEPHONE ENCOUNTER
Reason for call:  Medication   If this is a refill request, has the caller requested the refill from the pharmacy already? No  Will the patient be using a [facility]? Yes  Name of the pharmacy and phone number for the current request: [facility]    Name of the medication requested: Phentermine    Other request:     Phone number to reach patient:  Home number on file [phone] (home)    Best Time:      Can we leave a detailed message on this number?  YES    Travel screening: Not Applicable

## 2021-01-15 ENCOUNTER — HEALTH MAINTENANCE LETTER (OUTPATIENT)
Age: 36
End: 2021-01-15

## 2021-01-27 ENCOUNTER — TELEPHONE (OUTPATIENT)
Dept: ENDOCRINOLOGY | Facility: CLINIC | Age: 36
End: 2021-01-27

## 2021-01-27 NOTE — TELEPHONE ENCOUNTER
Patient transferred from the Call Center called to discuss scheduling appointments.    He was seen for an NBS on 9/30/20 with Sada Beach PA-C. Instructions were that a  would call the patient to schedule multiple appointments. This did not happen. He should have had Oct, Nov & Dec dietitian appointments scheduled.     I asked if the patient had scheduled a psychological evaluation and he had not. I scheduled him on 3/9/21 at 9 a.m. with Dr. Montanez. Discussed this appointment would be for consultation as well as MMPI testing but the testing portion would now show up as a visit in Epic. To plan on 2 hours.     Scheduled an appointment with Dr. iLriano on 3/3 at 8:30 a.m. for video visit.     Patient discussed his need for sleep medicine clearance, he has the phone number and will get that scheduled. He has been diagnosed with sleep apnea.

## 2021-02-01 ENCOUNTER — VIRTUAL VISIT (OUTPATIENT)
Dept: ENDOCRINOLOGY | Facility: CLINIC | Age: 36
End: 2021-02-01
Payer: COMMERCIAL

## 2021-02-01 DIAGNOSIS — E11.9 TYPE 2 DIABETES MELLITUS WITHOUT COMPLICATION, WITHOUT LONG-TERM CURRENT USE OF INSULIN (H): ICD-10-CM

## 2021-02-01 DIAGNOSIS — E66.813 CLASS 3 SEVERE OBESITY DUE TO EXCESS CALORIES WITH SERIOUS COMORBIDITY AND BODY MASS INDEX (BMI) OF 50.0 TO 59.9 IN ADULT (H): Primary | ICD-10-CM

## 2021-02-01 DIAGNOSIS — E66.01 CLASS 3 SEVERE OBESITY DUE TO EXCESS CALORIES WITH SERIOUS COMORBIDITY AND BODY MASS INDEX (BMI) OF 50.0 TO 59.9 IN ADULT (H): Primary | ICD-10-CM

## 2021-02-01 DIAGNOSIS — Z71.3 NUTRITIONAL COUNSELING: ICD-10-CM

## 2021-02-01 PROCEDURE — 97803 MED NUTRITION INDIV SUBSEQ: CPT | Mod: 95 | Performed by: DIETITIAN, REGISTERED

## 2021-02-01 NOTE — PROGRESS NOTES
"Larry Bangura is a 35 year old who is being evaluated via a billable telephone visit.     The patient has been notified of following:     \"This telephone visit will be conducted via a call between you and your physician/provider. We have found that certain health care needs can be provided without the need for a physical exam.  This service lets us provide the care you need with a short phone conversation.  If a prescription is necessary we can send it directly to your pharmacy.  If lab work is needed we can place an order for that and you can then stop by our lab to have the test done at a later time.    Telephone visits are billed at different rates depending on your insurance coverage. During this emergency period, for some insurers they may be billed the same as an in-person visit.  Please reach out to your insurance provider with any questions.    If during the course of the call the physician/provider feels a telephone visit is not appropriate, you will not be charged for this service.\"    Patient has given verbal consent for Telephone visit?  Yes    What phone number would you like to be contacted at? [phone]     How would you like to obtain your AVS? MyChart    Phone call duration: 9 minutes    During this virtual visit the patient is located in [place], patient verifies this as the location during the entirety of this visit.     Phone Start Time: 8:45 AM  Phone End Time: 8:54 AM     Return Bariatric Nutrition Consultation Note    Reason For Visit: Nutrition Assessment    Larry Bangura is a 35 year old presenting today for follow-up bariatric nutrition consult.   Pt is interested in laparoscopic sleeve gastrectomy with Dr. Liriano expected surgery in March 2021.  Patient is accompanied by self. This is pt's 3rd of 6 required nutrition visits prior to surgery.     - Insurance missed a month, pt reports he was instructed that a  would call him, this did not occur.     Pt referred by DELL Duckworth " "on September 30, 2020.  Patient with Co-morbidities of obesity including:  Type II DM yes  Renal Failure no  Sleep apnea yes  Hypertension yes   Dyslipidemia yes  Joint pain no  Back pain no  GERD no     Support System Reviewed With Patient 9/29/2020   Who do you have in your support network that can be available to help you for the first 2 weeks after surgery? wife   Who can you count on for support throughout your weight loss surgery journey? wife       ANTHROPOMETRICS:  Estimated body mass index is 53.42 kg/m  as calculated from the following:    Height as of 1/7/21: 1.803 m (5' 11\").    Weight as of 1/7/21: 173.7 kg (383 lb).   Current weight : Weight from  1/7/21 is his most recent weight  Initial weight: 400 lb   Required weight loss goal pre-op: 40 lbs from initial consult weight (goal weight 360 lbs or less before surgery)       9/29/2020   I have tried the following methods to lose weight Watching portions or calories, Exercise, Weight Watchers, Atkins type diet (low carb/high protein), Pre packaged meals ex: Nutrisystem, Slimfast       Weight Loss Questions Reviewed With Patient 9/29/2020   How long have you been overweight? 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 He typically picks up breakfast on his way to work, 2 [place] sausage egg burritos and a diet coke: sometimes a muffin. For lunch it is leftovers from dinner OR fast food, usually the sandwich not the fries and a diet coke.   Dinner is a meat vegetables and bread and he tries to have popsicle after dinner for his snack.     Today:   Stopped taking phentermine as he could not sleep at night. Pt continues to work on the goals, he has been focusing on chewing more and slowing down when eating.     Progress Towards Previous Goals:  Relating To Eating:  Eat slowly (20-30 minutes per meal), chewing foods well (25 chews " "per bite/applesauce consistency)-Improving, chewing ~15 times per bite, deos not like the texture, working on slower.   - Try setting a timer   - Try pausing [facility] through your meal: take a deep breath. Notice if you are satisfied, hungry or overfull and/or are you feeling tired, sluggish, energized, neutral.   - Try a remind with a sticky note near wear you eat.  9\" Plate method (1/2 plate non-starchy vegetables/fruit, 1/4 plate lean protein, 1/4 plate whole grain starch - no more than 1/2 cup carb/meal)-Improving.   Diet Recall:   B: Burrito: sasage eggs and pepers onions or potatoes  L: Skips Or left over or pizza.   D: meat and vegetable for dinner   Snack: a small piece of chocoalte or granola or none    Relating to beverages:  Reduce caffeine/carbonation/calorie containing beverages- Improving, water, diet soda ocassionally   Separate fluids from meals by 30 minutes before, during, and after eating-continue s    Relating to activity:  Increase activity as able-No change, did  an eliptcal.     Recall Diet Questions Reviewed With Patient 9/29/2020   Describe what you typically consume for breakfast (typical or most recent): 2 jackson escalera diet coveronique   Describe what you typically consume for lunch (typical or most recent): leftovers   Describe what you typically consume for supper (typical or most recent): meet veg bread   Describe what you typically consume as snacks (typical or most recent): chocolate covered almans   How many ounces of water, or other low calorie drinks, do you drink daily (8 oz=1 glass)? 32 oz- water   How many ounces of caffeine (coffee, tea, pop) do you drink daily (8 oz=1 glass)? 32 oz- soda    How many ounces of carbonated (pop, beer, sparkling water) drinks do you drinky daily (8 oz=1 glass)? 32 oz- dt mt dew and diet coke    How many ounces of juice, pop, sweet tea, sports drinks, protein drinks, other sweetened drinks, do you drink daily (8 oz=1 glass)? 8 oz- not usually  "   How many ounces of milk do you drink daily (8 oz=1 glass) 8 oz- with cereal    Please indicate the type of milk: skim   How often do you drink alcohol? Monthly or less- not usually.    If you do drink alcohol, how many drinks might you have in a day? (one drink = 5 oz. wine, 1 can/bottle of beer, 1 shot liquor) 1 or 2       Eating Habits 9/29/2020   Do you have any dietary restrictions? No   Do you currently binge eat (eat a large amount of food in a short time)? Yes   Are you an emotional eater? Yes   Do you get up to eat after falling asleep? No   What foods do you crave? all       ADDITIONAL INFORMATION:     Dining Out History Reviewed With Patient 9/29/2020   How often do you dine out? Nearly every day.   Where do you dine out? (select all that apply) fast food chains, take out, other   What types of food do you order when you dine out? salvador reese       Physical Activity Reviewed With Patient 9/29/2020   How often do you exercise? Never   What keeps you from being more active?  Pain, I should be more active but I just have not gotten around to it, Lack of Time, Too tired       NUTRITION DIAGNOSIS:  Obesity r/t long history of self-monitoring deficit and excessive energy intake aeb BMI >30 kg/m2.- Improving     INTERVENTION:  Intervention Provided/Education:   1. Reviewed and modified goals  2. Reviewed on post-op diet guidelines, vitamins/minerals essential post-operatively, GI anatomy of bariatric surgeries, ways to help prepare for post-op diet guidelines pre-operatively, portion/calorie-control, mindful eating and sources of protein.   3. Praised patient for behavior and diet modifications.   4. AVS via MyChart     Questions Reviewed With Patient 9/29/2020   How ready are you to make changes regarding your weight? Number 1 = Not ready at all to make changes up to 10 = very ready. 10   How confident are you that you can change? 1 = Not confident that you will be successful making changes up to 10 = very  "confident. 8       Patient Understanding: good  Expected Compliance: fair-good    GOALS:  Relating To Eating:  Eat slowly (20-30 minutes per meal), chewing foods well (25 chews per bite/applesauce consistency)  - Try setting a timer   - Try pausing MCC through your meal: take a deep breath. Notice if you are satisfied, hungry or overfull and/or are you feeling tired, sluggish, energized, neutral.   - Try a remind with a sticky note near wear you eat.  9\" Plate method (1/2 plate non-starchy vegetables/fruit, 1/4 plate lean protein, 1/4 plate whole grain starch - no more than 1/2 cup carb/meal)    Relating to beverages:  Reduce caffeine/carbonation/calorie containing beverages  Separate fluids from meals by 30 minutes before, during, and after eating    Relating to activity:  Increase activity as able    Initial Consult / Weight Loss     My Plate Planner_English - Pt Education  [email or web address]    Protein Sources for Weight Loss  [email or web address]     Carbohydrates  [email or web address]     Time spent with patient: 9 minutes.  Amy Guthrie, MS, RD, LD  "

## 2021-02-01 NOTE — PATIENT INSTRUCTIONS
"GOALS:  Relating To Eating:  Eat slowly (20-30 minutes per meal), chewing foods well (25 chews per bite/applesauce consistency)  - Try setting a timer   - Try pausing retirement through your meal: take a deep breath. Notice if you are satisfied, hungry or overfull and/or are you feeling tired, sluggish, energized, neutral.   - Try a remind with a sticky note near wear you eat.  9\" Plate method (1/2 plate non-starchy vegetables/fruit, 1/4 plate lean protein, 1/4 plate whole grain starch - no more than 1/2 cup carb/meal)    Relating to beverages:  Reduce caffeine/carbonation/calorie containing beverages  Separate fluids from meals by 30 minutes before, during, and after eating    Relating to activity:  Increase activity as able    Initial Consult / Weight Loss     My Plate Planner_English - Pt Education  [email or web address]    Protein Sources for Weight Loss  [email or web address]     Carbohydrates  [email or web address]     "

## 2021-02-01 NOTE — LETTER
"2/1/2021       RE: Larry Bangura  [address] [id number]     Dear Colleague,    Thank you for referring your patient, Larry Bangura, to the [facility]. Please see a copy of my visit note below.    Larry Bangura is a 35 year old who is being evaluated via a billable telephone visit.     The patient has been notified of following:     \"This telephone visit will be conducted via a call between you and your physician/provider. We have found that certain health care needs can be provided without the need for a physical exam.  This service lets us provide the care you need with a short phone conversation.  If a prescription is necessary we can send it directly to your pharmacy.  If lab work is needed we can place an order for that and you can then stop by our lab to have the test done at a later time.    Telephone visits are billed at different rates depending on your insurance coverage. During this emergency period, for some insurers they may be billed the same as an in-person visit.  Please reach out to your insurance provider with any questions.    If during the course of the call the physician/provider feels a telephone visit is not appropriate, you will not be charged for this service.\"    Patient has given verbal consent for Telephone visit?  Yes    What phone number would you like to be contacted at? [phone]     How would you like to obtain your AVS? [email or web address]    Phone call duration: 9 minutes    During this virtual visit the patient is located in [place], patient verifies this as the location during the entirety of this visit.     Phone Start Time: 8:45 AM  Phone End Time: 8:54 AM     Return Bariatric Nutrition Consultation Note    Reason For Visit: Nutrition Assessment    Larry Bangura is a 35 year old presenting today for follow-up bariatric nutrition consult.   Pt is interested in laparoscopic sleeve " "gastrectomy with Dr. Liriano expected surgery in March 2021.  Patient is accompanied by self. This is pt's 3rd of 6 required nutrition visits prior to surgery.     - Insurance missed a month, pt reports he was instructed that a  would call him, this did not occur.     Pt referred by DELL Duckworth on September 30, 2020.  Patient with Co-morbidities of obesity including:  Type II DM yes  Renal Failure no  Sleep apnea yes  Hypertension yes   Dyslipidemia yes  Joint pain no  Back pain no  GERD no     Support System Reviewed With Patient 9/29/2020   Who do you have in your support network that can be available to help you for the first 2 weeks after surgery? wife   Who can you count on for support throughout your weight loss surgery journey? wife       ANTHROPOMETRICS:  Estimated body mass index is 53.42 kg/m  as calculated from the following:    Height as of 1/7/21: 1.803 m (5' 11\").    Weight as of 1/7/21: 173.7 kg (383 lb).   Current weight : Weight from  1/7/21 is his most recent weight  Initial weight: 400 lb   Required weight loss goal pre-op: 40 lbs from initial consult weight (goal weight 360 lbs or less before surgery)       9/29/2020   I have tried the following methods to lose weight Watching portions or calories, Exercise, Weight Watchers, Atkins type diet (low carb/high protein), Pre packaged meals ex: Nutrisystem, Slimfast       Weight Loss Questions Reviewed With Patient 9/29/2020   How long have you been overweight? 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 He typically picks up breakfast on his way to work, 2 [place] sausage egg burritos and a diet coke: sometimes a muffin. For lunch it is leftovers from dinner OR fast food, usually the sandwich not the fries and a diet coke.   Dinner is a meat vegetables and bread and he tries to have popsicle after dinner " "for his snack.     Today:   Stopped taking phentermine as he could not sleep at night. Pt continues to work on the goals, he has been focusing on chewing more and slowing down when eating.     Progress Towards Previous Goals:  Relating To Eating:  Eat slowly (20-30 minutes per meal), chewing foods well (25 chews per bite/applesauce consistency)-Improving, chewing ~15 times per bite, deos not like the texture, working on slower.   - Try setting a timer   - Try pausing shelter through your meal: take a deep breath. Notice if you are satisfied, hungry or overfull and/or are you feeling tired, sluggish, energized, neutral.   - Try a remind with a sticky note near wear you eat.  9\" Plate method (1/2 plate non-starchy vegetables/fruit, 1/4 plate lean protein, 1/4 plate whole grain starch - no more than 1/2 cup carb/meal)-Improving.   Diet Recall:   B: Burrito: sasage eggs and pepers onions or potatoes  L: Skips Or left over or pizza.   D: meat and vegetable for dinner   Snack: a small piece of chocoalte or granola or none    Relating to beverages:  Reduce caffeine/carbonation/calorie containing beverages- Improving, water, diet soda ocassionally   Separate fluids from meals by 30 minutes before, during, and after eating-continue s    Relating to activity:  Increase activity as able-No change, did  an eliptcal.     Recall Diet Questions Reviewed With Patient 9/29/2020   Describe what you typically consume for breakfast (typical or most recent): 2 sausage lali diet coke   Describe what you typically consume for lunch (typical or most recent): leftovers   Describe what you typically consume for supper (typical or most recent): meet veg bread   Describe what you typically consume as snacks (typical or most recent): chocolate covered almans   How many ounces of water, or other low calorie drinks, do you drink daily (8 oz=1 glass)? 32 oz- water   How many ounces of caffeine (coffee, tea, pop) do you drink daily (8 " oz=1 glass)? 32 oz- soda    How many ounces of carbonated (pop, beer, sparkling water) drinks do you drinky daily (8 oz=1 glass)? 32 oz- dt mt dew and diet coke    How many ounces of juice, pop, sweet tea, sports drinks, protein drinks, other sweetened drinks, do you drink daily (8 oz=1 glass)? 8 oz- not usually    How many ounces of milk do you drink daily (8 oz=1 glass) 8 oz- with cereal    Please indicate the type of milk: skim   How often do you drink alcohol? Monthly or less- not usually.    If you do drink alcohol, how many drinks might you have in a day? (one drink = 5 oz. wine, 1 can/bottle of beer, 1 shot liquor) 1 or 2       Eating Habits 9/29/2020   Do you have any dietary restrictions? No   Do you currently binge eat (eat a large amount of food in a short time)? Yes   Are you an emotional eater? Yes   Do you get up to eat after falling asleep? No   What foods do you crave? all       ADDITIONAL INFORMATION:     Dining Out History Reviewed With Patient 9/29/2020   How often do you dine out? Nearly every day.   Where do you dine out? (select all that apply) fast food chains, take out, other   What types of food do you order when you dine out? salvador reese       Physical Activity Reviewed With Patient 9/29/2020   How often do you exercise? Never   What keeps you from being more active?  Pain, I should be more active but I just have not gotten around to it, Lack of Time, Too tired       NUTRITION DIAGNOSIS:  Obesity r/t long history of self-monitoring deficit and excessive energy intake aeb BMI >30 kg/m2.- Improving     INTERVENTION:  Intervention Provided/Education:   1. Reviewed and modified goals  2. Reviewed on post-op diet guidelines, vitamins/minerals essential post-operatively, GI anatomy of bariatric surgeries, ways to help prepare for post-op diet guidelines pre-operatively, portion/calorie-control, mindful eating and sources of protein.   3. Praised patient for behavior and diet modifications.   4.  "AVS via [employer]ue[employer]     Questions Reviewed With Patient 9/29/2020   How ready are you to make changes regarding your weight? Number 1 = Not ready at all to make changes up to 10 = very ready. 10   How confident are you that you can change? 1 = Not confident that you will be successful making changes up to 10 = very confident. 8       Patient Understanding: good  Expected Compliance: fair-good    GOALS:  Relating To Eating:  Eat slowly (20-30 minutes per meal), chewing foods well (25 chews per bite/applesauce consistency)  - Try setting a timer   - Try pausing [facility] through your meal: take a deep breath. Notice if you are satisfied, hungry or overfull and/or are you feeling tired, sluggish, energized, neutral.   - Try a remind with a sticky note near wear you eat.  9\" Plate method (1/2 plate non-starchy vegetables/fruit, 1/4 plate lean protein, 1/4 plate whole grain starch - no more than 1/2 cup carb/meal)    Relating to beverages:  Reduce caffeine/carbonation/calorie containing beverages  Separate fluids from meals by 30 minutes before, during, and after eating    Relating to activity:  Increase activity as able    Initial Consult / Weight Loss     My Plate Planner_English - Pt Education  [email or web address]    Protein Sources for Weight Loss  [email or web address]     Carbohydrates  [email or web address]     Time spent with patient: 9 minutes.  Amy Guthrie, MS, RD, LD      "

## 2021-02-03 ENCOUNTER — VIRTUAL VISIT (OUTPATIENT)
Dept: ENDOCRINOLOGY | Facility: CLINIC | Age: 36
End: 2021-02-03
Payer: COMMERCIAL

## 2021-02-03 VITALS — HEIGHT: 71 IN | BODY MASS INDEX: 44.1 KG/M2 | WEIGHT: 315 LBS

## 2021-02-03 DIAGNOSIS — K21.9 GASTROESOPHAGEAL REFLUX DISEASE WITHOUT ESOPHAGITIS: Chronic | ICD-10-CM

## 2021-02-03 DIAGNOSIS — E11.9 TYPE 2 DIABETES MELLITUS WITHOUT COMPLICATION, WITHOUT LONG-TERM CURRENT USE OF INSULIN (H): ICD-10-CM

## 2021-02-03 DIAGNOSIS — E66.01 CLASS 3 SEVERE OBESITY DUE TO EXCESS CALORIES WITH SERIOUS COMORBIDITY AND BODY MASS INDEX (BMI) OF 50.0 TO 59.9 IN ADULT (H): Primary | ICD-10-CM

## 2021-02-03 DIAGNOSIS — E66.813 CLASS 3 SEVERE OBESITY DUE TO EXCESS CALORIES WITH SERIOUS COMORBIDITY AND BODY MASS INDEX (BMI) OF 50.0 TO 59.9 IN ADULT (H): Primary | ICD-10-CM

## 2021-02-03 DIAGNOSIS — G47.33 OBSTRUCTIVE SLEEP APNEA: Chronic | ICD-10-CM

## 2021-02-03 PROCEDURE — 99214 OFFICE O/P EST MOD 30 MIN: CPT | Mod: 95 | Performed by: NURSE PRACTITIONER

## 2021-02-03 ASSESSMENT — PAIN SCALES - GENERAL: PAINLEVEL: MODERATE PAIN (4)

## 2021-02-03 ASSESSMENT — MIFFLIN-ST. JEOR: SCORE: 2699.41

## 2021-02-03 NOTE — ASSESSMENT & PLAN NOTE
Continue metformin. Add ozempic- will help with additional weight loss pre-bariatric surgery. Repeat A1C due and ordered.

## 2021-02-03 NOTE — PROGRESS NOTES
Carlos is a 35 year old who is being evaluated via a billable video visit.      How would you like to obtain your AVS? MyChart  If the video visit is dropped, the invitation should be resent by: Text to cell phone: [phone]  Will anyone else be joining your video visit? No      Video Start Time: 1225  Video-Visit Details    Type of service:  Video Visit    Video End Time:1245    Originating Location (pt. Location): Home    Distant Location (provider location):  [facility] [place]     Platform used for Video Visit: StreetOwl

## 2021-02-03 NOTE — PATIENT INSTRUCTIONS
"It was a pleasure meeting with you today.    Thank you for allowing us the privilege of caring for you. We hope we provided you with the excellent service you deserve.   Please let us know if there is anything else we can do for you so that we can be sure you are leaving completely satisfied with your care experience.    To ensure the quality of our services you may be receiving a patient satisfaction survey from an independent patient satisfaction monitoring company.    The greatest compliment you can give is a \"Likely to Recommend\"      Your visit was with Vale Weeks NP today.     Instructions per today's visit:   -continue topiramate 50mg twice daily  -try 8mg phentermine (lomira) in the mornings  -start ozempic - injection (semaglutide)   -follow up with Lauren Bloch (pharmacist) in 4 weeks       To schedule appointments with our team, please call [phone] option #1    Please call during clinic hours Monday through Friday 8:00a - 4:00p if you have questions or you can contact us via Transcriptic at anytime.      Nurses: [phone]  Fax: [phone]  Surgery Scheduler: [phone]    Please call the hospital at [phone] to speak with our on call MDs if you have urgent needs after hours, during weekends, or holidays.    **Please note if you need to go to the Emergency Room for any reason in the first 90 days after surgery it is important to go to the [facility] ER on the [place] on [facility] off of the [address] off [address]    *For patients who are currently enrolled in our program and have not yet had weight loss surgery please note*:    You must be at your required goal weight at the time of your Anesthesia clinic visit as well as the day of surgery or your surgery will be canceled. You will not be able to obtain a new surgery date until you have met your goal weight.    Lab results will be communicated through My Chart or letter (if My Chart not used). Please call the clinic if you " have not received communication after 1 week or if you have any questions.?    Main follow-up parameters for all bariatric patients     Patients who have undergone Bariatric surgery:    1. Follow-up interval during the first year: ~1 week, 1 month, 3 month, 6 month,12 months.  Every 6 months until 5 years; and then annually thereafter.  The goal of follow-up sessions is to ensure that food intake behavior (choice of food and eating speed) and exercise/activity level are set appropriately.    2. Yearly lab tests ordered by our clinic.      3. The bariatric team should be aware and potentially evaluate all adverse gastrointestinal symptoms (dysphagia, abdominal pain, nausea, vomiting, diarrhea, heartburn, reflux, etc), which can be a sign of complication.  The bariatric surgeons perform general surgery procedures in addition to bariatric surgery (laparoscopic cholecystectomy, etc.)    4. Inability to tolerate textured food (chicken, steak, fish, eggs, beans) is NOT normal and may need to be evaluated by endoscopy performed by the bariatric surgeon.    _____________________________________________________________________________________________________________________________    Meal Replacement Products:    Here is the link to our new e-store where you can purchase our meal replacement products    [facility] E-Store  [facility]/store    The one week starter kit is a great way to sample a variety of products and see what works for you.    If you want more information about the product go to: [employer]    Free Shipping for orders over $75     Benefits of meal replacements products:    Portion and calorie control  Improved nutrition  Structured eating  Simplified food choices  Avoid contact with trigger foods  ______________________________________________________________________________________________________________________________    Healthy Lifestyle Support Group   [facility]  [place] [facility]  Virtual Support Group Now Available    60 minutes of small group guided discussion, support and resources    Led by Health , Galilea Duncan    For questions, and to receive the invite information, contact Galilea at [email or web address]    All our welcome!    One Friday per month, 12:30pm to 1:30pm  SELF COMPASSION: July 31st  CREATING MY WELLNESS VISION: August 28th  MINDFULNESS PRACTICES FOR A CALM BODY & MIND: September 25th  MINDFUL EATING TOOLS: October 30th  HEALTHY& HAPPY HOLIDAYS: November 20th  OPEN FORUM: December 18th  _______________________________________________________________________________________________________________________________    Connections: Bariatric Care support group  Due to the Covid-19 restrictions, we will be doing our support group virtually using Microsoft Teams. You will need to get an invitation to the group from Gino Cordova, Ph.D., LP at [email or web address] and to learn about using Microsoft Teams. The next meeting is on Tuesday, July 14 between 6:30 and 8 PM and there will be no formal speaker.    This group meets the second Tuesday of each month from 6:30 to 8 PM and will be held again at [facility] in the [address] [place] [address] (A-B room) when the Covid-19 restrictions are lifted. The group is led by Gino Cordova, Ph.D., Licensed Psychologist, [facility]. There is no cost for group participation and is open to all [facility] (and those external to this program) pre- and post-operative bariatric surgery patients as well as their support system.   _________________________________________________________________________________________________________________________________      Interested in working with a health ?  Health coaches work with you to improve your overall health and wellbeing.  They look at the whole person, and may involve discussion  of different areas of life, including, but not limited to the four pillars of health (sleep, exercise, nutrition, and stress management). Discuss with your care team if you would like to start working a health .     Health Coaching-3 Pack:      $99 for three health coaching visits    Visits may be done in person or via phone    Coaching is a partnership between the  and the client; Coaches do not prescribe or diagnose    Coaching helps inspire the client to reach his/her personal goals   __________________________________________________________________________________________________________________________________    [facility] Get Moving Program    Our team of physical therapists is trained to help you understand and take control of your condition. They will perform a thorough evaluation to determine your ability for activity and develop a customized plan to fit your goals and physical ability.  Scheduling: Unsure if the Get Moving program is right for you? Discuss the program with your medical provider or diabetes educator. You can also call us at [phone] to ask questions or schedule an appointment.   [facility] Get Moving Program  ______________________________________________________________________________________________________________________  Bluetooth Scale:    We hope to provide you with high quality telephone and virtual healthcare visits while social distancing for COVID-19 is necessary, as well as in the future when virtual visits may be more convenient for you.     Our technology team made it possible for Bluetooth scales to send weight measurements to our electronic medical record. This allows weights from you weighing at home to securely flow into the medical record, which will improve telephone and virtual visits.   Additionally, studies have shown that adults actually lose more weight when their weights are automatically sent to someone else, and also that this  process is not stressful for those adults.    Below is a link for purchasing the scale, with a discount code for our patients. You may call your insurance company to see if they will reimburse you for the cost of the scale, as a piece of durable medical equipment. The scales only go up to a weight of 400 pounds. This is an issue and we are working with the developer on increasing this. We found no scales that go over 400lb that have blue-tooth for connecting to BCKSTGR.    Scale to purchase: the [employer]  Body  Scale: [email or web address]/body/[email or web address]=aw.ds    Discount Code: We have a discount code for our patients to bring the cost down to $50, the code is: [place]_Scale_20%off    Steps to link the scale to BCKSTGR via an Android Phone (you can always disconnect at any point in the future):  1. The order must be placed first before the patient can access Track My Health within BCKSTGR.  2. Download Google Fit akin from the Google Play Store   a. Log in or register using your Google account   3. Download the BCKSTGR akin from Google Play Store  a. Select add organization   b. Search for [employer] and select it   c. Log into BCKSTGR  d. Select Track My Health   e. Select the green connect my account button   f. When prompted log into your Google account   g. Select okay to confirm the account   4. Download the [employer] Health Mate akin from Google Play Store  a. [place] for [employer]   b. Go to profile   c. Tap google fit under the Apps section  d. Select the option to activate Google Fit integration   e. Select the same Google account   f. Select okay to confirm the account  g.   Steps to link the scale to BCKSTGR via an iPhone (you can always disconnect at any point in the future):  **Note WhoKnows is not available for download on an iPad**  1. The order must be placed first before the patient can access Track My Health within  Glassful.  2. Locate the Health jody on your iPhone.  a. Set up your Apple Health account as prompted  b. The Sources page will show Apps that communicate with your Health jody. Once all steps are completed, you should see [employer] and H-umust listed under the Apps section and your iPhone under the devices section.  i. Select Health Mate  1. Under 'ALLOW  [employer]  TO WRITE DATA ensure the toggle is on for Weight.  2. This will allow the scale to add your weight to the [employer] [facility]  ii. Select MyChart  1. Under 'ALLOW  [employer]  TO READ DATA ensure the toggle is on for Weight.  2. This allows Glassful to grab the weight from [employer] so your provider can see your weights.  3. Download the Glassful jody from the Jody Store   a. Select add organization                                                  b. Search for [employer] and select it  c. Log into Glassful  d. Select Track My Health   e. Select the green connect my account button   f. Follow prompts to link your device to Glassful.  4. Download the Withings health mate jody in the Jody Store   a. [place] for [employer]   b. Go to profile   c. Tap Health under the Apps section  d. If prompted to allow access with the Health Jody, toggle weight on for read and write access.         MEDICATION STARTED AT THIS APPOINTMENT    We are starting a GLP-1 (Glucagon-like Peptide-1) medication. Examples of this medication include Byetta, Bydureon, or Victoza, etc. The medication chosen will depend on insurance coverage, and can be changed to the medication that is covered under your plan. These medications work the same, in that they can help you lose weight and control your blood sugar. One of the ways it works is by slowing down the rate that food leaves your stomach. You feel mariano and will eat less.  It also helps regulate hormones that can help improve your blood sugars.    Usually short acting insulins or oral agents are stopped or decreased by the doctor at the appointment.  Low blood sugars are rare but can happen if patients are on insulin or other oral agents. If you notice consistent low sugars or high sugars, your medication may need to be adjusted after your appointment. If this is the case, please call RN and provide her your blood sugar record from the last 3-4 days. The RN will get in touch with the doctor and call you back/Mychart message with recommendations. We tolerate high sugars for a bit, so if sugars are running 180-200, this is ok. As weight starts dropping the blood sugars should too. If readings are consistently over 200 for 1-2 weeks, then you should call the doctor/nurse.    Types of GLP-1 medications include:    Victoza: Starting dose is 0.6 mg for a week, then 1.2 mg for a week, and then 1.8 mg thereafter (if prescribed by doctor). This medication is given daily. Pen needles need to be ordered also.    Ozempic: Starting dose is 0.25 mg once weekly for 4 weeks, and then increase to 0.5 mg weekly. If after 4 weeks of being on 0.5 mg weekly dosing and still wanting additional weight loss and/or blood sugar benefits, check with your doctor to see if they want to increase the dose to 1 mg weekly. Pen needles come in the Ozempic pen box.    Trulicity: Starting dose is 0.75 mg once weekly.  If after 1-3 months of being on 0.75 mg weekly and still wanting additional weight loss and/or blood sugar benefits, check with your doctor to see if they want to increase the dose to 1.5 mg weekly.    Bydureon: Starting dose is 2 mg and is given weekly. The patient should pick one day a week and give the medication on that day. Pen needles need to be ordered also.    Byetta: Starting dose is 5 mcg twice daily. This is given for the first month. Check with the doctor after a month to see if they want the dose increased to 10 mcg BID. Pen needles need to be ordered also.     Side effects of GLP- Medications include: The most common side effects are all GI related and consist of:  nausea, constipation, diarrhea, burping, or gassiness. Patients are advised to eat slowly and less, and nausea typically passes if people can stick it out.     The risk of pancreatitis (inflammation of the pancreas) has been associated with this type of medication, but is very rare.  If you have had pancreatitis in the past, this medication may not be for you. Please let us know about any past history of pancreas problems.      Symptoms of pancreatitis include: Pain in your upper stomach area which  may travel to your back and be worse after eating. Your stomach area may be tender to the touch.  You may have vomiting or nausea and/or have a fever. If you should develop any of these symptoms, stop the medication and contact your primary care doctor. They will do a blood test to check for pancreatitis.         There is a small chance you may have some low blood sugar after taking the medication.   The signs of low blood sugar are:  o Weakness  o Shaky   o Hungry  o Sweating  o Confusion      See below for ways to treat low blood sugar without adding in lots of extra calories.      Treating Low Blood Sugar    If you have symptoms of low blood sugar (sweating, shaking, dizzy, confused) eat 15 grams of carbs and wait 15 minutes:      Glucose Tabs are best for sugars under 70 -  Dex4 or BD Glucose tablets are good, you will need to take 3-4 of these to equal 15 grams.       One small box of raisins    4 oz fruit juice box or   cup fruit juice    1 small apple    1 small banana      cup canned fruit in water      English muffin or a slice of bread with jelly     1 low fat frozen waffle with sugar-free syrup      cup cottage cheese with   cup frozen or fresh blueberries    1 cup skim or low-fat milk      cup whole grain cereal    4-6 crackers such as Triscuits      This medication is usually covered by insurance for patients with a diagnosis of Type 2 Diabetes. Depending on insurance coverage, the medication may be changed  to a different formulary, but they all work in the same way. Sometimes a prior authorization is required, which may take up to 1-2 weeks for an insurance company to make a decision if they will cover the medication. Please be patient, you will be notified either way.     Contact the nurse via Coinfloor or call [phone] if you have any questions or concerns. (Do not stop taking it if you don't think it's working. For some people it works without them knowing it.)     In order to get refills of this or any medication we prescribe you must be seen in the medical weight mgmt clinic every 2-4 months.

## 2021-02-03 NOTE — Clinical Note
Latisha or Flor, Patient is having a hard time getting sleep medicine to call him back. He thinks the clinic he originally went to consolidated? When he called the clinic for help, he was told that his primary care could do his sleep clearance. Already uses CPAP and is compliant. Just needs follow up to get clearance. Anyone we can send his name to that will call [facility] and get him scheduled for what he needs? Hes getting discouraged. Thanks!

## 2021-02-03 NOTE — Clinical Note
Patient of Sada's referred him to you in 4 weeks for ozempic start- ordered today. He was comfortable figuring out how to administere- wife a nurse. DMII on metformin. Insomnia with phentermine so we switched to 8mg phentermine - needs a lot of weight loss presurgery and kendal stuck. Thanks!

## 2021-02-03 NOTE — NURSING NOTE
"Chief Complaint   Patient presents with     RECHECK     Pre surgery appt.        Vitals:    02/03/21 1147   Height: 1.803 m (5' 11\")       Body mass index is 53.42 kg/m .                            MARZENA WHITNEY, EMT    "

## 2021-02-03 NOTE — LETTER
2/3/2021       RE: Larry Bangura  [address] [place] [address]     Dear Colleague,    Thank you for referring your patient, Larry Bangura, to the [facility] [place] at [facility]. Please see a copy of my visit note below.      Pre-Bariatric Surgery Note    Shelton Mcconnell    Date: 2/3/2021     RE: Larry Bangura    MR#: [id number]   : 1985   Date of Visit: Feb 3, 2021    REASON FOR VISIT: Preoperative evaluation for possible weight loss surgery    Dear Shelton Cota,    I had the pleasure of seeing your patient, Larry Bangura, in my preoperative bariatric clinic.    As you know, he is morbidly obese and considering weight loss surgery to treat obesity in association with his medical conditions of obesity.  His consult weight was 400.  He has lost 17 pounds since his consult weight. He has not met his required pre-surgery weight. Please refer to initial consult note from date 2020 for patient's weight history and co-morbidities.    Continue metformin 1000mg , increased topiramate to 50mg BID, started phentermine 15mg, rechecked A1C to consider GLP1    Phentermine caused insomnia- has stopped   Topiramate- some fatigue and tingling. Has been out of the medication for 2 days, some increased hunger since stopping.     Has completely cut out caffeine, 1-2 cans of soda a week     Heri scheduled 3/3/2021  Psych with Tarik scheduled 3/9/2021  Sleep clinic- - difficulty getting this scheduled     Assessment & Plan   Problem List Items Addressed This Visit        Respiratory    Obstructive sleep apnea (Chronic)     Compliant with CPAP per patient report             Digestive    Esophageal reflux (Chronic)     Manageable          Class 3 severe obesity due to excess calories with serious comorbidity and body mass index (BMI) of 50.0 to 59.9 in adult (H) - Primary     Stop phentermine 15mg due to insomnia. Trial of 8mg  phentermine in the morning. Continue topiramate 50mg twice daily. Start ozempic. MTM in 4 weeks for follow up. Continue with RD.          Relevant Medications    Semaglutide,0.25 or 0.5MG/DOS, 2 MG/1.5ML SOPN    phentermine (LOMAIRA) 8 MG tablet    Other Relevant Orders    MED THERAPY MANAGE REFERRAL       Endocrine    Type 2 diabetes mellitus without complication (H) (Chronic)     Continue metformin. Add ozempic- will help with additional weight loss pre-bariatric surgery. Repeat A1C due and ordered.          Relevant Medications    Semaglutide,0.25 or 0.5MG/DOS, 2 MG/1.5ML SOPN    Other Relevant Orders    MED THERAPY MANAGE REFERRAL           37 minutes spent on the date of the encounter doing chart review, history and exam, documentation and further activities as noted above  0956}  MEDICATIONS:        - Trial of ozempic- will send to pharmacy       - Discontinue phentermine 15mg- insomnia       - Start taking phentermine 8mg once daily in AM       - Continue other medications without change- metformin 1000mg daily and topiramate 50mg twice daily   FUTURE LABS:       - Schedule a non-fasting blood draw - ordered  FUTURE APPOINTMENTS:       - Follow-up visit in [facility] 1 month to follow up on ozempic        - sleep medicine     Reviewed tasklist with patient     Most recent weights:  Wt Readings from Last 4 Encounters:   02/03/21 (!) 173.7 kg (383 lb)   01/07/21 (!) 173.7 kg (383 lb)   11/30/20 (!) 176 kg (388 lb)   11/26/20 (!) 172.4 kg (380 lb)         ROS    Past Medical History:   Diagnosis Date     HTN (hypertension) 9/10/2013     Morbid obesity (H)        Past Surgical History:   Procedure Laterality Date     ENDOSCOPIC RELEASE CARPAL TUNNEL  12/6/2013    Procedure: ENDOSCOPIC RELEASE CARPAL TUNNEL;  Right Hand Endoscopic Carpal Tunnel Release;  Surgeon: Kody Pittman MD;  Location: [facility] OR     ENDOSCOPIC RELEASE CARPAL TUNNEL  1/21/2014    Procedure: ENDOSCOPIC RELEASE CARPAL TUNNEL;  Left endoscopic carpal  tunnel release, possible open;  Surgeon: Kody Pittman MD;  Location: [facility] OR       Current Outpatient Medications   Medication     HYDROcodone-acetaminophen (NORCO) 5-325 MG tablet     lisinopril (ZESTRIL) 20 MG tablet     LORazepam (ATIVAN) 1 MG tablet     metFORMIN (GLUCOPHAGE) 1000 MG tablet     ORDER FOR DME, SET TO FAX,     phentermine (LOMAIRA) 8 MG tablet     Semaglutide,0.25 or 0.5MG/DOS, 2 MG/1.5ML SOPN     topiramate (TOPAMAX) 25 MG tablet     valACYclovir (VALTREX) 1000 mg tablet     valACYclovir (VALTREX) 500 MG tablet     No current facility-administered medications for this visit.        No Known Allergies    Orders Only on 12/28/2020   Component Date Value Ref Range Status     COVID-19 Virus PCR to [facility] - So* 12/28/2020 Nasopharyngeal   Final     COVID-19 Virus PCR to [facility] - Re* 12/28/2020 Not Detected   Final    Comment: Collection of multiple specimens from the same patient may be necessary to   detect the virus. The possibility of a false negative should be considered if   the patient's recent exposure or clinical presentation suggests 2019 nCOV   infection and diagnostic tests for other causes of illness are negative.   Repeat testing may be considered in this setting.  Patient sample was heat inactivated and amplified using the HDPCR SARS-CoV-2   assay ([employer] The HDPCRTM SARS-CoV-2 assay is a reverse   transcription real-time polymerase chain reaction (qRT-PCR) test intended for   the qualitative detection of nucleic acid  from SARS-CoV-2 in human nasopharyngeal swabs, oropharyngeal swabs, anterior   nasal swabs, mid-turbinate nasal swabs as well as nasal aspirate, nasal wash,   and bronchoalveolar lavage (BAL) specimens from individuals who are suspected   of COVID-19 by their healthcare provider.  A negative result does not rule out the presence of real-time PCR inhibitors   in the sp                           ecimen or COVID-19 RNA in concentrations below the limit of  "detection   of the assay. The possibility of a false negative should be considered if the   patients recent exposure or clinical presentation suggests COVID-19.   Additional testing or repeat testing requires consultation with the   laboratory.  Nasopharyngeal specimen is the preferred choice for swab-based SARS CoV2   testing. When collection of a nasopharyngeal swab is not possible the   following are acceptable alternatives:  an oropharyngeal (OP) specimen collected by a healthcare professional, or a   nasal mid-turbinate (NMT) swab collected by a healthcare professional or by   onsite self-collection (using a flocked tapered swab), or an anterior nares   specimen collected by a healthcare professional or by onsite self-collection   (using a round foam swab). (Centers for Disease Control)  Testing performed by [facility]   [facility] ([facility]) [address] [place] [address]  The test performance characteristics were determined by [facility]. It has not been   cleared or approved by the FDA.  The laboratory is regulated under the Clinical Laboratory Improvement   Amendments of 1988 (CLIA-88) as qualified to perform high-complexity testing.   This test is used for clinical purposes. It should not be regarded as   investigational or for research.           PHYSICAL EXAM:  Objective    Ht 1.803 m (5' 11\")   Wt (!) 173.7 kg (383 lb)   BMI 53.42 kg/m    Vitals - Patient Reported  Pain Score: Moderate Pain (4)  Pain Loc: Foot      Vitals:  No vitals were obtained today due to virtual visit.    Physical Exam   GENERAL: Healthy, alert and no distress  EYES: Eyes grossly normal to inspection.  No discharge or erythema, or obvious scleral/conjunctival abnormalities.  RESP: No audible wheeze, cough, or visible cyanosis.  No visible retractions or increased work of breathing.    SKIN: Visible skin clear. No significant rash, abnormal " pigmentation or lesions.  NEURO: Cranial nerves grossly intact.  Mentation and speech appropriate for age.  PSYCH: Mentation appears normal, affect normal/bright, judgement and insight intact, normal speech and appearance well-groomed.    Sleeve Gastrectomy: Risks and Side Effects    The complications or risks of surgery include but are not limited to: death, heart attack, infection in the surgical site (wound infection), abdomen (abscess), bladder (urinary tract infection), lungs (pneumonia), clots in legs (deep vein thrombosis) or lungs (pulmonary emboli),  injury to the bowels or other organs, bowel obstruction, hernia at the incision and gastrointestional bleeding.    More specific risks related to vertical sleeve gastrectomy were detailed at the bariatric informational seminar and include the following: leak at the vertical sleeve staple line, leak at the anastomoses,  nausea, vomiting, and dehydration for several months,  adhesions causing bowel obstruction, rapid weight loss causing a higher rate of gallstone formation during the first 6 months after surgery, decreased absorption of vitamins and protein because of the reduced stomach size, weight regain if inappropriate food intake occurs, stricture, injury to other organs, hernia,  and ulcers.       Side effects of bariatric surgery include but are not limited to: abdominal pain, cramping, bloating, constipation, nausea, vomiting, diarrhea, difficulty swallowing,  dehydration, hair loss, excess skin, protein, iron and vitamin deficiencies, heartburn, transfer of addictions, increased anxiety and worsening depression.       I emphasized exercise and activity behavior along with appropriate food choice as the main foundation for weight loss with surgery providing surgical reinforcement of the appropriate behavior set.        Review of general surgery weight loss process    1. Complete preoperative requirements, including weight loss.  Final weight check to  confirm MANDATORY weight loss requirement must be documented on a clinic scale.    2. Discuss prior authorization with .    3. History and physical evaluation by PCP of PAC clinic within 30 days of surgery date, preoperative class, and weight check (weigh-in visit) to be scheduled by patient.  Pre-anesthesia clinic for risk evaluation to be scheduled by anesthesia clinic.    4. We cannot guarantee that patient will qualify for surgery unless all preoperative requirements are met, prior authorization from primary insurance company is granted, and insurance changes do not occur.    5. It is possible for patients to regain all weight after weight loss surgery unless they follow guidelines prescribed by our bariatric center.    6. All patients with gastrointestinal complaints after weight loss surgery must have complaints conveyed to the bariatric team for appropriate treatment.    7. Vitamin deficiencies may develop post-bariatric surgery and annual laboratory testing should be performed.    8. Persistent nausea/vomiting after bariatric surgery entails risk of thiamine deficiency and should be treated early.  Vitamin B12 deficiency may develop, especially after gastric bypass surgery and must be recognized.        If you have any questions about our plans please don't hesitate to contact me.    Sincerely,    Vale Weeks NP            Carlos is a 35 year old who is being evaluated via a billable video visit.      How would you like to obtain your AVS? MyChart  If the video visit is dropped, the invitation should be resent by: Text to cell phone: [phone]  Will anyone else be joining your video visit? No    Video Start Time: 1225  Video-Visit Details    Type of service:  Video Visit    Video End Time:1245    Originating Location (pt. Location): Home    Distant Location (provider location):  [facility] [place]   Platform used for Video Visit: [employer]

## 2021-02-03 NOTE — PROGRESS NOTES
Pre-Bariatric Surgery Note    Shelton Mcconnell    Date: 2/3/2021     RE: Larry Bangura    MR#: [id number]   : 1985   Date of Visit: Feb 3, 2021    REASON FOR VISIT: Preoperative evaluation for possible weight loss surgery    Dear Shelton Cota,    I had the pleasure of seeing your patient, Larry Bangura, in my preoperative bariatric clinic.    As you know, he is morbidly obese and considering weight loss surgery to treat obesity in association with his medical conditions of obesity.  His consult weight was 400.  He has lost 17 pounds since his consult weight. He has not met his required pre-surgery weight. Please refer to initial consult note from date 2020 for patient's weight history and co-morbidities.    Continue metformin 1000mg , increased topiramate to 50mg BID, started phentermine 15mg, rechecked A1C to consider GLP1    Phentermine caused insomnia- has stopped   Topiramate- some fatigue and tingling. Has been out of the medication for 2 days, some increased hunger since stopping.     Has completely cut out caffeine, 1-2 cans of soda a week     Jonathanramtam scheduled 3/3/2021  Psych with Tarik scheduled 3/9/2021  Sleep clinic- - difficulty getting this scheduled     Assessment & Plan   Problem List Items Addressed This Visit        Respiratory    Obstructive sleep apnea (Chronic)     Compliant with CPAP per patient report             Digestive    Esophageal reflux (Chronic)     Manageable          Class 3 severe obesity due to excess calories with serious comorbidity and body mass index (BMI) of 50.0 to 59.9 in adult (H) - Primary     Stop phentermine 15mg due to insomnia. Trial of 8mg phentermine in the morning. Continue topiramate 50mg twice daily. Start ozempic. MTM in 4 weeks for follow up. Continue with RD.          Relevant Medications    Semaglutide,0.25 or 0.5MG/DOS, 2 MG/1.5ML SOPN    phentermine (LOMAIRA) 8 MG tablet    Other Relevant Orders    MED THERAPY MANAGE REFERRAL        Endocrine    Type 2 diabetes mellitus without complication (H) (Chronic)     Continue metformin. Add ozempic- will help with additional weight loss pre-bariatric surgery. Repeat A1C due and ordered.          Relevant Medications    Semaglutide,0.25 or 0.5MG/DOS, 2 MG/1.5ML SOPN    Other Relevant Orders    MED THERAPY MANAGE REFERRAL           37 minutes spent on the date of the encounter doing chart review, history and exam, documentation and further activities as noted above  0956}  MEDICATIONS:        - Trial of ozempic- will send to pharmacy       - Discontinue phentermine 15mg- insomnia       - Start taking phentermine 8mg once daily in AM       - Continue other medications without change- metformin 1000mg daily and topiramate 50mg twice daily   FUTURE LABS:       - Schedule a non-fasting blood draw - ordered  FUTURE APPOINTMENTS:       - Follow-up visit in [facility] 1 month to follow up on ozempic        - sleep medicine     Reviewed tasklist with patient     Most recent weights:  Wt Readings from Last 4 Encounters:   02/03/21 (!) 173.7 kg (383 lb)   01/07/21 (!) 173.7 kg (383 lb)   11/30/20 (!) 176 kg (388 lb)   11/26/20 (!) 172.4 kg (380 lb)         ROS    Past Medical History:   Diagnosis Date     HTN (hypertension) 9/10/2013     Morbid obesity (H)        Past Surgical History:   Procedure Laterality Date     ENDOSCOPIC RELEASE CARPAL TUNNEL  12/6/2013    Procedure: ENDOSCOPIC RELEASE CARPAL TUNNEL;  Right Hand Endoscopic Carpal Tunnel Release;  Surgeon: Kody Pittman MD;  Location: [facility] OR     ENDOSCOPIC RELEASE CARPAL TUNNEL  1/21/2014    Procedure: ENDOSCOPIC RELEASE CARPAL TUNNEL;  Left endoscopic carpal tunnel release, possible open;  Surgeon: Kody Pittman MD;  Location: [facility] OR       Current Outpatient Medications   Medication     HYDROcodone-acetaminophen (NORCO) 5-325 MG tablet     lisinopril (ZESTRIL) 20 MG tablet     LORazepam (ATIVAN) 1 MG tablet     metFORMIN (GLUCOPHAGE) 1000 MG tablet      ORDER FOR DME, SET TO FAX,     phentermine (LOMAIRA) 8 MG tablet     Semaglutide,0.25 or 0.5MG/DOS, 2 MG/1.5ML SOPN     topiramate (TOPAMAX) 25 MG tablet     valACYclovir (VALTREX) 1000 mg tablet     valACYclovir (VALTREX) 500 MG tablet     No current facility-administered medications for this visit.        No Known Allergies    Orders Only on 12/28/2020   Component Date Value Ref Range Status     COVID-19 Virus PCR to [facility] - So* 12/28/2020 Nasopharyngeal   Final     COVID-19 Virus PCR to [facility] - Re* 12/28/2020 Not Detected   Final    Comment: Collection of multiple specimens from the same patient may be necessary to   detect the virus. The possibility of a false negative should be considered if   the patient's recent exposure or clinical presentation suggests 2019 nCOV   infection and diagnostic tests for other causes of illness are negative.   Repeat testing may be considered in this setting.  Patient sample was heat inactivated and amplified using the HDPCR SARS-CoV-2   assay ([employer] The HDPCRTM SARS-CoV-2 assay is a reverse   transcription real-time polymerase chain reaction (qRT-PCR) test intended for   the qualitative detection of nucleic acid  from SARS-CoV-2 in human nasopharyngeal swabs, oropharyngeal swabs, anterior   nasal swabs, mid-turbinate nasal swabs as well as nasal aspirate, nasal wash,   and bronchoalveolar lavage (BAL) specimens from individuals who are suspected   of COVID-19 by their healthcare provider.  A negative result does not rule out the presence of real-time PCR inhibitors   in the sp                           ecimen or COVID-19 RNA in concentrations below the limit of detection   of the assay. The possibility of a false negative should be considered if the   patients recent exposure or clinical presentation suggests COVID-19.   Additional testing or repeat testing requires consultation with the   laboratory.  Nasopharyngeal specimen is the preferred choice for  "swab-based SARS CoV2   testing. When collection of a nasopharyngeal swab is not possible the   following are acceptable alternatives:  an oropharyngeal (OP) specimen collected by a healthcare professional, or a   nasal mid-turbinate (NMT) swab collected by a healthcare professional or by   onsite self-collection (using a flocked tapered swab), or an anterior nares   specimen collected by a healthcare professional or by onsite self-collection   (using a round foam swab). (Centers for Disease Control)  Testing performed by [facility] ([facility]) [address] [place] [address]  The test performance characteristics were determined by [facility]. It has not been   cleared or approved by the FDA.  The laboratory is regulated under the Clinical Laboratory Improvement   Amendments of 1988 (CLIA-88) as qualified to perform high-complexity testing.   This test is used for clinical purposes. It should not be regarded as   investigational or for research.           PHYSICAL EXAM:  Objective    Ht 1.803 m (5' 11\")   Wt (!) 173.7 kg (383 lb)   BMI 53.42 kg/m    Vitals - Patient Reported  Pain Score: Moderate Pain (4)  Pain Loc: Foot      Vitals:  No vitals were obtained today due to virtual visit.    Physical Exam   GENERAL: Healthy, alert and no distress  EYES: Eyes grossly normal to inspection.  No discharge or erythema, or obvious scleral/conjunctival abnormalities.  RESP: No audible wheeze, cough, or visible cyanosis.  No visible retractions or increased work of breathing.    SKIN: Visible skin clear. No significant rash, abnormal pigmentation or lesions.  NEURO: Cranial nerves grossly intact.  Mentation and speech appropriate for age.  PSYCH: Mentation appears normal, affect normal/bright, judgement and insight intact, normal speech and appearance well-groomed.    Sleeve Gastrectomy: Risks and Side Effects    The complications " or risks of surgery include but are not limited to: death, heart attack, infection in the surgical site (wound infection), abdomen (abscess), bladder (urinary tract infection), lungs (pneumonia), clots in legs (deep vein thrombosis) or lungs (pulmonary emboli),  injury to the bowels or other organs, bowel obstruction, hernia at the incision and gastrointestional bleeding.    More specific risks related to vertical sleeve gastrectomy were detailed at the bariatric informational seminar and include the following: leak at the vertical sleeve staple line, leak at the anastomoses,  nausea, vomiting, and dehydration for several months,  adhesions causing bowel obstruction, rapid weight loss causing a higher rate of gallstone formation during the first 6 months after surgery, decreased absorption of vitamins and protein because of the reduced stomach size, weight regain if inappropriate food intake occurs, stricture, injury to other organs, hernia,  and ulcers.       Side effects of bariatric surgery include but are not limited to: abdominal pain, cramping, bloating, constipation, nausea, vomiting, diarrhea, difficulty swallowing,  dehydration, hair loss, excess skin, protein, iron and vitamin deficiencies, heartburn, transfer of addictions, increased anxiety and worsening depression.       I emphasized exercise and activity behavior along with appropriate food choice as the main foundation for weight loss with surgery providing surgical reinforcement of the appropriate behavior set.        Review of general surgery weight loss process    1. Complete preoperative requirements, including weight loss.  Final weight check to confirm MANDATORY weight loss requirement must be documented on a clinic scale.    2. Discuss prior authorization with .    3. History and physical evaluation by PCP of PAC clinic within 30 days of surgery date, preoperative class, and weight check (weigh-in visit) to be scheduled by  patient.  Pre-anesthesia clinic for risk evaluation to be scheduled by anesthesia clinic.    4. We cannot guarantee that patient will qualify for surgery unless all preoperative requirements are met, prior authorization from primary insurance company is granted, and insurance changes do not occur.    5. It is possible for patients to regain all weight after weight loss surgery unless they follow guidelines prescribed by our bariatric center.    6. All patients with gastrointestinal complaints after weight loss surgery must have complaints conveyed to the bariatric team for appropriate treatment.    7. Vitamin deficiencies may develop post-bariatric surgery and annual laboratory testing should be performed.    8. Persistent nausea/vomiting after bariatric surgery entails risk of thiamine deficiency and should be treated early.  Vitamin B12 deficiency may develop, especially after gastric bypass surgery and must be recognized.        If you have any questions about our plans please don't hesitate to contact me.    Sincerely,    Vale Weeks NP

## 2021-02-03 NOTE — ASSESSMENT & PLAN NOTE
Stop phentermine 15mg due to insomnia. Trial of 8mg phentermine in the morning. Continue topiramate 50mg twice daily. Start ozempic. MTM in 4 weeks for follow up. Continue with RD.

## 2021-02-04 NOTE — PROGRESS NOTES
Carlos is a 35 year old who is being evaluated via a billable video visit.      How would you like to obtain your AVS? MyChart  If the video visit is dropped, the invitation should be resent by: Text to cell phone: [phone]  Will anyone else be joining your video visit? No      Video Start Time: 8:03 AM  Video-Visit Details    Type of service:  Video Visit    Video End Time:8:19 AM    Originating Location (pt. Location): Home    Distant Location (provider location):  [facility]EP [facility]     Platform used for Video Visit: Markr    Obstructive Sleep Apnea - PAP Follow-Up Visit:    Chief Complaint   Patient presents with     CPAP Follow Up       Larry Bangura comes in today for follow-up of their severe sleep apnea, managed with CPAP.  He is seeking pre-operative (bariatric surgery) clearance from sleep apnea standpoint.     His CPAP is >5 years old worn and not keeping pressure.     Larry Bangura is a 35 year old male with PMH significant for morbid obesity, asthma, HTN and GERD. He initially presented in clinic for evaluation of snoring, witnessed apnea and daytime sleepiness.     Sleep study recommended and completed on 6/2/015. AHI was 122.6. The lowest oxygen saturation was 51% and would frequently not normalize between events with zenith SpO2 often in high 60's to high 70's. .8. Periodic Limb Movement Index 0.0/hour.     Transcutaneous CO2 monitoring with pre-study ABG was performed. His ABG prior to the sleep study showed: pH: 7.39, pCO2 51, pO2 62, HCO3 31 and consistent with compensated respiratory acidosis and hypoxemia. Of note, head of bed elevated without appreciable change in events/saturations.   CPAP was titrated to zenith of 15 cm with CPAP of 13 cm with head of bed elevated 30 degrees appearing largely optimal including supine REM with elimination of apneas, hypopnea, desaturations and TCM stable in the high 50's. Trial of 14 cm with head of bed flat with residual  events in supine REM. Brief trial of bilevel at end of study, 16/11 cm appeared effective in lateral NREM and REM. During the CPAP titration, the PLM index was 58.9.   June 11, 2015.     Patient received a Resmed Airsense 10 Auto. Pressures were set at 13 cm H2O.    Overall, the patient rates his experience with PAP as 10 (0 poor, 10 great). The mask is not comfortable. The mask is not leaking.  He is not snoring with the mask on. He is not having gasp arousals. He is not having any oral or nasal dryness. The pressure settings are comfortable    His PAP interface is Full Face Mask.    Bedtime is typically 10PM -12 AM. Usually it takes about 30 minutes to fall asleep with the mask on. Wake time is typically 7 AM.  Patient is using PAP therapy 7 hours per night. The patient is usually getting 7 hours of sleep per night.    He does feel rested in the morning.    Total score - [place]: 0 (2/4/2021  2:00 PM)      LA Total Score: 10      ResMed   CPAP 20.0 cmH2O 30 day usage data:  100% of days with > 4 hours of use. 0/30 days with no use.   Average use 485 minutes per day.   95%ile Leak 5.73 L/min.   AHI 1.55 events per hour.       Past medical/surgical history, family history, social history, medications and allergies were reviewed.      Problem List:  Patient Active Problem List    Diagnosis Date Noted     Hyperlipidemia LDL goal <100 10/17/2018     Priority: Medium     Statin at 35-40.  Same with ASA.         Herpes simplex infection of penis 10/03/2018     Priority: Medium     Esophageal reflux 12/03/2013     Priority: Medium     Essential hypertension with goal blood pressure less than 140/90 09/10/2013     Priority: Medium     CARDIOVASCULAR SCREENING; LDL GOAL LESS THAN 160 08/14/2013     Priority: Medium     Seasonal allergic rhinitis 08/07/2013     Priority: Medium     Obstructive sleep apnea 08/07/2013     Priority: Medium     Severe IRWIN: PSG  6/2/2015 ( 414 lbs, , , adithya O2  51%.)  7/15/2016:Uses CPAP every day.          Type 2 diabetes mellitus without complication (H) 08/07/2013     Priority: Medium     Class 3 severe obesity due to excess calories with serious comorbidity and body mass index (BMI) of 50.0 to 59.9 in adult (H) 05/14/2012     Priority: Medium     New bariatric surgery consult Sept 2020  Weight 400 lbs.  15mg phentermine caused insomnia           There were no vitals taken for this visit.    Impression/Plan:  Very severe obstructive sleep apnea-  Excellent CPAP compliance and appears well controlled on CPAP 20 cm/H20.  Continue current treatment.  He needs a new machine and DME order placed.   Patient is stable from sleep apnea standpoint  to proceed with bariatric surgery.   Patient counseled to inform his surgeon and anaesthesiologist about his sleep apnea.  He will bring his  CPAP to the hospital for use.    Larry Bangura will follow up in about 2 month(s).     Tri Vasquez PA-C    CC: Vale Magallanes

## 2021-02-04 NOTE — PATIENT INSTRUCTIONS

## 2021-02-05 ENCOUNTER — VIRTUAL VISIT (OUTPATIENT)
Dept: SLEEP MEDICINE | Facility: CLINIC | Age: 36
End: 2021-02-05
Payer: COMMERCIAL

## 2021-02-05 DIAGNOSIS — G47.33 OSA (OBSTRUCTIVE SLEEP APNEA): Primary | ICD-10-CM

## 2021-02-05 PROCEDURE — 99213 OFFICE O/P EST LOW 20 MIN: CPT | Mod: 95 | Performed by: PHYSICIAN ASSISTANT

## 2021-02-09 ENCOUNTER — TELEPHONE (OUTPATIENT)
Dept: SLEEP MEDICINE | Facility: CLINIC | Age: 36
End: 2021-02-09

## 2021-02-09 NOTE — TELEPHONE ENCOUNTER
Patient called to check on status of new CPAP machine. Discussed that his insurance requires a prior authorization, I will submit and call him once I receive it. He states his mask is bothering him as well. He will stop by tomorrow to look at different masks.     Rubia Moreno, DME Coordinator

## 2021-02-12 ENCOUNTER — TELEPHONE (OUTPATIENT)
Dept: ENDOCRINOLOGY | Facility: CLINIC | Age: 36
End: 2021-02-12

## 2021-02-12 NOTE — TELEPHONE ENCOUNTER
Prior Authorization Retail Medication Request    Medication/Dose: Ozempic  ICD code (if different than what is on RX):  Type 2 diabetes mellitus without complication, without long-term current use of insulin (H) [E11.9]     Previously Tried and Failed:  Phentermine, Topiramate, history of diet and exercise  Rationale:  Continue metformin 1000mg , increased topiramate to 50mg BID, started phentermine 15mg, rechecked A1C to consider GLP1. Phentermine caused insomnia- has stopped   Topiramate- some fatigue and tingling. Has been out of the medication for 2 days, some increased hunger since stopping.    Insurance Name:   Insurance ID:        Pharmacy Information (if different than what is on RX)  Name:  [facility] [address]   Phone:  [phone]

## 2021-02-14 ENCOUNTER — VIRTUAL VISIT (OUTPATIENT)
Dept: FAMILY MEDICINE | Facility: OTHER | Age: 36
End: 2021-02-14

## 2021-02-14 NOTE — PROGRESS NOTES
"Date: 2021 16:23:02  Clinician: Karen Terrell  Clinician NPI: [id number]  Patient: TERESA FIGUEROA  Patient : 1985  Patient Address: [address] [place] [address]  Patient Phone: [phone]  Visit Protocol: URI  Patient Summary:  TERESA Chow is a 35 year old ( : 1985 ) male who initiated a [facility] Visit for cold, sinus infection, or influenza. When asked the question \"Please sign me up to receive news, health information and promotions. \", TERESA Chow responded \"Yes\".    TERESA Chow states his symptoms started gradually 7-9 days ago.   His symptoms consist of chills, myalgia, rhinitis, malaise, enlarged lymph nodes, a cough, nasal congestion, diarrhea, facial pain or pressure, and a sore throat. He is experiencing difficulty breathing due to nasal congestion but he is not short of breath.   Symptom details     Nasal secretions: The color of his mucus is clear.    Cough: TERESA Chow coughs every 5-10 minutes and his cough is not more bothersome at night. Phlegm comes into his throat when he coughs. He does not believe his cough is caused by post-nasal drip. The color of the phlegm is white and green.     Sore throat: TERESA Chow reports having mild throat pain (1-3 on a 10 point pain scale), does not have exudate on his tonsils, and can swallow liquids. The lymph nodes in his neck are enlarged. A rash has appeared on the skin since the sore throat started. TERESA Chow uploaded photos of his rash (see below).     Facial pain or pressure: The facial pain or pressure does not feel worse when bending or leaning forward.      TERESA Chow denies having ear pain, headache, vomiting, fever, nausea, teeth pain, ageusia, wheezing, and anosmia. He also denies having recent facial or sinus surgery in the past 60 days, taking antibiotic medication in the past month, and double sickening (worsening symptoms after initial improvement).   Precipitating events  Within the past week, " TERESA Chow has not been exposed to someone with strep throat. He has not recently been exposed to someone with influenza. TERESA Chow has not been in close contact with any high risk individuals.   Pertinent COVID-19 (Coronavirus) information  TERESA Chow does not work or volunteer as healthcare worker or a . In the past 14 days, TERESA Chow has not worked or volunteered at a healthcare facility or group living setting.   In the past 14 days, he also has not lived in a congregate living setting.   TERESA Chow has had a close contact with a laboratory-confirmed COVID-19 patient within 14 days of symptom onset. He was not exposed at his work. He does not know when he was exposed to the laboratory-confirmed COVID-19 patient.   Additional information about contact with COVID-19 (Coronavirus) patient as reported by the patient (free text): Home    TERESA Chow has not been tested for COVID-19.   TERESA Chow has not received a COVID-19 vaccine.   Pertinent medical history  TERESA Cohw has diabetes. He has been told by his provider that his diabetes is in control.   He has not been told by his provider to avoid NSAIDs.   He denies having immunosuppressive conditions (e.g., chemotherapy, HIV, organ transplant, long-term use of steroids or other immunosuppressive medications, splenectomy). He denies having congestive heart failure and severe COPD. He does not have asthma.   TERESA Chow does not need a return to work/school note.   TERESA Chow does not smoke or use smokeless tobacco.   Additional information as reported by the patient (free text): My wife was in the ER today was told that she was very likely to have Covid came back with a negative test told my doctor to have The rest of the family antibody test To see how many quarantine until done   Weight: 380 lbs    MEDICATIONS: No current medications, ALLERGIES: NKDA  Clinician Response:  Dear TERESA  Geraldo,    Your symptoms show that  you may have coronavirus (COVID-19). This illness can cause fever, cough and trouble breathing. Many people get a mild case and get better on their own. Some people can get very sick.  What should I do?  We would like to test you for this virus.   1. Please call [phone] to schedule your visit. Explain that you were referred by [facility] to have a COVID-19 test. Be ready to [facility].  * If you need to schedule in [facility] please call [phone] or for [employer] employees please call [phone].  * If you need to schedule in the [place] area please call [phone]. [place] employees call [phone].  The following will serve as your written order for this COVID Test, ordered by me, for the indication of suspected COVID [Z20.828]: The test will be ordered in [employer], our electronic health record, after you are scheduled. It will show as ordered and authorized by Garrett Florian MD.  Order: COVID-19 (Coronavirus) PCR for SYMPTOMATIC testing from [facility].   2. When it's time for your COVID test:  Stay at least 6 feet away from others. (If someone will drive you to your test, stay in the backseat, as far away from the  as you can.)   Cover your mouth and nose with a mask, tissue or washcloth.  Go straight to the testing site. Don't make any stops on the way there or back.      3.Starting now: Stay home and away from others (self-isolate) until:   You've had no fever---and no medicine that reduces fever---for one full day (24 hours). And...   Your other symptoms have gotten better. For example, your cough or breathing has improved. And...   At least 10 days have passed since your symptoms started.       During this time, don't leave the house except for testing or medical care.   Stay in your own room, even for meals. Use your own bathroom if you can.   Stay away from others in your home. No hugging, kissing or shaking hands. No visitors.  Don't go to work, school or anywhere else.     "Clean \"high touch\" surfaces often (doorknobs, counters, handles, etc.). Use a household cleaning spray or wipes. You'll find a full list of  on the EPA website: www.epa.gov/pesticide-registration/list-n-disinfectants-use-against-sars-cov-2.   Cover your mouth and nose with a mask, tissue or washcloth to avoid spreading germs.  Wash your hands and face often. Use soap and water.  Caregivers in these groups are at risk for severe illness due to COVID-19:  o People 65 years and older  o People who live in a nursing home or long-term care facility  o People with chronic disease (lung, heart, cancer, diabetes, kidney, liver, immunologic)  o People who have a weakened immune system, including those who:   Are in cancer treatment  Take medicine that weakens the immune system, such as corticosteroids  Had a bone marrow or organ transplant  Have an immune deficiency  Have poorly controlled HIV or AIDS  Are obese (body mass index of 40 or higher)  Smoke regularly   o Caregivers should wear gloves while washing dishes, handling laundry and cleaning bedrooms and bathrooms.  o Use caution when washing and drying laundry: Don't shake dirty laundry, and use the warmest water setting that you can.  o For more tips, go to www.cdc.gov/coronavirus/2019-ncov/downloads/10Things.pdf.    4.Sign up for [employer]. We know it's scary to hear that you might have COVID-19. We want to track your symptoms to make sure you're okay over the next 2 weeks. Please look for an email from [employer]---this is a free, online program that we'll use to keep in touch. To sign up, follow the link in the email. Learn more at [email or web address]  How can I take care of myself?   Get lots of rest. Drink extra fluids (unless a doctor has told you not to).   Take Tylenol (acetaminophen) for fever or pain. If you have liver or kidney problems, ask your family doctor if it's okay to take Tylenol.   Adults can take either:    650 mg (two " 325 mg pills) every 4 to 6 hours, or...   1,000 mg (two 500 mg pills) every 8 hours as needed.    Note: Don't take more than 3,000 mg in one day. Acetaminophen is found in many medicines (both prescribed and over-the-counter medicines). Read all labels to be sure you don't take too much.   For children, check the Tylenol bottle for the right dose. The dose is based on the child's age or weight.    If you have other health problems (like cancer, heart failure, an organ transplant or severe kidney disease): Call your specialty clinic if you don't feel better in the next 2 days.       Know when to call 911. Emergency warning signs include:    Trouble breathing or shortness of breath Pain or pressure in the chest that doesn't go away Feeling confused like you haven't felt before, or not being able to wake up Bluish-colored lips or face.  Where can I get more information?   [facility] -- About COVID-19: [email or web address]   CDC -- What to Do If You're Sick: www.cdc.gov/coronavirus/2019-ncov/about/steps-when-sick.html   CDC -- Ending Home Isolation: www.cdc.gov/coronavirus/2019-ncov/hcp/disposition-in-home-patients.html   CDC -- Caring for Someone: www.cdc.gov/coronavirus/2019-ncov/if-you-are-sick/care-for-someone.html   [facility] -- Interim Guidance for Hospital Discharge to Home: www.health.[facility].[place].us/diseases/coronavirus/hcp/hospdischarge.pdf   [facility] clinical trials (COVID-19 research studies): clinicalaffairs.[facility]/n-clinical-trials    Below are the COVID-19 hotlines at the [facility] ([facility]). Interpreters are available.    For health questions: Call 854-489-9145 or [phone] (7 a.m. to 7 p.m.) For questions about schools and childcare: Call 031-402-9688 or 1-542.979.5427 (7 a.m. to 7 p.m.)      Diagnosis: Acute maxillary sinusitis, unspecified  Diagnosis ICD: J01.00  Prescription: fluticasone propionate (Flonase Allergy Relief) 50 mcg/actuation nasal  spray,suspension 1 60 spray aerosol with adapter, 0 days supply. Inhale 1 spray in each nostril intranasally 1 time per day as needed. Refills: 0, Refill as needed: no, Allow substitutions: yes  Prescription: amoxicillin-pot clavulanate (Augmentin) 875-125 mg oral tablet 20 tablet, 10 days supply. Take 1 tablet by mouth every 12 hours for 10 days. Refills: 0, Refill as needed: no, Allow substitutions: yes

## 2021-02-16 DIAGNOSIS — Z20.822 SUSPECTED COVID-19 VIRUS INFECTION: Primary | ICD-10-CM

## 2021-02-16 NOTE — TELEPHONE ENCOUNTER
Central Prior Authorization Team   Phone: [phone]      PA Initiation    Medication: Ozempic-PA initiated  Insurance Company: [facility] - Phone [phone] Fax [phone]  Pharmacy Filling the Rx: [facility] [address] [place] [address]  Filling Pharmacy Phone: [phone]  Filling Pharmacy Fax:    Start Date: 2/16/2021

## 2021-02-17 ENCOUNTER — TELEPHONE (OUTPATIENT)
Dept: ENDOCRINOLOGY | Facility: CLINIC | Age: 36
End: 2021-02-17

## 2021-02-17 ENCOUNTER — TELEPHONE (OUTPATIENT)
Dept: SURGERY | Facility: CLINIC | Age: 36
End: 2021-02-17

## 2021-02-17 DIAGNOSIS — Z20.822 SUSPECTED COVID-19 VIRUS INFECTION: ICD-10-CM

## 2021-02-17 LAB
SARS-COV-2 RNA RESP QL NAA+PROBE: NORMAL
SPECIMEN SOURCE: NORMAL

## 2021-02-17 PROCEDURE — U0005 INFEC AGEN DETEC AMPLI PROBE: HCPCS | Performed by: FAMILY MEDICINE

## 2021-02-17 PROCEDURE — U0003 INFECTIOUS AGENT DETECTION BY NUCLEIC ACID (DNA OR RNA); SEVERE ACUTE RESPIRATORY SYNDROME CORONAVIRUS 2 (SARS-COV-2) (CORONAVIRUS DISEASE [COVID-19]), AMPLIFIED PROBE TECHNIQUE, MAKING USE OF HIGH THROUGHPUT TECHNOLOGIES AS DESCRIBED BY CMS-2020-01-R: HCPCS | Performed by: FAMILY MEDICINE

## 2021-02-17 NOTE — TELEPHONE ENCOUNTER
Tried calling patient twice, could not got through to patient or voicemail. Will send Nakedhart. For scheduling, patient is to See Amy Guthrie RD around 3/7/21 video visit, See Sada Beach around 4/7/21 for pre bariatric surgery video visit, See Vale Weeks for 2 month video return around 4/3/21.

## 2021-02-17 NOTE — TELEPHONE ENCOUNTER
I spoke to Betzy LONDON from [facility] asking for additional information. Patient is required to try/fail at least 2 of 8 preferred diabetic medications before they approve Ozempic. He has tried none, so this will be denied. Of note, all weight loss medications are excluded so any meds previously taken for weight loss cannot be considered. Betzy will fax the denial. Will update when received.

## 2021-02-18 ENCOUNTER — TELEPHONE (OUTPATIENT)
Dept: ENDOCRINOLOGY | Facility: CLINIC | Age: 36
End: 2021-02-18

## 2021-02-18 LAB
LABORATORY COMMENT REPORT: NORMAL
SARS-COV-2 RNA RESP QL NAA+PROBE: NEGATIVE
SPECIMEN SOURCE: NORMAL

## 2021-02-18 NOTE — TELEPHONE ENCOUNTER
PRIOR AUTHORIZATION DENIED    Medication: Ozempic-PA denied    Denial Date: 2/17/2021    Denial Rational:         Appeal Information:

## 2021-02-18 NOTE — TELEPHONE ENCOUNTER
Prior Authorization Retail Medication Request    Medication/Dose: Victoza  ICD code (if different than what is on RX):  E11.9  Previously Tried and Failed:  Phentermine, Topiramate, history of diet and exercise  Rationale:  Continue metformin 1000mg , increased topiramate to 50mg BID, started phentermine 15mg, rechecked A1C to consider GLP1. Phentermine caused insomnia- has stopped   Topiramate- some fatigue and tingling. Has been out of the medication for 2 days, some increased hunger since stopping.     Insurance Name:   Insurance ID:          Pharmacy Information (if different than what is on RX)  Name:  [facility] [address] [place], [place] - [address]  Phone:  [phone]

## 2021-02-26 ENCOUNTER — DOCUMENTATION ONLY (OUTPATIENT)
Dept: SLEEP MEDICINE | Facility: CLINIC | Age: 36
End: 2021-02-26

## 2021-02-26 DIAGNOSIS — G47.33 OSA (OBSTRUCTIVE SLEEP APNEA): Primary | ICD-10-CM

## 2021-02-26 NOTE — PROGRESS NOTES
Patient was offered choice of vendor and chose [facility].  Patient Larry Bangura was set up at [place]  on February 26, 2021. Patient received a Resmed AirSense 10 Auto. Pressures were set at 20 cm H2O.   Patient s ramp is 20 cm H2O for Auto and FLEX/EPR is 2.  Patient received a Resmed Mask name: Mirage FX  Nasal mask size Wide, heated tubing and heated humidifier.  Patient does need to meet compliance with usage only.  Rubia Moreno

## 2021-03-01 ENCOUNTER — DOCUMENTATION ONLY (OUTPATIENT)
Dept: SLEEP MEDICINE | Facility: CLINIC | Age: 36
End: 2021-03-01

## 2021-03-01 NOTE — PROGRESS NOTES
3 DAY STM VISIT    Diagnostic AHI: 122    PSG    Patient contacted for 3 day STM visit    Confirmed with patient at time of call- N/A Patient is still interested in STM service     Data only recheck    Replacement device: Yes  STM ordered by provider: No     Device type: CPAP  PAP settings from order::        CPAP fixed 20 cm  H20  Mask type:    Per patient choice     Device settings from machine              CPAP fixed 20.0 cm  H20      EPR level Setting: THREE       Assessment: Nightly usage over four hours.  Action plan: Patient removed from STM, STM not required or ordered.     Total time spent on accessing and  interpreting remote patient PAP therapy data  10 minutes  Total time spent counseling, coaching  and reviewing PAP therapy data with patient  0 minutes  [id number] no

## 2021-03-03 ENCOUNTER — VIRTUAL VISIT (OUTPATIENT)
Dept: ENDOCRINOLOGY | Facility: CLINIC | Age: 36
End: 2021-03-03
Payer: COMMERCIAL

## 2021-03-03 VITALS — HEIGHT: 71 IN | BODY MASS INDEX: 53.42 KG/M2

## 2021-03-03 DIAGNOSIS — E66.813 CLASS 3 SEVERE OBESITY DUE TO EXCESS CALORIES WITH SERIOUS COMORBIDITY AND BODY MASS INDEX (BMI) OF 50.0 TO 59.9 IN ADULT (H): Primary | ICD-10-CM

## 2021-03-03 DIAGNOSIS — E66.01 CLASS 3 SEVERE OBESITY DUE TO EXCESS CALORIES WITH SERIOUS COMORBIDITY AND BODY MASS INDEX (BMI) OF 50.0 TO 59.9 IN ADULT (H): Primary | ICD-10-CM

## 2021-03-03 PROCEDURE — 99212 OFFICE O/P EST SF 10 MIN: CPT | Mod: 95 | Performed by: SURGERY

## 2021-03-03 ASSESSMENT — PAIN SCALES - GENERAL: PAINLEVEL: MODERATE PAIN (5)

## 2021-03-03 NOTE — PROGRESS NOTES
Carlos is a 35 year old who is being evaluated via a billable video visit.      How would you like to obtain your AVS? MyChart  If the video visit is dropped, the invitation should be resent by: Text to cell phone: [phone]  Will anyone else be joining your video visit? No      This is a 15 minute Am Well visit from from 0840 to 0855. 35 year old male who is self employed as a .  Has been overweight beginning around his senior year of high school.  His highest weight ever was 450 lbs.  He had lost a substantial amount of weight when he had a  and moved to [place] with 3x/day exercises.  He estimates his current weight to be approximately 380 lbs. Patient does have a prescription for liraglutide. Additional co-morbid illness is prediabetes  Currently is on phenteramine and topiramate. Weight loss target is 350 lbs.      Current Outpatient Medications   Medication     lisinopril (ZESTRIL) 20 MG tablet     LORazepam (ATIVAN) 1 MG tablet     metFORMIN (GLUCOPHAGE) 1000 MG tablet     ORDER FOR DME, SET TO FAX,     phentermine (LOMAIRA) 8 MG tablet     topiramate (TOPAMAX) 25 MG tablet     valACYclovir (VALTREX) 1000 mg tablet     valACYclovir (VALTREX) 500 MG tablet     HYDROcodone-acetaminophen (NORCO) 5-325 MG tablet     liraglutide (VICTOZA) 18 MG/3ML solution     No current facility-administered medications for this visit.      Past Surgical History:   Procedure Laterality Date     ENDOSCOPIC RELEASE CARPAL TUNNEL  12/6/2013    Procedure: ENDOSCOPIC RELEASE CARPAL TUNNEL;  Right Hand Endoscopic Carpal Tunnel Release;  Surgeon: Kody Pittman MD;  Location: [facility] OR     ENDOSCOPIC RELEASE CARPAL TUNNEL  1/21/2014    Procedure: ENDOSCOPIC RELEASE CARPAL TUNNEL;  Left endoscopic carpal tunnel release, possible open;  Surgeon: Kody Pittman MD;  Location: [facility] OR     Past Medical History:   Diagnosis Date     HTN (hypertension) 9/10/2013     Morbid obesity (H)      We  discussed the VSG and risks with the potential need for a hiatal hernia repair.  Plan to see him in person in 4-6 weeks with plan for weight loss target of 350 lbs.

## 2021-03-03 NOTE — NURSING NOTE
"Chief Complaint   Patient presents with     Consult     Meet and greet appt.       Vitals:    03/03/21 0813   Height: 1.803 m (5' 11\")       Body mass index is 53.42 kg/m .                            MARZENA WHITNEY, EMT    "

## 2021-03-03 NOTE — LETTER
3/3/2021       RE: Larry Bangura  [address] [facility] [address]     Dear Colleague,    Thank you for referring your patient, Larry Bangura, to the [facility] [place] at [facility]. Please see a copy of my visit note below.    Carlos is a 35 year old who is being evaluated via a billable video visit.      How would you like to obtain your AVS? MyChart  If the video visit is dropped, the invitation should be resent by: Text to cell phone: [phone]  Will anyone else be joining your video visit? No      This is a 15 minute Am Well visit from from 0840 to 0855. 35 year old male who is self employed as a .  Has been overweight beginning around his senior year of high school.  His highest weight ever was 450 lbs.  He had lost a substantial amount of weight when he had a  and moved to [place] with 3x/day exercises.  He estimates his current weight to be approximately 380 lbs. Patient does have a prescription for liraglutide. Additional co-morbid illness is prediabetes  Currently is on phenteramine and topiramate. Weight loss target is 350 lbs.      Current Outpatient Medications   Medication     lisinopril (ZESTRIL) 20 MG tablet     LORazepam (ATIVAN) 1 MG tablet     metFORMIN (GLUCOPHAGE) 1000 MG tablet     ORDER FOR DME, SET TO FAX,     phentermine (LOMAIRA) 8 MG tablet     topiramate (TOPAMAX) 25 MG tablet     valACYclovir (VALTREX) 1000 mg tablet     valACYclovir (VALTREX) 500 MG tablet     HYDROcodone-acetaminophen (NORCO) 5-325 MG tablet     liraglutide (VICTOZA) 18 MG/3ML solution     No current facility-administered medications for this visit.      Past Surgical History:   Procedure Laterality Date     ENDOSCOPIC RELEASE CARPAL TUNNEL  12/6/2013    Procedure: ENDOSCOPIC RELEASE CARPAL TUNNEL;  Right Hand Endoscopic Carpal Tunnel Release;  Surgeon: Kody Pittman MD;  Location: [facility]  OR     ENDOSCOPIC RELEASE CARPAL TUNNEL  1/21/2014    Procedure: ENDOSCOPIC RELEASE CARPAL TUNNEL;  Left endoscopic carpal tunnel release, possible open;  Surgeon: Kody Pittman MD;  Location: [facility] OR     Past Medical History:   Diagnosis Date     HTN (hypertension) 9/10/2013     Morbid obesity (H)      We discussed the VSG and risks with the potential need for a hiatal hernia repair.  Plan to see him in person in 4-6 weeks with plan for weight loss target of 350 lbs.    Again, thank you for allowing me to participate in the care of your patient.      Sincerely,    Rikki Liriano MD

## 2021-03-04 ENCOUNTER — VIRTUAL VISIT (OUTPATIENT)
Dept: ENDOCRINOLOGY | Facility: CLINIC | Age: 36
End: 2021-03-04
Payer: COMMERCIAL

## 2021-03-04 DIAGNOSIS — E11.9 TYPE 2 DIABETES MELLITUS WITHOUT COMPLICATION, WITHOUT LONG-TERM CURRENT USE OF INSULIN (H): ICD-10-CM

## 2021-03-04 DIAGNOSIS — E66.01 CLASS 3 SEVERE OBESITY DUE TO EXCESS CALORIES WITH SERIOUS COMORBIDITY AND BODY MASS INDEX (BMI) OF 50.0 TO 59.9 IN ADULT (H): Primary | ICD-10-CM

## 2021-03-04 DIAGNOSIS — Z71.3 NUTRITIONAL COUNSELING: ICD-10-CM

## 2021-03-04 DIAGNOSIS — E66.813 CLASS 3 SEVERE OBESITY DUE TO EXCESS CALORIES WITH SERIOUS COMORBIDITY AND BODY MASS INDEX (BMI) OF 50.0 TO 59.9 IN ADULT (H): Primary | ICD-10-CM

## 2021-03-04 PROCEDURE — 97803 MED NUTRITION INDIV SUBSEQ: CPT | Mod: 95 | Performed by: DIETITIAN, REGISTERED

## 2021-03-04 NOTE — LETTER
"3/4/2021       RE: Larry Bangura  [address] [facility] [address]     Dear Colleague,    Thank you for referring your patient, Larry Bangura, to the [facility] [place] at [facility]. Please see a copy of my visit note below.    Larry Bangura is a 35 year old who is being evaluated via a billable video visit.      The patient has been notified of following:     \"This video visit will be conducted via a call between you and your physician/provider. We have found that certain health care needs can be provided without the need for an in-person physical exam.  This service lets us provide the care you need with a video conversation.  If a prescription is necessary we can send it directly to your pharmacy.  If lab work is needed we can place an order for that and you can then stop by our lab to have the test done at a later time.    Video visits are billed at different rates depending on your insurance coverage.  Please reach out to your insurance provider with any questions.    If during the course of the call the physician/provider feels a video visit is not appropriate, you will not be charged for this service.\"    Patient has given verbal consent for Video visit? Yes  How would you like to obtain your AVS? MyChart  If you are dropped from the video visit, the video invite should be resent to: Text to cell phone: [phone]   Will anyone else be joining your video visit? No        Video-Visit Details    Type of service:  Video Visit    Video Start Time: 8:34 AM   Video End Time: 8:43 AM     Originating Location (pt. Location): Home    Distant Location (provider location):  [facility] [place]     Platform used for Video Visit: [employer]    During this virtual visit the patient is located in [place], patient verifies this as the location during the entirety of this visit.     Return Bariatric Nutrition " "Consultation Note    Reason For Visit: Nutrition Assessment    Larry Bangura is a 35 year old presenting today for follow-up bariatric nutrition consult.   Pt is interested in laparoscopic sleeve gastrectomy with Dr. Liriano expected surgery in March 2021.  Patient is accompanied by self. This is pt's 3rd of 6 required nutrition visits prior to surgery.     - Insurance missed a month, pt reports he was instructed that a  would call him, this did not occur.     Pt referred by DELL Duckworth on September 30, 2020.  Patient with Co-morbidities of obesity including:  Type II DM yes  Renal Failure no  Sleep apnea yes  Hypertension yes   Dyslipidemia yes  Joint pain no  Back pain no  GERD no     Support System Reviewed With Patient 9/29/2020   Who do you have in your support network that can be available to help you for the first 2 weeks after surgery? wife   Who can you count on for support throughout your weight loss surgery journey? wife       ANTHROPOMETRICS:  Estimated body mass index is 53.42 kg/m  as calculated from the following:    Height as of 3/3/21: 1.803 m (5' 11\").    Weight as of 2/3/21: 173.7 kg (383 lb).   Current weight : Weight from 1/7/21 is his most recent weight  Initial weight: 400 lb   Required weight loss goal pre-op: 50 lbs from initial consult weight (goal weight 350 lbs or less before surgery)       9/29/2020   I have tried the following methods to lose weight Watching portions or calories, Exercise, Weight Watchers, Atkins type diet (low carb/high protein), Pre packaged meals ex: Nutrisystem, Slimfast       Weight Loss Questions Reviewed With Patient 9/29/2020   How long have you been overweight? 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 He typically picks up breakfast on his way to work, 2 [place] sausage egg burritos and a diet coke: sometimes a " "muffin. For lunch it is leftovers from dinner OR fast food, usually the sandwich not the fries and a diet coke.   Dinner is a meat vegetables and bread and he tries to have popsicle after dinner for his snack.     Today:    B: Egg with a couple piece of meat and a couple piece of muffin. With water flavor packet   Cup of noodle or a sandwich   And for dinner its regualr meal   Snack: if at home     Water, ocassionally     Progress Towards Previous Goals:  Relating To Eating:  Eat slowly (20-30 minutes per meal), chewing foods well (25 chews per bite/applesauce consistency)-Improving   - Try setting a timer   - Try pausing detention through your meal: take a deep breath. Notice if you are satisfied, hungry or overfull and/or are you feeling tired, sluggish, energized, neutral.   - Try a remind with a sticky note near wear you eat.  9\" Plate method (1/2 plate non-starchy vegetables/fruit, 1/4 plate lean protein, 1/4 plate whole grain starch - no more than 1/2 cup carb/meal)-Improving     Relating to beverages:  Reduce caffeine/carbonation/calorie containing beverages-Met   Separate fluids from meals by 30 minutes before, during, and after eating-No change     Relating to activity:  Increase activity as able-Not lately, wife has COVID helping out more around the house.     Recall Diet Questions Reviewed With Patient 9/29/2020   Describe what you typically consume for breakfast (typical or most recent): 2 sausage lali diet coke   Describe what you typically consume for lunch (typical or most recent): leftovers   Describe what you typically consume for supper (typical or most recent): meet veg bread   Describe what you typically consume as snacks (typical or most recent): chocolate covered almans   How many ounces of water, or other low calorie drinks, do you drink daily (8 oz=1 glass)? 32 oz- water   How many ounces of caffeine (coffee, tea, pop) do you drink daily (8 oz=1 glass)? 32 oz- soda    How many ounces of " carbonated (pop, beer, sparkling water) drinks do you drinky daily (8 oz=1 glass)? 32 oz- dt mt dew and diet coke    How many ounces of juice, pop, sweet tea, sports drinks, protein drinks, other sweetened drinks, do you drink daily (8 oz=1 glass)? 8 oz- not usually    How many ounces of milk do you drink daily (8 oz=1 glass) 8 oz- with cereal    Please indicate the type of milk: skim   How often do you drink alcohol? Monthly or less- not usually.    If you do drink alcohol, how many drinks might you have in a day? (one drink = 5 oz. wine, 1 can/bottle of beer, 1 shot liquor) 1 or 2       Eating Habits 9/29/2020   Do you have any dietary restrictions? No   Do you currently binge eat (eat a large amount of food in a short time)? Yes   Are you an emotional eater? Yes   Do you get up to eat after falling asleep? No   What foods do you crave? all       ADDITIONAL INFORMATION:     Dining Out History Reviewed With Patient 9/29/2020   How often do you dine out? Nearly every day.   Where do you dine out? (select all that apply) fast food chains, take out, other   What types of food do you order when you dine out? salvador reese       Physical Activity Reviewed With Patient 9/29/2020   How often do you exercise? Never   What keeps you from being more active?  Pain, I should be more active but I just have not gotten around to it, Lack of Time, Too tired       NUTRITION DIAGNOSIS:  Obesity r/t long history of self-monitoring deficit and excessive energy intake aeb BMI >30 kg/m2.- Improving     INTERVENTION:  Intervention Provided/Education:   1. Reviewed and modified goals  2. Reviewed on post-op diet guidelines, vitamins/minerals essential post-operatively, GI anatomy of bariatric surgeries, ways to help prepare for post-op diet guidelines pre-operatively, portion/calorie-control, mindful eating and sources of protein.   3. Praised patient for behavior and diet modifications.   4. AVS via [employer]     Questions Reviewed With  "Patient 9/29/2020   How ready are you to make changes regarding your weight? Number 1 = Not ready at all to make changes up to 10 = very ready. 10   How confident are you that you can change? 1 = Not confident that you will be successful making changes up to 10 = very confident. 8     Patient Understanding: good  Expected Compliance: fair-good    GOALS:  Relating To Eating:  Eat slowly (20-30 minutes per meal), chewing foods well (25 chews per bite/applesauce consistency)  - Try setting a timer   - Try pausing skilled nursing through your meal: take a deep breath. Notice if you are satisfied, hungry or overfull and/or are you feeling tired, sluggish, energized, neutral.   - Try a remind with a sticky note near wear you eat.  9\" Plate method (1/2 plate non-starchy vegetables/fruit, 1/4 plate lean protein, 1/4 plate whole grain starch - no more than 1/2 cup carb/meal)    Relating to beverages:  Reduce caffeine/carbonation/calorie containing beverages  Separate fluids from meals by 30 minutes before, during, and after eating    Relating to activity:  Increase activity as able    Initial Consult / Weight Loss     My Plate Planner_English - Pt Education  [email or web address]    Protein Sources for Weight Loss  [email or web address]     Carbohydrates  [email or web address]     Time spent with patient: 9 minutes.  Amy Guthrie, MS, RD, LD  "

## 2021-03-04 NOTE — TELEPHONE ENCOUNTER
Central Prior Authorization Team   Phone: [phone]      PA Initiation    Medication: victoza-PA iniitiated  Insurance Company: [employer] [facility] - Phone [phone] Fax [phone]  Pharmacy Filling the Rx: [facility] [address] [place] [address]  Filling Pharmacy Phone: [phone]  Filling Pharmacy Fax:    Start Date: 3/4/2021

## 2021-03-04 NOTE — PROGRESS NOTES
"Larry Bangura is a 35 year old who is being evaluated via a billable video visit.      The patient has been notified of following:     \"This video visit will be conducted via a call between you and your physician/provider. We have found that certain health care needs can be provided without the need for an in-person physical exam.  This service lets us provide the care you need with a video conversation.  If a prescription is necessary we can send it directly to your pharmacy.  If lab work is needed we can place an order for that and you can then stop by our lab to have the test done at a later time.    Video visits are billed at different rates depending on your insurance coverage.  Please reach out to your insurance provider with any questions.    If during the course of the call the physician/provider feels a video visit is not appropriate, you will not be charged for this service.\"    Patient has given verbal consent for Video visit? Yes  How would you like to obtain your AVS? MyChart  If you are dropped from the video visit, the video invite should be resent to: Text to cell phone: [phone]   Will anyone else be joining your video visit? No        Video-Visit Details    Type of service:  Video Visit    Video Start Time: 8:34 AM   Video End Time: 8:43 AM     Originating Location (pt. Location): Home    Distant Location (provider location):  [facility] [place]     Platform used for Video Visit: [employer]    During this virtual visit the patient is located in [place], patient verifies this as the location during the entirety of this visit.     Return Bariatric Nutrition Consultation Note    Reason For Visit: Nutrition Assessment    Larry Bangura is a 35 year old presenting today for follow-up bariatric nutrition consult.   Pt is interested in laparoscopic sleeve gastrectomy with Dr. Liriano expected surgery in March 2021.  Patient is accompanied by self. This is pt's 3rd of 6 " "required nutrition visits prior to surgery.     - Insurance missed a month, pt reports he was instructed that a  would call him, this did not occur.     Pt referred by DELL Duckworth on September 30, 2020.  Patient with Co-morbidities of obesity including:  Type II DM yes  Renal Failure no  Sleep apnea yes  Hypertension yes   Dyslipidemia yes  Joint pain no  Back pain no  GERD no     Support System Reviewed With Patient 9/29/2020   Who do you have in your support network that can be available to help you for the first 2 weeks after surgery? wife   Who can you count on for support throughout your weight loss surgery journey? wife       ANTHROPOMETRICS:  Estimated body mass index is 53.42 kg/m  as calculated from the following:    Height as of 3/3/21: 1.803 m (5' 11\").    Weight as of 2/3/21: 173.7 kg (383 lb).   Current weight : Weight from 1/7/21 is his most recent weight  Initial weight: 400 lb   Required weight loss goal pre-op: 50 lbs from initial consult weight (goal weight 350 lbs or less before surgery)       9/29/2020   I have tried the following methods to lose weight Watching portions or calories, Exercise, Weight Watchers, Atkins type diet (low carb/high protein), Pre packaged meals ex: Nutrisystem, Slimfast       Weight Loss Questions Reviewed With Patient 9/29/2020   How long have you been overweight? 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 He typically picks up breakfast on his way to work, 2 [place] sausage egg burritos and a diet coke: sometimes a muffin. For lunch it is leftovers from dinner OR fast food, usually the sandwich not the fries and a diet coke.   Dinner is a meat vegetables and bread and he tries to have popsicle after dinner for his snack.     Today:    B: Egg with a couple piece of meat and a couple piece of muffin. With water flavor packet " "  Cup of noodle or a sandwich   And for dinner its regualr meal   Snack: if at home     Water, ocassionally     Progress Towards Previous Goals:  Relating To Eating:  Eat slowly (20-30 minutes per meal), chewing foods well (25 chews per bite/applesauce consistency)-Improving   - Try setting a timer   - Try pausing snf through your meal: take a deep breath. Notice if you are satisfied, hungry or overfull and/or are you feeling tired, sluggish, energized, neutral.   - Try a remind with a sticky note near wear you eat.  9\" Plate method (1/2 plate non-starchy vegetables/fruit, 1/4 plate lean protein, 1/4 plate whole grain starch - no more than 1/2 cup carb/meal)-Improving     Relating to beverages:  Reduce caffeine/carbonation/calorie containing beverages-Met   Separate fluids from meals by 30 minutes before, during, and after eating-No change     Relating to activity:  Increase activity as able-Not lately, wife has COVID helping out more around the house.     Recall Diet Questions Reviewed With Patient 9/29/2020   Describe what you typically consume for breakfast (typical or most recent): 2 jackson escalera diet coveronique   Describe what you typically consume for lunch (typical or most recent): leftovers   Describe what you typically consume for supper (typical or most recent): meet veg bread   Describe what you typically consume as snacks (typical or most recent): chocolate covered almans   How many ounces of water, or other low calorie drinks, do you drink daily (8 oz=1 glass)? 32 oz- water   How many ounces of caffeine (coffee, tea, pop) do you drink daily (8 oz=1 glass)? 32 oz- soda    How many ounces of carbonated (pop, beer, sparkling water) drinks do you drinky daily (8 oz=1 glass)? 32 oz- dt mt dew and diet coke    How many ounces of juice, pop, sweet tea, sports drinks, protein drinks, other sweetened drinks, do you drink daily (8 oz=1 glass)? 8 oz- not usually    How many ounces of milk do you drink daily (8 " oz=1 glass) 8 oz- with cereal    Please indicate the type of milk: skim   How often do you drink alcohol? Monthly or less- not usually.    If you do drink alcohol, how many drinks might you have in a day? (one drink = 5 oz. wine, 1 can/bottle of beer, 1 shot liquor) 1 or 2       Eating Habits 9/29/2020   Do you have any dietary restrictions? No   Do you currently binge eat (eat a large amount of food in a short time)? Yes   Are you an emotional eater? Yes   Do you get up to eat after falling asleep? No   What foods do you crave? all       ADDITIONAL INFORMATION:     Dining Out History Reviewed With Patient 9/29/2020   How often do you dine out? Nearly every day.   Where do you dine out? (select all that apply) fast food chains, take out, other   What types of food do you order when you dine out? salvador reese       Physical Activity Reviewed With Patient 9/29/2020   How often do you exercise? Never   What keeps you from being more active?  Pain, I should be more active but I just have not gotten around to it, Lack of Time, Too tired       NUTRITION DIAGNOSIS:  Obesity r/t long history of self-monitoring deficit and excessive energy intake aeb BMI >30 kg/m2.- Improving     INTERVENTION:  Intervention Provided/Education:   1. Reviewed and modified goals  2. Reviewed on post-op diet guidelines, vitamins/minerals essential post-operatively, GI anatomy of bariatric surgeries, ways to help prepare for post-op diet guidelines pre-operatively, portion/calorie-control, mindful eating and sources of protein.   3. Praised patient for behavior and diet modifications.   4. AVS via [employer]     Questions Reviewed With Patient 9/29/2020   How ready are you to make changes regarding your weight? Number 1 = Not ready at all to make changes up to 10 = very ready. 10   How confident are you that you can change? 1 = Not confident that you will be successful making changes up to 10 = very confident. 8       Patient Understanding:  "good  Expected Compliance: fair-good    GOALS:  Relating To Eating:  Eat slowly (20-30 minutes per meal), chewing foods well (25 chews per bite/applesauce consistency)  - Try setting a timer   - Try pausing correction through your meal: take a deep breath. Notice if you are satisfied, hungry or overfull and/or are you feeling tired, sluggish, energized, neutral.   - Try a remind with a sticky note near wear you eat.  9\" Plate method (1/2 plate non-starchy vegetables/fruit, 1/4 plate lean protein, 1/4 plate whole grain starch - no more than 1/2 cup carb/meal)    Relating to beverages:  Reduce caffeine/carbonation/calorie containing beverages  Separate fluids from meals by 30 minutes before, during, and after eating    Relating to activity:  Increase activity as able    Initial Consult / Weight Loss     My Plate Planner_English - Pt Education  [email or web address]    Protein Sources for Weight Loss  [email or web address]     Carbohydrates  [email or web address]     Time spent with patient: 9 minutes.  Amy Guthrie, MS, RD, LD  "

## 2021-03-04 NOTE — PATIENT INSTRUCTIONS
"GOALS:  Relating To Eating:  Eat slowly (20-30 minutes per meal), chewing foods well (25 chews per bite/applesauce consistency)  - Try setting a timer   - Try pausing FCI through your meal: take a deep breath. Notice if you are satisfied, hungry or overfull and/or are you feeling tired, sluggish, energized, neutral.   - Try a remind with a sticky note near wear you eat.  9\" Plate method (1/2 plate non-starchy vegetables/fruit, 1/4 plate lean protein, 1/4 plate whole grain starch - no more than 1/2 cup carb/meal)    Relating to beverages:  Reduce caffeine/carbonation/calorie containing beverages  Separate fluids from meals by 30 minutes before, during, and after eating    Relating to activity:  Increase activity as able    Follow up with RD in one month    Amy Guthrie, MS, RD, LD  If you need to schedule or reschedule with a dietitian please call [phone].  "

## 2021-03-05 NOTE — TELEPHONE ENCOUNTER
Prior Authorization Approval    Authorization Effective Date: 1/1/2021  Authorization Expiration Date: 3/4/2022  Medication: victoza-PA approved  Approved Dose/Quantity:   Reference #: [id number]  Insurance Company: [employer] - Phone [phone] Fax [phone]  Expected CoPay:       CoPay Card Available:      Foundation Assistance Needed:    Which Pharmacy is filling the prescription (Not needed for infusion/clinic administered): [facility] [address] [facility] [address] [place] [address]  Pharmacy Notified: Yes  Patient Notified: No-pharmacy will contact

## 2021-03-08 ENCOUNTER — VIRTUAL VISIT (OUTPATIENT)
Dept: PHARMACY | Facility: CLINIC | Age: 36
End: 2021-03-08
Attending: NURSE PRACTITIONER
Payer: COMMERCIAL

## 2021-03-08 DIAGNOSIS — E11.9 TYPE 2 DIABETES MELLITUS WITHOUT COMPLICATION, WITHOUT LONG-TERM CURRENT USE OF INSULIN (H): ICD-10-CM

## 2021-03-08 DIAGNOSIS — E66.813 CLASS 3 SEVERE OBESITY DUE TO EXCESS CALORIES WITH BODY MASS INDEX (BMI) OF 50.0 TO 59.9 IN ADULT, UNSPECIFIED WHETHER SERIOUS COMORBIDITY PRESENT (H): Primary | ICD-10-CM

## 2021-03-08 DIAGNOSIS — I10 ESSENTIAL HYPERTENSION WITH GOAL BLOOD PRESSURE LESS THAN 140/90: ICD-10-CM

## 2021-03-08 DIAGNOSIS — E66.01 CLASS 3 SEVERE OBESITY DUE TO EXCESS CALORIES WITH BODY MASS INDEX (BMI) OF 50.0 TO 59.9 IN ADULT, UNSPECIFIED WHETHER SERIOUS COMORBIDITY PRESENT (H): Primary | ICD-10-CM

## 2021-03-08 DIAGNOSIS — A60.01 HERPES SIMPLEX INFECTION OF PENIS: ICD-10-CM

## 2021-03-08 PROCEDURE — 99607 MTMS BY PHARM ADDL 15 MIN: CPT | Mod: TEL | Performed by: PHARMACIST

## 2021-03-08 PROCEDURE — 99605 MTMS BY PHARM NP 15 MIN: CPT | Mod: TEL | Performed by: PHARMACIST

## 2021-03-08 RX ORDER — SEMAGLUTIDE 1.34 MG/ML
0.25 INJECTION, SOLUTION SUBCUTANEOUS WEEKLY
COMMUNITY
End: 2021-04-07

## 2021-03-08 NOTE — PROGRESS NOTES
Medication Therapy Management (MTM) Encounter    ASSESSMENT:                            Medication Adherence/Access: No issues identified    Obesity: Needs improvement. Patient is progressing with weight loss, but has not reached goal weight for bariatric surgery. Would benefit from starting phentermine 8 mg daily in AM and Ozempic 0.25 mg weekly for 4 weeks, then increase to 0.5 mg weekly thereafter for weight loss. Would benefit from getting blood pressure checked 1-2 weeks after starting phentermine. Would benefit from getting pre-bariatric labs completed.     Type 2 Diabetes:  Last A1c not at goal <7%, but last A1c not recent. Would benefit from A1c repeat, included in bariatric labs. Would benefit from starting Ozempic to assist with weight weight loss and blood sugar management. Reasonable no aspirin due to age. Reasonable no statin also due to age.     Hypertension: BP <140/90 mmHg last blood pressure reading, but was >3 months ago. See above for blood pressure recheck recommendations.     Herpes Simplex: Stable.     PLAN:                            1. Get bariatric labs completed, already ordered.     3. Ensure that the injectable that you have at home is the once weekly Ozempic. This injection you will start 0.25 mg weekly for 4 weeks, then if tolerating, increase to 0.5 mg weekly thereafter. If it is not the Ozempic, and is the Victoza let me know and I can send you information on that.     4. Start phentermine 8 mg daily in AM.  After starting phentermine, get blood pressure checked 1-2 weeks later to check in to see how things are looking. Can go to any [facility] to get blood pressure checked.     Follow-up:   Sada Beach PA-C in 1 month  Lauren Bloch, MTM in 2 months     SUBJECTIVE/OBJECTIVE:                          Larry Bangura is a 35 year old male called for an initial visit. He was referred to me from Vale Weeks CNP      Reason for visit: Ozempic start/check in and phentermine dose  "lowered check in.    Allergies/ADRs: Reviewed in chart  Tobacco: He reports that he has never smoked. He has never used smokeless tobacco.  Alcohol: not currently using  Caffeine: water flavor packets with caffeine - 2 per day  Activity:   Past Medical History: Reviewed in chart    Medication Adherence/Access: no issues reported    Obesity:   Phentermine 8 mg once daily in AM  - not taking   Ozempic 0.25 mg weekly - not taking   Topiramate 50 mg twice daily     Followed by Vale Weeks NP, seen 2/3/21 for Pre-Bariatric Surgery. Also previously worked with Sada Beach PA-C. He is still interested in undergoing bariatric surgery. Has not completed bariatric surgery labs yet. Last visit with jose Reyes, 3/4/21. Neuropsych testing tomorrow with Tanvi Montanez. Had insomnia from phentermine 15 mg so decreased to 8 mg daily but hasn't started because he is nervous to start as he is worried he will still have issues with insomnia. He does want to try it, but just was waiting to talk about it first. Wanted to talk about injectable first before starting as well.  He finds that the topiramate is helping with snacking less. No medication side effects. Reports pain in right toes, starting from pinky to great toe. No inflammation or redness, no discoloration. He reports he will follow up with PCP about this. Reports he has been working to change his nutrition and has been eating \"generally healthier\" than before. No more soda.     Current weight: unknown   Initial Consult Weight: 400 lb   Goal weight prior to surgery: 360 lb (-40 lb)     Wt Readings from Last 4 Encounters:   02/03/21 (!) 383 lb (173.7 kg)   01/07/21 (!) 383 lb (173.7 kg)   11/30/20 (!) 388 lb (176 kg)   11/26/20 (!) 380 lb (172.4 kg)     Estimated body mass index is 53.42 kg/m  as calculated from the following:    Height as of 3/3/21: 5' 11\" (1.803 m).    Weight as of 2/3/21: 383 lb (173.7 kg).    Type 2 Diabetes:    Metformin 1000 mg BID   Ozempic 0.25 mg " "weekly - has not started.    Has tried the extended release before and did not feel it helped with diarrhea, so went back to immediate release. He has never monitored blood sugars. He is not interested at this time. Has not started Ozempic yet. 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 Marlena was on profile, but he reports that he thought name of injectable at home was \"Ozempic\" and it was a once weekly injection.   Patient is not experiencing side effects.  Blood sugar monitoring: never.   Symptoms of low blood sugar? none  Symptoms of high blood sugar? none  Eye exam: up to date  Foot exam: due  Diet/Exercise: see below   Aspirin: Not taking due to age  Statin: No   ACEi/ARB: Yes: lisinopril.   Urine Albumin:   Lab Results   Component Value Date    UMALCR 12.36 12/11/2019      Lab Results   Component Value Date    A1C 7.7 09/21/2020    A1C 6.5 12/11/2019    A1C 7.5 12/10/2018    A1C 11.9 09/10/2018    A1C 6.4 11/17/2016     Recent Labs   Lab Test 09/21/20  0851 07/15/16  1417 08/13/13  0800   CHOL 155 176 223*   HDL 42 36* 36*   LDL 76 100* 139*   TRIG 184* 201* 240*   CHOLHDLRATIO  --   --  6.0*     Hypertension:   Lisinopril 20 mg daily.      Patient does not self-monitor blood pressure.  Patient reports no current medication side effects.  BP Readings from Last 3 Encounters:   11/30/20 138/88   11/26/20 (!) 187/105   09/21/20 130/78     Herpes Simplex:   Valacyclovir 500 mg BID   Valacyclovir 1000 mg BID for 3 days for outbreaks.     No outbreaks recently. No questions.   ----------------    I spent 23 minutes with this patient today. A copy of the visit note was provided to the patient's referring provider.    The patient was sent via [employer] a summary of these recommendations.     Lauren Bloch, PharmD  Medication Therapy Management Pharmacist   [facility]    Telemedicine Visit Details  Type of " service:  Telephone visit  Start Time: 9:01 AM  End Time: 9:24 AM  Originating Location (patient location): Home  Distant Location (provider location):  [facility]      Medication Therapy Recommendations  Class 3 severe obesity due to excess calories with body mass index (BMI) of 50.0 to 59.9 in adult, unspecified whether serious comorbidity present (H)    Current Medication: phentermine (LOMAIRA) 8 MG tablet   Rationale: Synergistic therapy - Needs additional medication therapy - Indication   Recommendation: Start Medication - 1 tablet once daily   Status: Patient Agreed - Adherence/Education   Note: get blood pressure checked 1-2 weeks after starting phentermine. Already ordered, patient hadn't started.          Rationale: Medication requires monitoring - Needs additional monitoring   Recommendation: Order Lab - Get bariatric labs completed   Status: Patient Agreed - Adherence/Education   Note: Hasn't compelted yet         Type 2 diabetes mellitus without complication (H)    Current Medication: Semaglutide,0.25 or 0.5MG/DOS, (OZEMPIC, 0.25 OR 0.5 MG/DOSE,) 2 MG/1.5ML SOPN   Rationale: Synergistic therapy - Needs additional medication therapy - Indication   Recommendation: Start Medication - 0.25 mg weekly for 4 weeks then 0.5 mg weekly thereafter.   Status: Patient Agreed - Adherence/Education   Note: ALready ordered, but patient hasn't started yet. Get A1c repeated, already ordered in bariatric labs but hasn't completed yet.

## 2021-03-08 NOTE — Clinical Note
Pre-surgery - He was prescribed phentermine from 15 mg to 8 mg due to insomnia but hasn't startedyet. Planning on starting soon. Also has T2DM, Ozempic was added to his metformin (A1c from 9/2020 >7%), but hasn't started. Planning on starting tonight. He is on track with weight loss but not at goal weight for surgery, still has 20+ lb to go. Psych testing tomorrow. Sweetie BRIDGES

## 2021-03-08 NOTE — PATIENT INSTRUCTIONS
Recommendations from today's MTM visit:                                                    MTM (medication therapy management) is a service provided by a clinical pharmacist designed to help you get the most of out of your medicines.      1. Get bariatric labs completed, already ordered. Can stop by any [facility] lab to have them completed.     3. Ensure that the injectable that you have at home is the once weekly Ozempic. This injection you will start 0.25 mg weekly for 4 weeks, then if tolerating, increase to 0.5 mg weekly thereafter. If it is not the Ozempic, and is the Victoza let me know and I can send you information on that.     4. Start phentermine 8 mg daily in AM.  After starting phentermine, get blood pressure checked 1-2 weeks later to check in to see how things are looking. Can go to any [facility] to get blood pressure checked.     Follow-up:   Sada Beach PA-C in 1 month, already scheduled  Lauren Bloch, MTM in 2 months - [phone] to schedule    It was great to speak with you today.  I value your experience and would be very thankful for your time with providing feedback on our clinic survey. You may receive a survey via email or text message in the next few days.       My Clinical Pharmacist's contact information:                                                      It was a pleasure talking with you today!  Please feel free to contact me with any questions or concerns you have.      Lauren Bloch, PharmD  Medication Therapy Management Pharmacist   [facility] [place]

## 2021-03-09 ENCOUNTER — VIRTUAL VISIT (OUTPATIENT)
Dept: NEUROPSYCHOLOGY | Facility: CLINIC | Age: 36
End: 2021-03-09
Payer: COMMERCIAL

## 2021-03-09 DIAGNOSIS — F54 PSYCHOLOGICAL FACTORS AFFECTING MEDICAL CONDITION: ICD-10-CM

## 2021-03-09 DIAGNOSIS — Z01.818 PREPROCEDURAL EXAMINATION: Primary | ICD-10-CM

## 2021-03-09 DIAGNOSIS — E66.01 MORBID OBESITY (H): ICD-10-CM

## 2021-03-09 PROCEDURE — 99207 PR NO CHARGE LOS: CPT

## 2021-03-09 NOTE — PROGRESS NOTES
The patient was scheduled for a psychological evaluation. He did not answer his telephone at the time of his appointment, and did not appear on the [employer] video connection, although when he was finally reached via telephone around 9:25, he said that he had been in the waiting room. He was offered the opportunity to complete the testing portion of the evaluation, but indicated that he was driving to his office and had only 15 minutes. The appointment will be rescheduled for a time when he is able to devote up to two hours for the interview and testing.

## 2021-04-05 ENCOUNTER — HOSPITAL ENCOUNTER (EMERGENCY)
Facility: CLINIC | Age: 36
Discharge: HOME | End: 2021-04-05
Attending: NURSE PRACTITIONER | Admitting: NURSE PRACTITIONER
Payer: COMMERCIAL

## 2021-04-05 VITALS
OXYGEN SATURATION: 99 % | WEIGHT: 315 LBS | RESPIRATION RATE: 16 BRPM | DIASTOLIC BLOOD PRESSURE: 72 MMHG | HEART RATE: 87 BPM | TEMPERATURE: 98.6 F | SYSTOLIC BLOOD PRESSURE: 125 MMHG | BODY MASS INDEX: 54.12 KG/M2

## 2021-04-05 DIAGNOSIS — S61.219A FINGER LACERATION: ICD-10-CM

## 2021-04-05 PROCEDURE — 99213 OFFICE O/P EST LOW 20 MIN: CPT | Mod: 25 | Performed by: NURSE PRACTITIONER

## 2021-04-05 PROCEDURE — 12001 RPR S/N/AX/GEN/TRNK 2.5CM/<: CPT | Performed by: NURSE PRACTITIONER

## 2021-04-05 PROCEDURE — G0463 HOSPITAL OUTPT CLINIC VISIT: HCPCS | Performed by: NURSE PRACTITIONER

## 2021-04-05 ASSESSMENT — ENCOUNTER SYMPTOMS: WOUND: 1

## 2021-04-05 NOTE — ED PROVIDER NOTES
History     Chief Complaint   Patient presents with     Laceration     HPI  Larry Bangura is a 35 year old male who presents to the urgent care for evaluation of finger laceration. Prior to arrival cut right middle finger on . No numbness or tingling. Bleeding controlled. Tetanus in 2013.     Allergies:  No Known Allergies    Problem List:    Patient Active Problem List    Diagnosis Date Noted     Hyperlipidemia LDL goal <100 10/17/2018     Priority: Medium     Statin at 35-40.  Same with ASA.         Herpes simplex infection of penis 10/03/2018     Priority: Medium     Esophageal reflux 12/03/2013     Priority: Medium     Essential hypertension with goal blood pressure less than 140/90 09/10/2013     Priority: Medium     CARDIOVASCULAR SCREENING; LDL GOAL LESS THAN 160 08/14/2013     Priority: Medium     Seasonal allergic rhinitis 08/07/2013     Priority: Medium     Obstructive sleep apnea 08/07/2013     Priority: Medium     Severe IRWIN: PSG  6/2/2015 ( 414 lbs, , , adithya O2 51%.)  7/15/2016:Uses CPAP every day.          Type 2 diabetes mellitus without complication (H) 08/07/2013     Priority: Medium     Class 3 severe obesity due to excess calories with serious comorbidity and body mass index (BMI) of 50.0 to 59.9 in adult (H) 05/14/2012     Priority: Medium     New bariatric surgery consult Sept 2020  Weight 400 lbs.  15mg phentermine caused insomnia           Past Medical History:    Past Medical History:   Diagnosis Date     HTN (hypertension) 9/10/2013     Morbid obesity (H)        Past Surgical History:    Past Surgical History:   Procedure Laterality Date     ENDOSCOPIC RELEASE CARPAL TUNNEL  12/6/2013    Procedure: ENDOSCOPIC RELEASE CARPAL TUNNEL;  Right Hand Endoscopic Carpal Tunnel Release;  Surgeon: Kody Pittman MD;  Location: [facility] OR     ENDOSCOPIC RELEASE CARPAL TUNNEL  1/21/2014    Procedure: ENDOSCOPIC RELEASE CARPAL TUNNEL;  Left endoscopic carpal tunnel release,  possible open;  Surgeon: Kody Pittman MD;  Location: [facility] OR       Family History:    Family History   Problem Relation Age of Onset     Diabetes Father      Cancer Sister      C.A.D. Maternal Grandfather        Social History:  Marital Status:  Single [1]  Social History     Tobacco Use     Smoking status: Never Smoker     Smokeless tobacco: Never Used   Substance Use Topics     Alcohol use: No     Alcohol/week: 0.0 standard drinks     Drug use: No        Medications:    lisinopril (ZESTRIL) 20 MG tablet  metFORMIN (GLUCOPHAGE) 1000 MG tablet  ORDER FOR DME, SET TO FAX,  phentermine (LOMAIRA) 8 MG tablet  Semaglutide,0.25 or 0.5MG/DOS, (OZEMPIC, 0.25 OR 0.5 MG/DOSE,) 2 MG/1.5ML SOPN  topiramate (TOPAMAX) 25 MG tablet  valACYclovir (VALTREX) 1000 mg tablet  valACYclovir (VALTREX) 500 MG tablet          Review of Systems   Skin: Positive for wound.   All other systems reviewed and are negative.      Physical Exam   BP: 125/72  Pulse: 87  Temp: 98.6  F (37  C)  Resp: 16  Weight: (!) 176.9 kg (390 lb)  SpO2: 99 %      Physical Exam  Constitutional:       General: He is not in acute distress.     Appearance: Normal appearance.   Musculoskeletal: Normal range of motion.   Skin:     General: Skin is warm.      Capillary Refill: Capillary refill takes less than 2 seconds.      Comments: 1 cm laceration to the dorsal aspect of the right distal middle finger. Pulses and perfusion equal bilaterally.    Neurological:      Mental Status: He is alert.         ED Course        [facility]    -Laceration Repair    Date/Time: 4/5/2021 4:50 PM  Performed by: Earlene Lu APRN CNP  Authorized by: Earlene Lu APRN CNP       ANESTHESIA (see MAR for exact dosages):     Anesthesia method:  Local infiltration    Local anesthetic:  Lidocaine 1% w/o epi  LACERATION DETAILS     Location:  Finger    Finger location:  L long finger    Length (cm):  1  EXPLORATION:     Wound exploration: wound explored  through full range of motion and entire depth of wound probed and visualized      Wound extent: no nerve damage, no tendon damage and no underlying fracture      TREATMENT:     Area cleansed with:  Betadine and Hibiclens    Amount of cleaning:  Standard    Irrigation solution:  Sterile water    SKIN REPAIR     Repair method:  Sutures    Suture size:  4-0    Suture material:  Nylon    Suture technique:  Simple interrupted    Number of sutures:  3    APPROXIMATION     Approximation:  Close    POST-PROCEDURE DETAILS     Dressing:  Antibiotic ointment and adhesive bandage      PROCEDURE   Patient Tolerance:  Patient tolerated the procedure well with no immediate complications        No results found for this or any previous visit (from the past 24 hour(s)).    Medications - No data to display    Assessments & Plan (with Medical Decision Making)   Larry Bangura is a 35 year old male who presents to the urgent care for evaluation of finger laceration. Prior to arrival cut right middle finger on .  Vital signs normal.  Educated on home wound care.  Suture removal in 7 to 10 days.  Return for new or worsening symptoms.  Discharged in good condition.  I have reviewed the nursing notes.    I have reviewed the findings, diagnosis, plan and need for follow up with the patient.    New Prescriptions    No medications on file     Final diagnoses:   Finger laceration     4/5/2021   [facility] EMERGENCY DEPT     Earlene Lu, APRN CNP  04/05/21 [id number]

## 2021-04-06 NOTE — TELEPHONE ENCOUNTER
Patient interested in weight loss surgery    Larry Bangura          Scheduled to see CNP or LOUISE at 0800, followed by an appt with a dietitian at 0830.    Spoke to Patient: regarding appt on wed sept 30th.    Can find information on bariatrix products at: [email or web address]    Instructed to view seminar and complete pre-visit questionnaire prior to visit at [email or web address].    [phone] contact center phone number      Danielle Sapp, EMT      
- - -

## 2021-04-07 ENCOUNTER — VIRTUAL VISIT (OUTPATIENT)
Dept: ENDOCRINOLOGY | Facility: CLINIC | Age: 36
End: 2021-04-07
Payer: COMMERCIAL

## 2021-04-07 ENCOUNTER — TELEPHONE (OUTPATIENT)
Dept: ENDOCRINOLOGY | Facility: CLINIC | Age: 36
End: 2021-04-07

## 2021-04-07 VITALS — HEIGHT: 71 IN | WEIGHT: 315 LBS | BODY MASS INDEX: 44.1 KG/M2

## 2021-04-07 DIAGNOSIS — E66.01 CLASS 3 SEVERE OBESITY DUE TO EXCESS CALORIES WITH SERIOUS COMORBIDITY AND BODY MASS INDEX (BMI) OF 50.0 TO 59.9 IN ADULT (H): ICD-10-CM

## 2021-04-07 DIAGNOSIS — E11.9 TYPE 2 DIABETES MELLITUS WITHOUT COMPLICATION, WITHOUT LONG-TERM CURRENT USE OF INSULIN (H): Chronic | ICD-10-CM

## 2021-04-07 DIAGNOSIS — G47.33 OBSTRUCTIVE SLEEP APNEA: Primary | Chronic | ICD-10-CM

## 2021-04-07 DIAGNOSIS — E66.813 CLASS 3 SEVERE OBESITY DUE TO EXCESS CALORIES WITH SERIOUS COMORBIDITY AND BODY MASS INDEX (BMI) OF 50.0 TO 59.9 IN ADULT (H): ICD-10-CM

## 2021-04-07 PROCEDURE — 99213 OFFICE O/P EST LOW 20 MIN: CPT | Mod: 95 | Performed by: PHYSICIAN ASSISTANT

## 2021-04-07 RX ORDER — PEN NEEDLE, DIABETIC 31 GX5/16"
NEEDLE, DISPOSABLE MISCELLANEOUS
Qty: 100 EACH | Refills: 1 | Status: SHIPPED | OUTPATIENT
Start: 2021-04-07 | End: 2021-12-08

## 2021-04-07 RX ORDER — LIRAGLUTIDE 6 MG/ML
INJECTION SUBCUTANEOUS DAILY
COMMUNITY
End: 2021-04-07

## 2021-04-07 RX ORDER — TOPIRAMATE 25 MG/1
75 TABLET, FILM COATED ORAL 2 TIMES DAILY
Qty: 180 TABLET | Refills: 3 | Status: SHIPPED | OUTPATIENT
Start: 2021-04-07 | End: 2021-12-08

## 2021-04-07 RX ORDER — LIRAGLUTIDE 6 MG/ML
1.8 INJECTION SUBCUTANEOUS DAILY
Qty: 9 ML | Refills: 3 | Status: SHIPPED | OUTPATIENT
Start: 2021-04-07 | End: 2021-12-08

## 2021-04-07 ASSESSMENT — MIFFLIN-ST. JEOR: SCORE: 2694.87

## 2021-04-07 ASSESSMENT — PAIN SCALES - GENERAL: PAINLEVEL: NO PAIN (0)

## 2021-04-07 NOTE — PATIENT INSTRUCTIONS
Pre bariatric surgery. Planning surgery July 2021 with Dr Liriano  Need to re-schedule psych eval. Call Oliver at [phone]  Increase victoza to 1.2mg daily  Increase topiramate to 75mg twice daily  Continue metformin per PCP prescription  Need preop labs ordered by me. To find a lab location near you, please call [phone].  Need to lose to 350 lbs wt loss goal before surgery. 382 today.  Need to see Dr Liriano again closer to surgery IN CLINIC visit

## 2021-04-07 NOTE — LETTER
2021       RE: Larry Bangura  [address] [facility] [address]     Dear Colleague,    Thank you for referring your patient, Larry Bangura, to the [facility] [place] at [facility]. Please see a copy of my visit note below.    Carlos is a 35 year old who is being evaluated via a billable video visit.      How would you like to obtain your AVS? MyChart  If the video visit is dropped, the invitation should be resent by: Text to cell phone: [phone]  Will anyone else be joining your video visit? No    Video Start Time:      Video-Visit Details    Type of service:  Video Visit    Video End Time:    Originating Location (pt. Location): Home    Distant Location (provider location):  [facility]     Platform used for Video Visit: [employer]       Pre-Bariatric Surgery Note    Shelton Mcconnell    Date: 2021     RE: Larry Bangura    MR#: [id number]   : 1985   Date of Visit: 2021    REASON FOR VISIT: Preoperative evaluation for possible weight loss surgery    Dear Shelton Cota,    I had the pleasure of seeing your patient, Larry Bangura, in my preoperative bariatric clinic.    As you know, he is morbidly obese and considering weight loss surgery to treat obesity in association with his medical conditions of obesity.  His consult weight was 400.  He has lost 18 pounds since his consult weight. He has not met his required pre-surgery weight. Please refer to initial consult note from date 21 for patient's weight history and co-morbidities.    Assessment & Plan   Problem List Items Addressed This Visit        Endocrine Diagnoses    Class 3 severe obesity due to excess calories with serious comorbidity and body mass index (BMI) of 50.0 to 59.9 in adult (H)     Pre bariatric surgery. Planning surgery 2021 with Dr Liriano  Need to re-schedule psych eval. Call  Oliver at [phone]  Increase victoza to 1.2mg daily  Increase topiramate to 75mg twice daily  Continue metformin per PCP prescription  Need preop labs ordered by me. To find a lab location near you, please call [phone].  Need to lose to 350 lbs wt loss goal before surgery. 382 today.  Need to see Dr Liriano again closer to surgery IN CLINIC visit         Relevant Medications    liraglutide (VICTOZA) 18 MG/3ML solution       Other    Obstructive sleep apnea - Primary (Chronic)     Cleared for bariatric surgery by sleep provider 2/5/21 [facility]               Phentermine: irritability, insomnia, suicidal thoughts  Victoza 0.6mg is helping (accidentally jumped to 1.8mg and had nausea, diarrhea, heartburn, upset stomach, no appetite. Didn't eat for 4 days.  Plans to increase to 1.2 and try it.  Topiramate 50mg twice daily  A1C 7.7, due for recheck    25 minutes spent on the date of the encounter doing chart review, history and exam, documentation and further activities per the not    Reviewed tasklist with patient yes    Most recent weights:  Wt Readings from Last 4 Encounters:   04/07/21 (!) 173.3 kg (382 lb)   04/05/21 (!) 176 kg (388 lb)   02/03/21 (!) 173.7 kg (383 lb)   01/07/21 (!) 173.7 kg (383 lb)         ROS    Past Medical History:   Diagnosis Date     HTN (hypertension) 9/10/2013     Morbid obesity (H)        Past Surgical History:   Procedure Laterality Date     ENDOSCOPIC RELEASE CARPAL TUNNEL  12/6/2013    Procedure: ENDOSCOPIC RELEASE CARPAL TUNNEL;  Right Hand Endoscopic Carpal Tunnel Release;  Surgeon: Kody Pittman MD;  Location: [facility]     ENDOSCOPIC RELEASE CARPAL TUNNEL  1/21/2014    Procedure: ENDOSCOPIC RELEASE CARPAL TUNNEL;  Left endoscopic carpal tunnel release, possible open;  Surgeon: Kody Pittman MD;  Location: [facility] OR       Current Outpatient Medications   Medication     liraglutide (VICTOZA) 18 MG/3ML solution     lisinopril (ZESTRIL) 20 MG tablet     metFORMIN (GLUCOPHAGE)  "1000 MG tablet     ORDER FOR DME, SET TO FAX,     valACYclovir (VALTREX) 1000 mg tablet     valACYclovir (VALTREX) 500 MG tablet     topiramate (TOPAMAX) 25 MG tablet     No current facility-administered medications for this visit.        No Known Allergies    PHYSICAL EXAM:  Objective    Ht 1.803 m (5' 11\")   Wt (!) 173.3 kg (382 lb)   BMI 53.28 kg/m    Vitals - Patient Reported  Pain Score: No Pain (0)        Physical Exam   GENERAL: Healthy, alert and no distress  EYES: Eyes grossly normal to inspection.  No discharge or erythema, or obvious scleral/conjunctival abnormalities.  RESP: No audible wheeze, cough, or visible cyanosis.  No visible retractions or increased work of breathing.    SKIN: Visible skin clear. No significant rash, abnormal pigmentation or lesions.  NEURO: Cranial nerves grossly intact.  Mentation and speech appropriate for age.  PSYCH: Mentation appears normal, affect normal/bright, judgement and insight intact, normal speech and appearance well-groomed.    Sleeve Gastrectomy: Risks and Side Effects    The complications or risks of surgery include but are not limited to: death, heart attack, infection in the surgical site (wound infection), abdomen (abscess), bladder (urinary tract infection), lungs (pneumonia), clots in legs (deep vein thrombosis) or lungs (pulmonary emboli),  injury to the bowels or other organs, bowel obstruction, hernia at the incision and gastrointestional bleeding.    More specific risks related to vertical sleeve gastrectomy were detailed at the bariatric informational seminar and include the following: leak at the vertical sleeve staple line, leak at the anastomoses,  nausea, vomiting, and dehydration for several months,  adhesions causing bowel obstruction, rapid weight loss causing a higher rate of gallstone formation during the first 6 months after surgery, decreased absorption of vitamins and protein because of the reduced stomach size, weight regain if " inappropriate food intake occurs, stricture, injury to other organs, hernia,  and ulcers.       Side effects of bariatric surgery include but are not limited to: abdominal pain, cramping, bloating, constipation, nausea, vomiting, diarrhea, difficulty swallowing,  dehydration, hair loss, excess skin, protein, iron and vitamin deficiencies, heartburn, transfer of addictions, increased anxiety and worsening depression.       I emphasized exercise and activity behavior along with appropriate food choice as the main foundation for weight loss with surgery providing surgical reinforcement of the appropriate behavior set.        Review of general surgery weight loss process    1. Complete preoperative requirements, including weight loss.  Final weight check to confirm MANDATORY weight loss requirement must be documented on a clinic scale.    2. Discuss prior authorization with .    3. History and physical evaluation by PCP of PAC clinic within 30 days of surgery date, preoperative class, and weight check (weigh-in visit) to be scheduled by patient.  Pre-anesthesia clinic for risk evaluation to be scheduled by anesthesia clinic.    4. We cannot guarantee that patient will qualify for surgery unless all preoperative requirements are met, prior authorization from primary insurance company is granted, and insurance changes do not occur.    5. It is possible for patients to regain all weight after weight loss surgery unless they follow guidelines prescribed by our bariatric center.    6. All patients with gastrointestinal complaints after weight loss surgery must have complaints conveyed to the bariatric team for appropriate treatment.    7. Vitamin deficiencies may develop post-bariatric surgery and annual laboratory testing should be performed.    8. Persistent nausea/vomiting after bariatric surgery entails risk of thiamine deficiency and should be treated early.  Vitamin B12 deficiency may develop,  especially after gastric bypass surgery and must be recognized.        If you have any questions about our plans please don't hesitate to contact me.    Sincerely,    Sada Beach PA-C

## 2021-04-07 NOTE — TELEPHONE ENCOUNTER
Called patient to offer other psychologists who patient may be able to get in with sooner. Allyson Almeida [phone], Gigi Rebolledo [phone] and Gigi Guerra [phone].

## 2021-04-07 NOTE — ASSESSMENT & PLAN NOTE
Pre bariatric surgery. Planning surgery July 2021 with Dr Liriano  Need to re-schedule psych eval. Call Oliver at [phone]  Increase victoza to 1.2mg daily  Increase topiramate to 75mg twice daily  Continue metformin per PCP prescription  Need preop labs ordered by me. To find a lab location near you, please call [phone].  Need to lose to 350 lbs wt loss goal before surgery. 382 today.  Need to see Dr Liriano again closer to surgery IN CLINIC visit

## 2021-04-07 NOTE — NURSING NOTE
"Chief Complaint   Patient presents with     Follow Up     Follow-up Weight Management       Vitals:    04/07/21 1104   Weight: (!) 173.3 kg (382 lb)   Height: 1.803 m (5' 11\")       Body mass index is 53.28 kg/m .                          "

## 2021-04-07 NOTE — Clinical Note
Oliver and ANA Ray I saw today.  Working on wt loss from 400 to 350 goal per Dr DE LA TORRE. He is 382 now  We made some med adjustments to help with wt loss. He is hoping to have surgery after July 2021  Was supposed to see Tanvi Montanez in March but he said he was on video but she didn't see him so they didn't complete visit.  This still needs to be rescheduled, Oliver can you help him with this or tell him who else to call?  meg Ray done and in [facility].   He saw Dr DE LA TORRE March but needs to return in person closer to surgery.      Contact center: please call pt to schedule April, May and June RD visits    Thanks! Sada

## 2021-04-07 NOTE — PROGRESS NOTES
Carlos is a 35 year old who is being evaluated via a billable video visit.      How would you like to obtain your AVS? MyChart  If the video visit is dropped, the invitation should be resent by: Text to cell phone: [phone]  Will anyone else be joining your video visit? No    Video Start Time: 1123     Video-Visit Details    Type of service:  Video Visit    Video End Time:113    Originating Location (pt. Location): Home    Distant Location (provider location):  [facility] [place]     Platform used for Video Visit: Jenny       Pre-Bariatric Surgery Note    Shelton Mcconnlel    Date: 2021     RE: Larry Bangura    MR#: [id number]   : 1985   Date of Visit: 2021    REASON FOR VISIT: Preoperative evaluation for possible weight loss surgery    Dear Shelton Cota,    I had the pleasure of seeing your patient, Larry Bangura, in my preoperative bariatric clinic.    As you know, he is morbidly obese and considering weight loss surgery to treat obesity in association with his medical conditions of obesity.  His consult weight was 400.  He has lost 18 pounds since his consult weight. He has not met his required pre-surgery weight. Please refer to initial consult note from date 21 for patient's weight history and co-morbidities.    Assessment & Plan   Problem List Items Addressed This Visit        Endocrine Diagnoses    Class 3 severe obesity due to excess calories with serious comorbidity and body mass index (BMI) of 50.0 to 59.9 in adult (H)     Pre bariatric surgery. Planning surgery 2021 with Dr Liriano  Need to re-schedule psych eval. Call Oliver at [phone]  Increase victoza to 1.2mg daily  Increase topiramate to 75mg twice daily  Continue metformin per PCP prescription  Need preop labs ordered by me. To find a lab location near you, please call [phone].  Need to lose to 350 lbs wt loss goal before surgery. 382 today.  Need to see Dr Liriano  "again closer to surgery IN CLINIC visit         Relevant Medications    liraglutide (VICTOZA) 18 MG/3ML solution       Other    Obstructive sleep apnea - Primary (Chronic)     Cleared for bariatric surgery by sleep provider 2/5/21 [facility]               Phentermine: irritability, insomnia, suicidal thoughts  Victoza 0.6mg is helping (accidentally jumped to 1.8mg and had nausea, diarrhea, heartburn, upset stomach, no appetite. Didn't eat for 4 days.  Plans to increase to 1.2 and try it.  Topiramate 50mg twice daily  A1C 7.7, due for recheck    25 minutes spent on the date of the encounter doing chart review, history and exam, documentation and further activities per the not    Reviewed tasklist with patient yes    Most recent weights:  Wt Readings from Last 4 Encounters:   04/07/21 (!) 173.3 kg (382 lb)   04/05/21 (!) 176 kg (388 lb)   02/03/21 (!) 173.7 kg (383 lb)   01/07/21 (!) 173.7 kg (383 lb)         ROS    Past Medical History:   Diagnosis Date     HTN (hypertension) 9/10/2013     Morbid obesity (H)        Past Surgical History:   Procedure Laterality Date     ENDOSCOPIC RELEASE CARPAL TUNNEL  12/6/2013    Procedure: ENDOSCOPIC RELEASE CARPAL TUNNEL;  Right Hand Endoscopic Carpal Tunnel Release;  Surgeon: Kody Pittman MD;  Location: [facility] OR     ENDOSCOPIC RELEASE CARPAL TUNNEL  1/21/2014    Procedure: ENDOSCOPIC RELEASE CARPAL TUNNEL;  Left endoscopic carpal tunnel release, possible open;  Surgeon: Kody Pittman MD;  Location: [facility] OR       Current Outpatient Medications   Medication     liraglutide (VICTOZA) 18 MG/3ML solution     lisinopril (ZESTRIL) 20 MG tablet     metFORMIN (GLUCOPHAGE) 1000 MG tablet     ORDER FOR DME, SET TO FAX,     valACYclovir (VALTREX) 1000 mg tablet     valACYclovir (VALTREX) 500 MG tablet     topiramate (TOPAMAX) 25 MG tablet     No current facility-administered medications for this visit.        No Known Allergies    PHYSICAL EXAM:  Objective    Ht 1.803 m (5' 11\")   Wt (!) " 173.3 kg (382 lb)   BMI 53.28 kg/m    Vitals - Patient Reported  Pain Score: No Pain (0)        Physical Exam   GENERAL: Healthy, alert and no distress  EYES: Eyes grossly normal to inspection.  No discharge or erythema, or obvious scleral/conjunctival abnormalities.  RESP: No audible wheeze, cough, or visible cyanosis.  No visible retractions or increased work of breathing.    SKIN: Visible skin clear. No significant rash, abnormal pigmentation or lesions.  NEURO: Cranial nerves grossly intact.  Mentation and speech appropriate for age.  PSYCH: Mentation appears normal, affect normal/bright, judgement and insight intact, normal speech and appearance well-groomed.    Sleeve Gastrectomy: Risks and Side Effects    The complications or risks of surgery include but are not limited to: death, heart attack, infection in the surgical site (wound infection), abdomen (abscess), bladder (urinary tract infection), lungs (pneumonia), clots in legs (deep vein thrombosis) or lungs (pulmonary emboli),  injury to the bowels or other organs, bowel obstruction, hernia at the incision and gastrointestional bleeding.    More specific risks related to vertical sleeve gastrectomy were detailed at the bariatric informational seminar and include the following: leak at the vertical sleeve staple line, leak at the anastomoses,  nausea, vomiting, and dehydration for several months,  adhesions causing bowel obstruction, rapid weight loss causing a higher rate of gallstone formation during the first 6 months after surgery, decreased absorption of vitamins and protein because of the reduced stomach size, weight regain if inappropriate food intake occurs, stricture, injury to other organs, hernia,  and ulcers.       Side effects of bariatric surgery include but are not limited to: abdominal pain, cramping, bloating, constipation, nausea, vomiting, diarrhea, difficulty swallowing,  dehydration, hair loss, excess skin, protein, iron and vitamin  deficiencies, heartburn, transfer of addictions, increased anxiety and worsening depression.       I emphasized exercise and activity behavior along with appropriate food choice as the main foundation for weight loss with surgery providing surgical reinforcement of the appropriate behavior set.        Review of general surgery weight loss process    1. Complete preoperative requirements, including weight loss.  Final weight check to confirm MANDATORY weight loss requirement must be documented on a clinic scale.    2. Discuss prior authorization with .    3. History and physical evaluation by PCP of PAC clinic within 30 days of surgery date, preoperative class, and weight check (weigh-in visit) to be scheduled by patient.  Pre-anesthesia clinic for risk evaluation to be scheduled by anesthesia clinic.    4. We cannot guarantee that patient will qualify for surgery unless all preoperative requirements are met, prior authorization from primary insurance company is granted, and insurance changes do not occur.    5. It is possible for patients to regain all weight after weight loss surgery unless they follow guidelines prescribed by our bariatric center.    6. All patients with gastrointestinal complaints after weight loss surgery must have complaints conveyed to the bariatric team for appropriate treatment.    7. Vitamin deficiencies may develop post-bariatric surgery and annual laboratory testing should be performed.    8. Persistent nausea/vomiting after bariatric surgery entails risk of thiamine deficiency and should be treated early.  Vitamin B12 deficiency may develop, especially after gastric bypass surgery and must be recognized.        If you have any questions about our plans please don't hesitate to contact me.    Sincerely,    Sada eBach PA-C

## 2021-04-14 ENCOUNTER — VIRTUAL VISIT (OUTPATIENT)
Dept: ENDOCRINOLOGY | Facility: CLINIC | Age: 36
End: 2021-04-14
Payer: COMMERCIAL

## 2021-04-14 DIAGNOSIS — Z71.3 NUTRITIONAL COUNSELING: ICD-10-CM

## 2021-04-14 DIAGNOSIS — E66.813 CLASS 3 SEVERE OBESITY DUE TO EXCESS CALORIES WITH SERIOUS COMORBIDITY AND BODY MASS INDEX (BMI) OF 50.0 TO 59.9 IN ADULT (H): Primary | ICD-10-CM

## 2021-04-14 DIAGNOSIS — E11.9 TYPE 2 DIABETES MELLITUS WITHOUT COMPLICATION, WITHOUT LONG-TERM CURRENT USE OF INSULIN (H): ICD-10-CM

## 2021-04-14 DIAGNOSIS — E66.01 CLASS 3 SEVERE OBESITY DUE TO EXCESS CALORIES WITH SERIOUS COMORBIDITY AND BODY MASS INDEX (BMI) OF 50.0 TO 59.9 IN ADULT (H): Primary | ICD-10-CM

## 2021-04-14 PROCEDURE — 97803 MED NUTRITION INDIV SUBSEQ: CPT | Mod: 95 | Performed by: DIETITIAN, REGISTERED

## 2021-04-14 NOTE — PATIENT INSTRUCTIONS
"GOALS:  Relating To Eating:  Eat slowly (20-30 minutes per meal), chewing foods well (25 chews per bite/applesauce consistency)  - Try setting a timer   - Try pausing longterm through your meal: take a deep breath. Notice if you are satisfied, hungry or overfull and/or are you feeling tired, sluggish, energized, neutral.   - Try a remind with a sticky note near wear you eat.  9\" Plate method (1/2 plate non-starchy vegetables/fruit, 1/4 plate lean protein, 1/4 plate whole grain starch - no more than 1/2 cup carb/meal)  - Try a protein filled breakfast, a piece of string cheese and scrambled eggs, lactose free cottage cheese, chicken salad, left overs from dinner, a protein shake, meat and cheese roll-ups, fish sticks.     *Protein Shake Criteria: no more than 210 Calories, at least 20 grams of protein, and less than 10 grams of sugar     Meal Replacement Options:   [facility] smoothie (160 Calories, 20 g protein)   Premier Protein (160 Calories, 30 g protein)  Slim Fast Advanced Nutrition (180 Calories, 20 g protein)  Muscle Milk, lactose-free, 17 oz bottle (210 Calories, 30 g protein)  Integrated Supplements, no artificial sugars (110 Calories, 20 g protein)  Quest Protein Bars (190 Calories, 20 g protein)  No Cow Protein Bar, gluten, dairy, and soy free (200 Calories, 20 g protein)    Relating to beverages:  Reduce caffeine/carbonation/calorie containing beverages  Separate fluids from meals by 30 minutes before, during, and after eating    Relating to activity:  Increase activity as able    Initial Consult / Weight Loss     My Plate Planner_English - Pt Education  [email or web address]    Protein Sources for Weight Loss  [email or web address]     Carbohydrates  [email or web address]       "

## 2021-04-14 NOTE — PROGRESS NOTES
"Larry Bangura is a 36 year old who is being evaluated via a billable video visit.      The patient has been notified of following:     \"This video visit will be conducted via a call between you and your physician/provider. We have found that certain health care needs can be provided without the need for an in-person physical exam.  This service lets us provide the care you need with a video conversation.  If a prescription is necessary we can send it directly to your pharmacy.  If lab work is needed we can place an order for that and you can then stop by our lab to have the test done at a later time.    Video visits are billed at different rates depending on your insurance coverage.  Please reach out to your insurance provider with any questions.    If during the course of the call the physician/provider feels a video visit is not appropriate, you will not be charged for this service.\"    Patient has given verbal consent for Video visit? Yes  How would you like to obtain your AVS? MyChart  If you are dropped from the video visit, the video invite should be resent to: Text to cell phone: [phone]   Will anyone else be joining your video visit? No        Video-Visit Details    Type of service:  Video Visit    Video Start Time: 11:20 AM   Video End Time: 11:30 AM       Originating Location (pt. Location): Home    Distant Location (provider location):  [facility] [place]     Platform used for Video Visit: [employer]    During this virtual visit the patient is located in [place], patient verifies this as the location during the entirety of this visit.     Return Bariatric Nutrition Consultation Note    Reason For Visit: Nutrition Assessment    Larry Bangura is a 35 year old presenting today for follow-up bariatric nutrition consult.   Pt is interested in laparoscopic sleeve gastrectomy with Dr. Liriano expected surgery in March 2021.  Patient is accompanied by self. This is pt's 4th of 6 " "required nutrition visits prior to surgery.     - Insurance missed a month, pt reports he was instructed that a  would call him, this did not occur.     Pt referred by DELL Duckworth on September 30, 2020.  Patient with Co-morbidities of obesity including:  Type II DM yes  Renal Failure no  Sleep apnea yes  Hypertension yes   Dyslipidemia yes  Joint pain no  Back pain no  GERD no     Support System Reviewed With Patient 9/29/2020   Who do you have in your support network that can be available to help you for the first 2 weeks after surgery? wife   Who can you count on for support throughout your weight loss surgery journey? wife       ANTHROPOMETRICS:  Estimated body mass index is 53.28 kg/m  as calculated from the following:    Height as of 4/7/21: 1.803 m (5' 11\").    Weight as of 4/7/21: 173.3 kg (382 lb). - Most recent weight   Initial weight: 400 lb   Required weight loss goal pre-op: 50 lbs from initial consult weight (goal weight 350 lbs or less before surgery)       9/29/2020   I have tried the following methods to lose weight Watching portions or calories, Exercise, Weight Watchers, Atkins type diet (low carb/high protein), Pre packaged meals ex: Nutrisystem, Slimfast       Weight Loss Questions Reviewed With Patient 9/29/2020   How long have you been overweight? 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 He typically picks up breakfast on his way to work, 2 [place] sausage egg burritos and a diet coke: sometimes a muffin. For lunch it is leftovers from dinner OR fast food, usually the sandwich not the fries and a diet coke.   Dinner is a meat vegetables and bread and he tries to have popsicle after dinner for his snack.     Today: Decreased portion sizes  Diet Recall:  B: 2 piece of english muffin toast with a bottle of water   ot Rony Tucker breakfast sausage " "burrito with a water or diet coke (carbonation kid of upsetting stomach)   [place] for lunch   porkchops with corn and baked potato, cannot eat a whole     Heartburn with pistachios     Progress Towards Previous Goals:  Relating To Eating:  Eat slowly (20-30 minutes per meal), chewing foods well (25 chews per bite/applesauce consistency)-Continues,Thinking about it   - Try setting a timer   - Try pausing care home through your meal: take a deep breath. Notice if you are satisfied, hungry or overfull and/or are you feeling tired, sluggish, energized, neutral.   - Try a remind with a sticky note near wear you eat.  9\" Plate method (1/2 plate non-starchy vegetables/fruit, 1/4 plate lean protein, 1/4 plate whole grain starch - no more than 1/2 cup carb/meal)-Continues     Relating to beverages:  Reduce caffeine/carbonation/calorie containing beverages-Improving: water, diet coke ( not drinking as much)   Separate fluids from meals by 30 minutes before, during, and after eating-  Continues     Relating to activity:  Increase activity as able-working,     Recall Diet Questions Reviewed With Patient 9/29/2020   Describe what you typically consume for breakfast (typical or most recent): 2 sausage burritos diet coke   Describe what you typically consume for lunch (typical or most recent): leftovers   Describe what you typically consume for supper (typical or most recent): meet veg bread   Describe what you typically consume as snacks (typical or most recent): chocolate covered almans   How many ounces of water, or other low calorie drinks, do you drink daily (8 oz=1 glass)? 32 oz- water   How many ounces of caffeine (coffee, tea, pop) do you drink daily (8 oz=1 glass)? 32 oz- soda    How many ounces of carbonated (pop, beer, sparkling water) drinks do you drinky daily (8 oz=1 glass)? 32 oz- dt mt dew and diet coke    How many ounces of juice, pop, sweet tea, sports drinks, protein drinks, other sweetened drinks, do you drink " daily (8 oz=1 glass)? 8 oz- not usually    How many ounces of milk do you drink daily (8 oz=1 glass) 8 oz- with cereal    Please indicate the type of milk: skim   How often do you drink alcohol? Monthly or less- not usually.    If you do drink alcohol, how many drinks might you have in a day? (one drink = 5 oz. wine, 1 can/bottle of beer, 1 shot liquor) 1 or 2       Eating Habits 9/29/2020   Do you have any dietary restrictions? No   Do you currently binge eat (eat a large amount of food in a short time)? Yes   Are you an emotional eater? Yes   Do you get up to eat after falling asleep? No   What foods do you crave? all       ADDITIONAL INFORMATION:     Dining Out History Reviewed With Patient 9/29/2020   How often do you dine out? Nearly every day.   Where do you dine out? (select all that apply) fast food chains, take out, other   What types of food do you order when you dine out? salvador reese       Physical Activity Reviewed With Patient 9/29/2020   How often do you exercise? Never   What keeps you from being more active?  Pain, I should be more active but I just have not gotten around to it, Lack of Time, Too tired       NUTRITION DIAGNOSIS:  Obesity r/t long history of self-monitoring deficit and excessive energy intake aeb BMI >30 kg/m2.- Improving     INTERVENTION:  Intervention Provided/Education:   1. Reviewed and modified goals  2. Reviewed on post-op diet guidelines, vitamins/minerals essential post-operatively, GI anatomy of bariatric surgeries, ways to help prepare for post-op diet guidelines pre-operatively, portion/calorie-control, mindful eating and sources of protein.   3. Praised patient for behavior and diet modifications.   4. AVS via [employer]     Questions Reviewed With Patient 9/29/2020   How ready are you to make changes regarding your weight? Number 1 = Not ready at all to make changes up to 10 = very ready. 10   How confident are you that you can change? 1 = Not confident that you will be  "successful making changes up to 10 = very confident. 8       Patient Understanding: good  Expected Compliance: fair-good    GOALS:  Relating To Eating:  Eat slowly (20-30 minutes per meal), chewing foods well (25 chews per bite/applesauce consistency)  - Try setting a timer   - Try pausing senior care through your meal: take a deep breath. Notice if you are satisfied, hungry or overfull and/or are you feeling tired, sluggish, energized, neutral.   - Try a remind with a sticky note near wear you eat.  9\" Plate method (1/2 plate non-starchy vegetables/fruit, 1/4 plate lean protein, 1/4 plate whole grain starch - no more than 1/2 cup carb/meal)  - Try a protein filled breakfast, a piece of string cheese and scrambled eggs, lactose free cottage cheese, chicken salad, left overs from dinner, a protein shake, meat and cheese roll-ups, fish sticks.     *Protein Shake Criteria: no more than 210 Calories, at least 20 grams of protein, and less than 10 grams of sugar     Meal Replacement Options:   [facility] smoothie (160 Calories, 20 g protein)   Premier Protein (160 Calories, 30 g protein)  Slim Fast Advanced Nutrition (180 Calories, 20 g protein)  Muscle Milk, lactose-free, 17 oz bottle (210 Calories, 30 g protein)  Integrated Supplements, no artificial sugars (110 Calories, 20 g protein)  Quest Protein Bars (190 Calories, 20 g protein)  No Cow Protein Bar, gluten, dairy, and soy free (200 Calories, 20 g protein)    Relating to beverages:  Reduce caffeine/carbonation/calorie containing beverages  Separate fluids from meals by 30 minutes before, during, and after eating    Relating to activity:  Increase activity as able    Initial Consult / Weight Loss     My Plate Planner_English - Pt Education  [email or web address]    Protein Sources for Weight Loss  [email or web address]     Carbohydrates  [email or web address]     Time spent with patient: 10 minutes.  Amy Guthrie, MS, RD, LD  "

## 2021-04-14 NOTE — LETTER
"4/14/2021       RE: Larry Bangura  [address] [id number]     Dear Colleague,    Thank you for referring your patient, Larry Bangura, to the [facility] [place] [facility]. Please see a copy of my visit note below.    Larry Bangura is a 36 year old who is being evaluated via a billable video visit.      The patient has been notified of following:     \"This video visit will be conducted via a call between you and your physician/provider. We have found that certain health care needs can be provided without the need for an in-person physical exam.  This service lets us provide the care you need with a video conversation.  If a prescription is necessary we can send it directly to your pharmacy.  If lab work is needed we can place an order for that and you can then stop by our lab to have the test done at a later time.    Video visits are billed at different rates depending on your insurance coverage.  Please reach out to your insurance provider with any questions.    If during the course of the call the physician/provider feels a video visit is not appropriate, you will not be charged for this service.\"    Patient has given verbal consent for Video visit? Yes  How would you like to obtain your AVS? MyChart  If you are dropped from the video visit, the video invite should be resent to: Text to cell phone: [phone]   Will anyone else be joining your video visit? No        Video-Visit Details    Type of service:  Video Visit    Video Start Time: 11:20 AM   Video End Time: 11:30 AM       Originating Location (pt. Location): Home    Distant Location (provider location):  [facility] [place]     Platform used for Video Visit: [employer]    During this virtual visit the patient is located in [place], patient verifies this as the location during the entirety of this visit.     Return Bariatric " "Nutrition Consultation Note    Reason For Visit: Nutrition Assessment    Larry Bangura is a 35 year old presenting today for follow-up bariatric nutrition consult.   Pt is interested in laparoscopic sleeve gastrectomy with Dr. Liriano expected surgery in March 2021.  Patient is accompanied by self. This is pt's 4th of 6 required nutrition visits prior to surgery.     - Insurance missed a month, pt reports he was instructed that a  would call him, this did not occur.     Pt referred by DELL Duckworth on September 30, 2020.  Patient with Co-morbidities of obesity including:  Type II DM yes  Renal Failure no  Sleep apnea yes  Hypertension yes   Dyslipidemia yes  Joint pain no  Back pain no  GERD no     Support System Reviewed With Patient 9/29/2020   Who do you have in your support network that can be available to help you for the first 2 weeks after surgery? wife   Who can you count on for support throughout your weight loss surgery journey? wife       ANTHROPOMETRICS:  Estimated body mass index is 53.28 kg/m  as calculated from the following:    Height as of 4/7/21: 1.803 m (5' 11\").    Weight as of 4/7/21: 173.3 kg (382 lb). - Most recent weight   Initial weight: 400 lb   Required weight loss goal pre-op: 50 lbs from initial consult weight (goal weight 350 lbs or less before surgery)       9/29/2020   I have tried the following methods to lose weight Watching portions or calories, Exercise, Weight Watchers, Atkins type diet (low carb/high protein), Pre packaged meals ex: Nutrisystem, Slimfast       Weight Loss Questions Reviewed With Patient 9/29/2020   How long have you been overweight? 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 He typically picks up breakfast on his way to work, 2 [place] sausage egg burritos and a diet coke: sometimes a muffin. For lunch it is " "leftovers from dinner OR fast food, usually the sandwich not the fries and a diet coke.   Dinner is a meat vegetables and bread and he tries to have popsicle after dinner for his snack.     Today: Decreased portion sizes  Diet Recall:  B: 2 piece of english muffin toast with a bottle of water   ot  [place] breakfast sausage burrito with a water or diet coke (carbonation kid of upsetting stomach)   [place] for lunch   porkchops with corn and baked potato, cannot eat a whole     Heartburn with pistachios     Progress Towards Previous Goals:  Relating To Eating:  Eat slowly (20-30 minutes per meal), chewing foods well (25 chews per bite/applesauce consistency)-Continues,Thinking about it   - Try setting a timer   - Try pausing alf through your meal: take a deep breath. Notice if you are satisfied, hungry or overfull and/or are you feeling tired, sluggish, energized, neutral.   - Try a remind with a sticky note near wear you eat.  9\" Plate method (1/2 plate non-starchy vegetables/fruit, 1/4 plate lean protein, 1/4 plate whole grain starch - no more than 1/2 cup carb/meal)-Continues     Relating to beverages:  Reduce caffeine/carbonation/calorie containing beverages-Improving: water, diet coke ( not drinking as much)   Separate fluids from meals by 30 minutes before, during, and after eating-  Continues     Relating to activity:  Increase activity as able-working,     Recall Diet Questions Reviewed With Patient 9/29/2020   Describe what you typically consume for breakfast (typical or most recent): 2 sausage burritos diet coke   Describe what you typically consume for lunch (typical or most recent): leftovers   Describe what you typically consume for supper (typical or most recent): meet veg bread   Describe what you typically consume as snacks (typical or most recent): chocolate covered almans   How many ounces of water, or other low calorie drinks, do you drink daily (8 oz=1 glass)? 32 oz- water   How many ounces " of caffeine (coffee, tea, pop) do you drink daily (8 oz=1 glass)? 32 oz- soda    How many ounces of carbonated (pop, beer, sparkling water) drinks do you drinky daily (8 oz=1 glass)? 32 oz- dt mt dew and diet coke    How many ounces of juice, pop, sweet tea, sports drinks, protein drinks, other sweetened drinks, do you drink daily (8 oz=1 glass)? 8 oz- not usually    How many ounces of milk do you drink daily (8 oz=1 glass) 8 oz- with cereal    Please indicate the type of milk: skim   How often do you drink alcohol? Monthly or less- not usually.    If you do drink alcohol, how many drinks might you have in a day? (one drink = 5 oz. wine, 1 can/bottle of beer, 1 shot liquor) 1 or 2       Eating Habits 9/29/2020   Do you have any dietary restrictions? No   Do you currently binge eat (eat a large amount of food in a short time)? Yes   Are you an emotional eater? Yes   Do you get up to eat after falling asleep? No   What foods do you crave? all       ADDITIONAL INFORMATION:     Dining Out History Reviewed With Patient 9/29/2020   How often do you dine out? Nearly every day.   Where do you dine out? (select all that apply) fast food chains, take out, other   What types of food do you order when you dine out? salvador reese       Physical Activity Reviewed With Patient 9/29/2020   How often do you exercise? Never   What keeps you from being more active?  Pain, I should be more active but I just have not gotten around to it, Lack of Time, Too tired       NUTRITION DIAGNOSIS:  Obesity r/t long history of self-monitoring deficit and excessive energy intake aeb BMI >30 kg/m2.- Improving     INTERVENTION:  Intervention Provided/Education:   1. Reviewed and modified goals  2. Reviewed on post-op diet guidelines, vitamins/minerals essential post-operatively, GI anatomy of bariatric surgeries, ways to help prepare for post-op diet guidelines pre-operatively, portion/calorie-control, mindful eating and sources of protein.   3.  "Praised patient for behavior and diet modifications.   4. AVS via [employer]     Questions Reviewed With Patient 9/29/2020   How ready are you to make changes regarding your weight? Number 1 = Not ready at all to make changes up to 10 = very ready. 10   How confident are you that you can change? 1 = Not confident that you will be successful making changes up to 10 = very confident. 8       Patient Understanding: good  Expected Compliance: fair-good    GOALS:  Relating To Eating:  Eat slowly (20-30 minutes per meal), chewing foods well (25 chews per bite/applesauce consistency)  - Try setting a timer   - Try pausing residential through your meal: take a deep breath. Notice if you are satisfied, hungry or overfull and/or are you feeling tired, sluggish, energized, neutral.   - Try a remind with a sticky note near wear you eat.  9\" Plate method (1/2 plate non-starchy vegetables/fruit, 1/4 plate lean protein, 1/4 plate whole grain starch - no more than 1/2 cup carb/meal)  - Try a protein filled breakfast, a piece of string cheese and scrambled eggs, lactose free cottage cheese, chicken salad, left overs from dinner, a protein shake, meat and cheese roll-ups, fish sticks.     *Protein Shake Criteria: no more than 210 Calories, at least 20 grams of protein, and less than 10 grams of sugar     Meal Replacement Options:   [facility] smoothie (160 Calories, 20 g protein)   Premier Protein (160 Calories, 30 g protein)  Slim Fast Advanced Nutrition (180 Calories, 20 g protein)  Muscle Milk, lactose-free, 17 oz bottle (210 Calories, 30 g protein)  Integrated Supplements, no artificial sugars (110 Calories, 20 g protein)  Quest Protein Bars (190 Calories, 20 g protein)  No Cow Protein Bar, gluten, dairy, and soy free (200 Calories, 20 g protein)    Relating to beverages:  Reduce caffeine/carbonation/calorie containing beverages  Separate fluids from meals by 30 minutes before, during, and after eating    Relating to " activity:  Increase activity as able    Initial Consult / Weight Loss     My Plate Planner_English - Pt Education  [email or web address]    Protein Sources for Weight Loss  [email or web address]     Carbohydrates  [email or web address]     Time spent with patient: 10 minutes.  Amy Guthrie, MS, RD, LD

## 2021-05-02 ENCOUNTER — HOSPITAL ENCOUNTER (EMERGENCY)
Facility: CLINIC | Age: 36
Discharge: HOME | End: 2021-05-02
Attending: PHYSICIAN ASSISTANT | Admitting: PHYSICIAN ASSISTANT
Payer: COMMERCIAL

## 2021-05-02 VITALS
SYSTOLIC BLOOD PRESSURE: 120 MMHG | WEIGHT: 315 LBS | OXYGEN SATURATION: 99 % | DIASTOLIC BLOOD PRESSURE: 77 MMHG | TEMPERATURE: 97.9 F | RESPIRATION RATE: 20 BRPM | HEART RATE: 92 BPM | BODY MASS INDEX: 53.28 KG/M2

## 2021-05-02 DIAGNOSIS — H60.91 RIGHT OTITIS EXTERNA: ICD-10-CM

## 2021-05-02 PROCEDURE — G0463 HOSPITAL OUTPT CLINIC VISIT: HCPCS | Performed by: PHYSICIAN ASSISTANT

## 2021-05-02 PROCEDURE — 99214 OFFICE O/P EST MOD 30 MIN: CPT | Performed by: PHYSICIAN ASSISTANT

## 2021-05-02 RX ORDER — CIPROFLOXACIN AND DEXAMETHASONE 3; 1 MG/ML; MG/ML
4 SUSPENSION/ DROPS AURICULAR (OTIC) 2 TIMES DAILY
Qty: 2.8 ML | Refills: 0 | Status: SHIPPED | OUTPATIENT
Start: 2021-05-02 | End: 2021-05-09

## 2021-05-02 ASSESSMENT — ENCOUNTER SYMPTOMS: CONSTITUTIONAL NEGATIVE: 1

## 2021-05-02 NOTE — ED PROVIDER NOTES
History     Chief Complaint   Patient presents with     Otalgia     HPI  Larry Bangura is a 36 year old male who presents with complaints of right ear pain that has been progressive over the past week or two.  Patient states he cleaned his ear yesterday and had some bloody drainage noted.  His hearing is also decreased.  Denies fevers, chills, rash, cough, sore throat, sinus pressure, nasal congestion, or rhinorrhea.  He is concerned as his daughter recently had an injury and outer ear infection and he was told this could be contagious.      Allergies:  No Known Allergies    Problem List:    Patient Active Problem List    Diagnosis Date Noted     Hyperlipidemia LDL goal <100 10/17/2018     Priority: Medium     Statin at 35-40.  Same with ASA.         Herpes simplex infection of penis 10/03/2018     Priority: Medium     Esophageal reflux 12/03/2013     Priority: Medium     Essential hypertension with goal blood pressure less than 140/90 09/10/2013     Priority: Medium     CARDIOVASCULAR SCREENING; LDL GOAL LESS THAN 160 08/14/2013     Priority: Medium     Seasonal allergic rhinitis 08/07/2013     Priority: Medium     Obstructive sleep apnea 08/07/2013     Priority: Medium     Severe IRWIN: PSG  6/2/2015 ( 414 lbs, , , adithya O2 51%.)  7/15/2016:Uses CPAP every day.          Type 2 diabetes mellitus without complication (H) 08/07/2013     Priority: Medium     Class 3 severe obesity due to excess calories with serious comorbidity and body mass index (BMI) of 50.0 to 59.9 in adult (H) 05/14/2012     Priority: Medium     New bariatric surgery consult Sept 2020  Weight 400 lbs.  15mg phentermine caused insomnia           Past Medical History:    Past Medical History:   Diagnosis Date     HTN (hypertension) 9/10/2013     Morbid obesity (H)        Past Surgical History:    Past Surgical History:   Procedure Laterality Date     ENDOSCOPIC RELEASE CARPAL TUNNEL  12/6/2013    Procedure: ENDOSCOPIC RELEASE CARPAL  TUNNEL;  Right Hand Endoscopic Carpal Tunnel Release;  Surgeon: Kody Pittman MD;  Location: [facility]     ENDOSCOPIC RELEASE CARPAL TUNNEL  1/21/2014    Procedure: ENDOSCOPIC RELEASE CARPAL TUNNEL;  Left endoscopic carpal tunnel release, possible open;  Surgeon: Kody Pittman MD;  Location: [facility]       Family History:    Family History   Problem Relation Age of Onset     Diabetes Father      Cancer Sister      C.A.D. Maternal Grandfather        Social History:  Marital Status:  Single [1]  Social History     Tobacco Use     Smoking status: Never Smoker     Smokeless tobacco: Never Used   Substance Use Topics     Alcohol use: No     Alcohol/week: 0.0 standard drinks     Drug use: No        Medications:    ciprofloxacin-dexamethasone (CIPRODEX) 0.3-0.1 % otic suspension  insulin pen needle (B-D U/F) 31G X 8 MM miscellaneous  liraglutide (VICTOZA) 18 MG/3ML solution  lisinopril (ZESTRIL) 20 MG tablet  metFORMIN (GLUCOPHAGE) 1000 MG tablet  ORDER FOR DME, SET TO FAX,  topiramate (TOPAMAX) 25 MG tablet  valACYclovir (VALTREX) 1000 mg tablet  valACYclovir (VALTREX) 500 MG tablet          Review of Systems   Constitutional: Negative.    HENT: Positive for ear discharge and ear pain.    Skin: Negative.    All other systems reviewed and are negative.      Physical Exam   BP: 120/77  Pulse: 92  Temp: 97.9  F (36.6  C)  Resp: 20  Weight: (!) 173.3 kg (382 lb)  SpO2: 99 %      Physical Exam  Constitutional:       General: He is not in acute distress.     Appearance: Normal appearance. He is well-developed. He is not ill-appearing, toxic-appearing or diaphoretic.   HENT:      Head: Normocephalic and atraumatic.      Right Ear: Tympanic membrane and external ear normal. Drainage, swelling and tenderness present. No mastoid tenderness.      Left Ear: Tympanic membrane, ear canal and external ear normal.      Ears:      Comments: Right ear canal swelling, erythema, and tenderness to palpation of the tragus     Nose: Nose  normal. No rhinorrhea.      Mouth/Throat:      Pharynx: No oropharyngeal exudate or posterior oropharyngeal erythema.   Eyes:      Extraocular Movements: Extraocular movements intact.      Conjunctiva/sclera: Conjunctivae normal.      Pupils: Pupils are equal, round, and reactive to light.   Neck:      Musculoskeletal: Normal range of motion and neck supple. No neck rigidity.   Cardiovascular:      Rate and Rhythm: Normal rate and regular rhythm.      Heart sounds: Normal heart sounds.   Pulmonary:      Effort: Pulmonary effort is normal. No respiratory distress.      Breath sounds: Normal breath sounds. No stridor. No wheezing.   Lymphadenopathy:      Cervical: No cervical adenopathy.   Skin:     General: Skin is warm and dry.   Neurological:      General: No focal deficit present.      Mental Status: He is alert and oriented to person, place, and time.         ED Course        Procedures    No results found for this or any previous visit (from the past 24 hour(s)).    Medications - No data to display    Assessments & Plan (with Medical Decision Making)     Pt is a 36 year old male who presents with complaints of right ear pain that has been progressive over the past week or two.  Patient states he cleaned his ear yesterday and had some bloody drainage noted.      Pt is afebrile on arrival.  Exam as above.  Findings of otitis externa on exam.  Return precautions were reviewed.  Hand-outs were provided.    Patient was sent with Ciprodex otic drops and was instructed to follow-up with PCP in 3-5 days for continued care and management or sooner if new or worsening symptoms.  He is to return to the ED for persistent and/or worsening symptoms.  Patient expressed understanding of the diagnosis and plan and was discharged home in good condition.    I have reviewed the nursing notes.    I have reviewed the findings, diagnosis, plan and need for follow up with the patient.    Discharge Medication List as of 5/2/2021  2:58  PM      START taking these medications    Details   ciprofloxacin-dexamethasone (CIPRODEX) 0.3-0.1 % otic suspension Place 4 drops into the right ear 2 times daily for 7 days, Disp-2.8 mL, R-0, E-PrescribePlease call me if too expensive             Final diagnoses:   Right otitis externa       5/2/2021   [facility] EMERGENCY DEPT      Disclaimer:  This note consists of symbols derived from keyboarding, dictation and/or voice recognition software.  As a result, there may be errors in the script that have gone undetected.  Please consider this when interpreting information found in this chart.     Shania Blakely PA-C  05/02/21 2123       Shania Blakely PA-C  05/02/21 2123

## 2021-05-18 ENCOUNTER — VIRTUAL VISIT (OUTPATIENT)
Dept: ENDOCRINOLOGY | Facility: CLINIC | Age: 36
End: 2021-05-18
Payer: COMMERCIAL

## 2021-05-18 DIAGNOSIS — Z71.3 NUTRITIONAL COUNSELING: ICD-10-CM

## 2021-05-18 DIAGNOSIS — E11.9 TYPE 2 DIABETES MELLITUS WITHOUT COMPLICATION, WITHOUT LONG-TERM CURRENT USE OF INSULIN (H): ICD-10-CM

## 2021-05-18 DIAGNOSIS — E66.813 CLASS 3 SEVERE OBESITY DUE TO EXCESS CALORIES WITH SERIOUS COMORBIDITY AND BODY MASS INDEX (BMI) OF 50.0 TO 59.9 IN ADULT (H): Primary | ICD-10-CM

## 2021-05-18 DIAGNOSIS — E66.01 CLASS 3 SEVERE OBESITY DUE TO EXCESS CALORIES WITH SERIOUS COMORBIDITY AND BODY MASS INDEX (BMI) OF 50.0 TO 59.9 IN ADULT (H): Primary | ICD-10-CM

## 2021-05-18 PROCEDURE — 97803 MED NUTRITION INDIV SUBSEQ: CPT | Mod: 95 | Performed by: DIETITIAN, REGISTERED

## 2021-05-18 NOTE — LETTER
"5/18/2021       RE: Larry Bangura  [address] [id number]     Dear Colleague,    Thank you for referring your patient, Larry Bangura, to the [facility] [place] at [facility]. Please see a copy of my visit note below.    Larry Bangura is a 36 year old who is being evaluated via a billable video visit.      The patient has been notified of following:     \"This video visit will be conducted via a call between you and your physician/provider. We have found that certain health care needs can be provided without the need for an in-person physical exam.  This service lets us provide the care you need with a video conversation.  If a prescription is necessary we can send it directly to your pharmacy.  If lab work is needed we can place an order for that and you can then stop by our lab to have the test done at a later time.    Video visits are billed at different rates depending on your insurance coverage.  Please reach out to your insurance provider with any questions.    If during the course of the call the physician/provider feels a video visit is not appropriate, you will not be charged for this service.\"    Patient has given verbal consent for Video visit? Yes  How would you like to obtain your AVS? MyChart  If you are dropped from the video visit, the video invite should be resent to: Text to cell phone: [phone]  Will anyone else be joining your video visit? No        Video-Visit Details    Type of service:  Video Visit    Video Start Time: 11:10 AM   Video End Time: 11:27 AM     Originating Location (pt. Location): Home    Distant Location (provider location):  [facility] [place]     Platform used for Video Visit: [employer]    During this virtual visit the patient is located in [place], patient verifies this as the location during the entirety of this visit.     Return Bariatric " "Nutrition Consultation Note    Reason For Visit: Nutrition Assessment    Larry Bangura is a 35 year old presenting today for follow-up bariatric nutrition consult. Pt is interested in laparoscopic sleeve gastrectomy with Dr. Liriano expected surgery in March 2021.  Patient is accompanied by self. This is pt's 4th of 6 required nutrition visits prior to surgery.     - Insurance missed a month, pt reports he was instructed that a  would call him, this did not occur.     Pt referred by DELL Duckworth on September 30, 2020.  Patient with Co-morbidities of obesity including:  Type II DM yes  Renal Failure no  Sleep apnea yes  Hypertension yes   Dyslipidemia yes  Joint pain no  Back pain no  GERD no     Support System Reviewed With Patient 9/29/2020   Who do you have in your support network that can be available to help you for the first 2 weeks after surgery? wife   Who can you count on for support throughout your weight loss surgery journey? wife       ANTHROPOMETRICS:  Estimated body mass index is 53.28 kg/m  as calculated from the following:    Height as of 4/7/21: 1.803 m (5' 11\").    Weight as of 5/2/21: 173.3 kg (382 lb). - most recent weight per pt's weight    Initial weight: 400 lb   Required weight loss goal pre-op: 50 lbs from initial consult weight (goal weight 350 lbs or less before surgery)       9/29/2020   I have tried the following methods to lose weight Watching portions or calories, Exercise, Weight Watchers, Atkins type diet (low carb/high protein), Pre packaged meals ex: Nutrisystem, Slimfast       Weight Loss Questions Reviewed With Patient 9/29/2020   How long have you been overweight? 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 He typically picks up breakfast on his way to work, 2 [place] sausage egg burritos and a diet coke: sometimes a muffin. For lunch " "it is leftovers from dinner OR fast food, usually the sandwich not the fries and a diet coke.   Dinner is a meat vegetables and bread and he tries to have popsicle after dinner for his snack.     Today:   Portion further decreased, now only eating 1 burger or sandwich. Feel frustrated because he has not seen a dramatic loss 1-2 lb every couple of weeks    Diet Recall:   B: 2 burritos from  dolad 1 burritos and sausage  muffin   L: left overs: or from a cafe at work   D: 1 burger    Snack: 2-4 popsicle (red white and blue rocket)--> sweet treat.       Progress Towards Previous Goals:  Relating To Eating:  Eat slowly (20-30 minutes per meal), chewing foods well (25 chews per bite/applesauce consistency)-Improving, subconsiusly eating slower.  - Try setting a timer   - Try pausing assisted through your meal: take a deep breath. Notice if you are satisfied, hungry or overfull and/or are you feeling tired, sluggish, energized, neutral.   - Try a remind with a sticky note near wear you eat.  9\" Plate method (1/2 plate non-starchy vegetables/fruit, 1/4 plate lean protein, 1/4 plate whole grain starch - no more than 1/2 cup carb/meal)-Continues  - Try a protein filled breakfast, a piece of string cheese and scrambled eggs, lactose free cottage cheese, chicken salad, left overs from dinner, a protein shake, meat and cheese roll-ups, fish sticks.     *Protein Shake Criteria: no more than 210 Calories, at least 20 grams of protein, and less than 10 grams of sugar     Meal Replacement Options:   [facility] smoothie (160 Calories, 20 g protein)   Premier Protein (160 Calories, 30 g protein)  Slim Fast Advanced Nutrition (180 Calories, 20 g protein)  Muscle Milk, lactose-free, 17 oz bottle (210 Calories, 30 g protein)  Integrated Supplements, no artificial sugars (110 Calories, 20 g protein)  Quest Protein Bars (190 Calories, 20 g protein)  No Cow Protein Bar, gluten, dairy, and soy free (200 Calories, 20 g " protein)    Relating to beverages:  Reduce caffeine/carbonation/calorie containing beverages-Improving, diet coke ( only about 1/2 does not appeal to him) and water   Separate fluids from meals by 30 minutes before, during, and after eating-Continues    Relating to activity:  Increase activity as able-Increased movement at work, up and down ladders. Two week trip  More walking going to [employer].       Recall Diet Questions Reviewed With Patient 9/29/2020   Describe what you typically consume for breakfast (typical or most recent): 2 sausage lali diet coke   Describe what you typically consume for lunch (typical or most recent): leftovers   Describe what you typically consume for supper (typical or most recent): meet veg bread   Describe what you typically consume as snacks (typical or most recent): chocolate covered almans   How many ounces of water, or other low calorie drinks, do you drink daily (8 oz=1 glass)? 32 oz- water   How many ounces of caffeine (coffee, tea, pop) do you drink daily (8 oz=1 glass)? 32 oz- soda    How many ounces of carbonated (pop, beer, sparkling water) drinks do you drinky daily (8 oz=1 glass)? 32 oz- dt mt dew and diet coke    How many ounces of juice, pop, sweet tea, sports drinks, protein drinks, other sweetened drinks, do you drink daily (8 oz=1 glass)? 8 oz- not usually    How many ounces of milk do you drink daily (8 oz=1 glass) 8 oz- with cereal    Please indicate the type of milk: skim   How often do you drink alcohol? Monthly or less- not usually.    If you do drink alcohol, how many drinks might you have in a day? (one drink = 5 oz. wine, 1 can/bottle of beer, 1 shot liquor) 1 or 2       Eating Habits 9/29/2020   Do you have any dietary restrictions? No   Do you currently binge eat (eat a large amount of food in a short time)? Yes   Are you an emotional eater? Yes   Do you get up to eat after falling asleep? No   What foods do you crave? all       ADDITIONAL INFORMATION:  "    Dining Out History Reviewed With Patient 9/29/2020   How often do you dine out? Nearly every day.   Where do you dine out? (select all that apply) fast food chains, take out, other   What types of food do you order when you dine out? salvador reese       Physical Activity Reviewed With Patient 9/29/2020   How often do you exercise? Never   What keeps you from being more active?  Pain, I should be more active but I just have not gotten around to it, Lack of Time, Too tired       NUTRITION DIAGNOSIS:  Obesity r/t long history of self-monitoring deficit and excessive energy intake aeb BMI >30 kg/m2.- Improving     INTERVENTION:  Intervention Provided/Education:   1. Reviewed and modified goals  2. Reviewed on post-op diet guidelines, vitamins/minerals essential post-operatively, GI anatomy of bariatric surgeries, ways to help prepare for post-op diet guidelines pre-operatively, portion/calorie-control, mindful eating and sources of protein.   3. Praised patient for behavior and diet modifications.   4. AVS via [employer]     Questions Reviewed With Patient 9/29/2020   How ready are you to make changes regarding your weight? Number 1 = Not ready at all to make changes up to 10 = very ready. 10   How confident are you that you can change? 1 = Not confident that you will be successful making changes up to 10 = very confident. 8       Patient Understanding: good  Expected Compliance: fair-good    GOALS:  Relating To Eating:  Eat slowly (20-30 minutes per meal), chewing foods well (25 chews per bite/applesauce consistency)  - Try setting a timer   - Try pausing custodial through your meal: take a deep breath. Notice if you are satisfied, hungry or overfull and/or are you feeling tired, sluggish, energized, neutral.   - Try a remind with a sticky note near wear you eat.  9\" Plate method (1/2 plate non-starchy vegetables/fruit, 1/4 plate lean protein, 1/4 plate whole grain starch - no more than 1/2 cup carb/meal)  - Try a " protein filled breakfast, a piece of string cheese and scrambled eggs, lactose free cottage cheese, chicken salad, left overs from dinner, a protein shake, meat and cheese roll-ups, fish sticks.     *Protein Shake Criteria: no more than 210 Calories, at least 20 grams of protein, and less than 10 grams of sugar     Meal Replacement Shake Options:   [facility] smoothie (160 Calories, 20 g protein)   Premier Protein (160 Calories, 30 g protein)  Slim Fast Advanced Nutrition (180 Calories, 20 g protein)  Muscle Milk, lactose-free, 17 oz bottle (210 Calories, 30 g protein)  Integrated Supplements, no artificial sugars (110 Calories, 20 g protein)  Genepro, unflavored protein powder (60 Calories, 30 g protein)    Meal Replacement Bar Options:  [facility] Protein Shake (160 Calories, 15 g protein)  Quest Protein Bars (190 Calories, 20 g protein)  Built Bar (170 Calories, 15-20 g protein)  One Protein Bar (210 calories, 20 g protein)  [place] Signature Protein Bar ([employer]) (190 Calories, 21 g protein)  Pure Protein Bars (180 Calories, 21 g protein)      Relating to beverages:  Reduce caffeine/carbonation/calorie containing beverages  Separate fluids from meals by 30 minutes before, during, and after eating    Relating to activity:  Increase activity as able    Initial Consult / Weight Loss     My Plate Planner_English - Pt Education  [email or web address]    Protein Sources for Weight Loss  [email or web address]     Carbohydrates  [email or web address]     Time spent with patient: 17 minutes.  Amy Guthrie, MS, RD, LD

## 2021-05-18 NOTE — PROGRESS NOTES
"Larry Bangura is a 36 year old who is being evaluated via a billable video visit.      The patient has been notified of following:     \"This video visit will be conducted via a call between you and your physician/provider. We have found that certain health care needs can be provided without the need for an in-person physical exam.  This service lets us provide the care you need with a video conversation.  If a prescription is necessary we can send it directly to your pharmacy.  If lab work is needed we can place an order for that and you can then stop by our lab to have the test done at a later time.    Video visits are billed at different rates depending on your insurance coverage.  Please reach out to your insurance provider with any questions.    If during the course of the call the physician/provider feels a video visit is not appropriate, you will not be charged for this service.\"    Patient has given verbal consent for Video visit? Yes  How would you like to obtain your AVS? MyChart  If you are dropped from the video visit, the video invite should be resent to: Text to cell phone: [phone]  Will anyone else be joining your video visit? No        Video-Visit Details    Type of service:  Video Visit    Video Start Time: 11:10 AM   Video End Time: 11:27 AM     Originating Location (pt. Location): Home    Distant Location (provider location):  [facility] [place]     Platform used for Video Visit: [employer]    During this virtual visit the patient is located in [place], patient verifies this as the location during the entirety of this visit.     Return Bariatric Nutrition Consultation Note    Reason For Visit: Nutrition Assessment    Larry Bangura is a 35 year old presenting today for follow-up bariatric nutrition consult. Pt is interested in laparoscopic sleeve gastrectomy with Dr. Liriano expected surgery in March 2021.  Patient is accompanied by self. This is pt's 4th of 6 " "required nutrition visits prior to surgery.     - Insurance missed a month, pt reports he was instructed that a  would call him, this did not occur.     Pt referred by DELL Duckworth on September 30, 2020.  Patient with Co-morbidities of obesity including:  Type II DM yes  Renal Failure no  Sleep apnea yes  Hypertension yes   Dyslipidemia yes  Joint pain no  Back pain no  GERD no     Support System Reviewed With Patient 9/29/2020   Who do you have in your support network that can be available to help you for the first 2 weeks after surgery? wife   Who can you count on for support throughout your weight loss surgery journey? wife       ANTHROPOMETRICS:  Estimated body mass index is 53.28 kg/m  as calculated from the following:    Height as of 4/7/21: 1.803 m (5' 11\").    Weight as of 5/2/21: 173.3 kg (382 lb). - most recent weight per pt's weight    Initial weight: 400 lb   Required weight loss goal pre-op: 50 lbs from initial consult weight (goal weight 350 lbs or less before surgery)       9/29/2020   I have tried the following methods to lose weight Watching portions or calories, Exercise, Weight Watchers, Atkins type diet (low carb/high protein), Pre packaged meals ex: Nutrisystem, Slimfast       Weight Loss Questions Reviewed With Patient 9/29/2020   How long have you been overweight? 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 He typically picks up breakfast on his way to work, 2 [place] sausage egg burritos and a diet coke: sometimes a muffin. For lunch it is leftovers from dinner OR fast food, usually the sandwich not the fries and a diet coke.   Dinner is a meat vegetables and bread and he tries to have popsicle after dinner for his snack.     Today:   Portion further decreased, now only eating 1 burger or sandwich. Feel frustrated because he has not seen a dramatic " "loss 1-2 lb every couple of weeks    Diet Recall:   B: 2 burritos from  dolad 1 burritos and sausage  muffin   L: left overs: or from a cafe at work   D: 1 burger    Snack: 2-4 popsicle (red white and blue rocket)--> sweet treat.       Progress Towards Previous Goals:  Relating To Eating:  Eat slowly (20-30 minutes per meal), chewing foods well (25 chews per bite/applesauce consistency)-Improving, subconsiusly eating slower.  - Try setting a timer   - Try pausing assisted through your meal: take a deep breath. Notice if you are satisfied, hungry or overfull and/or are you feeling tired, sluggish, energized, neutral.   - Try a remind with a sticky note near wear you eat.  9\" Plate method (1/2 plate non-starchy vegetables/fruit, 1/4 plate lean protein, 1/4 plate whole grain starch - no more than 1/2 cup carb/meal)-Continues  - Try a protein filled breakfast, a piece of string cheese and scrambled eggs, lactose free cottage cheese, chicken salad, left overs from dinner, a protein shake, meat and cheese roll-ups, fish sticks.     *Protein Shake Criteria: no more than 210 Calories, at least 20 grams of protein, and less than 10 grams of sugar     Meal Replacement Options:   [facility] smoothie (160 Calories, 20 g protein)   Premier Protein (160 Calories, 30 g protein)  Slim Fast Advanced Nutrition (180 Calories, 20 g protein)  Muscle Milk, lactose-free, 17 oz bottle (210 Calories, 30 g protein)  Integrated Supplements, no artificial sugars (110 Calories, 20 g protein)  Quest Protein Bars (190 Calories, 20 g protein)  No Cow Protein Bar, gluten, dairy, and soy free (200 Calories, 20 g protein)    Relating to beverages:  Reduce caffeine/carbonation/calorie containing beverages-Improving, diet coke ( only about 1/2 does not appeal to him) and water   Separate fluids from meals by 30 minutes before, during, and after eating-Continues    Relating to activity:  Increase activity as able-Increased movement at work, up and " down ladders. Two week trip  More walking going to [employer].       Recall Diet Questions Reviewed With Patient 9/29/2020   Describe what you typically consume for breakfast (typical or most recent): 2 sausage burritos diet coke   Describe what you typically consume for lunch (typical or most recent): leftovers   Describe what you typically consume for supper (typical or most recent): meet veg bread   Describe what you typically consume as snacks (typical or most recent): chocolate covered almans   How many ounces of water, or other low calorie drinks, do you drink daily (8 oz=1 glass)? 32 oz- water   How many ounces of caffeine (coffee, tea, pop) do you drink daily (8 oz=1 glass)? 32 oz- soda    How many ounces of carbonated (pop, beer, sparkling water) drinks do you drinky daily (8 oz=1 glass)? 32 oz- dt mt dew and diet coke    How many ounces of juice, pop, sweet tea, sports drinks, protein drinks, other sweetened drinks, do you drink daily (8 oz=1 glass)? 8 oz- not usually    How many ounces of milk do you drink daily (8 oz=1 glass) 8 oz- with cereal    Please indicate the type of milk: skim   How often do you drink alcohol? Monthly or less- not usually.    If you do drink alcohol, how many drinks might you have in a day? (one drink = 5 oz. wine, 1 can/bottle of beer, 1 shot liquor) 1 or 2       Eating Habits 9/29/2020   Do you have any dietary restrictions? No   Do you currently binge eat (eat a large amount of food in a short time)? Yes   Are you an emotional eater? Yes   Do you get up to eat after falling asleep? No   What foods do you crave? all       ADDITIONAL INFORMATION:     Dining Out History Reviewed With Patient 9/29/2020   How often do you dine out? Nearly every day.   Where do you dine out? (select all that apply) fast food chains, take out, other   What types of food do you order when you dine out? salvador reese       Physical Activity Reviewed With Patient 9/29/2020   How often do you exercise? Never  "  What keeps you from being more active?  Pain, I should be more active but I just have not gotten around to it, Lack of Time, Too tired       NUTRITION DIAGNOSIS:  Obesity r/t long history of self-monitoring deficit and excessive energy intake aeb BMI >30 kg/m2.- Improving     INTERVENTION:  Intervention Provided/Education:   1. Reviewed and modified goals  2. Reviewed on post-op diet guidelines, vitamins/minerals essential post-operatively, GI anatomy of bariatric surgeries, ways to help prepare for post-op diet guidelines pre-operatively, portion/calorie-control, mindful eating and sources of protein.   3. Praised patient for behavior and diet modifications.   4. AVS via DivXt     Questions Reviewed With Patient 9/29/2020   How ready are you to make changes regarding your weight? Number 1 = Not ready at all to make changes up to 10 = very ready. 10   How confident are you that you can change? 1 = Not confident that you will be successful making changes up to 10 = very confident. 8       Patient Understanding: good  Expected Compliance: fair-good    GOALS:  Relating To Eating:  Eat slowly (20-30 minutes per meal), chewing foods well (25 chews per bite/applesauce consistency)  - Try setting a timer   - Try pausing [facility] through your meal: take a deep breath. Notice if you are satisfied, hungry or overfull and/or are you feeling tired, sluggish, energized, neutral.   - Try a remind with a sticky note near wear you eat.  9\" Plate method (1/2 plate non-starchy vegetables/fruit, 1/4 plate lean protein, 1/4 plate whole grain starch - no more than 1/2 cup carb/meal)  - Try a protein filled breakfast, a piece of string cheese and scrambled eggs, lactose free cottage cheese, chicken salad, left overs from dinner, a protein shake, meat and cheese roll-ups, fish sticks.     *Protein Shake Criteria: no more than 210 Calories, at least 20 grams of protein, and less than 10 grams of sugar     Meal Replacement Shake Options: "   [facility] smoothie (160 Calories, 20 g protein)   Premier Protein (160 Calories, 30 g protein)  Slim Fast Advanced Nutrition (180 Calories, 20 g protein)  Muscle Milk, lactose-free, 17 oz bottle (210 Calories, 30 g protein)  Integrated Supplements, no artificial sugars (110 Calories, 20 g protein)  Genepro, unflavored protein powder (60 Calories, 30 g protein)    Meal Replacement Bar Options:  [facility] Protein Shake (160 Calories, 15 g protein)  Quest Protein Bars (190 Calories, 20 g protein)  Built Bar (170 Calories, 15-20 g protein)  One Protein Bar (210 calories, 20 g protein)  [place] Signature Protein Bar ([employer]) (190 Calories, 21 g protein)  Pure Protein Bars (180 Calories, 21 g protein)      Relating to beverages:  Reduce caffeine/carbonation/calorie containing beverages  Separate fluids from meals by 30 minutes before, during, and after eating    Relating to activity:  Increase activity as able    Initial Consult / Weight Loss     My Plate Planner_English - Pt Education  [email or web address]    Protein Sources for Weight Loss  [email or web address]     Carbohydrates  [email or web address]     Time spent with patient: 17 minutes.  Amy Guthrie, MS, RD, LD

## 2021-05-20 NOTE — PATIENT INSTRUCTIONS
"GOALS:  Relating To Eating:  Eat slowly (20-30 minutes per meal), chewing foods well (25 chews per bite/applesauce consistency)  - Try setting a timer   - Try pausing care home through your meal: take a deep breath. Notice if you are satisfied, hungry or overfull and/or are you feeling tired, sluggish, energized, neutral.   - Try a remind with a sticky note near wear you eat.  9\" Plate method (1/2 plate non-starchy vegetables/fruit, 1/4 plate lean protein, 1/4 plate whole grain starch - no more than 1/2 cup carb/meal)  - Try a protein filled breakfast, a piece of string cheese and scrambled eggs, lactose free cottage cheese, chicken salad, left overs from dinner, a protein shake, meat and cheese roll-ups, fish sticks.     *Protein Shake Criteria: no more than 210 Calories, at least 20 grams of protein, and less than 10 grams of sugar     Meal Replacement Shake Options:   [facility] smoothie (160 Calories, 20 g protein)   Premier Protein (160 Calories, 30 g protein)  Slim Fast Advanced Nutrition (180 Calories, 20 g protein)  Muscle Milk, lactose-free, 17 oz bottle (210 Calories, 30 g protein)  Integrated Supplements, no artificial sugars (110 Calories, 20 g protein)  Genepro, unflavored protein powder (60 Calories, 30 g protein)    Meal Replacement Bar Options:  [facility] Protein Shake (160 Calories, 15 g protein)  Quest Protein Bars (190 Calories, 20 g protein)  Built Bar (170 Calories, 15-20 g protein)  One Protein Bar (210 calories, 20 g protein)  [place] Signature Protein Bar ([employer]) (190 Calories, 21 g protein)  Pure Protein Bars (180 Calories, 21 g protein)      Relating to beverages:  Reduce caffeine/carbonation/calorie containing beverages  Separate fluids from meals by 30 minutes before, during, and after eating    Relating to activity:  Increase activity as able    Initial Consult / Weight Loss     My Plate Planner_English - Pt Education  [email or web address]    Protein Sources for " Weight Loss  [email or web address]     Carbohydrates  [email or web address]

## 2021-06-05 ENCOUNTER — HEALTH MAINTENANCE LETTER (OUTPATIENT)
Age: 36
End: 2021-06-05

## 2021-06-11 ENCOUNTER — TELEPHONE (OUTPATIENT)
Dept: ENDOCRINOLOGY | Facility: CLINIC | Age: 36
End: 2021-06-11

## 2021-06-11 NOTE — TELEPHONE ENCOUNTER
Called to change appt provider from Jerri to Aileen Duvall. LVM with call center #. Will also send [employer] message.    Same day if possible, otherwise, next available.

## 2021-06-29 ENCOUNTER — VIRTUAL VISIT (OUTPATIENT)
Dept: ENDOCRINOLOGY | Facility: CLINIC | Age: 36
End: 2021-06-29
Payer: COMMERCIAL

## 2021-06-29 DIAGNOSIS — E66.9 OBESITY: ICD-10-CM

## 2021-06-29 DIAGNOSIS — Z71.3 NUTRITIONAL COUNSELING: Primary | ICD-10-CM

## 2021-06-29 PROCEDURE — 97803 MED NUTRITION INDIV SUBSEQ: CPT | Mod: 95 | Performed by: DIETITIAN, REGISTERED

## 2021-06-29 NOTE — PATIENT INSTRUCTIONS
"Balwinder Gonzalez,    Follow-up with RD in 1 month    Thank you,    Aide Cruz, RD, LD  If you would like to schedule or reschedule an appointment with the RD, please call [phone]    Nutrition Goals  Relating To Eating:   - Eat slowly (20-30 minutes per meal), chewing foods well (25 chews per bite/applesauce consistency)     - 9\" Plate method (1/2 plate non-starchy vegetables/fruit, 1/4 plate lean protein, 1/4 plate whole grain starch no more than 1/2 cup carb/meal)              - Lean Protein sources (eggs, chicken, turkey, fish); limit processed and red meats              - bring fruit//veg to fill out 1/2 plate at lunch  - Try a meal replacement shake for breakfast (see list below)     *Protein Shake Criteria: no more than 210 Calories, at least 20 grams of protein, and less than 10 grams of sugar      Meal Replacement Shake Options:   [facility] smoothie (160 Calories, 20 g protein)   Premier Protein (160 Calories, 30 g protein)  Slim Fast Advanced Nutrition (180 Calories, 20 g protein)  Muscle Milk, lactose-free, 17 oz bottle (210 Calories, 30 g protein)  Integrated Supplements, no artificial sugars (110 Calories, 20 g protein)  Genepro, unflavored protein powder (60 Calories, 30 g protein)     Meal Replacement Bar Options:  [facility] Protein Shake (160 Calories, 15 g protein)  Quest Protein Bars (190 Calories, 20 g protein)  Built Bar (170 Calories, 15-20 g protein)  One Protein Bar (210 calories, 20 g protein)  [place] Signature Protein Bar ([employer]) (190 Calories, 21 g protein)  Pure Protein Bars (180 Calories, 21 g protein)        Relating to beverages:  - Reduce caffeine/carbonation/calorie containing beverages  - Separate fluids from meals by 30 minutes before, during, and after eating     Relating to activity:  - Increase activity as able     Initial Consult / Weight Loss      The Plate Method  [email or web address]     Protein Sources for Weight " From: Elayne Chand  To: Mahnaz Gao  Sent: 1/14/2021 9:37 AM CST  Subject: Other    Hello,   There is a pending order in the system for [facility] waiting for Dr. Gao's signature. Would you please have her sign it so I can start these daily infusions at home now? Thank you.   "Loss  [email or web address]      Carbohydrates  [email or web address]      Tools for after surgery:  [employer] Dish Sets: bariatric meal size bowls and plates with built in measurement designs on the dishes     Bariatric Pal: https://store.bariatricpal.com/collections/[email or web address]-dinnerware     Healthy Recipe Resources:  \"The Volumetrics Eating Plan\" by Gabriela Sidhu, Ph.D.  https://www.[employer]/  www.[employer]  www.[employer]  Dash Diet Recipes [facility]  www.extension.[facility] - the recipe box  \"Cooking that Counts\" by editors of [employer]  https://www.[facility]/communityculinary/[facility]_Cookbook_KT_Final_11-6_NoCrops-compressed.pdf  Https://www.Lifestyle [employer] Heritage Comyplate.gov/myplatekitchen  https://snaped.fns.usda.gov/recipes-menus - SNAP recipes  https://www.diabetesfoodhub.org/all-recipes.html  https://www.Mobile Complete.com/     Supplements after Weight Loss Surgery  [email or web address]      Diet Guidelines after Weight-loss Surgery  [email or web address]     Interested in working with a health ?  Health coaches work with you to improve your overall health and wellbeing.  They look at the whole person, and may involve discussion of different areas of life, including, but not limited to the four pillars of health (sleep, exercise, nutrition, and stress management). Discuss with your care team if you would like to start working a health .    Health Coaching-3 Pack:    $99 for three health coaching visits    Visits may be done in person or via phone    Coaching is a partnership between the  and the client; Coaches do not prescribe or diagnose    Coaching helps inspire the client to reach his/her personal goals      Virtual Support Groups are Available             Healthy Lifestyle Support Group      All are welcome!     Facilitator: Galilea Duncan, Certified Health     - Meets once a month on a Friday from 12:30pm to 1:30pm.  - Due to Covid-19 she is doing this " Support Group virtually using Microsoft Teams.  - 60 minutes of small group guided discussion, support and resources.  - Please email Galilea directly at [email or web address] to receive monthly invitations.  - If you opt to participate in individual health coaching sessions or for general questions, contact Galilea via email.  - Galilea will send out invites for each session, so the phone number and the conference ID may change for each session.

## 2021-06-29 NOTE — PROGRESS NOTES
"Larry Bangura is a 36 year old who is being evaluated via a billable video visit.      The patient has been notified of following:     \"This video visit will be conducted via a call between you and your physician/provider. We have found that certain health care needs can be provided without the need for an in-person physical exam.  This service lets us provide the care you need with a video conversation.  If a prescription is necessary we can send it directly to your pharmacy.  If lab work is needed we can place an order for that and you can then stop by our lab to have the test done at a later time.    Video visits are billed at different rates depending on your insurance coverage.  Please reach out to your insurance provider with any questions.    If during the course of the call the physician/provider feels a video visit is not appropriate, you will not be charged for this service.\"    Patient has given verbal consent for Video visit? Yes  How would you like to obtain your AVS? MyChart  If you are dropped from the video visit, the video invite should be resent to: Text to cell phone: [phone]  Will anyone else be joining your video visit? No        Video-Visit Details    Type of service:  Video Visit    Video Start Time: 7:04 AM   Video End Time: 7:30 AM     Originating Location (pt. Location): Home    Distant Location (provider location):  [facility] [place]     Platform used for Video Visit: InStream Media    During this virtual visit the patient is located in [place], patient verifies this as the location during the entirety of this visit.     Return Bariatric Nutrition Consultation Note    Reason For Visit: Nutrition Assessment    Larry Bangura is a 36 year old presenting today for follow-up bariatric nutrition consult. Pt is interested in laparoscopic sleeve gastrectomy with Dr. Liriano expected surgery TBD.  Patient is accompanied by self. This is pt's 5th of 6 required nutrition " "visits prior to surgery.     Pt referred by DELL Duckworth on September 30, 2020.  Patient with Co-morbidities of obesity including:  Type II DM yes  Renal Failure no  Sleep apnea yes  Hypertension yes   Dyslipidemia yes  Joint pain no  Back pain no  GERD no     ANTHROPOMETRICS:  Initial weight: 400 lb   Estimated body mass index is 53.28 kg/m  as calculated from the following:    Height as of 4/7/21: 1.803 m (5' 11\").    Weight as of 5/2/21: 173.3 kg (382 lb).   Current weight (pt stated 6/29):~ 370 lbs     Required weight loss goal pre-op: 50 lbs from initial consult weight (goal weight 350 lbs or less before surgery)      SUPPLEMENT INFORMATION:  None     NUTRITION HISTORY:  Today:   Pt reported reduced snacking to almost no snacking. Reduced portions by eating off smaller plates. Tried making protein shakes for breakfast (w/ protein powder) - however time constraints/busy morning to get to work. Discussed pre-made protein shake (premeire/corepower) to grab on the go for meal replacement/breakfast.       Diet Recall:   B: annie Welsh;   L: left overs from dinner (meat/veg)  D: spaghetti (pork sausage) salad, Mohawk bread  Snack: ice cream.  Beverage: water 1 gallon; (diet coveronique, once/week)      Progress Towards Previous Goals:  Relating To Eating:  Eat slowly (20-30 minutes per meal), chewing foods well (25 chews per bite/applesauce consistency) - met, continues    9\" Plate method (1/2 plate non-starchy vegetables/fruit, 1/4 plate lean protein, 1/4 plate whole grain starch - no more than 1/2 cup carb/meal) - improving, continues (dinner yes, challenges with lunch sometimes)  - Try a protein filled breakfast, a piece of string cheese and scrambled eggs, lactose free cottage cheese, chicken salad, left overs from dinner, a protein shake, meat and cheese roll-ups, fish sticks.      Relating to beverages:  Reduce caffeine/carbonation/calorie containing beverages-Improving, continues (occassional dt coveronique, 1x " "week)  Separate fluids from meals by 30 minutes before, during, and after eating-met, Continues    Relating to activity:  Increase activity as able - improving - around the house      NUTRITION DIAGNOSIS:  Obesity r/t long history of self-monitoring deficit and excessive energy intake aeb BMI >30 kg/m2.- Improving, continues     INTERVENTION:  Intervention Provided/Education:   1. Reviewed and modified goals  2. Reviewed on post-op diet guidelines, vitamins/minerals essential post-operatively, GI anatomy of bariatric surgeries, ways to help prepare for post-op diet guidelines pre-operatively, portion/calorie-control, mindful eating and sources of protein.   3. Praised patient for behavior and diet modifications.   4. AVS via CURRENThart     Patient Understanding: good  Expected Compliance: fair-good    GOALS:  Relating To Eating:   - Eat slowly (20-30 minutes per meal), chewing foods well (25 chews per bite/applesauce consistency)    - 9\" Plate method (1/2 plate non-starchy vegetables/fruit, 1/4 plate lean protein, 1/4 plate whole grain starch no more than 1/2 cup carb/meal)   - Lean Protein sources (eggs, chicken, turkey, fish); limit processed and red meat   - bring fruit//veg to fill out 1/2 plate at lunch  - Try a meal replacement shake for breakfast (see list below)    *Protein Shake Criteria: no more than 210 Calories, at least 20 grams of protein, and less than 10 grams of sugar     Meal Replacement Shake Options:   [facility] smoothie (160 Calories, 20 g protein)   Premier Protein (160 Calories, 30 g protein)  Slim Fast Advanced Nutrition (180 Calories, 20 g protein)  Muscle Milk, lactose-free, 17 oz bottle (210 Calories, 30 g protein)  Integrated Supplements, no artificial sugars (110 Calories, 20 g protein)  Genepro, unflavored protein powder (60 Calories, 30 g protein)    Meal Replacement Bar Options:  [facility] Protein Shake (160 Calories, 15 g protein)  Quest Protein Bars (190 Calories, 20 g " "protein)  Built Bar (170 Calories, 15-20 g protein)  One Protein Bar (210 calories, 20 g protein)  [place] Signature Protein Bar ([employer]) (190 Calories, 21 g protein)  Pure Protein Bars (180 Calories, 21 g protein)      Relating to beverages:  - Reduce caffeine/carbonation/calorie containing beverages  Separate fluids from meals by 30 minutes before, during, and after eating    Relating to activity:  - Increase activity as able    Initial Consult / Weight Loss     The Plate Method  [email or web address]    Protein Sources for Weight Loss  [email or web address]     Carbohydrates  [email or web address]     Tools for after surgery:  [employer] Dish Sets: bariatric meal size bowls and plates with built in measurement designs on the dishes    Bariatric Pal: https://store.bariatricpal.Known/collections/bariatric-dinnerware    Healthy Recipe Resources:  \"The Volumetrics Eating Plan\" by Gabriela Sidhu, Ph.D.  https://www.Box Score Games.Known/  www.PingThings  www.Beepi.Big[employer]  Dash Diet Recipes [facility]  www.extension.[facility] - the recipe box  \"Cooking that Counts\" by editors of [employer]  https://www.[facility]/communityculinary/[facility]_Cookbook_KT_Final_11-6_NoCrops-compressed.pdf  Https://www.choosemyplate.gov/myplatekitchen  https://snaped.fns.usda.gov/recipes-menus - SNAP recipes  https://www.diabetesfoodhub.org/all-recipes.html  https://www.QPD.com/    Supplements after Weight Loss Surgery  [email or web address]     Diet Guidelines after Weight-loss Surgery  [email or web address]     Time spent with patient: 26 minutes.  Aide Cruz RD, LD  "

## 2021-06-29 NOTE — LETTER
"6/29/2021       RE: Larry Bangura  [address] [id number]     Dear Colleague,    Thank you for referring your patient, Larry Bangura, to the [facility] [place] at [facility]. Please see a copy of my visit note below.    Larry Bangura is a 36 year old who is being evaluated via a billable video visit.      The patient has been notified of following:     \"This video visit will be conducted via a call between you and your physician/provider. We have found that certain health care needs can be provided without the need for an in-person physical exam.  This service lets us provide the care you need with a video conversation.  If a prescription is necessary we can send it directly to your pharmacy.  If lab work is needed we can place an order for that and you can then stop by our lab to have the test done at a later time.    Video visits are billed at different rates depending on your insurance coverage.  Please reach out to your insurance provider with any questions.    If during the course of the call the physician/provider feels a video visit is not appropriate, you will not be charged for this service.\"    Patient has given verbal consent for Video visit? Yes  How would you like to obtain your AVS? MyChart  If you are dropped from the video visit, the video invite should be resent to: Text to cell phone: [phone]  Will anyone else be joining your video visit? No        Video-Visit Details    Type of service:  Video Visit    Video Start Time: 7:04 AM   Video End Time: 7:30 AM     Originating Location (pt. Location): Home    Distant Location (provider location):  [facility] [place]     Platform used for Video Visit: Shiny [employer]ds    During this virtual visit the patient is located in [place], patient verifies this as the location during the entirety of this visit.     Return Bariatric Nutrition " "Consultation Note    Reason For Visit: Nutrition Assessment    Larry Bangura is a 36 year old presenting today for follow-up bariatric nutrition consult. Pt is interested in laparoscopic sleeve gastrectomy with Dr. Liriano expected surgery TBD.  Patient is accompanied by self. This is pt's 5th of 6 required nutrition visits prior to surgery.     Pt referred by DELL Duckworth on September 30, 2020.  Patient with Co-morbidities of obesity including:  Type II DM yes  Renal Failure no  Sleep apnea yes  Hypertension yes   Dyslipidemia yes  Joint pain no  Back pain no  GERD no     ANTHROPOMETRICS:  Initial weight: 400 lb   Estimated body mass index is 53.28 kg/m  as calculated from the following:    Height as of 4/7/21: 1.803 m (5' 11\").    Weight as of 5/2/21: 173.3 kg (382 lb).   Current weight (pt stated 6/29):~ 370 lbs     Required weight loss goal pre-op: 50 lbs from initial consult weight (goal weight 350 lbs or less before surgery)      SUPPLEMENT INFORMATION:  None     NUTRITION HISTORY:  Today:   Pt reported reduced snacking to almost no snacking. Reduced portions by eating off smaller plates. Tried making protein shakes for breakfast (w/ protein powder) - however time constraints/busy morning to get to work. Discussed pre-made protein shake (premeire/corepower) to grab on the go for meal replacement/breakfast.       Diet Recall:   B: annie Welsh;   L: left overs from dinner (meat/veg)  D: spaghetti (pork sausage) salad, Austrian bread  Snack: ice cream.  Beverage: water 1 gallon; (diet coke, once/week)      Progress Towards Previous Goals:  Relating To Eating:  Eat slowly (20-30 minutes per meal), chewing foods well (25 chews per bite/applesauce consistency) - met, continues    9\" Plate method (1/2 plate non-starchy vegetables/fruit, 1/4 plate lean protein, 1/4 plate whole grain starch - no more than 1/2 cup carb/meal) - improving, continues (dinner yes, challenges with lunch sometimes)  - Try a protein " "filled breakfast, a piece of string cheese and scrambled eggs, lactose free cottage cheese, chicken salad, left overs from dinner, a protein shake, meat and cheese roll-ups, fish sticks.      Relating to beverages:  Reduce caffeine/carbonation/calorie containing beverages-Improving, continues (occassional richard ugerra, 1x week)  Separate fluids from meals by 30 minutes before, during, and after eating-met, Continues    Relating to activity:  Increase activity as able - improving - around the house      NUTRITION DIAGNOSIS:  Obesity r/t long history of self-monitoring deficit and excessive energy intake aeb BMI >30 kg/m2.- Improving, continues     INTERVENTION:  Intervention Provided/Education:   1. Reviewed and modified goals  2. Reviewed on post-op diet guidelines, vitamins/minerals essential post-operatively, GI anatomy of bariatric surgeries, ways to help prepare for post-op diet guidelines pre-operatively, portion/calorie-control, mindful eating and sources of protein.   3. Praised patient for behavior and diet modifications.   4. AVS via MyChart     Patient Understanding: good  Expected Compliance: fair-good    GOALS:  Relating To Eating:   - Eat slowly (20-30 minutes per meal), chewing foods well (25 chews per bite/applesauce consistency)    - 9\" Plate method (1/2 plate non-starchy vegetables/fruit, 1/4 plate lean protein, 1/4 plate whole grain starch no more than 1/2 cup carb/meal)   - Lean Protein sources (eggs, chicken, turkey, fish); limit processed and red meat   - bring fruit//veg to fill out 1/2 plate at lunch  - Try a meal replacement shake for breakfast (see list below)    *Protein Shake Criteria: no more than 210 Calories, at least 20 grams of protein, and less than 10 grams of sugar     Meal Replacement Shake Options:   [facility] smoothie (160 Calories, 20 g protein)   Premier Protein (160 Calories, 30 g protein)  Slim Fast Advanced Nutrition (180 Calories, 20 g protein)  Muscle Milk, lactose-free, 17 " "oz bottle (210 Calories, 30 g protein)  Integrated Supplements, no artificial sugars (110 Calories, 20 g protein)  Genepro, unflavored protein powder (60 Calories, 30 g protein)    Meal Replacement Bar Options:  [facility] Protein Shake (160 Calories, 15 g protein)  Quest Protein Bars (190 Calories, 20 g protein)  Built Bar (170 Calories, 15-20 g protein)  One Protein Bar (210 calories, 20 g protein)  [place] Signature Protein Bar ([employer]) (190 Calories, 21 g protein)  Pure Protein Bars (180 Calories, 21 g protein)      Relating to beverages:  - Reduce caffeine/carbonation/calorie containing beverages  Separate fluids from meals by 30 minutes before, during, and after eating    Relating to activity:  - Increase activity as able    Initial Consult / Weight Loss     The Plate Method  [email or web address]    Protein Sources for Weight Loss  [email or web address]     Carbohydrates  [email or web address]     Tools for after surgery:  [employer] Data Dish Sets: bariatric meal size bowls and plates with built in measurement designs on the dishes    Bariatric Pal: https://store.bariatricpal.com/collections/bariatric-dinnerware    Healthy Recipe Resources:  \"The Volumetrics Eating Plan\" by Gabriela Sidhu, Ph.D.  https://www.Philtro.GPB Scientific/  www.ADAPTIX  www.[employer].org  Dash Diet Recipes [facility]  www.extension.[facility] - the recipe box  \"Cooking that Counts\" by editors of [employer]  https://www.[facility]/communityculinary/[facility]_Cookbook_KT_Final_11-6_NoCrops-compressed.pdf  Https://www.choosemyplate.gov/myplatekitchen  https://snaped.fns.usda.gov/recipes-menus - SNAP recipes  https://www.diabetesfoodhub.org/all-recipes.html  https://www.Nativoo.com/    Supplements after Weight Loss Surgery  [email or web address]     Diet Guidelines after Weight-loss Surgery  [email or web address]     Time spent with patient: 26 minutes.  Aide Cruz RD, LD      "

## 2021-07-07 DIAGNOSIS — G47.33 OSA (OBSTRUCTIVE SLEEP APNEA): Primary | ICD-10-CM

## 2021-07-30 ENCOUNTER — DOCUMENTATION ONLY (OUTPATIENT)
Dept: SLEEP MEDICINE | Facility: CLINIC | Age: 36
End: 2021-07-30

## 2021-07-30 NOTE — PROGRESS NOTES
Patient came to virtual set up via telephone visit for mask fitting appointment on July 30, 2021. Patient requested to switch masks because soreness/skin irritation . Discussed the following masks: N20,ESON2 ,MIRAGE FX Patient selected a Resmed Mask name: N20 Nasal mask size Large.

## 2021-09-15 ENCOUNTER — TELEPHONE (OUTPATIENT)
Dept: SURGERY | Facility: CLINIC | Age: 36
End: 2021-09-15

## 2021-09-15 ENCOUNTER — TELEPHONE (OUTPATIENT)
Dept: NEUROPSYCHOLOGY | Facility: CLINIC | Age: 36
End: 2021-09-15

## 2021-09-15 ENCOUNTER — VIRTUAL VISIT (OUTPATIENT)
Dept: ENDOCRINOLOGY | Facility: CLINIC | Age: 36
End: 2021-09-15
Payer: COMMERCIAL

## 2021-09-15 VITALS — BODY MASS INDEX: 53.28 KG/M2 | HEIGHT: 71 IN

## 2021-09-15 DIAGNOSIS — E66.01 CLASS 3 SEVERE OBESITY DUE TO EXCESS CALORIES WITH SERIOUS COMORBIDITY AND BODY MASS INDEX (BMI) OF 50.0 TO 59.9 IN ADULT (H): Primary | ICD-10-CM

## 2021-09-15 DIAGNOSIS — E66.813 CLASS 3 SEVERE OBESITY DUE TO EXCESS CALORIES WITH SERIOUS COMORBIDITY AND BODY MASS INDEX (BMI) OF 50.0 TO 59.9 IN ADULT (H): Primary | ICD-10-CM

## 2021-09-15 PROCEDURE — 99214 OFFICE O/P EST MOD 30 MIN: CPT | Mod: 95 | Performed by: PHYSICIAN ASSISTANT

## 2021-09-15 ASSESSMENT — PAIN SCALES - GENERAL: PAINLEVEL: NO PAIN (0)

## 2021-09-15 NOTE — LETTER
9/15/2021       RE: Larry Bangura  [address] [facility] [address]     Dear Colleague,    Thank you for referring your patient, Larry Bangura, to the [facility] [place] at [facility]. Please see a copy of my visit note below.    Carlos is a 36 year old who is being evaluated via a billable video visit.      How would you like to obtain your AVS? MyChart  If the video visit is dropped, the invitation should be resent by: Text to cell phone: [phone]  Will anyone else be joining your video visit? No    Video Start Time: 735    Video-Visit Details    Type of service:  Video Visit    Video End Time:8:01 AM    Originating Location (pt. Location): Home    Distant Location (provider location):  [facility] [place]     Platform used for Video Visit: Jenny       Pre-Bariatric Surgery Note    Shelton Mcconnell    Date: 9/15/2021     RE: Larry Bangura    MR#: [id number]   : 1985   Date of Visit: Sep 15, 2021    REASON FOR VISIT: Preoperative evaluation for possible weight loss surgery    Dear Shelton Cota,    I had the pleasure of seeing your patient, Larry Bangura, in my preoperative bariatric clinic.    As you know, he is morbidly obese and considering weight loss surgery to treat obesity in association with his medical conditions of obesity.  His consult weight was 400.  He has lost ? pounds since his consult weight. He has not met his required pre-surgery weight. Please refer to initial consult note from date 21 for patient's weight history and co-morbidities.    Hoping for bariatric surgery.   Last appt 21 and was taking victoza and topiramate  Self stopped topiramate shortly after last visit due to memory issues even at lower doses.  Self stopped victoza due to diarrhea/nausea  Phentermine: irritability, insomnia, suicidal thoughts  Unsure current weight, estimates 375  lbs  Still taking metformin PCP   Insurance didn't cover ozempic    PLAN:  Call Oliver to schedule psych eval [phone] and discuss number of dietitian visits and surgery timing  Need to see dietitian soon  Need current weight   Labs at any [facility] lab. To find a lab location near you, please call [phone].  See Sada or Vale in 1-2 months  Limited on anti obesity medication options due to side effects may need to try modified liquid diet. Discuss with dietitian.   Goal weight 350 lbs per Dr liriano (50 lbs from 400).  I recommend seeing Dr Liriano in person again closer to surgery.    30 minutes spent on the date of the encounter doing chart review, history and exam, documentation and further activities per the note    Most recent weights:  Wt Readings from Last 4 Encounters:   05/02/21 (!) 173.3 kg (382 lb)   04/07/21 (!) 173.3 kg (382 lb)   04/05/21 (!) 176 kg (388 lb)   02/03/21 (!) 173.7 kg (383 lb)         ROS    Past Medical History:   Diagnosis Date     HTN (hypertension) 9/10/2013     Morbid obesity (H)        Past Surgical History:   Procedure Laterality Date     ENDOSCOPIC RELEASE CARPAL TUNNEL  12/6/2013    Procedure: ENDOSCOPIC RELEASE CARPAL TUNNEL;  Right Hand Endoscopic Carpal Tunnel Release;  Surgeon: Kody Pittman MD;  Location: [facility] OR     ENDOSCOPIC RELEASE CARPAL TUNNEL  1/21/2014    Procedure: ENDOSCOPIC RELEASE CARPAL TUNNEL;  Left endoscopic carpal tunnel release, possible open;  Surgeon: Kody Pittman MD;  Location: [facility] OR       Current Outpatient Medications   Medication     lisinopril (ZESTRIL) 20 MG tablet     metFORMIN (GLUCOPHAGE) 1000 MG tablet     ORDER FOR DME, SET TO FAX,     valACYclovir (VALTREX) 1000 mg tablet     valACYclovir (VALTREX) 500 MG tablet     insulin pen needle (B-D U/F) 31G X 8 MM miscellaneous     liraglutide (VICTOZA) 18 MG/3ML solution     topiramate (TOPAMAX) 25 MG tablet     No current facility-administered medications for this visit.       No  "Known Allergies    Orders Only on 02/17/2021   Component Date Value Ref Range Status     COVID-19 Virus PCR to [facility] - So* 02/17/2021 Nasopharyngeal   Final     COVID-19 Virus PCR to [facility] - Re* 02/17/2021 Test received-See reflex to IDDL test SARS CoV2 (COVID-19) Virus RT-PCR   Final     SARS-CoV-2 Virus Specimen Source 02/17/2021 Nasopharyngeal   Final     SARS-CoV-2 PCR Result 02/17/2021 NEGATIVE   Final    SARS-CoV2 (COVID-19) RNA not detected, presumed negative.     SARS-CoV-2 PCR Comment 02/17/2021    Final                    Value:Testing was performed using the [employer] SARS-CoV-2 Assay on the [employer] Instrument System.   Additional information about this Emergency Use Authorization (EUA) assay can be found via   the Lab Guide.      Comment: This test should be ordered for the detection of SARS-CoV-2 in individuals who   meet SARS-CoV-2 clinical and/or epidemiological criteria. Test performance is   unknown in asymptomatic patients.  This test is for in vitro diagnostic use under the FDA EUA for laboratories   certified under CLIA to perform high complexity testing. This test has not   been FDA cleared or approved.  A negative result does not rule out the presence of PCR inhibitors in the   specimen or target RNA in concentration below the limit of detection for the   assay. The possibility of a false negative should be considered if the   patient's recent exposure or clinical presentation suggests COVID-19.  This test was validated by the [facility] Infectious Diseases   Diagnostic Laboratory. This laboratory is certified under the Clinical   Laboratory Improvement Amendments of 1988 (CLIA-88) as qualified to perform   high complexity laboratory testing.           PHYSICAL EXAM:  Objective    Ht 1.803 m (5' 11\")   BMI 53.28 kg/m    Vitals - Patient Reported  Pain Score: No Pain (0)        Physical Exam   GENERAL: Healthy, alert and no distress  EYES: Eyes grossly normal to inspection.  No " discharge or erythema, or obvious scleral/conjunctival abnormalities.  RESP: No audible wheeze, cough, or visible cyanosis.  No visible retractions or increased work of breathing.    SKIN: Visible skin clear. No significant rash, abnormal pigmentation or lesions.  NEURO: Cranial nerves grossly intact.  Mentation and speech appropriate for age.  PSYCH: Mentation appears normal, affect normal/bright, judgement and insight intact, normal speech and appearance well-groomed.    Sleeve Gastrectomy: Risks and Side Effects    The complications or risks of surgery include but are not limited to: death, heart attack, infection in the surgical site (wound infection), abdomen (abscess), bladder (urinary tract infection), lungs (pneumonia), clots in legs (deep vein thrombosis) or lungs (pulmonary emboli),  injury to the bowels or other organs, bowel obstruction, hernia at the incision and gastrointestional bleeding.    More specific risks related to vertical sleeve gastrectomy were detailed at the bariatric informational seminar and include the following: leak at the vertical sleeve staple line, leak at the anastomoses,  nausea, vomiting, and dehydration for several months,  adhesions causing bowel obstruction, rapid weight loss causing a higher rate of gallstone formation during the first 6 months after surgery, decreased absorption of vitamins and protein because of the reduced stomach size, weight regain if inappropriate food intake occurs, stricture, injury to other organs, hernia,  and ulcers.       Side effects of bariatric surgery include but are not limited to: abdominal pain, cramping, bloating, constipation, nausea, vomiting, diarrhea, difficulty swallowing,  dehydration, hair loss, excess skin, protein, iron and vitamin deficiencies, heartburn, transfer of addictions, increased anxiety and worsening depression.       I emphasized exercise and activity behavior along with appropriate food choice as the main foundation  for weight loss with surgery providing surgical reinforcement of the appropriate behavior set.        Review of general surgery weight loss process    1. Complete preoperative requirements, including weight loss.  Final weight check to confirm MANDATORY weight loss requirement must be documented on a clinic scale.    2. Discuss prior authorization with .    3. History and physical evaluation by PCP of PAC clinic within 30 days of surgery date, preoperative class, and weight check (weigh-in visit) to be scheduled by patient.  Pre-anesthesia clinic for risk evaluation to be scheduled by anesthesia clinic.    4. We cannot guarantee that patient will qualify for surgery unless all preoperative requirements are met, prior authorization from primary insurance company is granted, and insurance changes do not occur.    5. It is possible for patients to regain all weight after weight loss surgery unless they follow guidelines prescribed by our bariatric center.    6. All patients with gastrointestinal complaints after weight loss surgery must have complaints conveyed to the bariatric team for appropriate treatment.    7. Vitamin deficiencies may develop post-bariatric surgery and annual laboratory testing should be performed.    8. Persistent nausea/vomiting after bariatric surgery entails risk of thiamine deficiency and should be treated early.  Vitamin B12 deficiency may develop, especially after gastric bypass surgery and must be recognized.        If you have any questions about our plans please don't hesitate to contact me.    Sincerely,    Sada Beach PA-C

## 2021-09-15 NOTE — TELEPHONE ENCOUNTER
Patient called to schedule next available pre-bariatric surgery psychological evaluation with Dr. Montanez, scheduled 9/27 at 8:30 a.m. for video visit, to plan on 2 hours for interview and MMPI testing.

## 2021-09-15 NOTE — PROGRESS NOTES
Carlos is a 36 year old who is being evaluated via a billable video visit.      How would you like to obtain your AVS? MyChart  If the video visit is dropped, the invitation should be resent by: Text to cell phone: [phone]  Will anyone else be joining your video visit? No    Video Start Time: 735    Video-Visit Details    Type of service:  Video Visit    Video End Time:8:01 AM    Originating Location (pt. Location): Home    Distant Location (provider location):  [facility] [place]     Platform used for Video Visit: Jenny       Pre-Bariatric Surgery Note    Shelton Mcconnell    Date: 9/15/2021     RE: Larry Bangura    MR#: [id number]   : 1985   Date of Visit: Sep 15, 2021    REASON FOR VISIT: Preoperative evaluation for possible weight loss surgery    Dear Shelton Cota,    I had the pleasure of seeing your patient, Larry Bangura, in my preoperative bariatric clinic.    As you know, he is morbidly obese and considering weight loss surgery to treat obesity in association with his medical conditions of obesity.  His consult weight was 400.  He has lost ? pounds since his consult weight. He has not met his required pre-surgery weight. Please refer to initial consult note from date 21 for patient's weight history and co-morbidities.    Hoping for bariatric surgery.   Last appt 21 and was taking victoza and topiramate  Self stopped topiramate shortly after last visit due to memory issues even at lower doses.  Self stopped victoza due to diarrhea/nausea  Phentermine: irritability, insomnia, suicidal thoughts  Unsure current weight, estimates 375 lbs  Still taking metformin PCP   Insurance didn't cover ozempic    PLAN:  Call Oliver to schedule psych eval [phone] and discuss number of dietitian visits and surgery timing  Need to see dietitian soon  Need current weight   Labs at any [facility] lab. To find a lab location near you, please call [phone].  See Sada or  Vale in 1-2 months  Limited on anti obesity medication options due to side effects may need to try modified liquid diet. Discuss with dietitian.   Goal weight 350 lbs per Dr liriano (50 lbs from 400).  I recommend seeing Dr Liriano in person again closer to surgery.    30 minutes spent on the date of the encounter doing chart review, history and exam, documentation and further activities per the note    Most recent weights:  Wt Readings from Last 4 Encounters:   05/02/21 (!) 173.3 kg (382 lb)   04/07/21 (!) 173.3 kg (382 lb)   04/05/21 (!) 176 kg (388 lb)   02/03/21 (!) 173.7 kg (383 lb)         ROS    Past Medical History:   Diagnosis Date     HTN (hypertension) 9/10/2013     Morbid obesity (H)        Past Surgical History:   Procedure Laterality Date     ENDOSCOPIC RELEASE CARPAL TUNNEL  12/6/2013    Procedure: ENDOSCOPIC RELEASE CARPAL TUNNEL;  Right Hand Endoscopic Carpal Tunnel Release;  Surgeon: Kody Pittman MD;  Location: [facility]     ENDOSCOPIC RELEASE CARPAL TUNNEL  1/21/2014    Procedure: ENDOSCOPIC RELEASE CARPAL TUNNEL;  Left endoscopic carpal tunnel release, possible open;  Surgeon: Kody Pittman MD;  Location: [facility] OR       Current Outpatient Medications   Medication     lisinopril (ZESTRIL) 20 MG tablet     metFORMIN (GLUCOPHAGE) 1000 MG tablet     ORDER FOR DME, SET TO FAX,     valACYclovir (VALTREX) 1000 mg tablet     valACYclovir (VALTREX) 500 MG tablet     insulin pen needle (B-D U/F) 31G X 8 MM miscellaneous     liraglutide (VICTOZA) 18 MG/3ML solution     topiramate (TOPAMAX) 25 MG tablet     No current facility-administered medications for this visit.       No Known Allergies    Orders Only on 02/17/2021   Component Date Value Ref Range Status     COVID-19 Virus PCR to [facility] - So* 02/17/2021 Nasopharyngeal   Final     COVID-19 Virus PCR to [facility] - Re* 02/17/2021 Test received-See reflex to IDDL test SARS CoV2 (COVID-19) Virus RT-PCR   Final     SARS-CoV-2 Virus Specimen  "Source 02/17/2021 Nasopharyngeal   Final     SARS-CoV-2 PCR Result 02/17/2021 NEGATIVE   Final    SARS-CoV2 (COVID-19) RNA not detected, presumed negative.     SARS-CoV-2 PCR Comment 02/17/2021    Final                    Value:Testing was performed using the [employer] SARS-CoV-2 Assay on the [employer] Instrument System.   Additional information about this Emergency Use Authorization (EUA) assay can be found via   the Lab Guide.      Comment: This test should be ordered for the detection of SARS-CoV-2 in individuals who   meet SARS-CoV-2 clinical and/or epidemiological criteria. Test performance is   unknown in asymptomatic patients.  This test is for in vitro diagnostic use under the FDA EUA for laboratories   certified under CLIA to perform high complexity testing. This test has not   been FDA cleared or approved.  A negative result does not rule out the presence of PCR inhibitors in the   specimen or target RNA in concentration below the limit of detection for the   assay. The possibility of a false negative should be considered if the   patient's recent exposure or clinical presentation suggests COVID-19.  This test was validated by the [facility] Infectious Diseases   Diagnostic Laboratory. This laboratory is certified under the Clinical   Laboratory Improvement Amendments of 1988 (CLIA-88) as qualified to perform   high complexity laboratory testing.           PHYSICAL EXAM:  Objective    Ht 1.803 m (5' 11\")   BMI 53.28 kg/m    Vitals - Patient Reported  Pain Score: No Pain (0)        Physical Exam   GENERAL: Healthy, alert and no distress  EYES: Eyes grossly normal to inspection.  No discharge or erythema, or obvious scleral/conjunctival abnormalities.  RESP: No audible wheeze, cough, or visible cyanosis.  No visible retractions or increased work of breathing.    SKIN: Visible skin clear. No significant rash, abnormal pigmentation or lesions.  NEURO: Cranial nerves grossly intact.  Mentation and speech " appropriate for age.  PSYCH: Mentation appears normal, affect normal/bright, judgement and insight intact, normal speech and appearance well-groomed.    Sleeve Gastrectomy: Risks and Side Effects    The complications or risks of surgery include but are not limited to: death, heart attack, infection in the surgical site (wound infection), abdomen (abscess), bladder (urinary tract infection), lungs (pneumonia), clots in legs (deep vein thrombosis) or lungs (pulmonary emboli),  injury to the bowels or other organs, bowel obstruction, hernia at the incision and gastrointestional bleeding.    More specific risks related to vertical sleeve gastrectomy were detailed at the bariatric informational seminar and include the following: leak at the vertical sleeve staple line, leak at the anastomoses,  nausea, vomiting, and dehydration for several months,  adhesions causing bowel obstruction, rapid weight loss causing a higher rate of gallstone formation during the first 6 months after surgery, decreased absorption of vitamins and protein because of the reduced stomach size, weight regain if inappropriate food intake occurs, stricture, injury to other organs, hernia,  and ulcers.       Side effects of bariatric surgery include but are not limited to: abdominal pain, cramping, bloating, constipation, nausea, vomiting, diarrhea, difficulty swallowing,  dehydration, hair loss, excess skin, protein, iron and vitamin deficiencies, heartburn, transfer of addictions, increased anxiety and worsening depression.       I emphasized exercise and activity behavior along with appropriate food choice as the main foundation for weight loss with surgery providing surgical reinforcement of the appropriate behavior set.        Review of general surgery weight loss process    1. Complete preoperative requirements, including weight loss.  Final weight check to confirm MANDATORY weight loss requirement must be documented on a clinic scale.    2.  Discuss prior authorization with .    3. History and physical evaluation by PCP of PAC clinic within 30 days of surgery date, preoperative class, and weight check (weigh-in visit) to be scheduled by patient.  Pre-anesthesia clinic for risk evaluation to be scheduled by anesthesia clinic.    4. We cannot guarantee that patient will qualify for surgery unless all preoperative requirements are met, prior authorization from primary insurance company is granted, and insurance changes do not occur.    5. It is possible for patients to regain all weight after weight loss surgery unless they follow guidelines prescribed by our bariatric center.    6. All patients with gastrointestinal complaints after weight loss surgery must have complaints conveyed to the bariatric team for appropriate treatment.    7. Vitamin deficiencies may develop post-bariatric surgery and annual laboratory testing should be performed.    8. Persistent nausea/vomiting after bariatric surgery entails risk of thiamine deficiency and should be treated early.  Vitamin B12 deficiency may develop, especially after gastric bypass surgery and must be recognized.        If you have any questions about our plans please don't hesitate to contact me.    Sincerely,    Sada Beach PA-C

## 2021-09-15 NOTE — Clinical Note
Oliver and Flor, this osvaldo wants to know why surgery is taking so long yet he has never re-scheduled psych eval with Tanvi Montanez as planned, has no idea where his weight is currently and never let us know he stopped his weight loss meds due to side effects back in April.  Last RD was  with Aide and it says 5/6 but when I count visits it looks to me that he had /Feb/March/April/May/ so I think he had 6 but they will  in .  His insurance is [employer] through the state so he said he has no idea what it will change to after  also.  I told him to get back into RD visits, get labs, get psych eval schedule and discuss insurance with you Oliver.  THanks!

## 2021-09-15 NOTE — PATIENT INSTRUCTIONS
Call Oliver to schedule psych eval [phone] and discuss number of dietitian visits and surgery timing  Need to see dietitian soon  Need current weight   Labs at any [facility] lab. To find a lab location near you, please call [phone].  See Sada or Vale in 1-2 months  Limited on anti obesity medication options due to side effects may need to try modified liquid diet. Discuss with dietitian.

## 2021-09-15 NOTE — TELEPHONE ENCOUNTER
TT PT who already scheduled RD visit for 9/17 with Nataliya Monsivais @ 11am. 12/1 with Vale Weeks @ 5:15pm pre surg.

## 2021-09-15 NOTE — NURSING NOTE
"Chief Complaint   Patient presents with     RECHECK     Follow up pre surg.       Vitals:    09/15/21 0709   Height: 1.803 m (5' 11\")       Body mass index is 53.28 kg/m .                          Danielle Sapp, EMT    "

## 2021-09-16 NOTE — PROGRESS NOTES
"Larry Bangura is a 36 year old who is being evaluated via a billable video visit.      The patient has been notified of following:     \"This video visit will be conducted via a call between you and your physician/provider. We have found that certain health care needs can be provided without the need for an in-person physical exam.  This service lets us provide the care you need with a video conversation.  If a prescription is necessary we can send it directly to your pharmacy.  If lab work is needed we can place an order for that and you can then stop by our lab to have the test done at a later time.    Video visits are billed at different rates depending on your insurance coverage.  Please reach out to your insurance provider with any questions.    If during the course of the call the physician/provider feels a video visit is not appropriate, you will not be charged for this service.\"    Patient has given verbal consent for Video visit? Yes  How would you like to obtain your AVS? MyChart  If you are dropped from the video visit, the video invite should be resent to: Text to cell phone: [phone]  Will anyone else be joining your video visit? No        Video-Visit Details    Type of service:  Video Visit    Video Start Time: 10:48 AM  Video End Time: 11:06 AM    Originating Location (pt. Location): Home    Distant Location (provider location):  [facility] [place]     Platform used for Video Visit: TRELYS    During this virtual visit the patient is located in [place], patient verifies this as the location during the entirety of this visit.     Return Bariatric Nutrition Consultation Note    Reason For Visit: Nutrition Assessment    Larry Bangura is a 36 year old presenting today for follow-up bariatric nutrition consult. Pt is interested in laparoscopic sleeve gastrectomy with Dr. Liriano expected surgery TBD.  Patient is accompanied by self. This is pt's 6th of 6 required nutrition " "visits prior to surgery.     Pt referred by DELL Duckworth on September 30, 2020.  Patient with Co-morbidities of obesity including:  Type II DM yes  Renal Failure no  Sleep apnea yes  Hypertension yes   Dyslipidemia yes  Joint pain no  Back pain no  GERD no     ANTHROPOMETRICS:  Initial weight: 400 lb     Current weight (pt stated 9/17): 375 lbs (-25 lbs from initial, intends on having weight checked when he goes in to have labs done next week)    Required weight loss goal pre-op: 50 lbs from initial consult weight (goal weight 350 lbs or less before surgery)      SUPPLEMENT INFORMATION:  None     NUTRITION HISTORY:  Pt reported reduced snacking to almost no snacking. Reduced portions by eating off smaller plates. Tried making protein shakes for breakfast (w/ protein powder) - however time constraints/busy morning to get to work. Discussed pre-made protein shake (premeire/corepower) to grab on the go for meal replacement/breakfast.     Diet Recall:   B: 1-2 Sam Dove bfast sandwich  L: sandwich (homemade, ham and cheese with butter) + ritz baked chips  D: chicken (fist sized) and hashbrowns (1.5x fist sized meal)  Snack: dilly bar (not having nightly)   Beverage: water 1 gallon    Progress Towards Previous Goals:  Relating To Eating:  Eat slowly (20-30 minutes per meal), chewing foods well (25 chews per bite/applesauce consistency) - met, continues    9\" Plate method (1/2 plate non-starchy vegetables/fruit, 1/4 plate lean protein, 1/4 plate whole grain starch - no more than 1/2 cup carb/meal) - Improving, plans on making chili this week  - Try a protein filled breakfast, a piece of string cheese and scrambled eggs, lactose free cottage cheese, chicken salad, left overs from dinner, a protein shake, meat and cheese roll-ups, fish sticks.      Relating to beverages:  Reduce caffeine/carbonation/calorie containing beverages - Improving, continues (occassional richard guerra, 1x week)  Separate fluids from meals by 30 " minutes before, during, and after eating-met, Continues    Relating to activity:  Increase activity as able - Anticipates more activity with moving coming up       NUTRITION DIAGNOSIS:  Obesity r/t long history of self-monitoring deficit and excessive energy intake aeb BMI >30 kg/m2.- Improving, continues     INTERVENTION:  Intervention Provided/Education:   1. Reviewed and modified goals  2. Reviewed on post-op diet guidelines, vitamins/minerals essential post-operatively, GI anatomy of bariatric surgeries, ways to help prepare for post-op diet guidelines pre-operatively, portion/calorie-control and healthy food choice for wt loss and DM2 mgmt, mindful eating and sources of protein.   3. Encouraged pt start the Mod Liq diet, starting with replacing 1 meal per day, advancing to replacing 2 meals per day. Discussed using lean protein, increased non-starchy veggies and reduced refined carb at third meal. Discussed protein bar/shake options. Pt demonstrates understanding.   4. AVS via "Mobile Location, IP"t     Patient Understanding: good  Expected Compliance: fair-good    GOALS:  Relating To Eating:  - Eat slowly (20-30 minutes per meal), chewing foods well (25 chews per bite/applesauce consistency)  - Replace 1-2 meals per day with protein shake/bar (see recommendations below)     Follow the Modified Liquid Diet for weight loss:  Breakfast: Protein Shake  Lunch: Protein Shake  Supper: 3 oz lean protein + non-starchy vegetables  Snack: protein bar/shalke  Beverages: at least 48-64 oz water between meals daily    Meal Replacement Shake Options:   *Protein Shake Criteria: no more than 210 Calories, at least 20 grams of protein, and less than 10 grams of sugar   [facility] smoothie (160 Calories, 20 g protein)   Premier Protein (160 Calories, 30 g protein)  Slim Fast Advanced Nutrition (180 Calories, 20 g protein)  Muscle Milk, lactose-free, 17 oz bottle (210 Calories, 30 g protein)  Integrated Supplements, no artificial sugars (110  "Calories, 20 g protein)  Genepro, unflavored protein powder (60 Calories, 30 g protein)  Boost/Ensure Max (160 calories, 30 gm protein)   [facility] Core Power (170 calories, 26 gm protein)    Meal Replacement Bar Options:  [facility] Protein Shake (160 Calories, 15 g protein)  Quest Protein Bars (190 Calories, 20 g protein)  Built Bar (170 Calories, 15-20 g protein)  One Protein Bar (210 calories, 20 g protein)  [facility] Signature Protein Bar ([employer]) (190 Calories, 21 g protein)  Pure Protein Bars (180 Calories, 21 g protein)    Frozen Meal Replacements  Healthy Choice  Lean Cuisine  Atkins Meals  Smart Ones    Relating to beverages:  - Reduce caffeine/carbonation/calorie containing beverages  Separate fluids from meals by 30 minutes before, during, and after eating    Relating to activity:  - Increase activity as able    Diet Guidelines after Weight-loss Surgery  [email or web address]     Protein Sources for Weight Loss  [email or web address]       Tools for after surgery:  Orgdot Dish Sets: bariatric meal size bowls and plates with built in measurement designs on the dishes    Bariatric Pal: https://store.bariatricpal.com/collections/bariatric-dinnerware    Healthy Recipe Resources:  \"The Volumetrics Eating Plan\" by Gabriela Sidhu, Ph.D.  https://www.[employer]addle [employer])/  www.Med[employer] Systems  www.[employer].org  Dash Diet Recipes [facility]  www.extension.[facility] - the recipe box  \"Cooking that Counts\" by editors of SnapAppointments  https://www.[facility]/communityculinary/[facility]_Cookbook_KT_Final_11-6_NoCrops-compressed.pdf  Https://www.choosemyplate.gov/myplatekitchen  https://snaped.fns.usda.gov/recipes-menus - SNAP recipes  https://www.diabetesfoodhub.org/all-recipes.html  https://www.[employer].com/      Time spent with patient: 18 minutes.  Nataliya Monsivais RD, LD  "

## 2021-09-17 ENCOUNTER — VIRTUAL VISIT (OUTPATIENT)
Dept: ENDOCRINOLOGY | Facility: CLINIC | Age: 36
End: 2021-09-17
Payer: COMMERCIAL

## 2021-09-17 DIAGNOSIS — E66.01 MORBID OBESITY (H): ICD-10-CM

## 2021-09-17 DIAGNOSIS — Z71.3 NUTRITIONAL COUNSELING: Primary | ICD-10-CM

## 2021-09-17 DIAGNOSIS — E66.9 OBESITY: ICD-10-CM

## 2021-09-17 DIAGNOSIS — E11.9 TYPE 2 DIABETES MELLITUS WITHOUT COMPLICATION, WITHOUT LONG-TERM CURRENT USE OF INSULIN (H): ICD-10-CM

## 2021-09-17 PROCEDURE — 97803 MED NUTRITION INDIV SUBSEQ: CPT | Mod: 95 | Performed by: DIETITIAN, REGISTERED

## 2021-09-17 NOTE — LETTER
"9/17/2021       RE: Larry Bangura  [address] [facility] [address]     Dear Colleague,    Thank you for referring your patient, Larry Bangura, to the [facility] [place] at [facility]. Please see a copy of my visit note below.    Larry Bangura is a 36 year old who is being evaluated via a billable video visit.      The patient has been notified of following:     \"This video visit will be conducted via a call between you and your physician/provider. We have found that certain health care needs can be provided without the need for an in-person physical exam.  This service lets us provide the care you need with a video conversation.  If a prescription is necessary we can send it directly to your pharmacy.  If lab work is needed we can place an order for that and you can then stop by our lab to have the test done at a later time.    Video visits are billed at different rates depending on your insurance coverage.  Please reach out to your insurance provider with any questions.    If during the course of the call the physician/provider feels a video visit is not appropriate, you will not be charged for this service.\"    Patient has given verbal consent for Video visit? Yes  How would you like to obtain your AVS? MyChart  If you are dropped from the video visit, the video invite should be resent to: Text to cell phone: [phone]  Will anyone else be joining your video visit? No        Video-Visit Details    Type of service:  Video Visit    Video Start Time: 10:48 AM  Video End Time: 11:06 AM    Originating Location (pt. Location): Home    Distant Location (provider location):  [facility] [place]     Platform used for Video Visit: FundedByMe    During this virtual visit the patient is located in [place], patient verifies this as the location during the entirety of this visit.     Return Bariatric " "Nutrition Consultation Note    Reason For Visit: Nutrition Assessment    Larry Bangura is a 36 year old presenting today for follow-up bariatric nutrition consult. Pt is interested in laparoscopic sleeve gastrectomy with Dr. Liriano expected surgery TBD.  Patient is accompanied by self. This is pt's 6th of 6 required nutrition visits prior to surgery.     Pt referred by DELL Duckworth on September 30, 2020.  Patient with Co-morbidities of obesity including:  Type II DM yes  Renal Failure no  Sleep apnea yes  Hypertension yes   Dyslipidemia yes  Joint pain no  Back pain no  GERD no     ANTHROPOMETRICS:  Initial weight: 400 lb     Current weight (pt stated 9/17): 375 lbs (-25 lbs from initial, intends on having weight checked when he goes in to have labs done next week)    Required weight loss goal pre-op: 50 lbs from initial consult weight (goal weight 350 lbs or less before surgery)      SUPPLEMENT INFORMATION:  None     NUTRITION HISTORY:  Pt reported reduced snacking to almost no snacking. Reduced portions by eating off smaller plates. Tried making protein shakes for breakfast (w/ protein powder) - however time constraints/busy morning to get to work. Discussed pre-made protein shake (premeire/corepower) to grab on the go for meal replacement/breakfast.     Diet Recall:   B: 1-2 Sam Derrell bfast sandwich  L: sandwich (homemade, ham and cheese with butter) + ritz baked chips  D: chicken (fist sized) and hashbrowns (1.5x fist sized meal)  Snack: dilly bar (not having nightly)   Beverage: water 1 gallon    Progress Towards Previous Goals:  Relating To Eating:  Eat slowly (20-30 minutes per meal), chewing foods well (25 chews per bite/applesauce consistency) - met, continues    9\" Plate method (1/2 plate non-starchy vegetables/fruit, 1/4 plate lean protein, 1/4 plate whole grain starch - no more than 1/2 cup carb/meal) - Improving, plans on making chili this week  - Try a protein filled breakfast, a piece of " string cheese and scrambled eggs, lactose free cottage cheese, chicken salad, left overs from dinner, a protein shake, meat and cheese roll-ups, fish sticks.      Relating to beverages:  Reduce caffeine/carbonation/calorie containing beverages - Improving, continues (occassional richard guerra, 1x week)  Separate fluids from meals by 30 minutes before, during, and after eating-met, Continues    Relating to activity:  Increase activity as able - Anticipates more activity with moving coming up       NUTRITION DIAGNOSIS:  Obesity r/t long history of self-monitoring deficit and excessive energy intake aeb BMI >30 kg/m2.- Improving, continues     INTERVENTION:  Intervention Provided/Education:   1. Reviewed and modified goals  2. Reviewed on post-op diet guidelines, vitamins/minerals essential post-operatively, GI anatomy of bariatric surgeries, ways to help prepare for post-op diet guidelines pre-operatively, portion/calorie-control and healthy food choice for wt loss and DM2 mgmt, mindful eating and sources of protein.   3. Encouraged pt start the Mod Liq diet, starting with replacing 1 meal per day, advancing to replacing 2 meals per day. Discussed using lean protein, increased non-starchy veggies and reduced refined carb at third meal. Discussed protein bar/shake options. Pt demonstrates understanding.   4. AVS via [employer]     Patient Understanding: good  Expected Compliance: fair-good    GOALS:  Relating To Eating:  - Eat slowly (20-30 minutes per meal), chewing foods well (25 chews per bite/applesauce consistency)  - Replace 1-2 meals per day with protein shake/bar (see recommendations below)     Follow the Modified Liquid Diet for weight loss:  Breakfast: Protein Shake  Lunch: Protein Shake  Supper: 3 oz lean protein + non-starchy vegetables  Snack: protein bar/shalke  Beverages: at least 48-64 oz water between meals daily    Meal Replacement Shake Options:   *Protein Shake Criteria: no more than 210 Calories, at least  "20 grams of protein, and less than 10 grams of sugar   [facility] smoothie (160 Calories, 20 g protein)   Premier Protein (160 Calories, 30 g protein)  Slim Fast Advanced Nutrition (180 Calories, 20 g protein)  Muscle Milk, lactose-free, 17 oz bottle (210 Calories, 30 g protein)  Integrated Supplements, no artificial sugars (110 Calories, 20 g protein)  Genepro, unflavored protein powder (60 Calories, 30 g protein)  Boost/Ensure Max (160 calories, 30 gm protein)   [employer] Core Power (170 calories, 26 gm protein)    Meal Replacement Bar Options:  [facility] Protein Shake (160 Calories, 15 g protein)  Quest Protein Bars (190 Calories, 20 g protein)  Built Bar (170 Calories, 15-20 g protein)  One Protein Bar (210 calories, 20 g protein)  [facility] Signature Protein Bar ([employer]) (190 Calories, 21 g protein)  Pure Protein Bars (180 Calories, 21 g protein)    Frozen Meal Replacements  Healthy Choice  Lean Cuisine  Atkins Meals  Smart Ones    Relating to beverages:  - Reduce caffeine/carbonation/calorie containing beverages  Separate fluids from meals by 30 minutes before, during, and after eating    Relating to activity:  - Increase activity as able    Diet Guidelines after Weight-loss Surgery  [email or web address]     Protein Sources for Weight Loss  [email or web address]       Tools for after surgery:  CogniSens Dish Sets: bariatric meal size bowls and plates with built in measurement designs on the dishes    Bariatric Pal: https://store.bariatricpal.com/collections/bariatric-dinnerware    Healthy Recipe Resources:  \"The Volumetrics Eating Plan\" by Gabriela Sidhu, Ph.D.  https://www.Modacruztive.Greenbox/  www.Fantasy [employer]er.com  www.oldPerfect Pizzapt.org  Dash Diet Recipes [facility]  www.extension.[facility] - the recipe box  \"Cooking that Counts\" by editors of " CookingLight  https://www.[facility]/communityculinShady Side/[facility]_Cookbook_KT_Final_11-6_NoCrops-compressed.pdf  Https://www.choosemyplate.gov/myplatekitchen  https://snaped.fns.usda.gov/recipes-menus - SNAP recipes  https://www.diabetesfoodhub.org/all-recipes.html  https://www.mealime.com/      Time spent with patient: 18 minutes.  aNtaliya Monsivais RD, LD

## 2021-09-17 NOTE — PATIENT INSTRUCTIONS
Balwinder Gonzalez,    Follow-up with RD in one month.     Thank you,    Nataliya Monsivais, RD, LD  If you would like to schedule or reschedule an appointment with the RD, please call [phone]    Nutrition Goals  Relating To Eating:  - Eat slowly (20-30 minutes per meal), chewing foods well (25 chews per bite/applesauce consistency)  - Replace 1-2 meals per day with protein shake/bar (see recommendations below)     Follow the Modified Liquid Diet for weight loss:  Breakfast: Protein Shake  Lunch: Protein Shake  Supper: 3 oz lean protein + non-starchy vegetables  Snack: protein bar/shalke  Beverages: at least 48-64 oz water between meals daily    Meal Replacement Shake Options:   *Protein Shake Criteria: no more than 210 Calories, at least 20 grams of protein, and less than 10 grams of sugar   [facility] smoothie (160 Calories, 20 g protein)   Premier Protein (160 Calories, 30 g protein)  Slim Fast Advanced Nutrition (180 Calories, 20 g protein)  Muscle Milk, lactose-free, 17 oz bottle (210 Calories, 30 g protein)  Integrated Supplements, no artificial sugars (110 Calories, 20 g protein)  Genepro, unflavored protein powder (60 Calories, 30 g protein)  Boost/Ensure Max (160 calories, 30 gm protein)   Codigames Core Power (170 calories, 26 gm protein)    Meal Replacement Bar Options:   Health [facility] Protein Shake (160 Calories, 15 g protein)  Quest Protein Bars (190 Calories, 20 g protein)  Built Bar (170 Calories, 15-20 g protein)  One Protein Bar (210 calories, 20 g protein)  Henriquez Signature Protein Bar ([employer]) (190 Calories, 21 g protein)  Pure Protein Bars (180 Calories, 21 g protein)    Frozen Meal Replacements  Healthy Choice  Lean Cuisine  Atkins Meals  Smart Ones    Relating to beverages:  - Reduce caffeine/carbonation/calorie containing beverages  Separate fluids from meals by 30 minutes before, during, and after eating    Relating to activity:  - Increase activity as able    Diet Guidelines after Weight-loss  "Surgery  [email or web address]     Protein Sources for Weight Loss  [email or web address]       Tools for after surgery:  Simple.TV Dish Sets: bariatric meal size bowls and plates with built in measurement designs on the dishes    Bariatric Pal: https://store.bariatricpal.com/collections/bariatric-dinnerware    Healthy Recipe Resources:  \"The Volumetrics Eating Plan\" by Gabriela Sidhu, Ph.D.  https://www.WeGreek.Connectify/  www.[employer]  www.Second Genome  Dash Diet Recipes [facility]  www.extension.[facility] - the recipe box  \"Cooking that Counts\" by editors of [employer]vaGene  https://www.[facility]/communityculinary/[facility]_Cookbook_KT_Final_11-6_NoCrops-compressed.pdf  Https://www.Ziptrmyplate.gov/myplatekitchen  https://snaped.fns.usda.gov/recipes-menus - SNAP recipes  https://www.diabetesfoodhub.org/all-recipes.html  https://www.[facility].Connectify/        Interested in working with a health ?  Health coaches work with you to improve your overall health and wellbeing.  They look at the whole person, and may involve discussion of different areas of life, including, but not limited to the four pillars of health (sleep, exercise, nutrition, and stress management). Discuss with your care team if you would like to start working a health .    Health Coaching-3 Pack:    $99 for three health coaching visits    Visits may be done in person or via phone     Coaching is a partnership between the  and the client; Coaches do not prescribe or diagnose    Coaching helps inspire the client to reach his/her personal goals      COMPREHENSIVE WEIGHT MANAGEMENT PROGRAM  VIRTUAL SUPPORT GROUPS    For Support Group Information:      We offer support groups for patients who are working on weight loss and considering, preparing for or have had weight loss surgery.   There is no cost for this opportunity.  You are invited to attend the?Virtual Support Groups?provided by any of the following " "locations:    1. [facility] via [employer] Teams with Vera Casas RN  2.   [place] via [employer] Teams with Gino Cordova, PhD, LP  3.   [place] via [employer] Teams with Galilea Breen RN  4.   [facility] via [employer] Teams with Galilea Duncan [facility]    The following Support Group information can also be found on our website:  https://www.[facility].org/treatments/weight-loss-surgery-support-groups      [facility] Weight Loss Surgery Support Group    [facility] Weight Loss Surgery Support Group  The support group is a patient-lead forum that meets monthly to share experiences, encouragement and education. It is open to those who have had weight loss surgery, are scheduled for surgery, and those who are considering surgery.   WHEN: This group meets on the 3rd Wednesday of each month from 5:00PM - 6:00PM virtually using Microsoft Teams.   FACILITATOR: Led by Vera Casas, the program's Clinical Coordinator.   TO REGISTER: Please contact the clinic via [facility] or call the nurse line directly at [phone] to inform our staff that you would like an invite sent to you and to let us know the email you would like the invite sent to. Prior to the meeting, a link with directions on how to join the meeting will be sent to you.    2021 Meetings  August 18: \"Let's Talk\" a time for the group to share.  September 15: \"Let's Talk\" a time for the group to share.  October 20: \"Let's Talk\" a time for the group to share.  November 17: Emy Maya RD, MICHAELA \"Protein, Metabolism and Meal Planning\"  December 15: Shashank Lay RD, LD will speak, \"Recipe Modification\"    [facility] [place] Support Groups    Connections: Bariatric Care Support Group?  This is open to all [facility] (and those external to this program) pre- and post- operative bariatric surgery patients as well as their support system.   WHEN: This group meets the 2nd Tuesday of each " "month from 6:30 PM - 8:00 PM virtually using Microsoft Teams.   FACILITATOR: Led by Gino Cordova, Ph.D who is a Licensed Psychologist with the [facility] Comprehensive Weight Management Program.   TO REGISTER: Please send an email to Gino Cordova, Ph.D.,  at?[email or web address]?if you would like an invitation to the group and to learn about using Microsoft Teams.    2021 Meetings  August 10: Open Forum  September 14: Guest Speaker: Galilea Breen RN,CBN, CIC, [facility] Comprehensive Weight Management Program, \"Your Hospital Stay and Post-Operative Compliance\"  October 12: Open Forum  November 9: Guest Speaker: Brittny Montes RD,LD, [facility] Comprehensive Weight Management Program,\"Holiday Eating\".  December 14: Guest Speaker Florencia Coburn MD, MPH, [facility], [facility], \"Body Contouring Surgery for Bariatric Surgery Patients\"     Connections: Post-Operative Bariatric Surgery Support Group  This is a support group for [facility] bariatric patients (and those external to [facility]) who have had bariatric surgery and are at least 3 months post-surgery.  WHEN: This support group meets the 4th Wednesday of the month from 11:00 AM - 12:00 PM virtually using Microsoft Teams.   FACILITATOR: Led by Certified Bariatric Nurse, Galilea Breen RN.   TO REGISTER: Please send an email to Galilea at [email or web address] if you would like an invitation to the group and to learn about using [employer].      [facility] Healthy Lifestyle Virtual Support Group    Healthy Lifestyle Virtual Support Group?  This is 60 minutes of small group guided discussion, support and resources. All are welcome who want a healthy lifestyle.  WHEN: This group meets monthly on a Friday from 12:30 PM - to 1:30 PM virtually using Microsoft Teams.   FACILITATOR: Led by National Board Certified Health , Galilea Duncan [facility].   TO REGISTER: Please " send an email to Galilea at?[email or web address] to receive monthly invites to the group or if you have any questions about having a health .  Prior to the meeting, a link with directions on how to join the meeting will be sent to you.    2021 Meetings  August 27: Open Forum  September 24: Sleep and the 7 Types of Rest  October 29: Open Forum  November 19: Gratitude     December 10: Open Forum

## 2021-09-18 ENCOUNTER — TRANSFERRED RECORDS (OUTPATIENT)
Dept: MULTI SPECIALTY CLINIC | Facility: CLINIC | Age: 36
End: 2021-09-18
Payer: COMMERCIAL

## 2021-09-18 LAB — RETINOPATHY: NEGATIVE

## 2021-09-20 ENCOUNTER — LAB (OUTPATIENT)
Dept: LAB | Facility: CLINIC | Age: 36
End: 2021-09-20
Payer: COMMERCIAL

## 2021-09-20 ENCOUNTER — ALLIED HEALTH/NURSE VISIT (OUTPATIENT)
Dept: FAMILY MEDICINE | Facility: CLINIC | Age: 36
End: 2021-09-20
Payer: COMMERCIAL

## 2021-09-20 VITALS — WEIGHT: 315 LBS | BODY MASS INDEX: 53.22 KG/M2

## 2021-09-20 DIAGNOSIS — E66.01 MORBID OBESITY (H): Primary | ICD-10-CM

## 2021-09-20 DIAGNOSIS — E11.9 TYPE 2 DIABETES MELLITUS WITHOUT COMPLICATION, WITHOUT LONG-TERM CURRENT USE OF INSULIN (H): Chronic | ICD-10-CM

## 2021-09-20 LAB — HBA1C MFR BLD: 7.2 % (ref 0–5.6)

## 2021-09-20 PROCEDURE — 99207 PR NO CHARGE NURSE ONLY: CPT

## 2021-09-20 PROCEDURE — 36415 COLL VENOUS BLD VENIPUNCTURE: CPT

## 2021-09-20 PROCEDURE — 83036 HEMOGLOBIN GLYCOSYLATED A1C: CPT

## 2021-09-25 ENCOUNTER — HEALTH MAINTENANCE LETTER (OUTPATIENT)
Age: 36
End: 2021-09-25

## 2021-09-27 ENCOUNTER — VIRTUAL VISIT (OUTPATIENT)
Dept: NEUROPSYCHOLOGY | Facility: CLINIC | Age: 36
End: 2021-09-27
Payer: COMMERCIAL

## 2021-09-27 DIAGNOSIS — E66.01 MORBID OBESITY (H): ICD-10-CM

## 2021-09-27 DIAGNOSIS — Z01.818 PREPROCEDURAL EXAMINATION: Primary | ICD-10-CM

## 2021-09-27 DIAGNOSIS — F54 PSYCHOLOGICAL FACTORS AFFECTING MEDICAL CONDITION: ICD-10-CM

## 2021-09-27 PROCEDURE — 96139 PSYCL/NRPSYC TST TECH EA: CPT | Mod: 95

## 2021-09-27 PROCEDURE — 96138 PSYCL/NRPSYC TECH 1ST: CPT | Mod: 95

## 2021-09-27 PROCEDURE — 96130 PSYCL TST EVAL PHYS/QHP 1ST: CPT | Mod: 95

## 2021-09-27 PROCEDURE — 90791 PSYCH DIAGNOSTIC EVALUATION: CPT | Mod: 95

## 2021-09-27 NOTE — PROGRESS NOTES
Pt was seen for neuropsychological evaluation via telehealth at the request of Sada Beach PA-C for the purposes of diagnostic clarification and treatment planning.  56 minutes of video test administration and scoring were provided by this writer.  Please see Dr. Tanvi Montanez's report for a full interpretation of the findings.    Video Start Time: 8:58  Video End Time: 9:49    Earlene Ku  Psychometrist

## 2021-09-27 NOTE — PROGRESS NOTES
Carlos Bangura is a 36 year old who is being evaluated via a billable video visit.      How would you like to obtain your AVS? MyChart  If the video visit is dropped, the invitation should be resent by: Send to e-mail at: [email or web address]  Will anyone else be joining your video visit? No    Video Start Time: 8:27 AM      PSYCHOLOGICAL EVALUATION    RELEVANT HISTORY AND REASON FOR REFERRAL    Carlos Bangura is a 36 year old  with 16 years of formal education. Information was obtained via video interview with the patient and review of his medical records. Mr. Bangura has a history of morbid obesity, and is interested in undergoing bariatric surgery. This psychological evaluation was undertaken at the request of Sada Beach PA-C, as part of the presurgical protocol, to assess personality traits and emotional functioning, as they pertain to his ability to make well-reasoned medical decisions and follow through with treatment recommendations.     Precautions are in place related to COVID-19. The entire evaluation took place over the Nexio video platform, including proctoring of the MMPI-3 and PHQ-9.    EVALUATION FINDINGS    Behavioral observations were limited as the evaluation was conducted over video. Mood was euthymic. Speech was fluent, with normal articulation, volume, and rate. He presented his thoughts in a clear, logical manner. Memory and attention appeared to be adequate. Judgment and insight appeared to be good.    Upon interview, Mr. Bangura stated that he first considered bariatric surgery about 10 years ago, but decided not to pursue it because he was not ready for the change in lifestyle.  For instance, he did not want to give up alcohol.  Now, however, he has 2 children, ages 7 and 8, and his lifestyle is more conducive to making these changes.  He feels drained when hiking with his kids, and he has aches and pains that he thinks would improve with weight loss.  He is interested in the  sleeve procedure.  He understands that it can rupture if he overeats, and that there could be other complications that can go along with any surgery.  He has spoken with his family about the surgery, and they are on board.  His wife is supportive, and knows that his weight slows him down.  She will be able to assist with his cares as necessary following the procedure.    As noted, Mr. Bangura has a longstanding history of obesity.  Currently, he weighs 382 pounds.  The most he ever weighed was 450 pounds, around 2013.  He was asked to lose 50 pounds in preparation for surgery, from a starting weight of about 400.  He has tried many diets and weight loss programs since he was 17 years old.  During his senior year in high school, he spent 3 months in [place] with family who owned a gym, and he worked out three times a day and watched his calories, and he lost 120 pounds.  This was his most successful weight loss.  He has also tried the Atkins diet, weight loss pills, and cutting out meals.  Now, he is keeping sugar out of the house and is not snacking at bedtime.  For breakfast he will have 1 or 2 breakfast croissants.  For lunch, he has a meal replacement shake.  Dinner is a vegetable, protein, and possibly bread.  He will occasionally snack on popcorn.  He drinks sugar-free water, and rarely has a diet Coke.  He will sometimes have an orange Hi-C.  He does not drink coffee.  He acknowledged binging in the context of marijuana use.  He was using marijuana nightly around bedtime and would binge eat after that.  He stopped using marijuana almost 3 weeks ago and feels that he is doing better now.  He has not binged since then.  He denied any history of purging.  He has not been diagnosed with an eating disorder.  He is active at work.  They moved recently and he will be moving his elliptical to his home this week.    Mr. Bangura denied any history of psychiatric illness.  He has never been in psychotherapy.  He described  his mood as fine, and he does not feel depressed or anxious.  He rated his level of stress as a 2 or 3 on a scale of 1-10, with his primary stressors being finances and work.  He used to have insomnia and would dream about work, which was one of the reasons that he started using marijuana nightly.  Now that he is self-employed, however, his stress and sleep are better.  He is not napping anymore.  He uses a CPAP regularly, stating that he cannot sleep without it.  He sleeps 7-1/2 hours a night.  His energy level is fair.  His interest level varies.  He denied suicidal ideation or any history of suicide attempts.  He denied visual or auditory hallucinations.  He has never been physically or sexually abused.    As noted, Mr. Bangura endorsed nightly marijuana use up until 3 weeks ago.  He used a vape cartridge and would have 2 puffs to calm his mind before bed.  He drinks alcohol rarely, the last was about 3 months ago.  He does not smoke cigarettes.  He has never been in any chemical dependency treatment programs.  He has never been arrested and does not higginbotham.    Mr. Bangura completed a Bachelor's degree at [employer], where he studied to be a  and also business management.  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  He has been together with his wife for 10 years and they were  in 2018.  He has 2 children.    Mr. Bangura denied any history of seizure, stroke, or head injury resulting loss of consciousness.  He has not been bothered by headaches.  He has pain in his knees and back.  He has been to the emergency department in the last year after cutting his finger open at work.    As noted, the MMPI-3, a self-report measure of mood and personality, was administered remotely using  [employer] and video proctoring.  Mr. Bangura responded to the items in a consistent manner. He was somewhat circumspect in his responses, although the  profile is considered to be valid. His level of emotional distress was low. He denied significant psychopathology, including depressed mood or ideation, anxiety, or psychosis. On the PHQ-9, he endorsed minimal depressive symptomatology.    PAST MEDICAL HISTORY: Medical records indicate a history of hyperlipidemia, esophageal reflux, hypertension, obstructive sleep apnea, type 2 diabetes mellitus, class III severe obesity.    CURRENT MEDICATIONS:  Include liraglutide, lisinopril, Metformin, topiramate, valacyclovir.    FAMILY MEDICAL HISTORY:  Significant for a father and paternal grandfather with diabetes and obesity, a mother with cardiovascular disease.    CONCLUSIONS    Carlos Bangura is a 36 year old man with a history of morbid obesity, who is interested in undergoing bariatric surgery. Due to precautions related to COVID-[employer], this evaluation was undertaken remotely, using a video platform. Mr. Bangura appears to be capable of comprehending medical information and making well reasoned decisions for himself. He has a good understanding of the surgical procedure and the risks involved.    Mr. Bangura does not report any history of psychiatric illness.  Assessment of mood and personality falls within normal limits.  He does not appear to be experiencing emotional problems that might interfere with his judgment or ability to follow through with treatment recommendations.  He endorsed a history of nightly marijuana use, to calm his mind and help him sleep.  He noticed that he would binge eat after using marijuana, and ultimately decided to stop marijuana use 3 weeks ago.  He has not had binging since then, and it is recommended that he continue to abstain from marijuana use.  He has never been in any chemical dependency treatment programs.  He has a good support system in his family. He will likely be able to tolerate the emotional stress and physical discomfort associated with the surgery, as well as the changes in his  lifestyle once the surgery is complete. There are no psychosocial contraindications to him undergoing bariatric surgery.      Tanvi Montanez, Ph.D., ABPP  Licensed Psychologist, LP [id number]  Board Certified in Clinical Neuropsychology    Time spent:  One hour professional time, including interview and biopsychosocial assessment (CPT 18117); One hour psychological testing evaluation services by a licensed and board-certified neuropsychologist, including integration of patient data, interpretation of standardized test results and clinical data, clinical decision making, treatment planning and report, first hour (CPT 41093); 1 unit psychological test administration and scoring by technician (CPT 37928). An additional 26 minutes (1 unit)  psychological test administration and scoring by technician (CPT 37585). ICD-10 diagnosis: Z01.818; E66.01; F54.    Video-Visit Details    Type of service:  Video Visit    Video End Time:8:55 AM    Originating Location (pt. Location): Home    Distant Location (provider location):  [facility] NEUROPSYCHOLOGY [place]     Platform used for Video Visit: [employer]

## 2021-09-27 NOTE — LETTER
9/27/2021       RE: Larry Bangura  [address] [facility] [address]     Dear Colleague,    Thank you for referring your patient, Larry Bangura, to the [facility]OLOGY [place] at [facility]. Please see a copy of my visit note below.    Carlos Bangura is a 36 year old who is being evaluated via a billable video visit.      How would you like to obtain your AVS? MyChart  If the video visit is dropped, the invitation should be resent by: Send to e-mail at: [email or web address]  Will anyone else be joining your video visit? No    Video Start Time: 8:27 AM      PSYCHOLOGICAL EVALUATION    RELEVANT HISTORY AND REASON FOR REFERRAL    Carlos Bangura is a 36 year old  with 16 years of formal education. Information was obtained via video interview with the patient and review of his medical records. Mr. Bangura has a history of morbid obesity, and is interested in undergoing bariatric surgery. This psychological evaluation was undertaken at the request of Sada Beach PA-C, as part of the presurgical protocol, to assess personality traits and emotional functioning, as they pertain to his ability to make well-reasoned medical decisions and follow through with treatment recommendations.     Precautions are in place related to COVID-19. The entire evaluation took place over the [employer] video platform, including proctoring of the MMPI-3 and PHQ-9.    EVALUATION FINDINGS    Behavioral observations were limited as the evaluation was conducted over video. Mood was euthymic. Speech was fluent, with normal articulation, volume, and rate. He presented his thoughts in a clear, logical manner. Memory and attention appeared to be adequate. Judgment and insight appeared to be good.    Upon interview, Mr. Bangura stated that he first considered bariatric surgery about 10 years ago, but decided not to pursue it because he was not ready for the  change in lifestyle.  For instance, he did not want to give up alcohol.  Now, however, he has 2 children, ages 7 and 8, and his lifestyle is more conducive to making these changes.  He feels drained when hiking with his kids, and he has aches and pains that he thinks would improve with weight loss.  He is interested in the sleeve procedure.  He understands that it can rupture if he overeats, and that there could be other complications that can go along with any surgery.  He has spoken with his family about the surgery, and they are on board.  His wife is supportive, and knows that his weight slows him down.  She will be able to assist with his cares as necessary following the procedure.    As noted, Mr. Bangura has a longstanding history of obesity.  Currently, he weighs 382 pounds.  The most he ever weighed was 450 pounds, around 2013.  He was asked to lose 50 pounds in preparation for surgery, from a starting weight of about 400.  He has tried many diets and weight loss programs since he was 17 years old.  During his senior year in high school, he spent 3 months in [place] with family who owned a gym, and he worked out three times a day and watched his calories, and he lost 120 pounds.  This was his most successful weight loss.  He has also tried the Atkins diet, weight loss pills, and cutting out meals.  Now, he is keeping sugar out of the house and is not snacking at bedtime.  For breakfast he will have 1 or 2 breakfast croissants.  For lunch, he has a meal replacement shake.  Dinner is a vegetable, protein, and possibly bread.  He will occasionally snack on popcorn.  He drinks sugar-free water, and rarely has a diet Coke.  He will sometimes have an orange Hi-C.  He does not drink coffee.  He acknowledged binging in the context of marijuana use.  He was using marijuana nightly around bedtime and would binge eat after that.  He stopped using marijuana almost 3 weeks ago and feels that he is doing better now.  He  has not binged since then.  He denied any history of purging.  He has not been diagnosed with an eating disorder.  He is active at work.  They moved recently and he will be moving his elliptical to his home this week.    Mr. Bangura denied any history of psychiatric illness.  He has never been in psychotherapy.  He described his mood as fine, and he does not feel depressed or anxious.  He rated his level of stress as a 2 or 3 on a scale of 1-10, with his primary stressors being finances and work.  He used to have insomnia and would dream about work, which was one of the reasons that he started using marijuana nightly.  Now that he is self-employed, however, his stress and sleep are better.  He is not napping anymore.  He uses a CPAP regularly, stating that he cannot sleep without it.  He sleeps 7-1/2 hours a night.  His energy level is fair.  His interest level varies.  He denied suicidal ideation or any history of suicide attempts.  He denied visual or auditory hallucinations.  He has never been physically or sexually abused.    As noted, Mr. Bangura endorsed nightly marijuana use up until 3 weeks ago.  He used a vape cartridge and would have 2 puffs to calm his mind before bed.  He drinks alcohol rarely, the last was about 3 months ago.  He does not smoke cigarettes.  He has never been in any chemical dependency treatment programs.  He has never been arrested and does not higginbotham.    Mr. Bangura completed a Bachelor's degree at [employer], where he studied to be a  and also business management.  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  He has been together with his wife for 10 years and they were  in 2018.  He has 2 children.    Mr. Bangura denied any history of seizure, stroke, or head injury resulting loss of consciousness.  He has not been bothered by headaches.  He has pain in his knees and back.  He has been to the  emergency department in the last year after cutting his finger open at work.    As noted, the MMPI-3, a self-report measure of mood and personality, was administered remotely using  [employer] and video proctoring.  Mr. Bangura responded to the items in a consistent manner. He was somewhat circumspect in his responses, although the profile is considered to be valid. His level of emotional distress was low. He denied significant psychopathology, including depressed mood or ideation, anxiety, or psychosis. On the PHQ-9, he endorsed minimal depressive symptomatology.    PAST MEDICAL HISTORY: Medical records indicate a history of hyperlipidemia, esophageal reflux, hypertension, obstructive sleep apnea, type 2 diabetes mellitus, class III severe obesity.    CURRENT MEDICATIONS:  Include liraglutide, lisinopril, Metformin, topiramate, valacyclovir.    FAMILY MEDICAL HISTORY:  Significant for a father and paternal grandfather with diabetes and obesity, a mother with cardiovascular disease.    CONCLUSIONS    Carlos Bangura is a 36 year old man with a history of morbid obesity, who is interested in undergoing bariatric surgery. Due to precautions related to COVID-19, this evaluation was undertaken remotely, using a video platform. Mr. Bangura appears to be capable of comprehending medical information and making well reasoned decisions for himself. He has a good understanding of the surgical procedure and the risks involved.    Mr. Bangura does not report any history of psychiatric illness.  Assessment of mood and personality falls within normal limits.  He does not appear to be experiencing emotional problems that might interfere with his judgment or ability to follow through with treatment recommendations.  He endorsed a history of nightly marijuana use, to calm his mind and help him sleep.  He noticed that he would binge eat after using marijuana, and ultimately decided to stop marijuana use 3 weeks ago.  He has not had binging since  then, and it is recommended that he continue to abstain from marijuana use.  He has never been in any chemical dependency treatment programs.  He has a good support system in his family. He will likely be able to tolerate the emotional stress and physical discomfort associated with the surgery, as well as the changes in his lifestyle once the surgery is complete. There are no psychosocial contraindications to him undergoing bariatric surgery.      Tanvi Montanez, Ph.D., ABPP  Licensed Psychologist, LP [id number]  Board Certified in Clinical Neuropsychology    Time spent:  One hour professional time, including interview and biopsychosocial assessment (CPT 28106); One hour psychological testing evaluation services by a licensed and board-certified neuropsychologist, including integration of patient data, interpretation of standardized test results and clinical data, clinical decision making, treatment planning and report, first hour (CPT 91224); 1 unit psychological test administration and scoring by technician (CPT 51405). An additional 26 minutes (1 unit)  psychological test administration and scoring by technician (CPT 74440). ICD-10 diagnosis: Z01.818; E66.01; F54.    Video-Visit Details    Type of service:  Video Visit    Video End Time:8:55 AM    Originating Location (pt. Location): Home    Distant Location (provider location):  [facility] NEUROPSYCHOLOGY [place]     Platform used for Video Visit: [facility]      Pt was seen for neuropsychological evaluation via telehealth at the request of Sada Beach PA-C for the purposes of diagnostic clarification and treatment planning.  56 minutes of video test administration and scoring were provided by this writer.  Please see Dr. Tanvi Montanez's report for a full interpretation of the findings.    Video Start Time: 8:58  Video End Time: 9:49    Earlene Ku  Psychometrist        Again, thank you for allowing me to participate in the care of your  patient.      Sincerely,    Tanvi Montanez, Ph.D., LP, ABPP-CN

## 2021-10-06 DIAGNOSIS — E11.9 TYPE 2 DIABETES MELLITUS WITHOUT COMPLICATION, WITHOUT LONG-TERM CURRENT USE OF INSULIN (H): Chronic | ICD-10-CM

## 2021-10-06 DIAGNOSIS — I10 ESSENTIAL HYPERTENSION WITH GOAL BLOOD PRESSURE LESS THAN 140/90: ICD-10-CM

## 2021-10-07 RX ORDER — LISINOPRIL 20 MG/1
TABLET ORAL
Qty: 30 TABLET | Refills: 0 | Status: SHIPPED | OUTPATIENT
Start: 2021-10-07 | End: 2021-11-08

## 2021-10-11 NOTE — PROGRESS NOTES
"Larry Bangura is a 36 year old who is being evaluated via a billable video visit.      The patient has been notified of following:     \"This video visit will be conducted via a call between you and your physician/provider. We have found that certain health care needs can be provided without the need for an in-person physical exam.  This service lets us provide the care you need with a video conversation.  If a prescription is necessary we can send it directly to your pharmacy.  If lab work is needed we can place an order for that and you can then stop by our lab to have the test done at a later time.    Video visits are billed at different rates depending on your insurance coverage.  Please reach out to your insurance provider with any questions.    If during the course of the call the physician/provider feels a video visit is not appropriate, you will not be charged for this service.\"    Patient has given verbal consent for Video visit? Yes  How would you like to obtain your AVS? MyChart  If you are dropped from the video visit, the video invite should be resent to: Text to cell phone: [phone]  Will anyone else be joining your video visit? No        Video-Visit Details    Type of service:  Video Visit    Video Start Time: 1:00 pm  Video End Time: 1:19 pm    Originating Location (pt. Location): Home    Distant Location (provider location):  [facility] WEIGHT MANAGEMENT CL[facility]IC [place]     Platform used for Video Visit: [employer]    During this virtual visit the patient is located in [place], patient verifies this as the location during the entirety of this visit.     Return Bariatric Nutrition Consultation Note    Reason For Visit: Nutrition Assessment    Larry Bangura is a 36 year old presenting today for follow-up bariatric nutrition consult. Pt is interested in laparoscopic sleeve gastrectomy with Dr. Liriano expected surgery TBD.  Patient is accompanied by self. This is pt's 6th of 6 required nutrition " "visits prior to surgery.     Pt referred by DELL Duckworth on September 30, 2020.  Patient with Co-morbidities of obesity including:  Type II DM yes  Renal Failure no  Sleep apnea yes  Hypertension yes   Dyslipidemia yes  Joint pain no  Back pain no  GERD no     ANTHROPOMETRICS:  Initial weight: 400 lb     Estimated body mass index is 53.22 kg/m  as calculated from the following:    Height as of 9/15/21: 1.803 m (5' 11\").    Weight as of 9/20/21: 173.1 kg (381 lb 9.6 oz).    Current weight (pt reported): 378 lbs (-3 lbs over past couple weeks, -22 lbs from initial)    Required weight loss goal pre-op: 50 lbs from initial consult weight (goal weight 350 lbs or less before surgery)      SUPPLEMENT INFORMATION:  Centrum for mens MVI    NUTRITION HISTORY:  Pt states today he started replacing lunch with a protein drink about a week ago. Started using Herbalife protein drink (135 star, 17 gm pro) after a customer of his recommended it. Reports he does tolerate soy or whey protein powder, uses pea protein instead. He is eating salad with every dinner. Switched from croissants at breakfast to higher protein option (see diet recall).     Diet Recall:   B: 2 egg +  2pc bacn + 1/2 cup cheese + 2 tortilla  L: protein shake  D: cabbage + adams and pasta plus salad  Snack: 2 nut bars per day (17 gm pro, 6 gm sugar)  Beverage: water 1 gallon/day    Progress Towards Previous Goals:  Relating To Eating:  - Eat slowly (20-30 minutes per meal), chewing foods well (25 chews per bite/applesauce consistency) - Met, continues   - Replace 1-2 meals per day with protein shake/bar (see recommendations below)  - Met, continues    Relating to beverages:  - Reduce caffeine/carbonation/calorie containing beverages - Met, continues  Separate fluids from meals by 30 minutes before, during, and after eating - Met, continues     Relating to activity:  - Increase activity as able - Increased, moved recently and set up a elliptcal in garage at new " "place.       NUTRITION DIAGNOSIS:  Obesity r/t long history of self-monitoring deficit and excessive energy intake aeb BMI >30 kg/m2.- Improving, continues     INTERVENTION:  Intervention Provided/Education:   1. Reviewed and modified goals  2. Reviewed on post-op diet guidelines, ways to help prepare for post-op diet guidelines pre-operatively, portion/calorie-control and healthy food choice for wt loss and DM2 mgmt, mindful eating and sources of protein.   3. Encouraged pt continue replacing 1 meal per day, advancing to replacing 2 meals per day for increased rate of weight loss. Discussed using lean protein, increased non-starchy veggies and reduced refined carb at third meal. Discussed protein bar/shake options. Pt demonstrates understanding.   4. AVS via [employer]     Patient Understanding: good  Expected Compliance: fair-good    GOALS:  1) Eat slowly (20-30 minutes per meal), chewing foods well (25 chews per bite/applesauce consistency)  2) Replace 1-2 meals per day with protein shake/bar  3) Avoid snacking as able. May use nut bar if needed.  4) Increase activity as able    Diet Guidelines after Weight-loss Surgery  [email or web address]     Protein Sources for Weight Loss  [email or web address]       Tools for after surgery:  [employer]te Dish Sets: bariatric meal size bowls and plates with built in measurement designs on the dishes    Bariatric Pal: https://store.NanoPowerspal.Tribold/collections/bariatric-dinnerware    Healthy Recipe Resources:  \"The Volumetrics Eating Plan\" by Gabriela Sidhu, Ph.D.  https://www.MedPro.Tribold/  www.Coco Controller  www.ZIOPHARM Oncology.org  Dash Diet Recipes [facility]  www.extension.[facility] - the recipe box  \"Cooking that Counts\" by editors of Triloq  https://www.[facility].edu/communityculinary/[facility]_Cookbook_KT_Final_11-6_NoCrops-compressed.pdf  Https://www.Xactly Corpmyplate.gov/myplatekitchen  https://snaped.fns.usda.gov/recipes-menus - SNAP " recipes  https://www.diabetesfoodhub.org/all-recipes.html  https://www.Sezion.com/      Time spent with patient: 19 minutes.  Nataliya Monsivais RD, LD

## 2021-10-12 ENCOUNTER — VIRTUAL VISIT (OUTPATIENT)
Dept: ENDOCRINOLOGY | Facility: CLINIC | Age: 36
End: 2021-10-12
Payer: COMMERCIAL

## 2021-10-12 DIAGNOSIS — Z71.3 NUTRITIONAL COUNSELING: Primary | ICD-10-CM

## 2021-10-12 DIAGNOSIS — E66.9 OBESITY: ICD-10-CM

## 2021-10-12 DIAGNOSIS — E11.9 TYPE 2 DIABETES MELLITUS WITHOUT COMPLICATION, WITHOUT LONG-TERM CURRENT USE OF INSULIN (H): ICD-10-CM

## 2021-10-12 PROCEDURE — 97803 MED NUTRITION INDIV SUBSEQ: CPT | Mod: 95 | Performed by: DIETITIAN, REGISTERED

## 2021-10-12 NOTE — LETTER
"10/12/2021       RE: Larry Bangura  [address] [facility] [address]     Dear Colleague,    Thank you for referring your patient, Larry Bangura, to the [facility] WEIGHT MANAGEMENT [facility]INIC [place] at [facility]. Please see a copy of my visit note below.    Larry Bangura is a 36 year old who is being evaluated via a billable video visit.      The patient has been notified of following:     \"This video visit will be conducted via a call between you and your physician/provider. We have found that certain health care needs can be provided without the need for an in-person physical exam.  This service lets us provide the care you need with a video conversation.  If a prescription is necessary we can send it directly to your pharmacy.  If lab work is needed we can place an order for that and you can then stop by our lab to have the test done at a later time.    Video visits are billed at different rates depending on your insurance coverage.  Please reach out to your insurance provider with any questions.    If during the course of the call the physician/provider feels a video visit is not appropriate, you will not be charged for this service.\"    Patient has given verbal consent for Video visit? Yes  How would you like to obtain your AVS? MyChart  If you are dropped from the video visit, the video invite should be resent to: Text to cell phone: [phone]  Will anyone else be joining your video visit? No        Video-Visit Details    Type of service:  Video Visit    Video Start Time: 1:00 pm  Video End Time: 1:19 pm    Originating Location (pt. Location): Home    Distant Location (provider location):  [facility] WEIGHT MANAGEMENT CLINIC [place]     Platform used for Video Visit: [employer]    During this virtual visit the patient is located in [place], patient verifies this as the location during the entirety of this visit.     Return Bariatric " "Nutrition Consultation Note    Reason For Visit: Nutrition Assessment    Larry Bangura is a 36 year old presenting today for follow-up bariatric nutrition consult. Pt is interested in laparoscopic sleeve gastrectomy with Dr. Liriano expected surgery TBD.  Patient is accompanied by self. This is pt's 6th of 6 required nutrition visits prior to surgery.     Pt referred by DELL Duckworth on September 30, 2020.  Patient with Co-morbidities of obesity including:  Type II DM yes  Renal Failure no  Sleep apnea yes  Hypertension yes   Dyslipidemia yes  Joint pain no  Back pain no  GERD no     ANTHROPOMETRICS:  Initial weight: 400 lb     Estimated body mass index is 53.22 kg/m  as calculated from the following:    Height as of 9/15/21: 1.803 m (5' 11\").    Weight as of 9/20/21: 173.1 kg (381 lb 9.6 oz).    Current weight (pt reported): 378 lbs (-3 lbs over past couple weeks, -22 lbs from initial)    Required weight loss goal pre-op: 50 lbs from initial consult weight (goal weight 350 lbs or less before surgery)      SUPPLEMENT INFORMATION:  Centrum for mens MVI    NUTRITION HISTORY:  Pt states today he started replacing lunch with a protein drink about a week ago. Started using Herbalife protein drink (135 star, 17 gm pro) after a customer of his recommended it. Reports he does tolerate soy or whey protein powder, uses pea protein instead. He is eating salad with every dinner. Switched from croissants at breakfast to higher protein option (see diet recall).     Diet Recall:   B: 2 egg +  2pc bacn + 1/2 cup cheese + 2 tortilla  L: protein shake  D: cabbage + adams and pasta plus salad  Snack: 2 nut bars per day (17 gm pro, 6 gm sugar)  Beverage: water 1 gallon/day    Progress Towards Previous Goals:  Relating To Eating:  - Eat slowly (20-30 minutes per meal), chewing foods well (25 chews per bite/applesauce consistency) - Met, continues   - Replace 1-2 meals per day with protein shake/bar (see recommendations below)  - " "Met, continues    Relating to beverages:  - Reduce caffeine/carbonation/calorie containing beverages - Met, continues  Separate fluids from meals by 30 minutes before, during, and after eating - Met, continues     Relating to activity:  - Increase activity as able - Increased, moved recently and set up a elliptcal in garage at new place.       NUTRITION DIAGNOSIS:  Obesity r/t long history of self-monitoring deficit and excessive energy intake aeb BMI >30 kg/m2.- Improving, continues     INTERVENTION:  Intervention Provided/Education:   1. Reviewed and modified goals  2. Reviewed on post-op diet guidelines, ways to help prepare for post-op diet guidelines pre-operatively, portion/calorie-control and healthy food choice for wt loss and DM2 mgmt, mindful eating and sources of protein.   3. Encouraged pt continue replacing 1 meal per day, advancing to replacing 2 meals per day for increased rate of weight loss. Discussed using lean protein, increased non-starchy veggies and reduced refined carb at third meal. Discussed protein bar/shake options. Pt demonstrates understanding.   4. AVS via [employer]     Patient Understanding: good  Expected Compliance: fair-good    GOALS:  1) Eat slowly (20-30 minutes per meal), chewing foods well (25 chews per bite/applesauce consistency)  2) Replace 1-2 meals per day with protein shake/bar  3) Avoid snacking as able. May use nut bar if needed.  4) Increase activity as able    Diet Guidelines after Weight-loss Surgery  [email or web address]     Protein Sources for Weight Loss  [email or web address]       Tools for after surgery:  [employer] Dish Sets: bariatric meal size bowls and plates with built in measurement designs on the dishes    Bariatric Pal: https://store.bariatricpal.com/collections/bariatric-dinnerware    Healthy Recipe Resources:  \"The Volumetrics Eating Plan\" by Gabriela Sidhu, " "Ph.D.  https://www.SPD Control Systems.HourlyNerd/  www.ITM Power.com  www.[employer].org  Dash Diet Recipes [facility]  www.extension.[facility] - the recipe box  \"Cooking that Counts\" by editors of CookingLight  https://www.[facility]/communityculinary/[facility]_Cookbook_KT_Final_11-6_NoCrops-compressed.pdf  Https://www.choosemyplate.gov/myplatekitchen  https://snaped.fns.usda.gov/recipes-menus - SNAP recipes  https://www.diabetesfoodhub.org/all-recipes.html  https://www.TalkLife.com/      Time spent with patient: 19 minutes.  Nataliya Monsivais RD, LD    "

## 2021-10-12 NOTE — PATIENT INSTRUCTIONS
"Balwinder Gonzalez,     Follow-up with RD on 11/16 at 12:30 for a video visit.    Thank you,    Nataliya Monsivais, RD, LD  If you would like to schedule or reschedule an appointment with the RD, please call [phone]    Nutrition Goals  1) Eat slowly (20-30 minutes per meal), chewing foods well (25 chews per bite/applesauce consistency)  2) Replace 1-2 meals per day with protein shake/bar  3) Avoid snacking as able. May use nut bar if needed.  4) Increase activity as able    Diet Guidelines after Weight-loss Surgery  [email or web address]     Protein Sources for Weight Loss  [email or web address]       Tools for after surgery:  Ascenz Dish Sets: bariatric meal size bowls and plates with built in measurement designs on the dishes    Bariatric Pal: https://store.Amirite.com.Connectbeam/collections/bariatric-dinnerware    Healthy Recipe Resources:  \"The Volumetrics Eating Plan\" by Gabriela Sidhu, Ph.D.  https://www.Abacus e-Media.Connectbeam/  www.Connect Media Interactive  www.Enverv.org  Dash Diet Recipes [facility]  www.[facility] - the recipe box  \"Cooking that Counts\" by editors of Digital H2O  https://www.[facility]/communityculinary/[facility]_Cookbook_KT_Final_11-6_NoCrops-compressed.pdf  Https://www.choosemyplate.gov/myplatekitchen  https://snaped.fns.usda.gov/recipes-menus - SNAP recipes  https://www.diabetesfoodhub.org/all-recipes.html  https://www.The Auto Vault.com/      Interested in working with a health ?  Health coaches work with you to improve your overall health and wellbeing.  They look at the whole person, and may involve discussion of different areas of life, including, but not limited to the four pillars of health (sleep, exercise, nutrition, and stress management). Discuss with your care team if you would like to start working a health .    Health Coaching-3 Pack:    $99 for three health coaching visits    Visits may be done in person or via phone    Coaching is a partnership between the  and the " "client; Coaches do not prescribe or diagnose    Coaching helps inspire the client to reach his/her personal goals      COMPREHENSIVE WEIGHT MANAGEMENT PROGRAM  VIRTUAL SUPPORT GROUPS    For Support Group Information:      We offer support groups for patients who are working on weight loss and considering, preparing for or have had weight loss surgery.   There is no cost for this opportunity.  You are invited to attend the?Virtual Support Groups?provided by any of the following locations:    1. [facility] via Microsoft Teams with Vera Casas RN  2.   [place] via [employer] with Gino Cordova, PhD, LP  3.   [place] via [employer] with Galilea Breen RN  4.   [facility] via Microsoft Teams with Galilea [facility]    The following Support Group information can also be found on our website:  https://www.[email or web address]/[email or web address]/weight-loss-surgery-support-groups      [facility] Weight Loss Surgery Support Group    [facility] Weight Loss [facility] Support Group  The support group is a patient-lead forum that meets monthly to share experiences, encouragement and education. It is open to those who have had weight loss surgery, are scheduled for surgery, and those who are considering surgery.   WHEN: This group meets on the 3rd Wednesday of each month from 5:00PM - 6:00PM virtually using Microsoft Teams.   FACILITATOR: Led by Vera Casas, the program's Clinical Coordinator.   TO REGISTER: Please contact the clinic via [email or web address] or call the nurse line directly at [phone] to inform our staff that you would like an invite sent to you and to let us know the email you would like the invite sent to. Prior to the meeting, a link with directions on how to join the meeting will be sent to you.    2021 Meetings  August 18: \"Let's Talk\" a time for the group to share.  September 15: \"Let's Talk\" a time for the group to share.  October 20: \"Let's Talk\" a time for the group to " "share.  November 17: Emy Maya RD, LD \"Protein, Metabolism and Meal Planning\"  December 15: Shashank Lay RD, LD will speak, \"Recipe Modification\"    [facility] and [facility] Support Groups    Connections: Bariatric Care Support Group?  This is open to all [facility] (and those external to this program) pre- and post- operative bariatric surgery patients as well as their support system.   WHEN: This group meets the 2nd Tuesday of each month from 6:30 PM - 8:00 PM virtually using Microsoft Teams.   FACILITATOR: Led by Gino Cordova, Ph.D who is a Licensed Psychologist with the [facility] Comprehensive Weight Management Program.   TO REGISTER: Please send an email to Gino Cordova, Ph.D., LP at?[email or web address]?if you would like an invitation to the group and to learn about using Microsoft Teams.    2021 Meetings  August 10: Open Forum  September 14: Guest Speaker: Galilea Breen RN,CBN, [facility], [facility] Comprehensive Weight Management Program, \"Your Hospital Stay and Post-Operative Compliance\"  October 12: Open Forum  November 9: Guest Speaker: Brittny Montes RD, LD, [facility] Weight Management Program,\"Holiday Eating\".  December 14: Guest Speaker Florencia Coburn MD, MPH, [facility], [facility], \"Body Contouring Surgery for Bariatric Surgery Patients\"     Connections: Post-Operative Bariatric Surgery Support Group  This is a support group for [facility] bariatric patients (and those external to [facility]) who have had bariatric surgery and are at least 3 months post-surgery.  WHEN: This support group meets the 4th Wednesday of the month from 11:00 AM - 12:00 PM virtually using Microsoft Teams.   FACILITATOR: Led by Certified Bariatric Nurse, Galilea Breen RN.   TO REGISTER: Please send an email to Galilea at [email or web address] if you would like an invitation to the group and to learn about using Appia" [facility] Healthy Lifestyle Virtual Support Group    Healthy Lifestyle Virtual Support Group?  This is 60 minutes of small group guided discussion, support and resources. All are welcome who want a healthy lifestyle.  WHEN: This group meets monthly on a Friday from 12:30 PM - to 1:30 PM virtually using Microsoft Teams.   FACILITATOR: Led by National Board Certified Health , Galilea [facility].   TO REGISTER: Please send an email to Galilea [email or web address] to receive monthly invites to the group or if you have any questions about having a health .  Prior to the meeting, a link with directions on how to join the meeting will be sent to you.    2021 Meetings  August 27: Open Forum  September 24: Sleep and the 7 Types of Rest  October 29: Open Forum  November 19: Gratitude     December 10: Open Forum

## 2021-11-08 ENCOUNTER — OFFICE VISIT (OUTPATIENT)
Dept: FAMILY MEDICINE | Facility: CLINIC | Age: 36
End: 2021-11-08
Payer: COMMERCIAL

## 2021-11-08 VITALS
HEART RATE: 82 BPM | SYSTOLIC BLOOD PRESSURE: 120 MMHG | BODY MASS INDEX: 44.1 KG/M2 | WEIGHT: 315 LBS | TEMPERATURE: 98.5 F | HEIGHT: 71 IN | RESPIRATION RATE: 16 BRPM | DIASTOLIC BLOOD PRESSURE: 80 MMHG | OXYGEN SATURATION: 100 %

## 2021-11-08 DIAGNOSIS — E66.813 CLASS 3 SEVERE OBESITY DUE TO EXCESS CALORIES WITH SERIOUS COMORBIDITY AND BODY MASS INDEX (BMI) OF 50.0 TO 59.9 IN ADULT (H): ICD-10-CM

## 2021-11-08 DIAGNOSIS — A60.01 HERPES SIMPLEX INFECTION OF PENIS: ICD-10-CM

## 2021-11-08 DIAGNOSIS — Z23 NEED FOR PROPHYLACTIC VACCINATION AND INOCULATION AGAINST INFLUENZA: Primary | ICD-10-CM

## 2021-11-08 DIAGNOSIS — E66.01 CLASS 3 SEVERE OBESITY DUE TO EXCESS CALORIES WITH SERIOUS COMORBIDITY AND BODY MASS INDEX (BMI) OF 50.0 TO 59.9 IN ADULT (H): ICD-10-CM

## 2021-11-08 DIAGNOSIS — E11.9 TYPE 2 DIABETES MELLITUS WITHOUT COMPLICATION, WITHOUT LONG-TERM CURRENT USE OF INSULIN (H): Chronic | ICD-10-CM

## 2021-11-08 DIAGNOSIS — G47.33 OBSTRUCTIVE SLEEP APNEA: Chronic | ICD-10-CM

## 2021-11-08 DIAGNOSIS — I10 ESSENTIAL HYPERTENSION WITH GOAL BLOOD PRESSURE LESS THAN 140/90: ICD-10-CM

## 2021-11-08 LAB
ANION GAP SERPL CALCULATED.3IONS-SCNC: 4 MMOL/L (ref 3–14)
BUN SERPL-MCNC: 15 MG/DL (ref 7–30)
CALCIUM SERPL-MCNC: 9.1 MG/DL (ref 8.5–10.1)
CHLORIDE BLD-SCNC: 103 MMOL/L (ref 94–109)
CO2 SERPL-SCNC: 29 MMOL/L (ref 20–32)
CREAT SERPL-MCNC: 0.58 MG/DL (ref 0.66–1.25)
ERYTHROCYTE [DISTWIDTH] IN BLOOD BY AUTOMATED COUNT: 14.9 % (ref 10–15)
GFR SERPL CREATININE-BSD FRML MDRD: >90 ML/MIN/1.73M2
GLUCOSE BLD-MCNC: 130 MG/DL (ref 70–99)
HCT VFR BLD AUTO: 41 % (ref 40–53)
HGB BLD-MCNC: 13.4 G/DL (ref 13.3–17.7)
MCH RBC QN AUTO: 27.5 PG (ref 26.5–33)
MCHC RBC AUTO-ENTMCNC: 32.7 G/DL (ref 31.5–36.5)
MCV RBC AUTO: 84 FL (ref 78–100)
PLATELET # BLD AUTO: 354 10E3/UL (ref 150–450)
POTASSIUM BLD-SCNC: 4.2 MMOL/L (ref 3.4–5.3)
RBC # BLD AUTO: 4.88 10E6/UL (ref 4.4–5.9)
SODIUM SERPL-SCNC: 136 MMOL/L (ref 133–144)
WBC # BLD AUTO: 8.8 10E3/UL (ref 4–11)

## 2021-11-08 PROCEDURE — 85027 COMPLETE CBC AUTOMATED: CPT | Performed by: FAMILY MEDICINE

## 2021-11-08 PROCEDURE — 36415 COLL VENOUS BLD VENIPUNCTURE: CPT | Performed by: FAMILY MEDICINE

## 2021-11-08 PROCEDURE — 90686 IIV4 VACC NO PRSV 0.5 ML IM: CPT | Performed by: FAMILY MEDICINE

## 2021-11-08 PROCEDURE — 90471 IMMUNIZATION ADMIN: CPT | Performed by: FAMILY MEDICINE

## 2021-11-08 PROCEDURE — 99214 OFFICE O/P EST MOD 30 MIN: CPT | Mod: 25 | Performed by: FAMILY MEDICINE

## 2021-11-08 PROCEDURE — 80048 BASIC METABOLIC PNL TOTAL CA: CPT | Performed by: FAMILY MEDICINE

## 2021-11-08 RX ORDER — VALACYCLOVIR HYDROCHLORIDE 500 MG/1
500 TABLET, FILM COATED ORAL 2 TIMES DAILY
Qty: 180 TABLET | Refills: 3 | Status: SHIPPED | OUTPATIENT
Start: 2021-11-08 | End: 2022-02-01

## 2021-11-08 RX ORDER — LISINOPRIL 20 MG/1
20 TABLET ORAL DAILY
Qty: 90 TABLET | Refills: 3 | Status: SHIPPED | OUTPATIENT
Start: 2021-11-08 | End: 2022-09-14

## 2021-11-08 ASSESSMENT — MIFFLIN-ST. JEOR: SCORE: 2644.51

## 2021-11-08 NOTE — PROGRESS NOTES
"A; DM2: metformin.  Fills .  A1C is good  Htn: lisinopril.  Stable.  Fills and labs  Morbid obesity: dropping some weight with diet.    Herpes: suppression with valtrex, wants fills  Sleep apnea: cpap    P: fills.  Met panel.  Doing well, continue with bariatric involvement, non sugical for now    Weight management plan: see above              S :Larry Bangura is a 36 year old male with    DM2: metformin.  Fills .  A1C is good  Htn: lisinopril.  Stable.  Fills and labs  Morbid obesity: dropping some weight with diet.  Thinking about bariatric surg, but not yet  Herpes: suppression with valtrex, wants fills  Sleep apnea: cpap    O:/80   Pulse 82   Temp 98.5  F (36.9  C) (Tympanic)   Resp 16   Ht 1.803 m (5' 11\")   Wt (!) 168.7 kg (372 lb)   SpO2 100%   BMI 51.88 kg/m    GEN: Alert and oriented, in no acute distress  CV: RRR, no murmur  RESP: lungs clear bilaterally, good effort      "

## 2021-11-14 ENCOUNTER — E-VISIT (OUTPATIENT)
Dept: URGENT CARE | Facility: URGENT CARE | Age: 36
End: 2021-11-14
Payer: COMMERCIAL

## 2021-11-14 DIAGNOSIS — Z20.822 SUSPECTED COVID-19 VIRUS INFECTION: Primary | ICD-10-CM

## 2021-11-14 PROCEDURE — 99421 OL DIG E/M SVC 5-10 MIN: CPT | Performed by: PHYSICIAN ASSISTANT

## 2021-11-14 NOTE — PATIENT INSTRUCTIONS
Dear Larry Bangura,    Your symptoms show that you may have coronavirus (COVID-19). This illness can cause fever, cough and trouble breathing. Many people get a mild case and get better on their own. Some people can get very sick.    Will I be tested for COVID-19?  We would like to test you for Covid-19 virus. I have placed orders for this test.     To schedule: go to your [email or web address] home page and scroll down to the section that says  You have an appointment that needs to be scheduled  and click the large green button that says  Schedule Now  and follow the steps to find the next available openings.    If you are unable to complete these [email or web address] scheduling steps, please call [phone] to schedule your testing.     Return to work/school/ guidance:  Please let your workplace manager and staffing office know when your quarantine ends     We can t give you an exact date as it depends on the above. You can calculate this on your own or work with your manager/staffing office to calculate this. (For example if you were exposed on 10/4, you would have to quarantine for 14 full days. That would be through 10/18. You could return on 10/19.)      If you receive a positive COVID-19 test result, follow the guidance of the those who are giving you the results. Usually the return to work is 10 (or in some cases 20 days from symptom onset.) If you work at [facility], you must also be cleared by Employee Occupational Health and Safety to return to work.        If you receive a negative COVID-19 test result and did not have a high risk exposure to someone with a known positive COVID-19 test, you can return to work once you're free of fever for 24 hours without fever-reducing medication and your symptoms are improving or resolved.      If you receive a negative COVID-19 test and If you had a high risk exposure to someone who has tested positive for COVID-19 then you can return to work 14 days after your last contact  with the positive individual    Note: If you have ongoing exposure to the covid positive person, this quarantine period may be more than 14 days. (For example, if you are continued to be exposed to your child who tested positive and cannot isolate from them, then the quarantine of 7-14 days can't start until your child is no longer contagious. This is typically 10 days from onset of the child's symptoms. So the total duration may be 17-24 days in this case.)    Sign up for Seatwave.   We know it's scary to hear that you might have COVID-19. We want to track your symptoms to make sure you're okay over the next 2 weeks. Please look for an email from Seatwave--this is a free, online program that we'll use to keep in touch. To sign up, follow the link in the email you will receive. Learn more at [email or web address]    How can I take care of myself?    Get lots of rest. Drink extra fluids (unless a doctor has told you not to)    Take Tylenol (acetaminophen) or ibuprofen for fever or pain. If you have liver or kidney problems, ask your family doctor if it's okay to take Tylenol o ibuprofen    If you have other health problems (like cancer, heart failure, an organ transplant or severe kidney disease): Call your specialty clinic if you don't feel better in the next 2 days.    Know when to call 911. Emergency warning signs include:  o Trouble breathing or shortness of breath  o Pain or pressure in the chest that doesn't go away  o Feeling confused like you haven't felt before, or not being able to wake up  o Bluish-colored lips or face    Where can I get more information?  [facility] - About COVID-19:   [email or web address]    CDC - What to Do If You're Sick:   www.cdc.gov/coronavirus/2019-ncov/about/steps-when-sick.html    November 14, 2021  RE:  Larry Bangura                                                                                                                  [id number]  [facility] [id number]      To whom it may concern:    I evaluated Larry Bangura on November 14, 2021. Larry Bangura should be excused from work/school.     They should let their workplace manager and staffing office know when their quarantine ends.    We can not give an exact date as it depends on the information below. They can calculate this on their own or work with their manager/staffing office to calculate this. (For example if they were exposed on 10/04, they would have to quarantine for 14 full days. That would be through 10/18. They could return on 10/19.)    Quarantine Guidelines:      If patient receives a positive COVID-19 test result, they should follow the guidance of those who are giving the results. Usually the return to work is 10 (or in some cases 20 days from symptom onset.) If they work at "[employer] they must be cleared by Employee Occupational Health and Safety to return to work.        If patient receives a negative COVID-19 test result and did not have a high risk exposure to someone with a known positive COVID-19 test, they can return to work once they're free of fever for 24 hours without fever-reducing medication and their symptoms are improving or resolved.      If patient receives a negative COVID-19 test and if they had a high risk exposure to someone who has tested positive for COVID-19 then they can return to work 14 days after their last contact with the positive individual    Note: If there is ongoing exposure to the covid positive person, this quarantine period may be longer than 14 days. (For example, if they are continually exposed to their child, who tested positive and cannot isolate from them, then the quarantine of 7-14 days can't start until their child is no longer contagious. This is typically 10 days from onset to the child's symptoms. So the total duration may be 17-24 days in this case.)     Sincerely,  Katiana Briggs PA-C

## 2021-11-15 ENCOUNTER — LAB (OUTPATIENT)
Dept: URGENT CARE | Facility: URGENT CARE | Age: 36
End: 2021-11-15
Attending: PHYSICIAN ASSISTANT
Payer: COMMERCIAL

## 2021-11-15 DIAGNOSIS — Z20.822 SUSPECTED COVID-19 VIRUS INFECTION: ICD-10-CM

## 2021-11-15 PROCEDURE — U0005 INFEC AGEN DETEC AMPLI PROBE: HCPCS

## 2021-11-15 PROCEDURE — U0003 INFECTIOUS AGENT DETECTION BY NUCLEIC ACID (DNA OR RNA); SEVERE ACUTE RESPIRATORY SYNDROME CORONAVIRUS 2 (SARS-COV-2) (CORONAVIRUS DISEASE [COVID-19]), AMPLIFIED PROBE TECHNIQUE, MAKING USE OF HIGH THROUGHPUT TECHNOLOGIES AS DESCRIBED BY CMS-2020-01-R: HCPCS

## 2021-11-15 NOTE — PROGRESS NOTES
"Larry Bangura is a 36 year old who is being evaluated via a billable video visit.      The patient has been notified of following:     \"This video visit will be conducted via a call between you and your physician/provider. We have found that certain health care needs can be provided without the need for an in-person physical exam.  This service lets us provide the care you need with a video conversation.  If a prescription is necessary we can send it directly to your pharmacy.  If lab work is needed we can place an order for that and you can then stop by our lab to have the test done at a later time.    Video visits are billed at different rates depending on your insurance coverage.  Please reach out to your insurance provider with any questions.    If during the course of the call the physician/provider feels a video visit is not appropriate, you will not be charged for this service.\"    Patient has given verbal consent for Video visit? Yes  How would you like to obtain your AVS? MyChart  If you are dropped from the video visit, the video invite should be resent to: Text to cell phone: [phone]  Will anyone else be joining your video visit? No        Video-Visit Details    Type of service:  Video Visit    Video Start Time: 12:38 PM  Video End Time: 12:46 PM    Originating Location (pt. Location): Home    Distant Location (provider location):  [facility] [place]     Platform used for Video Visit: OpenSpace    During this virtual visit the patient is located in [place], patient verifies this as the location during the entirety of this visit.     Return Bariatric Nutrition Consultation Note    Reason For Visit: Nutrition Assessment    Larry Bangura is a 36 year old presenting today for follow-up bariatric nutrition consult. Pt is interested in laparoscopic sleeve gastrectomy with Dr. Liriano expected surgery TBD.  Patient is accompanied by self. This is pt's 7th of 6 required nutrition " "visits prior to surgery.     Pt referred by DELL Duckworth on September 30, 2020.  Patient with Co-morbidities of obesity including:  Type II DM yes  Renal Failure no  Sleep apnea yes  Hypertension yes   Dyslipidemia yes  Joint pain no  Back pain no  GERD no     ANTHROPOMETRICS:  Initial weight: 400 lb     Estimated body mass index is 51.88 kg/m  as calculated from the following:    Height as of 11/8/21: 1.803 m (5' 11\").    Weight as of 11/8/21: 168.7 kg (372 lb). (-6 lbs over past month, -28 lbs from initial)    Required weight loss goal pre-op: 50 lbs from initial consult weight (goal weight 350 lbs or less before surgery)      SUPPLEMENT INFORMATION:  Centrum for mens MVI    NUTRITION HISTORY:  Pt continues to lose about 1-2 lbs per week. He continues to replace lunch with Herbalife protein drink (135 star, 17 gm pro) after a customer of his recommended it. Reports he does not tolerate soy or whey protein powder, uses pea protein instead. He is eating salad with every dinner. Switched from croissants at breakfast to higher protein option (see diet recall).     Diet Recall:   B: 2 egg +  2pc bacn + 1/2 cup cheese + 2 tortilla  L: protein shake  PM snack: Shoes of Prey granola bar protein bar  D: meat and potatoes; with veggies   Snack: 2 nut bars per day (17 gm pro, 6 gm sugar)  Beverage: water 1 gallon/day    Progress Towards Previous Goals:  1) Eat slowly (20-30 minutes per meal), chewing foods well (25 chews per bite/applesauce consistency) - Met, continues  2) Replace 1-2 meals per day with protein shake/bar - Met, continues  3) Avoid snacking as able. May use nut bar if needed. - Met, continues  4) Increase activity as able - Improving, doing more projects home. Set up elliptical at [facility].     NUTRITION DIAGNOSIS:  Obesity r/t long history of self-monitoring deficit and excessive energy intake aeb BMI >30 kg/m2.- Improving, continues     INTERVENTION:  Intervention Provided/Education:   1. Reviewed " "previous goals  2. Reviewed mindful eating, portion/calorie-control and healthy food choice for wt loss and DM2 mgmt.  3. Praised pt for the positive changes he has made to diet/lifetsyle.   4. AVS via [employer]     Patient Understanding: good  Expected Compliance: good    GOALS:  1) Eat slowly (20-30 minutes per meal), chewing foods well (25 chews per bite/applesauce consistency)  2) Replace 1-2 meals per day with protein shake/bar  3) Avoid snacking as able. May use nut bar if needed.  4) Increase activity as able    Diet Guidelines after Weight-loss Surgery  [email or web address]     Protein Sources for Weight Loss  [email or web address]       Tools for after surgery:  Nearbox Dish Sets: bariatric meal size bowls and plates with built in measurement designs on the dishes    Bariatric Pal: https://store.Olark.CorkCRM/collections/bariatric-dinnerware    Healthy Recipe Resources:  \"The Volumetrics Eating Plan\" by Gabriela Sidhu, Ph.D.  https://www.FastHealth.CorkCRM/  www.LegalReach  www.Rivet Games.org  Dash Diet Recipes [facility]  www.extension.[facility] - the recipe box  \"Cooking that Counts\" by editors of Sellobuy  https://www.[facility]/communityculinary/[facility]_Cookbook_KT_Final_11-6_NoCrops-compressed.pdf  Https://www.choosemyplate.gov/myplatekitchen  https://snaped.fns.usda.gov/recipes-menus - SNAP recipes  https://www.diabetesfoodhub.org/all-recipes.html  https://www.Sarentis [employer].com/      Time spent with patient: 8 minutes.  Nataliya Monsivais RD, LD  "

## 2021-11-16 ENCOUNTER — VIRTUAL VISIT (OUTPATIENT)
Dept: ENDOCRINOLOGY | Facility: CLINIC | Age: 36
End: 2021-11-16
Payer: COMMERCIAL

## 2021-11-16 DIAGNOSIS — Z71.3 NUTRITIONAL COUNSELING: Primary | ICD-10-CM

## 2021-11-16 DIAGNOSIS — E11.9 TYPE 2 DIABETES MELLITUS WITHOUT COMPLICATION, WITHOUT LONG-TERM CURRENT USE OF INSULIN (H): ICD-10-CM

## 2021-11-16 DIAGNOSIS — E66.9 OBESITY: ICD-10-CM

## 2021-11-16 LAB — SARS-COV-2 RNA RESP QL NAA+PROBE: NEGATIVE

## 2021-11-16 PROCEDURE — 97803 MED NUTRITION INDIV SUBSEQ: CPT | Mod: 95 | Performed by: DIETITIAN, REGISTERED

## 2021-11-16 NOTE — LETTER
"11/16/2021       RE: Larry Bangura  [address] [facility] [address]     Dear Colleague,    Thank you for referring your patient, Larry Bangura, to the [facility] [place] at [facility]. Please see a copy of my visit note below.    Larry Bangura is a 36 year old who is being evaluated via a billable video visit.      The patient has been notified of following:     \"This video visit will be conducted via a call between you and your physician/provider. We have found that certain health care needs can be provided without the need for an in-person physical exam.  This service lets us provide the care you need with a video conversation.  If a prescription is necessary we can send it directly to your pharmacy.  If lab work is needed we can place an order for that and you can then stop by our lab to have the test done at a later time.    Video visits are billed at different rates depending on your insurance coverage.  Please reach out to your insurance provider with any questions.    If during the course of the call the physician/provider feels a video visit is not appropriate, you will not be charged for this service.\"    Patient has given verbal consent for Video visit? Yes  How would you like to obtain your AVS? MyChart  If you are dropped from the video visit, the video invite should be resent to: Text to cell phone: [phone]  Will anyone else be joining your video visit? No        Video-Visit Details    Type of service:  Video Visit    Video Start Time: 12:38 PM  Video End Time: 12:46 PM    Originating Location (pt. Location): Home    Distant Location (provider location):  [facility] [place]     Platform used for Video Visit: Appography    During this virtual visit the patient is located in [place], patient verifies this as the location during the entirety of this visit.     Return Bariatric " "Nutrition Consultation Note    Reason For Visit: Nutrition Assessment    Larry Bangura is a 36 year old presenting today for follow-up bariatric nutrition consult. Pt is interested in laparoscopic sleeve gastrectomy with Dr. Liriano expected surgery TBD.  Patient is accompanied by self. This is pt's 7th of 6 required nutrition visits prior to surgery.     Pt referred by DELL Duckworth on September 30, 2020.  Patient with Co-morbidities of obesity including:  Type II DM yes  Renal Failure no  Sleep apnea yes  Hypertension yes   Dyslipidemia yes  Joint pain no  Back pain no  GERD no     ANTHROPOMETRICS:  Initial weight: 400 lb     Estimated body mass index is 51.88 kg/m  as calculated from the following:    Height as of 11/8/21: 1.803 m (5' 11\").    Weight as of 11/8/21: 168.7 kg (372 lb). (-6 lbs over past month, -28 lbs from initial)    Required weight loss goal pre-op: 50 lbs from initial consult weight (goal weight 350 lbs or less before surgery)      SUPPLEMENT INFORMATION:  Centrum for mens MVI    NUTRITION HISTORY:  Pt continues to lose about 1-2 lbs per week. He continues to replace lunch with Herbalife protein drink (135 star, 17 gm pro) after a customer of his recommended it. Reports he does not tolerate soy or whey protein powder, uses pea protein instead. He is eating salad with every dinner. Switched from croissants at breakfast to higher protein option (see diet recall).     Diet Recall:   B: 2 egg +  2pc bacn + 1/2 cup cheese + 2 tortilla  L: protein shake  PM snack: [facility] granola bar protein bar  D: meat and potatoes; with veggies   Snack: 2 nut bars per day (17 gm pro, 6 gm sugar)  Beverage: water 1 gallon/day    Progress Towards Previous Goals:  1) Eat slowly (20-30 minutes per meal), chewing foods well (25 chews per bite/applesauce consistency) - Met, continues  2) Replace 1-2 meals per day with protein shake/bar - Met, continues  3) Avoid snacking as able. May use nut bar if needed. - " "Met, continues  4) Increase activity as able - Improving, doing more projects home. Set up elliptical at [facility].     NUTRITION DIAGNOSIS:  Obesity r/t long history of self-monitoring deficit and excessive energy intake aeb BMI >30 kg/m2.- Improving, continues     INTERVENTION:  Intervention Provided/Education:   1. Reviewed previous goals  2. Reviewed mindful eating, portion/calorie-control and healthy food choice for wt loss and DM2 mgmt.  3. Praised pt for the positive changes he has made to diet/lifetsyle.   4. AVS via Ekohart     Patient Understanding: good  Expected Compliance: good    GOALS:  1) Eat slowly (20-30 minutes per meal), chewing foods well (25 chews per bite/applesauce consistency)  2) Replace 1-2 meals per day with protein shake/bar  3) Avoid snacking as able. May use nut bar if needed.  4) Increase activity as able    Diet Guidelines after Weight-loss Surgery  [email or web address]     Protein Sources for Weight Loss  [email or web address]       Tools for after surgery:  [employer] Dish Sets: bariatric meal size bowls and plates with built in measurement designs on the dishes    Bariatric Pal: https://store.[employer].[facility]Health/collections/bariatric-dinnerware    Healthy Recipe Resources:  \"The Volumetrics Eating Plan\" by Gabriela Sidhu, Ph.D.  https://www.[employer].CiteHealth/  www.Dynis  www.Simplilearn.org  Dash Diet Recipes [facility]  www.extension.[facility] - the recipe box  \"Cooking that Counts\" by editors of [employer]  https://www.[facility]/communityculinary/[facility]_Cookbook_KT_Final_11-6_NoCrops-compressed.pdf  Https://www.choosemyplate.gov/myplatekitchen  https://snaped.fns.usda.gov/recipes-menus - SNAP recipes  https://www.diabetesfoodhub.org/all-recipes.html  https://www.mealime.com/      Time spent with patient: 8 minutes.  Nataliya Monsivais RD, LD    "

## 2021-11-16 NOTE — PATIENT INSTRUCTIONS
"Balwinder Gonzalez,    Keep up the great work!    Follow-up with RD on 12/16    Thank you,    Nataliya Monsivais, RD, LD  If you would like to schedule or reschedule an appointment with the RD, please call [phone]    Nutrition Goals  1) Eat slowly (20-30 minutes per meal), chewing foods well (25 chews per bite/applesauce consistency)  2) Replace 1-2 meals per day with protein shake/bar  3) Avoid snacking as able. May use nut bar if needed.  4) Increase activity as able    Diet Guidelines after Weight-loss Surgery  [email or web address]     Protein Sources for Weight Loss  [email or web address]       Tools for after surgery:  [employer] Dish Sets: bariatric meal size bowls and plates with built in measurement designs on the dishes    Bariatric Pal: https://store.WeVue.Rarelook/collections/bariatric-dinnerware    Healthy Recipe Resources:  \"The Volumetrics Eating Plan\" by Gabriela Sidhu, Ph.D.  https://www.[employer].Rarelook/  www.[employer]  www.TRIXandTRAX.org  Dash Diet Recipes [facility]  www.[facility] - the recipe box  \"Cooking that Counts\" by editors of [employer]  https://www.[facility]/communityculinary/[facility]_Cookbook_KT_Final_11-6_NoCrops-compressed.pdf  Https://www.choosemyplate.gov/myplatekitchen  https://snaped.fns.usda.gov/recipes-menus - SNAP recipes  https://www.diabetesfoodhub.org/all-recipes.html  https://www.BRAND-YOURSELF.Rarelook/        Interested in working with a health ?  Health coaches work with you to improve your overall health and wellbeing.  They look at the whole person, and may involve discussion of different areas of life, including, but not limited to the four pillars of health (sleep, exercise, nutrition, and stress management). Discuss with your care team if you would like to start working a health .    Health Coaching-3 Pack:    $99 for three health coaching visits    Visits may be done in person or via phone    Coaching is a partnership between the  and the " "client; Coaches do not prescribe or diagnose    Coaching helps inspire the client to reach his/her personal goals      COMPREHENSIVE WEIGHT MANAGEMENT PROGRAM  VIRTUAL SUPPORT GROUPS    For Support Group Information:      We offer support groups for patients who are working on weight loss and considering, preparing for or have had weight loss surgery.   There is no cost for this opportunity.  You are invited to attend the?Virtual Support Groups?provided by any of the following locations:    1. [facility] via Microsoft Teams with Vera Casas RN  2.   [place] via Human Longevity with Gino Cordova, PhD, LP  3.   [place] via Human Longevity with Galilea Breen RN  4.   [facility] via Microsoft Teams with Galilea Duncan [facility]    The following Support Group information can also be found on our website:  https://www.[facility].org/treatments/weight-loss-surgery-support-groups      [facility] Weight Loss Surgery Support Group    [facility] Weight Loss Surgery Support Group  The support group is a patient-lead forum that meets monthly to share experiences, encouragement and education. It is open to those who have had weight loss surgery, are scheduled for surgery, and those who are considering surgery.   WHEN: This group meets on the 3rd Wednesday of each month from 5:00PM - 6:00PM virtually using Microsoft Teams.   FACILITATOR: Led by Vera Casas, the program's Clinical Coordinator.   TO REGISTER: Please contact the clinic via LinkMeGlobal or call the nurse line directly at [phone] to inform our staff that you would like an invite sent to you and to let us know the email you would like the invite sent to. Prior to the meeting, a link with directions on how to join the meeting will be sent to you.    2021 Meetings  August 18: \"Let's Talk\" a time for the group to share.  September 15: \"Let's Talk\" a time for the group to share.  October 20: \"Let's Talk\" a time for the group to " "share.  November 17: Emy Maya RD, LD \"Protein, Metabolism and Meal Planning\"  December 15: Shashank Lay RD, LD will speak, \"Recipe Modification\"    [facility] Support Groups    Connections: Bariatric Care Support Group?  This is open to all [facility] (and those external to this program) pre- and post- operative bariatric surgery patients as well as their support system.   WHEN: This group meets the 2nd Tuesday of each month from 6:30 PM - 8:00 PM virtually using Microsoft Teams.   FACILITATOR: Led by Gino Cordova, Ph.D who is a Licensed Psychologist with the [facility] Comprehensive Weight Management Program.   TO REGISTER: Please send an email to Gino Cordova, Ph.D., LP at?[email or web address]?if you would like an invitation to the group and to learn about using Microsoft Teams.    2021 Meetings  August 10: Open Forum  September 14: Guest Speaker: Galilea Breen RN,CBN, [facility], [facility] Comprehensive Weight Management Program, \"Your Hospital Stay and Post-Operative Compliance\"  October 12: Open Forum  November 9: Guest Speaker: Brittny Montes RD, LD, [facility] Comprehensive Weight Management Program,\"Holiday Eating\".  December 14: Guest Speaker Florencia Coburn MD, MPH, [facility], [facility], \"Body Contouring Surgery for Bariatric Surgery Patients\"     Connections: Post-Operative Bariatric Surgery Support Group  This is a support group for [facility] bariatric patients (and those external to [facility]) who have had bariatric surgery and are at least 3 months post-surgery.  WHEN: This support group meets the 4th Wednesday of the month from 11:00 AM - 12:00 PM virtually using Microsoft Teams.   FACILITATOR: Led by Certified Bariatric Nurse, Galilea Breen RN.   TO REGISTER: Please send an email to Galilea at [email or web address] if you would like an invitation to the group and to learn about using [employer]" [facility] Healthy Lifestyle Virtual Support Group    Healthy Lifestyle Virtual Support Group?  This is 60 minutes of small group guided discussion, support and resources. All are welcome who want a healthy lifestyle.  WHEN: This group meets monthly on a Friday from 12:30 PM - to 1:30 PM virtually using Microsoft Teams.   FACILITATOR: Led by National Board Certified Health , Galilea Duncan [facility].   TO REGISTER: Please send an email to Galilea [email or web address] to receive monthly invites to the group or if you have any questions about having a health .  Prior to the meeting, a link with directions on how to join the meeting will be sent to you.    2021 Meetings  August 27: Open Forum  September 24: Sleep and the 7 Types of Rest  October 29: Open Forum  November 19: Gratitude     December 10: Open Forum

## 2021-12-08 ENCOUNTER — VIRTUAL VISIT (OUTPATIENT)
Dept: ENDOCRINOLOGY | Facility: CLINIC | Age: 36
End: 2021-12-08
Payer: COMMERCIAL

## 2021-12-08 VITALS — WEIGHT: 315 LBS | BODY MASS INDEX: 44.1 KG/M2 | HEIGHT: 71 IN

## 2021-12-08 DIAGNOSIS — E66.813 CLASS 3 SEVERE OBESITY DUE TO EXCESS CALORIES WITH SERIOUS COMORBIDITY AND BODY MASS INDEX (BMI) OF 50.0 TO 59.9 IN ADULT (H): Primary | ICD-10-CM

## 2021-12-08 DIAGNOSIS — E66.01 CLASS 3 SEVERE OBESITY DUE TO EXCESS CALORIES WITH SERIOUS COMORBIDITY AND BODY MASS INDEX (BMI) OF 50.0 TO 59.9 IN ADULT (H): Primary | ICD-10-CM

## 2021-12-08 PROCEDURE — 99213 OFFICE O/P EST LOW 20 MIN: CPT | Mod: 95 | Performed by: PHYSICIAN ASSISTANT

## 2021-12-08 RX ORDER — GLUCOSAMINE HCL 500 MG
1 TABLET ORAL DAILY
Qty: 30 TABLET | Refills: 11 | Status: SHIPPED | OUTPATIENT
Start: 2021-12-08 | End: 2022-09-14

## 2021-12-08 ASSESSMENT — MIFFLIN-ST. JEOR: SCORE: 2599.15

## 2021-12-08 ASSESSMENT — PAIN SCALES - GENERAL: PAINLEVEL: NO PAIN (0)

## 2021-12-08 NOTE — PROGRESS NOTES
Carlos is a 36 year old who is being evaluated via a billable video visit.      How would you like to obtain your AVS? MyChart  If the video visit is dropped, the invitation should be resent by: Text to cell phone: [phone]  Will anyone else be joining your video visit? No    Video Start Time: 840     Video-Visit Details    Type of service:  Video Visit    Video End Time:8:58 AM    Originating Location (pt. Location): Home    Distant Location (provider location):  [facility] WEIGHT MANAGEMENT [facility]     Platform used for Video Visit: [facility]       Pre-Bariatric Surgery Note    Shelton Mcconnell    Date: 2021     RE: Larry Bangura    MR#: [id number]   : 1985   Date of Visit: Dec 8, 2021    REASON FOR VISIT: Preoperative evaluation for possible weight loss surgery    Dear Shelton Cota,    I had the pleasure of seeing your patient, Larry Bangura, in my preoperative bariatric clinic.    As you know, he is morbidly obese and considering weight loss surgery to treat obesity in association with his medical conditions of obesity.  His consult weight was 400.  He has lost 38 pounds since his consult weight. He has not met his required pre-surgery weight. Please refer to initial consult note from date 21 for patient's weight history and co-morbidities.    1 meal replacement lunch, has lost monserrat 400 to 362 lbs  Psych eval complete Tanvi Montanez   Still need some labs      PLAN:  See Dr Liriano for pre-bariatric visit to confirm plan for sleeve gastrectomy. [phone] (has lost 38 lbs of 50 lbs goal)  Call Oliver Judd [phone] to get possible surgery date  Labs at any [facility] lab  Continue with RD until surgery date       Assessment & Plan   Problem List Items Addressed This Visit     None           20 minutes spent on the date of the encounter doing chart review, history and exam, documentation and further activities per the note    Reviewed tasklist with patient yes    Most recent  "weights:  Wt Readings from Last 4 Encounters:   12/08/21 (!) 164.2 kg (362 lb)   11/08/21 (!) 168.7 kg (372 lb)   09/20/21 (!) 173.1 kg (381 lb 9.6 oz)   05/02/21 (!) 173.3 kg (382 lb)         ROS    Past Medical History:   Diagnosis Date     HTN (hypertension) 9/10/2013     Morbid obesity (H)        Past Surgical History:   Procedure Laterality Date     ENDOSCOPIC RELEASE CARPAL TUNNEL  12/6/2013    Procedure: ENDOSCOPIC RELEASE CARPAL TUNNEL;  Right Hand Endoscopic Carpal Tunnel Release;  Surgeon: Kody Pittman MD;  Location: [facility] OR     ENDOSCOPIC RELEASE CARPAL TUNNEL  1/21/2014    Procedure: ENDOSCOPIC RELEASE CARPAL TUNNEL;  Left endoscopic carpal tunnel release, possible open;  Surgeon: Kody Pittman MD;  Location: [facility] OR       Current Outpatient Medications   Medication     lisinopril (ZESTRIL) 20 MG tablet     metFORMIN (GLUCOPHAGE) 1000 MG tablet     ORDER FOR DME, SET TO FAX,     insulin pen needle (B-D U/F) 31G X 8 MM miscellaneous     liraglutide (VICTOZA) 18 MG/3ML solution     topiramate (TOPAMAX) 25 MG tablet     valACYclovir (VALTREX) 1000 mg tablet     valACYclovir (VALTREX) 500 MG tablet     No current facility-administered medications for this visit.       No Known Allergies    Lab on 11/15/2021   Component Date Value Ref Range Status     SARS CoV2 PCR 11/15/2021 Negative  Negative, Testing sent to reference lab. Results will be returned via unsolicited result Final    NEGATIVE: SARS-CoV-2 (COVID-19) RNA not detected, presumed negative.       PHYSICAL EXAM:  Objective    Ht 1.803 m (5' 11\")   Wt (!) 164.2 kg (362 lb)   BMI 50.49 kg/m    Vitals - Patient Reported  Pain Score: No Pain (0)        Physical Exam   GENERAL: Healthy, alert and no distress  EYES: Eyes grossly normal to inspection.  No discharge or erythema, or obvious scleral/conjunctival abnormalities.  RESP: No audible wheeze, cough, or visible cyanosis.  No visible retractions or increased work of breathing.    SKIN: " Visible skin clear. No significant rash, abnormal pigmentation or lesions.  NEURO: Cranial nerves grossly intact.  Mentation and speech appropriate for age.  PSYCH: Mentation appears normal, affect normal/bright, judgement and insight intact, normal speech and appearance well-groomed.    Sleeve Gastrectomy: Risks and Side Effects    The complications or risks of surgery include but are not limited to: death, heart attack, infection in the surgical site (wound infection), abdomen (abscess), bladder (urinary tract infection), lungs (pneumonia), clots in legs (deep vein thrombosis) or lungs (pulmonary emboli),  injury to the bowels or other organs, bowel obstruction, hernia at the incision and gastrointestional bleeding.    More specific risks related to vertical sleeve gastrectomy were detailed at the bariatric informational seminar and include the following: leak at the vertical sleeve staple line, leak at the anastomoses,  nausea, vomiting, and dehydration for several months,  adhesions causing bowel obstruction, rapid weight loss causing a higher rate of gallstone formation during the first 6 months after surgery, decreased absorption of vitamins and protein because of the reduced stomach size, weight regain if inappropriate food intake occurs, stricture, injury to other organs, hernia,  and ulcers.       Side effects of bariatric surgery include but are not limited to: abdominal pain, cramping, bloating, constipation, nausea, vomiting, diarrhea, difficulty swallowing,  dehydration, hair loss, excess skin, protein, iron and vitamin deficiencies, heartburn, transfer of addictions, increased anxiety and worsening depression.       I emphasized exercise and activity behavior along with appropriate food choice as the main foundation for weight loss with surgery providing surgical reinforcement of the appropriate behavior set.        Review of general surgery weight loss process    1. Complete preoperative requirements,  including weight loss.  Final weight check to confirm MANDATORY weight loss requirement must be documented on a clinic scale.    2. Discuss prior authorization with .    3. History and physical evaluation by PCP of PAC clinic within 30 days of surgery date, preoperative class, and weight check (weigh-in visit) to be scheduled by patient.  Pre-anesthesia clinic for risk evaluation to be scheduled by anesthesia clinic.    4. We cannot guarantee that patient will qualify for surgery unless all preoperative requirements are met, prior authorization from primary insurance company is granted, and insurance changes do not occur.    5. It is possible for patients to regain all weight after weight loss surgery unless they follow guidelines prescribed by our bariatric center.    6. All patients with gastrointestinal complaints after weight loss surgery must have complaints conveyed to the bariatric team for appropriate treatment.    7. Vitamin deficiencies may develop post-bariatric surgery and annual laboratory testing should be performed.    8. Persistent nausea/vomiting after bariatric surgery entails risk of thiamine deficiency and should be treated early.  Vitamin B12 deficiency may develop, especially after gastric bypass surgery and must be recognized.        If you have any questions about our plans please don't hesitate to contact me.    Sincerely,    Sada Beach PA-C

## 2021-12-08 NOTE — LETTER
2021       RE: Larry Bangura  [address] [facility] [address]     Dear Colleague,    Thank you for referring your patient, Larry Bangura, to the [facility] [place] at [facility]. Please see a copy of my visit note below.    Carlos is a 36 year old who is being evaluated via a billable video visit.      How would you like to obtain your AVS? MyChart  If the video visit is dropped, the invitation should be resent by: Text to cell phone: [phone]  Will anyone else be joining your video visit? No    Video Start Time: 840     Video-Visit Details    Type of service:  Video Visit    Video End Time:8:58 AM    Originating Location (pt. Location): Home    Distant Location (provider location):  [facility] [place]     Platform used for Video Visit: Jenny       Pre-Bariatric Surgery Note    Shelton Mcconnell    Date: 2021     RE: Larry Bangura    MR#: [id number]   : 1985   Date of Visit: Dec 8, 2021    REASON FOR VISIT: Preoperative evaluation for possible weight loss surgery    Dear Shelton Cota,    I had the pleasure of seeing your patient, Larry Bangura, in my preoperative bariatric clinic.    As you know, he is morbidly obese and considering weight loss surgery to treat obesity in association with his medical conditions of obesity.  His consult weight was 400.  He has lost 38 pounds since his consult weight. He has not met his required pre-surgery weight. Please refer to initial consult note from date 21 for patient's weight history and co-morbidities.    1 meal replacement lunch, has lost monserrat 400 to 362 lbs  Psych eval complete Tanvi Montanez   Still need some labs      PLAN:  See Dr Liriano for pre-bariatric visit to confirm plan for sleeve gastrectomy. [phone] (has lost 38 lbs of 50 lbs goal)  Call Oliver Judd [phone] to get possible surgery date  Labs at any  "[facility] lab  Continue with RD until surgery date       Assessment & Plan   Problem List Items Addressed This Visit     None           20 minutes spent on the date of the encounter doing chart review, history and exam, documentation and further activities per the note    Reviewed tasklist with patient yes    Most recent weights:  Wt Readings from Last 4 Encounters:   12/08/21 (!) 164.2 kg (362 lb)   11/08/21 (!) 168.7 kg (372 lb)   09/20/21 (!) 173.1 kg (381 lb 9.6 oz)   05/02/21 (!) 173.3 kg (382 lb)         ROS    Past Medical History:   Diagnosis Date     HTN (hypertension) 9/10/2013     Morbid obesity (H)        Past Surgical History:   Procedure Laterality Date     ENDOSCOPIC RELEASE CARPAL TUNNEL  12/6/2013    Procedure: ENDOSCOPIC RELEASE CARPAL TUNNEL;  Right Hand Endoscopic Carpal Tunnel Release;  Surgeon: Kody Pittman MD;  Location: [facility] OR     ENDOSCOPIC RELEASE CARPAL TUNNEL  1/21/2014    Procedure: ENDOSCOPIC RELEASE CARPAL TUNNEL;  Left endoscopic carpal tunnel release, possible open;  Surgeon: Kody Pittman MD;  Location: [facility] OR       Current Outpatient Medications   Medication     lisinopril (ZESTRIL) 20 MG tablet     metFORMIN (GLUCOPHAGE) 1000 MG tablet     ORDER FOR DME, SET TO FAX,     insulin pen needle (B-D U/F) 31G X 8 MM miscellaneous     liraglutide (VICTOZA) 18 MG/3ML solution     topiramate (TOPAMAX) 25 MG tablet     valACYclovir (VALTREX) 1000 mg tablet     valACYclovir (VALTREX) 500 MG tablet     No current facility-administered medications for this visit.       No Known Allergies    Lab on 11/15/2021   Component Date Value Ref Range Status     SARS CoV2 PCR 11/15/2021 Negative  Negative, Testing sent to reference lab. Results will be returned via unsolicited result Final    NEGATIVE: SARS-CoV-2 (COVID-19) RNA not detected, presumed negative.       PHYSICAL EXAM:  Objective    Ht 1.803 m (5' 11\")   Wt (!) 164.2 kg (362 lb)   BMI 50.49 kg/m    Vitals - Patient Reported  Pain Score: " No Pain (0)        Physical Exam   GENERAL: Healthy, alert and no distress  EYES: Eyes grossly normal to inspection.  No discharge or erythema, or obvious scleral/conjunctival abnormalities.  RESP: No audible wheeze, cough, or visible cyanosis.  No visible retractions or increased work of breathing.    SKIN: Visible skin clear. No significant rash, abnormal pigmentation or lesions.  NEURO: Cranial nerves grossly intact.  Mentation and speech appropriate for age.  PSYCH: Mentation appears normal, affect normal/bright, judgement and insight intact, normal speech and appearance well-groomed.    Sleeve Gastrectomy: Risks and Side Effects    The complications or risks of surgery include but are not limited to: death, heart attack, infection in the surgical site (wound infection), abdomen (abscess), bladder (urinary tract infection), lungs (pneumonia), clots in legs (deep vein thrombosis) or lungs (pulmonary emboli),  injury to the bowels or other organs, bowel obstruction, hernia at the incision and gastrointestional bleeding.    More specific risks related to vertical sleeve gastrectomy were detailed at the bariatric informational seminar and include the following: leak at the vertical sleeve staple line, leak at the anastomoses,  nausea, vomiting, and dehydration for several months,  adhesions causing bowel obstruction, rapid weight loss causing a higher rate of gallstone formation during the first 6 months after surgery, decreased absorption of vitamins and protein because of the reduced stomach size, weight regain if inappropriate food intake occurs, stricture, injury to other organs, hernia,  and ulcers.       Side effects of bariatric surgery include but are not limited to: abdominal pain, cramping, bloating, constipation, nausea, vomiting, diarrhea, difficulty swallowing,  dehydration, hair loss, excess skin, protein, iron and vitamin deficiencies, heartburn, transfer of addictions, increased anxiety and worsening  depression.       I emphasized exercise and activity behavior along with appropriate food choice as the main foundation for weight loss with surgery providing surgical reinforcement of the appropriate behavior set.        Review of general surgery weight loss process    1. Complete preoperative requirements, including weight loss.  Final weight check to confirm MANDATORY weight loss requirement must be documented on a clinic scale.    2. Discuss prior authorization with .    3. History and physical evaluation by PCP of PAC clinic within 30 days of surgery date, preoperative class, and weight check (weigh-in visit) to be scheduled by patient.  Pre-anesthesia clinic for risk evaluation to be scheduled by anesthesia clinic.    4. We cannot guarantee that patient will qualify for surgery unless all preoperative requirements are met, prior authorization from primary insurance company is granted, and insurance changes do not occur.    5. It is possible for patients to regain all weight after weight loss surgery unless they follow guidelines prescribed by our bariatric center.    6. All patients with gastrointestinal complaints after weight loss surgery must have complaints conveyed to the bariatric team for appropriate treatment.    7. Vitamin deficiencies may develop post-bariatric surgery and annual laboratory testing should be performed.    8. Persistent nausea/vomiting after bariatric surgery entails risk of thiamine deficiency and should be treated early.  Vitamin B12 deficiency may develop, especially after gastric bypass surgery and must be recognized.    If you have any questions about our plans please don't hesitate to contact me.    Sincerely,    Sada [place] PA-C

## 2021-12-08 NOTE — PATIENT INSTRUCTIONS
PLAN:  See Dr Liriano for pre-bariatric visit to confirm plan for sleeve gastrectomy. [phone]  Call Oliver Judd [phone] to get possible surgery date  Labs at any [facility] lab  Continue with RD until surgery date

## 2021-12-08 NOTE — Clinical Note
See note from today.  Has lost 38 of 50 lbs required.  Plan to see Dr DE LA TORRE again soon and get plan for tentative surgery date to keep him motivated with weight loss.  Psych done.  Thanks!

## 2021-12-14 NOTE — PROGRESS NOTES
"Larry Bangura is a 36 year old who is being evaluated via a billable video visit.      The patient has been notified of following:     \"This video visit will be conducted via a call between you and your physician/provider. We have found that certain health care needs can be provided without the need for an in-person physical exam.  This service lets us provide the care you need with a video conversation.  If a prescription is necessary we can send it directly to your pharmacy.  If lab work is needed we can place an order for that and you can then stop by our lab to have the test done at a later time.    Video visits are billed at different rates depending on your insurance coverage.  Please reach out to your insurance provider with any questions.    If during the course of the call the physician/provider feels a video visit is not appropriate, you will not be charged for this service.\"    Patient has given verbal consent for Video visit? Yes  How would you like to obtain your AVS? MyChart  If you are dropped from the video visit, the video invite should be resent to: Text to cell phone: [phone]  Will anyone else be joining your video visit? No        Video-Visit Details    Type of service:  Video Visit    Video Start Time: 1:00 PM  Video End Time: 1:17 PM    Originating Location (pt. Location): Home    Distant Location (provider location):  [facility] [place]     Platform used for Video Visit: [employer]    During this virtual visit the patient is located in [place], patient verifies this as the location during the entirety of this visit.     Return Bariatric Nutrition Consultation Note    Reason For Visit: Nutrition Assessment    Larry Bangura is a 36 year old presenting today for follow-up bariatric nutrition consult. Pt is interested in laparoscopic sleeve gastrectomy with Dr. Liriano expected surgery TBD.  Patient is accompanied by self. This is pt's 8th of 6 required nutrition " "visits prior to surgery.     Pt referred by DELL Duckworth on September 30, 2020.  Patient with Co-morbidities of obesity including:  Type II DM yes  Renal Failure no  Sleep apnea yes  Hypertension yes   Dyslipidemia yes  Joint pain no  Back pain no  GERD no     ANTHROPOMETRICS:  Initial weight: 400 lb     Estimated body mass index is 50.49 kg/m  as calculated from the following:    Height as of 12/8/21: 1.803 m (5' 11\").    Weight as of 12/8/21: 164.2 kg (362 lb).     Current weight (per pt report): 360 lbs (-10 lbs over past month, -38 lbs from initial)    Required weight loss goal pre-op: 50 lbs from initial consult weight (goal weight 350 lbs or less before surgery)      SUPPLEMENT INFORMATION:  Centrum for mens MVI    NUTRITION HISTORY:  He continues to replace lunch with Herbalife protein drink (26 gm pro) Reports he does not tolerate soy or whey protein powder, uses pea protein instead. He is eating salad with every dinner. Switched from croissants at breakfast to higher protein option (see diet recall). Consuming 3 meals, 2 snacks per day. He reports weight has plateaued this month, so he started riding his elliptical more consistently.     Diet Recall:   B: 2 egg +  2pc bacn + 1/2 cup cheese + 2 tortilla  L: protein shake  PM snack: [employer] granola bar protein bar (170 star, 9 gm carb)  D: meat and potatoes; with veggies   Snack: 2 nut bars per day (17 gm pro, 6 gm sugar)  Beverage: water 1 gallon/day    Progress Towards Previous Goals:  1) Eat slowly (20-30 minutes per meal), chewing foods well (25 chews per bite/applesauce consistency) - Improving  2) Replace 1-2 meals per day with protein shake/bar  - Met, continues   3) Avoid snacking as able. May use nut bar if needed. - Not met  4) Increase activity as able - Ellipcital for 30 mins 5 days per week.     NUTRITION DIAGNOSIS:  Obesity r/t long history of self-monitoring deficit and excessive energy intake aeb BMI >30 kg/m2.- Improving, continues "     INTERVENTION:  Intervention Provided/Education:   1. Reviewed previous goals  2. Reviewed mindful eating, portion/calorie-control and healthy food choice for wt loss and DM2 mgmt.  3. Praised pt for the positive changes he has made to diet/lifetsyle.   4. AVS via MyChart     Patient Understanding: good  Expected Compliance: good    GOALS:  1) Eat slowly (20-30 minutes per meal), chewing foods well (25 chews per bite/applesauce consistency)  2) Replace 1-2 meals per day with protein shake/bar  3) Avoid snacking as able. Replace with non-starchy veggies, or half protein shake/bar if needed.   4) Increase activity as able    Post-op Diet Handouts:  Diet Guidelines after Weight-loss Surgery  [email or web address]     Your Stage 1 Diet: Clear Liquids  [email or web address]     Your Stage 2 Diet: Low-fat Full Liquids  [email or web address]     Your Stage 3 Diet: Pureed Foods  [email or web address]     Pureed Pleasures  [email or web address]    Your Stage 4 Diet: Soft Foods  [email or web address]    Your Stage 5 Diet: Regular Foods  [email or web address]    Supplements after Weight Loss Surgery  [email or web address]     Keeping Track of Fluids  [email or web address]      Time spent with patient: 17 minutes.  Nataliya Monsivais RD, LD

## 2021-12-16 ENCOUNTER — VIRTUAL VISIT (OUTPATIENT)
Dept: ENDOCRINOLOGY | Facility: CLINIC | Age: 36
End: 2021-12-16
Payer: COMMERCIAL

## 2021-12-16 DIAGNOSIS — E66.9 OBESITY: ICD-10-CM

## 2021-12-16 DIAGNOSIS — Z71.3 NUTRITIONAL COUNSELING: Primary | ICD-10-CM

## 2021-12-16 DIAGNOSIS — E11.9 TYPE 2 DIABETES MELLITUS WITHOUT COMPLICATION, WITHOUT LONG-TERM CURRENT USE OF INSULIN (H): ICD-10-CM

## 2021-12-16 PROCEDURE — 97803 MED NUTRITION INDIV SUBSEQ: CPT | Mod: 95 | Performed by: DIETITIAN, REGISTERED

## 2021-12-16 NOTE — PATIENT INSTRUCTIONS
Balwinder Gonzalez,    Follow-up with RD in 1 month.    Thank you,    Nataliya Monsivais, RD, LD  If you would like to schedule or reschedule an appointment with the RD, please call [phone]    Nutrition Goals  1) Eat slowly (20-30 minutes per meal), chewing foods well (25 chews per bite/applesauce consistency)  2) Replace 1-2 meals per day with protein shake/bar  3) Avoid snacking as able. Replace with non-starchy veggies, or half protein shake/bar if needed.   4) Increase activity as able    Post-op Diet Handouts:  Diet Guidelines after Weight-loss Surgery  [email or web address]     Your Stage 1 Diet: Clear Liquids  [email or web address]     Your Stage 2 Diet: Low-fat Full Liquids  [email or web address]     Your Stage 3 Diet: Pureed Foods  [email or web address]     Pureed Pleasures  [email or web address]    Your Stage 4 Diet: Soft Foods  [email or web address]    Your Stage 5 Diet: Regular Foods  [email or web address]    Supplements after Weight Loss Surgery  [email or web address]     Keeping Track of Fluids  [email or web address]      Interested in working with a health ?  Health coaches work with you to improve your overall health and wellbeing.  They look at the whole person, and may involve discussion of different areas of life, including, but not limited to the four pillars of health (sleep, exercise, nutrition, and stress management). Discuss with your care team if you would like to start working a health .    Health Coaching-3 Pack:    $99 for three health coaching visits    Visits may be done in person or via phone    Coaching is a partnership between the  and the client; Coaches do not prescribe or diagnose    Coaching helps inspire the client to reach his/her personal goals      COMPREHENSIVE WEIGHT MANAGEMENT PROGRAM  VIRTUAL SUPPORT GROUPS    For Support Group Information:      We offer support groups for patients who are  "working on weight loss and considering, preparing for or have had weight loss surgery.   There is no cost for this opportunity.  You are invited to attend the?Virtual Support Groups?provided by any of the following locations:    1. [facility] via Microsoft Teams with Vera Casas RN  2.   [facility] via [employer] with Gino Cordova, PhD, LP  3.   [place] via [employer]ian with Galilea Breen RN  4.   [facility] via Citrix Online Teams with Galilea Duncan, [facility]    The following Support Group information can also be found on our website:  [email or web address]      [facility] Weight Loss Surgery Support Group    [facility] Weight Loss Surgery Support Group  The support group is a patient-lead forum that meets monthly to share experiences, encouragement and education. It is open to those who have had weight loss surgery, are scheduled for surgery, and those who are considering surgery.   WHEN: This group meets on the 3rd Wednesday of each month from 5:00PM - 6:00PM virtually using Microsoft Teams.   FACILITATOR: Led by Vera Casas RD, LD, RN, the program's Clinical Coordinator.   TO REGISTER: Please contact the clinic via [employer] or call the nurse line directly at [phone] to inform our staff that you would like an invite sent to you and to let us know the email you would like the invite sent to. Prior to the meeting, a link with directions on how to join the meeting will be sent to you.    2021 Meetings August 18: \"Let's Talk\" a time for the group to share.  September 15: \"Let's Talk\" a time for the group to share.  October 20: \"Let's Talk\" a time for the group to share.  November 17: Guest Speaker: Emy Maya RD, MICHAELA \"Protein, Metabolism and Meal Planning\"  December 15: Guest Speaker: Shashank Lay RD, MICHAELA will speak, \"Recipe Modification\"    2022 Meetings January 19 February 16 March 16: Guest Speakers: Sudha" "Babak, PhD,LP and Krista Johnson PsyD,JOHN  April 20  May 18  Ramila 15    [facility] Support Groups    Connections: Bariatric Care Support Group?  This is open to all [facility] (and those external to this program) pre- and post- operative bariatric surgery patients as well as their support system.   WHEN: This group meets the 2nd Tuesday of each month from 6:30 PM - 8:00 PM virtually using Microsoft Teams.   FACILITATOR: Led by Gino Cordova, Ph.D who is a Licensed Psychologist with the [facility] Weight Management [facility].   TO REGISTER: Please send an email to Gino Cordova, Ph.D., LP at?[email or web address]?if you would like an invitation to the group and to learn about using Microsoft Teams.    2021 Meetings  August 10: Open Forum  September 14: Guest Speaker: Galilea Breen RN,CBN, CIC, [facility], \"Your Hospital Stay and Post-Operative Compliance\"  October 12: Open Forum  November 9: Guest Speaker: Brittny Montes RD,LD, [facility],\"Holiday Eating\".  December 14: Guest Speaker Florencia Coburn MD, MPH, [facility], [facility], \"Body Contouring Surgery for Bariatric Surgery Patients\"     2022 Meetings  January 11  February 8  March 8  April 12  May 10  Ramila 14    Connections: Post-Operative Bariatric Surgery Support Group  This is a support group for [facility] bariatric patients (and those external to [facility]) who have had bariatric surgery and are at least 3 months post-surgery.  WHEN: This support group meets the 4th Wednesday of the month from 11:00 AM - 12:00 PM virtually using Microsoft Teams.   FACILITATOR: Led by Certified Bariatric Nurse, Galilea Breen RN.   TO REGISTER: Please send an email to Galilea at [email or web address] if you would like an invitation to the group and to learn about using [employer] " "Teams.    2022 Meetings  January 26  February 23  March 23  April 27  May 25  Ramila 22    [facility] Healthy Lifestyle Virtual Support Group    Healthy Lifestyle Virtual Support Group?  This is 60 minutes of small group guided discussion, support and resources. All are welcome who want a healthy lifestyle.  WHEN: This group meets monthly on a Friday from 12:30 PM - 1:30 PM virtually using Microsoft Teams.   FACILITATOR: Led by National Board Certified Health and , Galilea Duncan, [facility].   TO REGISTER: Please send an email to Galilea [email or web address] to receive monthly invites to the group or if you have any questions about having a health .  Prior to the meeting, a link with directions on how to join the meeting will be sent to you.    2021 Meetings August 27: Open Forum  September 24: Sleep and the 7 Types of Rest  October 29: Open Forum  November 19: Gratitude     December 10: Open Forum    2022 Meetings  January 21: Gabriela Nelson MS, RN, CIC, CBN, \"Healthy Habits\"  February 25: Open Forum  March 18: \"Setting Limits and Boundaries\"  April 29: Aileen Duvall RD, \"Meal Planning Made Easy\"  May 20: Open Forum  June: To be determined          "

## 2021-12-16 NOTE — LETTER
"12/16/2021       RE: Larry Bangura  [address] [facility] [address]     Dear Colleague,    Thank you for referring your patient, Larry Bangura, to the [facility] [place] at [facility]. Please see a copy of my visit note below.    Larry Bangura is a 36 year old who is being evaluated via a billable video visit.      The patient has been notified of following:     \"This video visit will be conducted via a call between you and your physician/provider. We have found that certain health care needs can be provided without the need for an in-person physical exam.  This service lets us provide the care you need with a video conversation.  If a prescription is necessary we can send it directly to your pharmacy.  If lab work is needed we can place an order for that and you can then stop by our lab to have the test done at a later time.    Video visits are billed at different rates depending on your insurance coverage.  Please reach out to your insurance provider with any questions.    If during the course of the call the physician/provider feels a video visit is not appropriate, you will not be charged for this service.\"    Patient has given verbal consent for Video visit? Yes  How would you like to obtain your AVS? MyChart  If you are dropped from the video visit, the video invite should be resent to: Text to cell phone: [phone]  Will anyone else be joining your video visit? No        Video-Visit Details    Type of service:  Video Visit    Video Start Time: 1:00 PM  Video End Time: 1:17 PM    Originating Location (pt. Location): Home    Distant Location (provider location):  [facility] [place]     Platform used for Video Visit: [employer]    During this virtual visit the patient is located in [place], patient verifies this as the location during the entirety of this visit.     Return Bariatric " "Nutrition Consultation Note    Reason For Visit: Nutrition Assessment    Larry Bangura is a 36 year old presenting today for follow-up bariatric nutrition consult. Pt is interested in laparoscopic sleeve gastrectomy with Dr. Liriano expected surgery TBD.  Patient is accompanied by self. This is pt's 8th of 6 required nutrition visits prior to surgery.     Pt referred by DELL Duckworth on September 30, 2020.  Patient with Co-morbidities of obesity including:  Type II DM yes  Renal Failure no  Sleep apnea yes  Hypertension yes   Dyslipidemia yes  Joint pain no  Back pain no  GERD no     ANTHROPOMETRICS:  Initial weight: 400 lb     Estimated body mass index is 50.49 kg/m  as calculated from the following:    Height as of 12/8/21: 1.803 m (5' 11\").    Weight as of 12/8/21: 164.2 kg (362 lb).     Current weight (per pt report): 360 lbs (-10 lbs over past month, -38 lbs from initial)    Required weight loss goal pre-op: 50 lbs from initial consult weight (goal weight 350 lbs or less before surgery)      SUPPLEMENT INFORMATION:  Centrum for mens MVI    NUTRITION HISTORY:  He continues to replace lunch with Herbal[employer] protein drink (26 gm pro) Reports he does not tolerate soy or whey protein powder, uses pea protein instead. He is eating salad with every dinner. Switched from croissants at breakfast to higher protein option (see diet recall). Consuming 3 meals, 2 snacks per day. He reports weight has plateaued this month, so he started riding his elliptical more consistently.     Diet Recall:   B: 2 egg +  2pc bacn + 1/2 cup cheese + 2 tortilla  L: protein shake  PM snack: [employer] granola bar protein bar (170 star, 9 gm carb)  D: meat and potatoes; with veggies   Snack: 2 nut bars per day (17 gm pro, 6 gm sugar)  Beverage: water 1 gallon/day    Progress Towards Previous Goals:  1) Eat slowly (20-30 minutes per meal), chewing foods well (25 chews per bite/applesauce consistency) - Improving  2) Replace 1-2 meals per " day with protein shake/bar  - Met, continues   3) Avoid snacking as able. May use nut bar if needed. - Not met  4) Increase activity as able - Ellipcital for 30 mins 5 days per week.     NUTRITION DIAGNOSIS:  Obesity r/t long history of self-monitoring deficit and excessive energy intake aeb BMI >30 kg/m2.- Improving, continues     INTERVENTION:  Intervention Provided/Education:   1. Reviewed previous goals  2. Reviewed mindful eating, portion/calorie-control and healthy food choice for wt loss and DM2 mgmt.  3. Praised pt for the positive changes he has made to diet/lifetsyle.   4. AVS via [employer]     Patient Understanding: good  Expected Compliance: good    GOALS:  1) Eat slowly (20-30 minutes per meal), chewing foods well (25 chews per bite/applesauce consistency)  2) Replace 1-2 meals per day with protein shake/bar  3) Avoid snacking as able. Replace with non-starchy veggies, or half protein shake/bar if needed.   4) Increase activity as able    Post-op Diet Handouts:  Diet Guidelines after Weight-loss Surgery  [email or web address]     Your Stage 1 Diet: Clear Liquids  [email or web address]     Your Stage 2 Diet: Low-fat Full Liquids  [email or web address]     Your Stage 3 Diet: Pureed Foods  [email or web address]     Pureed Pleasures  [email or web address]    Your Stage 4 Diet: Soft Foods  [email or web address]    Your Stage 5 Diet: Regular Foods  [email or web address]    Supplements after Weight Loss Surgery  [email or web address]     Keeping Track of Fluids  [email or web address]      Time spent with patient: 17 minutes.  Nataliya Monsivais RD, LD

## 2021-12-18 ENCOUNTER — OFFICE VISIT (OUTPATIENT)
Dept: URGENT CARE | Facility: URGENT CARE | Age: 36
End: 2021-12-18
Payer: COMMERCIAL

## 2021-12-18 VITALS
HEART RATE: 80 BPM | TEMPERATURE: 98.3 F | OXYGEN SATURATION: 97 % | RESPIRATION RATE: 18 BRPM | DIASTOLIC BLOOD PRESSURE: 77 MMHG | SYSTOLIC BLOOD PRESSURE: 123 MMHG

## 2021-12-18 DIAGNOSIS — Z20.822 SUSPECTED 2019 NOVEL CORONAVIRUS INFECTION: ICD-10-CM

## 2021-12-18 DIAGNOSIS — J01.90 ACUTE SINUSITIS WITH SYMPTOMS > 10 DAYS: Primary | ICD-10-CM

## 2021-12-18 DIAGNOSIS — R07.0 THROAT PAIN: ICD-10-CM

## 2021-12-18 LAB
DEPRECATED S PYO AG THROAT QL EIA: NEGATIVE
GROUP A STREP BY PCR: NOT DETECTED

## 2021-12-18 PROCEDURE — U0003 INFECTIOUS AGENT DETECTION BY NUCLEIC ACID (DNA OR RNA); SEVERE ACUTE RESPIRATORY SYNDROME CORONAVIRUS 2 (SARS-COV-2) (CORONAVIRUS DISEASE [COVID-19]), AMPLIFIED PROBE TECHNIQUE, MAKING USE OF HIGH THROUGHPUT TECHNOLOGIES AS DESCRIBED BY CMS-2020-01-R: HCPCS | Performed by: NURSE PRACTITIONER

## 2021-12-18 PROCEDURE — 87651 STREP A DNA AMP PROBE: CPT | Performed by: NURSE PRACTITIONER

## 2021-12-18 PROCEDURE — 99213 OFFICE O/P EST LOW 20 MIN: CPT | Performed by: NURSE PRACTITIONER

## 2021-12-18 PROCEDURE — U0005 INFEC AGEN DETEC AMPLI PROBE: HCPCS | Performed by: NURSE PRACTITIONER

## 2021-12-18 RX ORDER — FLUTICASONE PROPIONATE 50 MCG
1-2 SPRAY, SUSPENSION (ML) NASAL DAILY
Qty: 16 G | Refills: 0 | Status: SHIPPED | OUTPATIENT
Start: 2021-12-18 | End: 2022-03-16

## 2021-12-18 NOTE — LETTER
December 20, 2021      Larry Bangura  [address] [facility] [address]        Dear ,    We are writing to inform you of your test results.    That test result was within normal parameters. Covid test result was negative.    Resulted Orders   Symptomatic; Unknown COVID-19 Virus (Coronavirus) by PCR Nose   Result Value Ref Range    SARS CoV2 PCR Negative Negative, Testing sent to reference lab. Results will be returned via unsolicited result      Comment:      NEGATIVE: SARS-CoV-2 (COVID-19) RNA not detected, presumed negative.    Narrative    Testing was performed using the Aptima SARS-CoV-2 Assay on the  Asantae Instrument System. Additional information about this  Emergency Use Authorization (EUA) assay can be found via the Lab  Guide. This test should be ordered for the detection of SARS-CoV-2 in  individuals who meet SARS-CoV-2 clinical and/or epidemiological  criteria. Test performance is unknown in asymptomatic patients. This  test is for in vitro diagnostic use under the FDA EUA for  laboratories certified under CLIA to perform high complexity testing.  This test has not been FDA cleared or approved. A negative result  does not rule out the presence of PCR inhibitors in the specimen or  target RNA in concentration below the limit of detection for the  assay. The possibility of a false negative should be considered if  the patient's recent exposure or clinical presentation suggests  COVID-19. This test was validated by the [facility]. This laboratory is certified under  the Clinical Laboratory Improvement Amendments of 1988 (CLIA-88) as  qualified to perform high complexity laboratory testing.       If you have any questions or concerns, please call the clinic at the number listed above.       Sincerely,      Veronica Nguyen MD

## 2021-12-18 NOTE — PROGRESS NOTES
SUBJECTIVE:   Larry Bangura  is a 36 year old male who is here today because of: Sore Throat.  The patient has had symptoms of fever, sore throat and nasal congestion/runny nose.   Onset of symptoms was 7 day ago. Course of illness is same.  Patient denies exposure to illness at home or work/school.   Patient denies fever  Treatment measures tried include acetaminophen.    Past Medical History:   Diagnosis Date     HTN (hypertension) 9/10/2013     Morbid obesity (H)        Social History     Tobacco Use     Smoking status: Never Smoker     Smokeless tobacco: Never Used   Substance Use Topics     Alcohol use: No     Alcohol/week: 0.0 standard drinks     Drug use: No       ROS:  Review of systems negative except as stated above.      OBJECTIVE:   /77   Pulse 80   Temp 98.3  F (36.8  C) (Tympanic)   Resp 18   SpO2 97%   General: healthy, alert and no distress  Eyes - conjunctivae clear.  Ears - External ears normal. Canals clear. TM's normal.  Nose/Sinuses - Nares normal.Maxillary sinus tenderness, bilateral turbinates inflamed and edematous    Oropharynx - Lips, mucosa, and tongueOr normal. Positive findings: oropharyngeal erythema, tonsillar hypertrophy exudates present,   Neck - Neck supple; Positive findings: moderate anterior cervical nodes,   Lungs - Lungs clear; no wheezing or rales.  Heart - regular rate and rhythm. No murmurs, rub.    Labs:  Results for orders placed or performed in visit on 12/18/21   Streptococcus A Rapid Screen w/Reflex to PCR - Clinic Collect     Status: Normal    Specimen: Throat; Swab   Result Value Ref Range    Group A Strep antigen Negative Negative         ASSESSMENT:   (J01.90) Acute sinusitis with symptoms > 10 days  (primary encounter diagnosis)  Comment:   Plan: amoxicillin-clavulanate (AUGMENTIN) 875-125 MG         tablet, fluticasone (FLONASE) 50 MCG/ACT nasal         spray      (Z20.822) Suspected 2019 novel coronavirus infection  Comment:   Plan:     (R07.0) Throat  pain  Comment:   Plan: Symptomatic; Unknown COVID-19 Virus         (Coronavirus) by PCR Nose, Streptococcus A         Rapid Screen w/Reflex to PCR - Clinic Collect,         Group A Streptococcus PCR Throat Swab                PLAN:  See discharge   Aimee Schultz CNP

## 2021-12-18 NOTE — PATIENT INSTRUCTIONS
Patient Education     Sinusitis (Antibiotic Treatment)    The sinuses are air-filled spaces within the bones of the face. They connect to the inside of the nose. Sinusitis is an inflammation of the tissue that lines the sinuses. Sinusitis can occur during a cold. It can also happen due to allergies to pollens and other particles in the air. Sinusitis can cause symptoms of sinus congestion and a feeling of fullness. A sinus infection causes fever, headache, and facial pain. There is often green or yellow fluid draining from the nose or into the back of the throat (post-nasal drip). You have been given antibiotics to treat this condition.   Home care    Take the full course of antibiotics as instructed. Don't stop taking them, even when you feel better.    Drink plenty of water, hot tea, and other liquids as directed by the healthcare provider. This may help thin nasal mucus. It also may help your sinuses drain fluids.    Heat may help soothe painful areas of your face. Use a towel soaked in hot water. Or,  the shower and direct the warm spray onto your face. Using a vaporizer along with a menthol rub at night may also help soothe symptoms.     An expectorant with guaifenesin may help thin nasal mucus and help your sinuses drain fluids. Talk with your provider or pharmacists before taking an over-the-counter (OTC) medicine if you have any questions about it or its side effects..    You can use an OTC decongestant, unless a similar medicine was prescribed to you. Nasal sprays work the fastest. Use one that contains phenylephrine or oxymetazoline. First blow your nose gently. Then use the spray. Don't use these medicines more often than directed on the label. If you do, your symptoms may get worse. You may also take pills that contain pseudoephedrine. Don t use products that combine multiple medicines. This is because side effects may be increased. Read labels. You can also ask the pharmacist for help. (People  with high blood pressure should not use decongestants. They can raise blood pressure.) Talk with your provider or pharmacist if you have any questions about the medicine..    OTC antihistamines may help if allergies contributed to your sinusitis. Talk with your provider or pharmacist if you have any questions about the medicine..    Don't use nasal rinses or irrigation during an acute sinus infection, unless your healthcare provider tells you to. Rinsing may spread the infection to other areas in your sinuses.    Use acetaminophen or ibuprofen to control pain, unless another pain medicine was prescribed to you. If you have chronic liver or kidney disease or ever had a stomach ulcer, talk with your healthcare provider before using these medicines. Never give aspirin to anyone under age 18 who is ill with a fever. It may cause severe liver damage.    Don't smoke. This can make symptoms worse.    Follow-up care  Follow up with your healthcare provider, or as advised.   When to seek medical advice  Call your healthcare provider if any of these occur:     Facial pain or headache that gets worse    Stiff neck    Unusual drowsiness or confusion    Swelling of your forehead or eyelids    Symptoms don't go away in 10 days    Vision problems, such as blurred or double vision    Fever of 100.4 F (38 C) or higher, or as directed by your healthcare provider  Call 911  Call 911 if any of these occur:     Seizure    Trouble breathing    Feeling dizzy or faint    Fingernails, skin or lips look blue, purple , or gray  Prevention  Here are steps you can take to help prevent an infection:     Keep good hand washing habits.    Don t have close contact with people who have sore throats, colds, or other upper respiratory infections.    Don t smoke, and stay away from secondhand smoke.    Stay up to date with of your vaccines.  [employer] last reviewed this educational content on 12/1/2019 2000-2021 [employer]. All rights  reserved. This information is not intended as a substitute for professional medical care. Always follow your healthcare professional's instructions.

## 2021-12-19 LAB — SARS-COV-2 RNA RESP QL NAA+PROBE: NEGATIVE

## 2021-12-19 NOTE — RESULT ENCOUNTER NOTE
Group A Streptococcus PCR is NEGATIVE  No treatment or change in treatment [facility] ED lab result Strep Group A protocol.

## 2021-12-29 ENCOUNTER — VIRTUAL VISIT (OUTPATIENT)
Dept: ENDOCRINOLOGY | Facility: CLINIC | Age: 36
End: 2021-12-29
Payer: COMMERCIAL

## 2021-12-29 VITALS — HEIGHT: 71 IN | WEIGHT: 315 LBS | BODY MASS INDEX: 44.1 KG/M2

## 2021-12-29 DIAGNOSIS — E66.01 MORBID OBESITY (H): Primary | ICD-10-CM

## 2021-12-29 PROCEDURE — 99213 OFFICE O/P EST LOW 20 MIN: CPT | Mod: 95 | Performed by: SURGERY

## 2021-12-29 ASSESSMENT — PAIN SCALES - GENERAL: PAINLEVEL: NO PAIN (0)

## 2021-12-29 ASSESSMENT — MIFFLIN-ST. JEOR: SCORE: 2590.08

## 2021-12-29 NOTE — LETTER
12/29/2021       RE: Larry Bangura  [address] [facility] [address]     Dear Colleague,    Thank you for referring your patient, Larry Bangura, to the [facility] [place] at [facility]. Please see a copy of my visit note below.    Carlos is a 36 year old who is being evaluated via a billable video visit.      How would you like to obtain your AVS? MyChart  If the video visit is dropped, the invitation should be resent by: Text to cell phone: [phone]  Will anyone else be joining your video visit? No  Take this is an 18-minute Ninite video visit 36-year-old gentleman who has a body mass index of 50 here for consideration of a sleeve gastrectomy.  The patient has been overweight almost all his life when he was in high school he lost almost 100 pounds with the help of a .  Now that he is older he has 2 young children he wishes to exercise with them he finds that the pain that he experiences after hiking to last a few days and this is limiting to his desire and ability to exercise.  He has tried multiple attempts of weight loss in the past.  He currently is completing his work-up for bariatric surgery and has lost 40 of his prescribed 50 pounds.  We talked about the procedure, its risks, and potential complications and he agrees to proceed and he is clear on the postoperative diet.  I encouraged him to keep losing weight.  He also understands that we may need to perform a hiatal hernia repair at the time of surgery.    Past Medical History:   Diagnosis Date     HTN (hypertension) 9/10/2013     Morbid obesity (H)      Past Surgical History:   Procedure Laterality Date     ENDOSCOPIC RELEASE CARPAL TUNNEL  12/6/2013    Procedure: ENDOSCOPIC RELEASE CARPAL TUNNEL;  Right Hand Endoscopic Carpal Tunnel Release;  Surgeon: Kody Pittman MD;  Location: [facility] OR     ENDOSCOPIC RELEASE CARPAL TUNNEL  1/21/2014     Procedure: ENDOSCOPIC RELEASE CARPAL TUNNEL;  Left endoscopic carpal tunnel release, possible open;  Surgeon: Kody Pittman MD;  Location: [facility] OR     Current Outpatient Medications   Medication     amoxicillin-clavulanate (AUGMENTIN) 875-125 MG tablet     Cholecalciferol (VITAMIN D3) 75 MCG (3000 UT) TABS     fluticasone (FLONASE) 50 MCG/ACT nasal spray     lisinopril (ZESTRIL) 20 MG tablet     metFORMIN (GLUCOPHAGE) 1000 MG tablet     ORDER FOR DME, SET TO FAX,     valACYclovir (VALTREX) 1000 mg tablet     valACYclovir (VALTREX) 500 MG tablet     No current facility-administered medications for this visit.     Plan as listed above the patient understands the procedure risks and potential complications I will ask my office to reach out to him with the potential date.          Again, thank you for allowing me to participate in the care of your patient.      Sincerely,    Rikki Liriano MD

## 2021-12-29 NOTE — NURSING NOTE
"Chief Complaint   Patient presents with     Consult     Consultation Bariatric Surgery Meet and Greet Surgeon       Vitals:    12/29/21 0755   Weight: (!) 163.3 kg (360 lb)   Height: 1.803 m (5' 11\")       Body mass index is 50.21 kg/m .                          "

## 2021-12-29 NOTE — Clinical Note
Patient appears to be a reasonable candidate for laparoscopic sleeve gastrectomy.  He should continue to lose weight Frederick can you please call him and give him a date for surgery.

## 2022-01-07 ENCOUNTER — PREP FOR PROCEDURE (OUTPATIENT)
Dept: ENDOCRINOLOGY | Facility: CLINIC | Age: 37
End: 2022-01-07
Payer: COMMERCIAL

## 2022-01-07 ENCOUNTER — CARE COORDINATION (OUTPATIENT)
Dept: ENDOCRINOLOGY | Facility: CLINIC | Age: 37
End: 2022-01-07
Payer: COMMERCIAL

## 2022-01-07 ENCOUNTER — TELEPHONE (OUTPATIENT)
Dept: ENDOCRINOLOGY | Facility: CLINIC | Age: 37
End: 2022-01-07
Payer: COMMERCIAL

## 2022-01-07 DIAGNOSIS — E66.01 MORBID OBESITY (H): Primary | ICD-10-CM

## 2022-01-07 RX ORDER — ONDANSETRON 2 MG/ML
4 INJECTION INTRAMUSCULAR; INTRAVENOUS
Status: CANCELLED | OUTPATIENT
Start: 2022-01-07

## 2022-01-07 RX ORDER — CEFAZOLIN SODIUM IN 0.9 % NACL 3 G/100 ML
3 INTRAVENOUS SOLUTION, PIGGYBACK (ML) INTRAVENOUS SEE ADMIN INSTRUCTIONS
Status: CANCELLED | OUTPATIENT
Start: 2022-01-07

## 2022-01-07 RX ORDER — CEFAZOLIN SODIUM IN 0.9 % NACL 3 G/100 ML
3 INTRAVENOUS SOLUTION, PIGGYBACK (ML) INTRAVENOUS
Status: CANCELLED | OUTPATIENT
Start: 2022-01-07

## 2022-01-07 RX ORDER — ACETAMINOPHEN 325 MG/1
975 TABLET ORAL ONCE
Status: CANCELLED | OUTPATIENT
Start: 2022-01-07 | End: 2022-01-07

## 2022-01-07 NOTE — TELEPHONE ENCOUNTER
Flor.. pt called right back saying that he picked the wrong surg date !!    [phone]  *OK to leave detailed VM

## 2022-01-07 NOTE — PROGRESS NOTES
Tasklist updated and sent to patient via [employer].    Bariatric Task List    Fax:  Please fax all paperwork to: [phone] -     Status:  Is patient a candidate for bariatric surgery?:  patient is a candidate for bariatric surgery -     Cleared to schedule surgeon consult?:  cleared to schedule surgeon consult - Call [phone] for an in-person visit with Dr Liriano prior to surgery. bks   Status:  surgery evaluation in process -     Surgeon: Heri -     Tentative surgery month/year: TBD -        Insurance: Insurance:  [employer] -        Patient Info: Initial Weight:  400 -     Date of Initial Weight/Height:  9/30/2020 -     Goal Weight (lbs):  350 -     Required Weight Loss:  50 -     Surgery Type:  sleeve gastrectomy -        Dietician Visits: Structured weight loss required by insurance?:  structured weight loss required -     Dietician Visit 1:  Completed - Sept 2020   Dietician Visit 2:  Completed - Jan 2021   Dietician Visit 3:  Completed -     Dietician Visit 4:  Completed -     Dietician Visit 5:  Completed -     Dietician Visit 6:  Completed -     Dietician Visit additional:  Needed - Monthly until surgery for cont wt loss and post-op diet ed. bks      Psychological Evaluation: Psych eval:  Completed -        Lab Work: Complete Blood Count:  Completed - 11/8/21   Comprehensive Metabolic Panel:  Needed - 11/8/21 BMP done, need hepatic function tests. bks   Vitamin D:  Needed -     PTH:  Needed -     Hgb A1c:  Completed -      Lipids: Needed -      TSH ([facility], [facility], [place] MA):   -       Ferritin:   -       Folate:   -       Testosterone, Total and Free:   -     Thiamine:   -     Vitamin A:   -     Vitamin B12:   -     Zinc:   -     C-peptide:   -     H. pylori:    -     MRSA (2 swabs, minimum 48 hours apart):   -     Nicotine Testing:    -     Recheck Vitamin D:   -     Other:    -        Consults/ [facility]:  Completed - Cleared by Dr Vasquez [facility] sleep 2/5/21. IRWIN uses CPAP nightly     "  PCP: Establish care with PCP:  Completed -     PCP letter of support:  Completed - 12/8/21 Shelton Mcconnell MD -PCP ltr in Epic.      Stopping Smoking/ Alcohol Use: Other: Check THC level with preop labs -     Quit date: 9/1/2021 -        Patient Education:  Information Session:  Completed -     Given \"Making your decision\" handout?:  Yes -     Given \"A Roadmap to you Weight Loss Surgery\" handout?: Yes -     Given \"Get Well Loop\" information?: Yes -     Given support group information?:  support group contact information provided -     Attended support group?:    -     Support plan in place?:  Completed -     Research consents signed?:  research consent not signed -        Final Tasks:  Before surgery online class:  Needed -     Before surgery online class website link:  [email or web address]   After surgery online class:  Needed -     After surgery online class website link:  [email or web address]   Nurse visit per clinic:  Needed -     History and Physical per clinic:   -  PreAssessment Clinic   Final labs per clinic: Needed -        Notes: Please register for the Get Well Loop when you get an email invitation and a surgery date.     The Get Well Loop will give you information via email or text messages that can help you be more successful before and after surgery.  It can also help ans,wer any questions you may have.    Get Well Loop Information  [email or web address]       -          "

## 2022-01-10 ENCOUNTER — TELEPHONE (OUTPATIENT)
Dept: ENDOCRINOLOGY | Facility: CLINIC | Age: 37
End: 2022-01-10
Payer: COMMERCIAL

## 2022-01-10 NOTE — TELEPHONE ENCOUNTER
Patient called back to discuss surgery date and get PAC appointment. He would like the sleeve on 2/14 instead of 2/7.   PAC scheduled 1/24/22 for video visit.

## 2022-01-10 NOTE — TELEPHONE ENCOUNTER
Left VM message asking patient to call me to schedule PAC appointment as I need to submit this report along with clinicals to [facility] for prior authorization for a sleeve gastrectomy. My direct call back number left.

## 2022-01-11 ENCOUNTER — TELEPHONE (OUTPATIENT)
Dept: ENDOCRINOLOGY | Facility: CLINIC | Age: 37
End: 2022-01-11
Payer: COMMERCIAL

## 2022-01-11 NOTE — TELEPHONE ENCOUNTER
Balwinder Boyd.. so is his surg officially 2/14? If so, I'll call today to make post op appts -Vasquez  Thanks!

## 2022-01-11 NOTE — TELEPHONE ENCOUNTER
FUTURE VISIT INFORMATION      SURGERY INFORMATION:    SLEEVE 2/14 DR LIU    Consult: virtual visit 12/29/21    RECORDS REQUESTED FROM:        Primary Care Provider: [facility]    Pertinent Medical History: hypertension     Most recent Sleep Study:  6/2/15

## 2022-01-12 ENCOUNTER — OFFICE VISIT (OUTPATIENT)
Dept: ENDOCRINOLOGY | Facility: CLINIC | Age: 37
End: 2022-01-12
Payer: COMMERCIAL

## 2022-01-12 ENCOUNTER — LAB (OUTPATIENT)
Dept: LAB | Facility: CLINIC | Age: 37
End: 2022-01-12
Payer: COMMERCIAL

## 2022-01-12 ENCOUNTER — OFFICE VISIT (OUTPATIENT)
Dept: FAMILY MEDICINE | Facility: CLINIC | Age: 37
End: 2022-01-12
Payer: COMMERCIAL

## 2022-01-12 VITALS
OXYGEN SATURATION: 100 % | HEART RATE: 80 BPM | HEIGHT: 71 IN | BODY MASS INDEX: 44.1 KG/M2 | RESPIRATION RATE: 16 BRPM | SYSTOLIC BLOOD PRESSURE: 120 MMHG | WEIGHT: 315 LBS | DIASTOLIC BLOOD PRESSURE: 64 MMHG

## 2022-01-12 VITALS
HEART RATE: 79 BPM | BODY MASS INDEX: 44.1 KG/M2 | WEIGHT: 315 LBS | OXYGEN SATURATION: 99 % | DIASTOLIC BLOOD PRESSURE: 64 MMHG | HEIGHT: 71 IN | SYSTOLIC BLOOD PRESSURE: 120 MMHG

## 2022-01-12 DIAGNOSIS — G47.00 INSOMNIA, UNSPECIFIED TYPE: Primary | ICD-10-CM

## 2022-01-12 DIAGNOSIS — E66.01 MORBID OBESITY (H): Primary | ICD-10-CM

## 2022-01-12 DIAGNOSIS — E66.813 CLASS 3 SEVERE OBESITY DUE TO EXCESS CALORIES WITH SERIOUS COMORBIDITY AND BODY MASS INDEX (BMI) OF 50.0 TO 59.9 IN ADULT (H): ICD-10-CM

## 2022-01-12 DIAGNOSIS — E66.01 CLASS 3 SEVERE OBESITY DUE TO EXCESS CALORIES WITH SERIOUS COMORBIDITY AND BODY MASS INDEX (BMI) OF 50.0 TO 59.9 IN ADULT (H): ICD-10-CM

## 2022-01-12 DIAGNOSIS — E66.01 MORBID OBESITY (H): ICD-10-CM

## 2022-01-12 LAB
ALBUMIN SERPL-MCNC: 3.9 G/DL (ref 3.4–5)
ALP SERPL-CCNC: 67 U/L (ref 40–150)
ALT SERPL W P-5'-P-CCNC: 46 U/L (ref 0–70)
AST SERPL W P-5'-P-CCNC: 27 U/L (ref 0–45)
BILIRUB DIRECT SERPL-MCNC: 0.2 MG/DL (ref 0–0.2)
BILIRUB SERPL-MCNC: 0.5 MG/DL (ref 0.2–1.3)
CHOLEST SERPL-MCNC: 171 MG/DL
FASTING STATUS PATIENT QL REPORTED: NO
HDLC SERPL-MCNC: 40 MG/DL
LDLC SERPL CALC-MCNC: 98 MG/DL
NONHDLC SERPL-MCNC: 131 MG/DL
PROT SERPL-MCNC: 7.8 G/DL (ref 6.8–8.8)
TRIGL SERPL-MCNC: 164 MG/DL

## 2022-01-12 PROCEDURE — 80061 LIPID PANEL: CPT

## 2022-01-12 PROCEDURE — 36415 COLL VENOUS BLD VENIPUNCTURE: CPT

## 2022-01-12 PROCEDURE — 99213 OFFICE O/P EST LOW 20 MIN: CPT | Mod: GC | Performed by: SURGERY

## 2022-01-12 PROCEDURE — 99213 OFFICE O/P EST LOW 20 MIN: CPT | Performed by: FAMILY MEDICINE

## 2022-01-12 PROCEDURE — 83970 ASSAY OF PARATHORMONE: CPT

## 2022-01-12 PROCEDURE — 80076 HEPATIC FUNCTION PANEL: CPT

## 2022-01-12 PROCEDURE — 82306 VITAMIN D 25 HYDROXY: CPT

## 2022-01-12 RX ORDER — TRAZODONE HYDROCHLORIDE 50 MG/1
50 TABLET, FILM COATED ORAL AT BEDTIME
Qty: 30 TABLET | Refills: 2 | Status: SHIPPED | OUTPATIENT
Start: 2022-01-12 | End: 2022-02-01

## 2022-01-12 ASSESSMENT — MIFFLIN-ST. JEOR
SCORE: 2615.48
SCORE: 2612.76

## 2022-01-12 ASSESSMENT — PAIN SCALES - GENERAL
PAINLEVEL: NO PAIN (0)
PAINLEVEL: NO PAIN (0)

## 2022-01-12 NOTE — PROGRESS NOTES
"S :Larry Bangura is a 36 year old male with insomnia.  Was using marijuana at night to sleep \"turn off mind\" but now has stopped due to upcoming bariatric surgery in a month.      Anxiety playing a role, but doesn't necessarily want to start a new chronic med before the surgeyr    Would like something to help with sleep he can use as needed    O:vs /64   Pulse 80   Resp 16   Ht 1.803 m (5' 11\")   Wt (!) 165.6 kg (365 lb)   SpO2 100%   BMI 50.91 kg/m      GEN: Alert and oriented, in no acute distress      A: insomnia    P: trazodone prn.      Consider SSrI down the road for longstanding anxiety, but wait until things have settled after bariatric surgery .        "

## 2022-01-12 NOTE — LETTER
"1/12/2022       RE: Larry Bangura  [address] [facility] [address]     Dear Colleague,    Thank you for referring your patient, Larry Bangura, to the [facility] WEIGHT MANAGEMENT CLINIC [place] at [facility]. Please see a copy of my visit note below.    Dear Shelton Cota,      Referring provider:       9/29/2020   Who referred you? Dr Mcconnell     I was asked to see the patient regarding obesity by the referring provider above.    I had the pleasure of meeting with your patient Larry Bangura in our weight loss surgery office.  This patient is a 36 year old male who has been undergoing our thorough preoperative screening process in anticipation of potential bariatric surgery.    At initial evaluation we recorded Larry Bangura's Height: 180.3 cm (5' 11\"), Initial Weight (lbs): 400 lbs and Initial BMI: 53.42.  The patient has been unsuccessful with other methods of permanent weight loss and suffers from multiple weight related medical conditions.  Due to lack of success in achieving weight loss through other methods, he is interested in undergoing bariatric surgery.    37 yo M with HTN, DM2, HLD, and IRWIN who presents to clinic to discuss weight loss surgery. In particular, he is interested in the sleeve gastrectomy. He had started at 400lbs with the aim to lose 50lbs. He is 365lbs today. He has been using protein shakes and caloric restriction to the lose weight. He also exercises some on the elliptical. Ability to exercise is limited by pain. Patient states that he has been trying to lose weight for 10 years. He quit drinking alcohol  2 years ago which has helped in his goals. 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     PREVIOUS WEIGHT LOSS ATTEMPTS:     9/29/2020   I have tried the following methods to lose weight Watching portions or calories, Exercise, Weight Watchers, Atkins type diet (low carb/high " "protein), Pre packaged meals ex: Nutrisystem, Slimfast       CO-MORBIDITIES OF OBESITY INCLUDE:     9/29/2020   I have the following health issues associated with obesity: Type II Diabetes, Pre-Diabetes, High Blood Pressure, High Cholesterol, Sleep Apnea, Stress Incontinence       VITALS:  /64 (BP Location: Left arm, Patient Position: Chair, Cuff Size: Adult Large)   Pulse 79   Ht 1.803 m (5' 11\")   Wt (!) 165.8 kg (365 lb 9.6 oz)   SpO2 99%   BMI 50.99 kg/m      PE:  GENERAL: Alert and oriented x3. NAD  HEENT exam: Sclerae not icteric. Hearing good. Head normocephalic and atraumatic.   CARDIOVASCULAR: RRR  RESPIRATORY: Breathing unlabored  GASTROINTESTINAL: Obese, central adiposity  LOWER EXTREMITIES: no deformities  MUSCULOSKELETAL: Normal gait, Moves all 4 extremities equal and strong  NEUROLOGIC: no gross defect  SKIN: warm and dry to touch     In summary, he has undergone an appropriate medical evaluation, dietitian evaluation, as well as psychologic screening. The patient appears to be an appropriate candidate for bariatric surgery. We ask him to lose an additional 15lbs for surgery.    In the office today, I discussed the laparoscopic gastric sleeve surgery. Risks, benefits and anticipated outcomes were outlined including the risk of death, staple line leak (1-2%), PE, DVT, ulcer, worsening GERD, N/V, stricture, hernia, wound infection, weight regain, and vitamin deficiencies. This patient has a good chance of sustaining permanent weight loss due to this procedure.  This should also allow improvement if not resolution of his/her weight related medical conditions.    At present we are going to present your patient's file for prior authorization to insurance.  Pending prior authorization, I anticipate a surgical date in the near future.  We will keep you updated on any progress.  If you have questions regarding care please feel free to contact me.  Total time spent in the clinic was 30 minutes with " greater than 50% in face-to-face consultation.        Sincerely,    Note initially prepared by:  Katia Little MD    Please route or send letter to:  Primary Care Provider (PCP) and Referring Provider    Attestation signed by Rikki Liriano MD at 1/12/2022  8:11 AM:  I saw and evaluated the patient. I agree with the findings and the plan of care as documented in the resident s note.    Rikki Liriano MD

## 2022-01-12 NOTE — NURSING NOTE
"Chief Complaint   Patient presents with     Consult     Consultation Bariatric Surgery here to Meet and Greet Surgeon       Vitals:    01/12/22 0704   BP: 120/64   BP Location: Left arm   Patient Position: Chair   Cuff Size: Adult Large   Pulse: 79   SpO2: 99%   Weight: (!) 165.8 kg (365 lb 9.6 oz)   Height: 1.803 m (5' 11\")       Body mass index is 50.99 kg/m .                          "

## 2022-01-12 NOTE — PROGRESS NOTES
"Dear Shelton Cota,      Referring provider:       9/29/2020   Who referred you? Dr Mcconnell     I was asked to see the patient regarding obesity by the referring provider above.    I had the pleasure of meeting with your patient Larry Bangura in our weight loss surgery office.  This patient is a 36 year old male who has been undergoing our thorough preoperative screening process in anticipation of potential bariatric surgery.    At initial evaluation we recorded Larry Bangura's Height: 180.3 cm (5' 11\"), Initial Weight (lbs): 400 lbs and Initial BMI: 53.42.  The patient has been unsuccessful with other methods of permanent weight loss and suffers from multiple weight related medical conditions.  Due to lack of success in achieving weight loss through other methods, he is interested in undergoing bariatric surgery.    37 yo M with HTN, DM2, HLD, and IRWIN who presents to clinic to discuss weight loss surgery. In particular, he is interested in the sleeve gastrectomy. He had started at 400lbs with the aim to lose 50lbs. He is 365lbs today. He has been using protein shakes and caloric restriction to the lose weight. He also exercises some on the elliptical. Ability to exercise is limited by pain. Patient states that he has been trying to lose weight for 10 years. He quit drinking alcohol  2 years ago which has helped in his goals. 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     PREVIOUS WEIGHT LOSS ATTEMPTS:     9/29/2020   I have tried the following methods to lose weight Watching portions or calories, Exercise, Weight Watchers, Atkins type diet (low carb/high protein), Pre packaged meals ex: Nutrisystem, Slimfast       CO-MORBIDITIES OF OBESITY INCLUDE:     9/29/2020   I have the following health issues associated with obesity: Type II Diabetes, Pre-Diabetes, High Blood Pressure, High Cholesterol, Sleep Apnea, Stress Incontinence       VITALS:  /64 (BP Location: Left arm, " "Patient Position: Chair, Cuff Size: Adult Large)   Pulse 79   Ht 1.803 m (5' 11\")   Wt (!) 165.8 kg (365 lb 9.6 oz)   SpO2 99%   BMI 50.99 kg/m      PE:  GENERAL: Alert and oriented x3. NAD  HEENT exam: Sclerae not icteric. Hearing good. Head normocephalic and atraumatic.   CARDIOVASCULAR: RRR  RESPIRATORY: Breathing unlabored  GASTROINTESTINAL: Obese, central adiposity  LOWER EXTREMITIES: no deformities  MUSCULOSKELETAL: Normal gait, Moves all 4 extremities equal and strong  NEUROLOGIC: no gross defect  SKIN: warm and dry to touch     In summary, he has undergone an appropriate medical evaluation, dietitian evaluation, as well as psychologic screening. The patient appears to be an appropriate candidate for bariatric surgery. We ask him to lose an additional 15lbs for surgery.    In the office today, I discussed the laparoscopic gastric sleeve surgery. Risks, benefits and anticipated outcomes were outlined including the risk of death, staple line leak (1-2%), PE, DVT, ulcer, worsening GERD, N/V, stricture, hernia, wound infection, weight regain, and vitamin deficiencies. This patient has a good chance of sustaining permanent weight loss due to this procedure.  This should also allow improvement if not resolution of his/her weight related medical conditions.    At present we are going to present your patient's file for prior authorization to insurance.  Pending prior authorization, I anticipate a surgical date in the near future.  We will keep you updated on any progress.  If you have questions regarding care please feel free to contact me.  Total time spent in the clinic was 30 minutes with greater than 50% in face-to-face consultation.        Sincerely,    Note initially prepared by:  Katia Little MD    Please route or send letter to:  Primary Care Provider (PCP) and Referring Provider  "

## 2022-01-13 ENCOUNTER — TEAM CONFERENCE (OUTPATIENT)
Dept: ENDOCRINOLOGY | Facility: CLINIC | Age: 37
End: 2022-01-13
Payer: COMMERCIAL

## 2022-01-13 LAB
DEPRECATED CALCIDIOL+CALCIFEROL SERPL-MC: 32 UG/L (ref 20–75)
PTH-INTACT SERPL-MCNC: 25 PG/ML (ref 18–80)

## 2022-01-13 NOTE — TELEPHONE ENCOUNTER
1/13/22 Bariatric Team Conference Patient Review  Name: Larry Bangura  MRN: [id number]  OR date: 2/14/22 Sleeve  Surgeon: Heri  Dietitian:Nataliya  Initial wt: 400  Goal wt: 350  Last wt and date: 365 on 1/12/22  Next RD appt: 1/17/22 Nataliya appt  Plan: Was doing 1 meal as a protein shake per day, to increase to protein shakes for 2 meals per day.

## 2022-01-14 NOTE — PROGRESS NOTES
"Larry Bangura is a 36 year old who is being evaluated via a billable video visit.      The patient has been notified of following:     \"This video visit will be conducted via a call between you and your physician/provider. We have found that certain health care needs can be provided without the need for an in-person physical exam.  This service lets us provide the care you need with a video conversation.  If a prescription is necessary we can send it directly to your pharmacy.  If lab work is needed we can place an order for that and you can then stop by our lab to have the test done at a later time.    Video visits are billed at different rates depending on your insurance coverage.  Please reach out to your insurance provider with any questions.    If during the course of the call the physician/provider feels a video visit is not appropriate, you will not be charged for this service.\"    Patient has given verbal consent for Video visit? Yes  How would you like to obtain your AVS? MyChart  If you are dropped from the video visit, the video invite should be resent to: Text to cell phone: [phone]  Will anyone else be joining your video visit? No        Video-Visit Details    Type of service:  Video Visit    Video Start Time: 8:00 AM  Video End Time: 8:22 AM    Originating Location (pt. Location): Home    Distant Location (provider location):  [facility] WEIGHT MANAGEMENT CLINIC [place]     Platform used for Video Visit: [employer]    During this virtual visit the patient is located in [place], patient verifies this as the location during the entirety of this visit.     Return Bariatric Nutrition Consultation Note    Reason For Visit: Nutrition Assessment    Larry Bangura is a 36 year old presenting today for follow-up bariatric nutrition consult and nutrition education regarding clear and low-fat full liquid diet for post-bariatric surgery. Pt is interested in laparoscopic sleeve gastrectomy with Dr. Liriano " "expected surgery on 2/14/22.  Patient is accompanied by self. Patient has completed minimum required nutrition visits prior to surgery.     Pt referred by DELL Duckworth on September 30, 2020.  Patient with Co-morbidities of obesity including:  Type II DM yes  Renal Failure no  Sleep apnea yes  Hypertension yes   Dyslipidemia yes  Joint pain no  Back pain no  GERD no     ANTHROPOMETRICS:  Initial weight: 400 lb     Current weight:  Estimated body mass index is 50.91 kg/m  as calculated from the following:    Height as of 1/12/22: 1.803 m (5' 11\").    Weight as of 1/12/22: 165.6 kg (365 lb). (-35 lbs from initial. Pt states weight taken at clinic with shoes and jacket on. Home scale showed a lower weight)    Required weight loss goal pre-op: 50 lbs from initial consult weight (goal weight 350 lbs or less before surgery)      SUPPLEMENT INFORMATION:  Centrum for mens MVI    NUTRITION HISTORY:  Reports he does not tolerate soy or whey protein powder, uses pea protein instead (Herbalife protein drink (26 gm pro)).   He is eating salad with every dinner. Switched from croissants at breakfast to higher protein option (see diet recall).     Continue to replace 1 meal per day with protein shake. Intends on replacing breakfast to help with meeting weight loss goal prior to surgery. States he had discussed with Dr. Liriano doing a Full Liquid Diet the week prior to surgery if needed to help meet wt loss goal.    Diet Recall:   B: 2 egg +  2pc bacn + 1/2 cup cheese + 2 tortilla  L: protein shake  PM snack: [employer] granola bar protein bar (170 star, 9 gm carb)  D: meat and potatoes; with veggies   Snack: 2 nut bars per day (17 gm pro, 6 gm sugar)  Beverage: water 1 gallon/day    Progress Towards Previous Goals:  1) Eat slowly (20-30 minutes per meal), chewing foods well (25 chews per bite/applesauce consistency) - Improving   2) Replace 1-2 meals per day with protein shake/bar - Met, continues   3) Avoid snacking as " able. Replace with non-starchy veggies, or half protein shake/bar if needed. - Met, continues   4) Increase activity as able - Not discussed today    NUTRITION DIAGNOSIS:  Obesity r/t long history of self-monitoring deficit and excessive energy intake aeb BMI >30 kg/m2.- Improving, continues     INTERVENTION:  Intervention Provided/Education Provided/Reviewed previous goals and encouraged patient to consistently replace 2 meals per day with meal replacement. Third meal of lean protein and non-starchy vegetable. Provided instruction on pre-op Full Liquid Diet if needed.     Provided instruction on bariatric clear and low-fat full liquid diets.  Provided the following handouts: Diet Guidelines for Bariatric Surgery, Stage 1-5 Post-op Diet Food Lists, Sources of Protein, Keeping Track of Your Fluids, list of recommended vitamin/mineral supplementation after sleeve gastrectomy surgery and RD contact information.     Patient Understanding: good  Expected Compliance: good    GOALS:  1) Eat slowly (20-30 minutes per meal), chewing foods well (25 chews per bite/applesauce consistency)  2) Replace 2 meals per day with protein shake/bar   - Full Liquid meal replacement the week prior to surgery if needed.   3) Avoid snacking as able. Replace with non-starchy veggies, or half protein shake/bar if needed.   4) Increase activity as able    Goals after surgery:   1. Follow the bariatric post-op diet advancement schedule (see below)  2. Sip on 48-64 oz (or greater) fluids daily, recording intake to help stay on-track.  - Drink at least 1-2 oz of fluid every 15-30 min.  3. Stop vitamins/minerals 1 week before surgery. We will discuss starting a chewable multivitamin at the one week post-op visit.     Post-op Diet Advancement Schedule:  Clear Liquid Diet (stage 1): 2/13   Low-Fat Full Liquid Diet (stage 2): 2/21  Pureed Diet (stage 3): 2/28  Soft Diet (stage 4): 3/14  Regular Diet (stage 5): 4/11     Post-op Diet Handouts:  Diet  Guidelines after Weight-loss Surgery  [email or web address]     Your Stage 1 Diet: Clear Liquids  [email or web address]     Your Stage 2 Diet: Low-fat Full Liquids  [email or web address]     Your Stage 3 Diet: Pureed Foods  [email or web address]     Pureed Recipes  [email or web address]    Your Stage 4 Diet: Soft Foods  [email or web address]    Your Stage 5 Diet: Regular Foods  [email or web address]    Supplements after Weight Loss Surgery  [email or web address]     Keeping Track of Fluids  [email or web address]    Chewable Multivitamin Options for After Surgery:  Bariatric Advantage Advanced Multi EA chewable (https://www.bariatricadBehavioSecage.com/item/chewable-advanced-multi-ea)  - Discount code: UOFMINN (for 15% off order) and NEW10 (additional 10% off for new customers)   Celebrate Multi Complete 45 (https://celebrateBindos.[employer]/products/xsgxa-guqktjsn-22?toquajk=09720710907336)  Flinstone's Complete, or generic (with 18 mg iron per chew) (https://www.LookStat.com/p/kids-39-complete-multivitamin-chewable-tablets-orange-grape-38-cherry-150ct-up-38-up-8482/-/A-44412933#lnk=sametab)         Time spent with patient: 22 minutes.  Nataliya Monsivais, KELLY, LD

## 2022-01-15 ENCOUNTER — HEALTH MAINTENANCE LETTER (OUTPATIENT)
Age: 37
End: 2022-01-15

## 2022-01-17 ENCOUNTER — VIRTUAL VISIT (OUTPATIENT)
Dept: ENDOCRINOLOGY | Facility: CLINIC | Age: 37
End: 2022-01-17
Payer: COMMERCIAL

## 2022-01-17 ENCOUNTER — PREP FOR PROCEDURE (OUTPATIENT)
Dept: ENDOCRINOLOGY | Facility: CLINIC | Age: 37
End: 2022-01-17

## 2022-01-17 ENCOUNTER — TELEPHONE (OUTPATIENT)
Dept: FAMILY MEDICINE | Facility: CLINIC | Age: 37
End: 2022-01-17

## 2022-01-17 DIAGNOSIS — E11.9 TYPE 2 DIABETES MELLITUS WITHOUT COMPLICATION, WITHOUT LONG-TERM CURRENT USE OF INSULIN (H): ICD-10-CM

## 2022-01-17 DIAGNOSIS — Z71.3 NUTRITIONAL COUNSELING: Primary | ICD-10-CM

## 2022-01-17 DIAGNOSIS — E66.9 OBESITY: ICD-10-CM

## 2022-01-17 DIAGNOSIS — E66.01 MORBID OBESITY (H): Primary | ICD-10-CM

## 2022-01-17 DIAGNOSIS — F41.9 ANXIETY: Primary | ICD-10-CM

## 2022-01-17 PROCEDURE — 97803 MED NUTRITION INDIV SUBSEQ: CPT | Mod: 95 | Performed by: DIETITIAN, REGISTERED

## 2022-01-17 RX ORDER — ONDANSETRON 2 MG/ML
4 INJECTION INTRAMUSCULAR; INTRAVENOUS
Status: CANCELLED | OUTPATIENT
Start: 2022-01-17

## 2022-01-17 RX ORDER — CEFAZOLIN SODIUM IN 0.9 % NACL 3 G/100 ML
3 INTRAVENOUS SOLUTION, PIGGYBACK (ML) INTRAVENOUS SEE ADMIN INSTRUCTIONS
Status: CANCELLED | OUTPATIENT
Start: 2022-01-17

## 2022-01-17 RX ORDER — CEFAZOLIN SODIUM IN 0.9 % NACL 3 G/100 ML
3 INTRAVENOUS SOLUTION, PIGGYBACK (ML) INTRAVENOUS
Status: CANCELLED | OUTPATIENT
Start: 2022-01-17

## 2022-01-17 RX ORDER — ACETAMINOPHEN 325 MG/1
975 TABLET ORAL ONCE
Status: CANCELLED | OUTPATIENT
Start: 2022-01-17 | End: 2022-01-17

## 2022-01-17 RX ORDER — MIRTAZAPINE 15 MG/1
15 TABLET, FILM COATED ORAL AT BEDTIME
Qty: 30 TABLET | Refills: 3 | Status: SHIPPED | OUTPATIENT
Start: 2022-01-17 | End: 2022-03-16

## 2022-01-17 NOTE — TELEPHONE ENCOUNTER
Carlos says the Trazodone does not help at all, he went up to 75 mg, didn't help, wants to try the other medication you talked about for sleep and to manage anxiety

## 2022-01-17 NOTE — LETTER
"1/17/2022       RE: Larry Bangura  [address] [facility] [address]     Dear Colleague,    Thank you for referring your patient, Larry Bangura, to the [facility] WEIGHT MANAGEMENT CLINIC [place] [facility]. Please see a copy of my visit note below.    Larry Bangura is a 36 year old who is being evaluated via a billable video visit.      The patient has been notified of following:     \"This video visit will be conducted via a call between you and your physician/provider. We have found that certain health care needs can be provided without the need for an in-person physical exam.  This service lets us provide the care you need with a video conversation.  If a prescription is necessary we can send it directly to your pharmacy.  If lab work is needed we can place an order for that and you can then stop by our lab to have the test done at a later time.    Video visits are billed at different rates depending on your insurance coverage.  Please reach out to your insurance provider with any questions.    If during the course of the call the physician/provider feels a video visit is not appropriate, you will not be charged for this service.\"    Patient has given verbal consent for Video visit? Yes  How would you like to obtain your AVS? MyChart  If you are dropped from the video visit, the video invite should be resent to: Text to cell phone: [phone]  Will anyone else be joining your video visit? No        Video-Visit Details    Type of service:  Video Visit    Video Start Time: 8:00 AM  Video End Time: 8:22 AM    Originating Location (pt. Location): Home    Distant Location (provider location):  [facility] WEIGHT MANAGEMENT [facility]     Platform used for Video Visit: [employer]    During this virtual visit the patient is located in [place], patient verifies this as the location during the entirety of this visit.     Return Bariatric Nutrition " "Consultation Note    Reason For Visit: Nutrition Assessment    Larry Bangura is a 36 year old presenting today for follow-up bariatric nutrition consult and nutrition education regarding clear and low-fat full liquid diet for post-bariatric surgery. Pt is interested in laparoscopic sleeve gastrectomy with Dr. Liriano expected surgery on 2/14/22.  Patient is accompanied by self. Patient has completed minimum required nutrition visits prior to surgery.     Pt referred by DELL Duckworth on September 30, 2020.  Patient with Co-morbidities of obesity including:  Type II DM yes  Renal Failure no  Sleep apnea yes  Hypertension yes   Dyslipidemia yes  Joint pain no  Back pain no  GERD no     ANTHROPOMETRICS:  Initial weight: 400 lb     Current weight:  Estimated body mass index is 50.91 kg/m  as calculated from the following:    Height as of 1/12/22: 1.803 m (5' 11\").    Weight as of 1/12/22: 165.6 kg (365 lb). (-35 lbs from initial. Pt states weight taken at clinic with shoes and jacket on. Home scale showed a lower weight)    Required weight loss goal pre-op: 50 lbs from initial consult weight (goal weight 350 lbs or less before surgery)      SUPPLEMENT INFORMATION:  Centrum for mens MVI    NUTRITION HISTORY:  Reports he does not tolerate soy or whey protein powder, uses pea protein instead (Herbalife protein drink (26 gm pro)).   He is eating salad with every dinner. Switched from croissants at breakfast to higher protein option (see diet recall).     Continue to replace 1 meal per day with protein shake. Intends on replacing breakfast to help with meeting weight loss goal prior to surgery. States he had discussed with Dr. Liriano doing a Full Liquid Diet the week prior to surgery if needed to help meet wt loss goal.    Diet Recall:   B: 2 egg +  2pc bacn + 1/2 cup cheese + 2 tortilla  L: protein shake  PM snack: [facility] [employer] bar protein bar (170 star, 9 gm carb)  D: meat and potatoes; with veggies "   Snack: 2 nut bars per day (17 gm pro, 6 gm sugar)  Beverage: water 1 gallon/day    Progress Towards Previous Goals:  1) Eat slowly (20-30 minutes per meal), chewing foods well (25 chews per bite/applesauce consistency) - Improving   2) Replace 1-2 meals per day with protein shake/bar - Met, continues   3) Avoid snacking as able. Replace with non-starchy veggies, or half protein shake/bar if needed. - Met, continues   4) Increase activity as able - Not discussed today    NUTRITION DIAGNOSIS:  Obesity r/t long history of self-monitoring deficit and excessive energy intake aeb BMI >30 kg/m2.- Improving, continues     INTERVENTION:  Intervention Provided/Education Provided/Reviewed previous goals and encouraged patient to consistently replace 2 meals per day with meal replacement. Third meal of lean protein and non-starchy vegetable. Provided instruction on pre-op Full Liquid Diet if needed.     Provided instruction on bariatric clear and low-fat full liquid diets.  Provided the following handouts: Diet Guidelines for Bariatric Surgery, Stage 1-5 Post-op Diet Food Lists, Sources of Protein, Keeping Track of Your Fluids, list of recommended vitamin/mineral supplementation after sleeve gastrectomy surgery and RD contact information.     Patient Understanding: good  Expected Compliance: good    GOALS:  1) Eat slowly (20-30 minutes per meal), chewing foods well (25 chews per bite/applesauce consistency)  2) Replace 2 meals per day with protein shake/bar   - Full Liquid meal replacement the week prior to surgery if needed.   3) Avoid snacking as able. Replace with non-starchy veggies, or half protein shake/bar if needed.   4) Increase activity as able    Goals after surgery:   1. Follow the bariatric post-op diet advancement schedule (see below)  2. Sip on 48-64 oz (or greater) fluids daily, recording intake to help stay on-track.  - Drink at least 1-2 oz of fluid every 15-30 min.  3. Stop vitamins/minerals 1 week before  surgery. We will discuss starting a chewable multivitamin at the one week post-op visit.     Post-op Diet Advancement Schedule:  Clear Liquid Diet (stage 1): 2/13   Low-Fat Full Liquid Diet (stage 2): 2/21  Pureed Diet (stage 3): 2/28  Soft Diet (stage 4): 3/14  Regular Diet (stage 5): 4/11     Post-op Diet Handouts:  Diet Guidelines after Weight-loss Surgery  [email or web address]     Your Stage 1 Diet: Clear Liquids  [email or web address]     Your Stage 2 Diet: Low-fat Full Liquids  [email or web address]     Your Stage 3 Diet: Pureed Foods  [email or web address]     Pureed Recipes  [email or web address]    Your Stage 4 Diet: Soft Foods  [email or web address]    Your Stage 5 Diet: Regular Foods  [email or web address]    Supplements after Weight Loss Surgery  [email or web address]     Keeping Track of Fluids  [email or web address]    Chewable Multivitamin Options for After Surgery:  Bariatric Advantage Advanced Multi EA chewable (https://www.bariatricadvantage.com/item/chewable-advanced-multi-ea)  - Discount code: UOFMINN (for 15% off order) and NEW10 (additional 10% off for new customers)   Celebrate Multi Complete 45 (https://[employer]/products/oayjz-jxhbtsnc-59?uwppcfa=37791380334344)  Flinstone's Complete, or generic (with 18 mg iron per chew) (https://www.[employer].com/p/kids-39-complete-multivitamin-chewable-tablets-orange-grape-38-cherry-150ct-up-38-up-8482/-/A-[id number]#lnk=sametab)     Time spent with patient: 22 minutes.  Nataliya Monsivais RD, LD

## 2022-01-18 ENCOUNTER — HOSPITAL ENCOUNTER (INPATIENT)
Facility: CLINIC | Age: 37
Setting detail: SURGERY ADMIT
End: 2022-01-18
Attending: SURGERY | Admitting: SURGERY
Payer: COMMERCIAL

## 2022-01-21 ENCOUNTER — TELEPHONE (OUTPATIENT)
Dept: ENDOCRINOLOGY | Facility: CLINIC | Age: 37
End: 2022-01-21
Payer: COMMERCIAL

## 2022-01-21 ENCOUNTER — TELEPHONE (OUTPATIENT)
Dept: SURGERY | Facility: CLINIC | Age: 37
End: 2022-01-21

## 2022-01-21 NOTE — TELEPHONE ENCOUNTER
Patient requested a call back as he found out he is COVID positive. Currently scheduled for sleeve gastrectomy with Dr. Liriano on 2/14/22.   He states he tested positive with rapid test and regular nasal swab on 1/18, testing done at the [facility] in [place].   Surgery will be moved to 2/28/22, will cancel his PAC on 1/24 and reschedule his postop appointments.

## 2022-01-21 NOTE — TELEPHONE ENCOUNTER
Pt just called in a stated he tested positive for covid has upcoming surg on 2/14 and would like a call back to discuss    [phone]

## 2022-01-24 ENCOUNTER — PRE VISIT (OUTPATIENT)
Dept: SURGERY | Facility: CLINIC | Age: 37
End: 2022-01-24

## 2022-01-26 NOTE — TELEPHONE ENCOUNTER
FUTURE VISIT INFORMATION      SURGERY INFORMATION:    Date: 3/7/22    Location: [facility] OR    Surgeon:  Rikki Liriano MD    Anesthesia Type:  General    Procedure: GASTRECTOMY, SLEEVE, LAPAROSCOPIC HERNIORRHAPHY, HIATAL, LAPAROSCOPIC    Consult: virtual visit 12/29/21     RECORDS REQUESTED FROM:          Primary Care Provider: Shelton [facility]cconnell [facility]     Pertinent Medical History: hypertension      Most recent Sleep Study:  6/2/15

## 2022-02-01 ENCOUNTER — VIRTUAL VISIT (OUTPATIENT)
Dept: PHARMACY | Facility: CLINIC | Age: 37
End: 2022-02-01
Payer: COMMERCIAL

## 2022-02-01 DIAGNOSIS — E11.9 TYPE 2 DIABETES MELLITUS WITHOUT COMPLICATION, WITHOUT LONG-TERM CURRENT USE OF INSULIN (H): ICD-10-CM

## 2022-02-01 DIAGNOSIS — E55.9 VITAMIN D DEFICIENCY: ICD-10-CM

## 2022-02-01 DIAGNOSIS — Z78.9 TAKES DIETARY SUPPLEMENTS: ICD-10-CM

## 2022-02-01 DIAGNOSIS — E66.01 CLASS 3 SEVERE OBESITY DUE TO EXCESS CALORIES WITH BODY MASS INDEX (BMI) OF 50.0 TO 59.9 IN ADULT, UNSPECIFIED WHETHER SERIOUS COMORBIDITY PRESENT (H): Primary | ICD-10-CM

## 2022-02-01 DIAGNOSIS — E66.813 CLASS 3 SEVERE OBESITY DUE TO EXCESS CALORIES WITH BODY MASS INDEX (BMI) OF 50.0 TO 59.9 IN ADULT, UNSPECIFIED WHETHER SERIOUS COMORBIDITY PRESENT (H): Primary | ICD-10-CM

## 2022-02-01 DIAGNOSIS — G47.00 INSOMNIA, UNSPECIFIED TYPE: ICD-10-CM

## 2022-02-01 DIAGNOSIS — I10 ESSENTIAL HYPERTENSION WITH GOAL BLOOD PRESSURE LESS THAN 140/90: ICD-10-CM

## 2022-02-01 DIAGNOSIS — A60.01 HERPES SIMPLEX INFECTION OF PENIS: ICD-10-CM

## 2022-02-01 DIAGNOSIS — J30.2 SEASONAL ALLERGIES: ICD-10-CM

## 2022-02-01 PROCEDURE — 99607 MTMS BY PHARM ADDL 15 MIN: CPT | Performed by: PHARMACIST

## 2022-02-01 PROCEDURE — 99606 MTMS BY PHARM EST 15 MIN: CPT | Performed by: PHARMACIST

## 2022-02-01 RX ORDER — ACETAMINOPHEN 500 MG
500-1000 TABLET ORAL EVERY 6 HOURS PRN
COMMUNITY
End: 2022-09-14

## 2022-02-01 RX ORDER — PSEUDOEPHEDRINE HCL 60 MG
60 TABLET ORAL
COMMUNITY
End: 2022-03-16

## 2022-02-01 NOTE — PROGRESS NOTES
"Medication Therapy Management (MTM) Encounter    ASSESSMENT:                            Medication Adherence/Access: No issues identified    Obesity:   Assessed and discussed potential administration changes of medications and potential holding/adjustment of medications post sleeve gastrectomy.  An alternative administration plan was formulated for medications that were greater than approximately ~6-8 mm.   Medication sizes were identified utilizing the medication's NDC # or drug identifier. Otherwise if medication size was not able to be identified, medications were compared to that of \"size of m&m\" for easy comparison for patient.  Medications found to be larger than specified below:   Metformin, valacyclovir - see below for recommendations   Evidence regarding medication plan for medications post-bariatric surgery is largely centralized around Eren-en-Y gastric bypass per guidelines recommendations. Guideline recommendations are less known for medication adjustments for sleeve gastrectomy specifically.  As the sleeve gastrectomy includes solely removing a portion of stomach and does not impact small intestinal tract (compared to other bariatric surgeries like the Eren-en-Y Gastric Bypass), medication adjustments may vary from guidelines and follow study data/expert recommendations/center protocol. Therefore, extended release or sustained release medications may be continued post-operatively if the benefit was found to outweigh the risk.     Type 2 Diabetes:  A1c not at goal <7%. As patient will be undergoing sleeve gastrectomy, patient would benefit from holding multivitamin post op as likely not necessary post op. Can recheck A1c 3 months post op.     Herpes Simplex: Stable. Should patient need to use Valacyclovir post op, can cut tablet in half and swallow both halves for appropriate dose.     Hx Vitamin D Deficiency/Supplements: Would benefit from holding vitamins/supplements 1 week before surgery then can " restart chewable multivitamin with iron 1 week post op and vitamin D along with other necessary supplements 1 month post op - will be discussed further with patient and dietitian at 1 month post op follow up appointment.     Allergic Rhinitis: Stable.     Hypertension: Stable. blood pressure at goal <140/90 mmHg.     Insomnia: Stable.       PLAN:                            1. Stop all vitamins/supplements 1 week post op. Restart chewable multivitamin with iron 1 week after surgery and restart vitamin D along with all other necessary supplements 1 month post op. To be discussed at post op follow up appointment with dietitian.     2. Surgical Team To Consider:   -Stop metformin post op     3. Check A1c 3 months post op (already in post op order set to be completed)     4. If needing valacyclovir post op, cut tablet in half and take both halves by mouth to administer each dose.     Follow-up: as needed     SUBJECTIVE/OBJECTIVE:                          Larry Bangura is a 36 year old male called for a follow-up visit.  Today's visit is a follow-up MTM visit from 3/8/2021.      Reason for visit: comprehensive review of medications - Sleeve Gastrectomy  3/14/22.    Allergies/ADRs: Reviewed in chart  Past Medical History: Reviewed in chart  Tobacco: He reports that he has never smoked. He has never used smokeless tobacco.  Alcohol: not currently using  Caffeine: 1 caffeine water every so often.     Medication Adherence/Access:   Patient takes medications directly from bottles.  Patient takes medications 2 time(s) per day.   Per patient, misses medication 0 times per week.     Obesity:   No medications     Followed by Vale Weeks NP, seen 2/3/21 for Pre-Bariatric Surgery. Patient has Sleeve Gastrectomy scheduled for Dr. Liriano on 3/14/2022. He would like to talk about change to medications if anything post op. No longer taking weight loss medication(s).     Initial Consult Weight: 400 lb   Goal weight prior to surgery:  "360 lb (-40 lb)     Wt Readings from Last 4 Encounters:   01/12/22 (!) 365 lb (165.6 kg)   01/12/22 (!) 365 lb 9.6 oz (165.8 kg)   12/29/21 (!) 360 lb (163.3 kg)   12/08/21 (!) 362 lb (164.2 kg)     Type 2 Diabetes:    Metformin 1000 mg twice daily     Patient is not experiencing side effects.  Blood sugar monitoring: never.   Symptoms of low blood sugar? None, feels \"hot\".   Symptoms of high blood sugar? none  Eye exam: up to date  Foot exam: due  Diet/Exercise: see below   Aspirin: Not taking due to age  Statin: No   ACEi/ARB: Yes: lisinopril.   Urine Albumin:   Lab Results   Component Value Date    UMALCR 12.36 12/11/2019      Lab Results   Component Value Date    A1C 7.2 09/20/2021    A1C 7.7 09/21/2020    A1C 6.5 12/11/2019    A1C 7.5 12/10/2018    A1C 11.9 09/10/2018    A1C 6.4 11/17/2016     Herpes Simplex:     Valacyclovir 1000 mg BID for 3 days for outbreaks.     No outbreaks recently. He isn't taking the preventative dosing anymore. Does have the Valacyclovir as needed if he needs it for outbreak. Hasn't had in a while.     Hx Vitamin D Deficiency/Supplements:  Vitamin D 3000 international unit(s) daily - not taking   Multivitamin Centrum for Men 1 tablet daily     No concerns. Reports that he hadn't been regularly taking vitamin D.  Lab Results   Component Value Date    VITDT 32 01/12/2022     Allergic Rhinitis:   fluticasone nasal spray 1-2 spray(s) once daily as needed  Pseudoephedrine 60 mg as needed     Had been using flonase and pseudoephedrine more when he had COVID, diagnosed 1/7/22. Not taking right now for allergies otherwise, was taking more in the spring/summer months. Patient feels that current therapy is effective.     Hypertension:   Lisinopril 20 mg daily.      Patient does not self-monitor blood pressure.  Patient reports no current medication side effects.  BP Readings from Last 3 Encounters:   01/12/22 120/64   01/12/22 120/64   12/18/21 123/77     Insomnia:   Mirtazapine 15 mg nightly " "    Finds that sleep is better with the medication. \"this stuff knocks me out\". Finds works well. Tries to get 7 hours of sleep per night. Sometimes will have some next day dry mouth. Uses CPAP. Failed medications: trazodone - ineffective at 50 mg nightly. Previously was using marijuana to help with sleeping but hasn't been using this now.     ----------------      I spent 30 minutes with this patient today. A copy of the visit note was provided to the patient's provider(s).    The patient was sent via [employer] a summary of these recommendations.     Lauren Bloch, PharmD, BCACP   Medication Therapy Management Pharmacist   [facility]    Telemedicine Visit Details  Type of service:  Telephone visit  Start Time: 1:00 PM  End Time: 1:30 PM  Originating Location (patient location): Home  Distant Location (provider location):  [facility] [place]     Medication Therapy Recommendations  Takes dietary supplements    Current Medication: Multiple Vitamins-Minerals (CENTRUM MEN) TABS   Rationale: Cannot swallow/administer medication - Adherence - Adherence   Recommendation: Change Medication - childrens multivitamin w/iron 18 MG chewable tablet   Status: Accepted - no CPA Needed         Type 2 diabetes mellitus without complication (H)    Current Medication: metFORMIN (GLUCOPHAGE) 1000 MG tablet   Rationale: Nonmedication therapy more appropriate - Unnecessary medication therapy - Indication   Recommendation: Discontinue Medication   Status: Accepted per Provider                "

## 2022-02-09 NOTE — PATIENT INSTRUCTIONS
Recommendations from today's MTM visit:                                                    MTM (medication therapy management) is a service provided by a clinical pharmacist designed to help you get the most of out of your medicines.   Today we reviewed what your medicines are for, how to know if they are working, that your medicines are safe and how to make your medicine regimen as easy as possible.      1. Stop all vitamins/supplements 1 week post op. Restart chewable multivitamin with iron 1 week after surgery and restart vitamin D along with all other necessary supplements 1 month post op. To be discussed at post op follow up appointment with dietitian.     2. Surgical Team To Consider:   -Stop metformin post op     3. Check A1c 3 months post op (already in post op order set to be completed)     4. If needing valacyclovir post op, cut tablet in half and take both halves by mouth to administer each dose.     Follow-up: as needed     It was great to speak with you today.  I value your experience and would be very thankful for your time with providing feedback on our clinic survey. You may receive a survey via email or text message in the next few days.       My Clinical Pharmacist's contact information:                                                      Please feel free to contact me with any questions or concerns you have.      Lauren Bloch, PharmD  Medication Therapy Management Pharmacist   [facility] [place]

## 2022-02-10 DIAGNOSIS — Z11.59 ENCOUNTER FOR SCREENING FOR OTHER VIRAL DISEASES: Primary | ICD-10-CM

## 2022-02-14 ENCOUNTER — PRE VISIT (OUTPATIENT)
Dept: SURGERY | Facility: CLINIC | Age: 37
End: 2022-02-14

## 2022-02-14 ENCOUNTER — VIRTUAL VISIT (OUTPATIENT)
Dept: SURGERY | Facility: CLINIC | Age: 37
End: 2022-02-14
Payer: COMMERCIAL

## 2022-02-14 ENCOUNTER — ANESTHESIA EVENT (OUTPATIENT)
Dept: SURGERY | Facility: CLINIC | Age: 37
End: 2022-02-14

## 2022-02-14 DIAGNOSIS — Z01.818 PRE-OP EVALUATION: Primary | ICD-10-CM

## 2022-02-14 PROCEDURE — 99204 OFFICE O/P NEW MOD 45 MIN: CPT | Mod: 95 | Performed by: NURSE PRACTITIONER

## 2022-02-14 ASSESSMENT — ENCOUNTER SYMPTOMS: ORTHOPNEA: 0

## 2022-02-14 ASSESSMENT — LIFESTYLE VARIABLES: TOBACCO_USE: 0

## 2022-02-14 ASSESSMENT — PAIN SCALES - GENERAL: PAINLEVEL: NO PAIN (0)

## 2022-02-14 NOTE — PROGRESS NOTES
Carlos is a 36 year old who is being evaluated via a billable video visit.      How would you like to obtain your AVS? MyChart  If the video visit is dropped, the invitation should be resent by: Text to cell phone: [phone]      HPI         Review of Systems         Objective    Vitals - Patient Reported  Pain Score: No Pain (0)        Physical Exam

## 2022-02-14 NOTE — ANESTHESIA PREPROCEDURE EVALUATION
Anesthesia Pre-Procedure Evaluation    Patient: Larry Bangura   MRN: [id number] : 1985        Preoperative Diagnosis: * No surgery found *    Procedure :   Video Visit       Past Medical History:   Diagnosis Date     HTN (hypertension) 9/10/2013     Morbid obesity (H)       Past Surgical History:   Procedure Laterality Date     ENDOSCOPIC RELEASE CARPAL TUNNEL  2013    Procedure: ENDOSCOPIC RELEASE CARPAL TUNNEL;  Right Hand Endoscopic Carpal Tunnel Release;  Surgeon: Kody Pittman MD;  Location: [facility] OR     ENDOSCOPIC RELEASE CARPAL TUNNEL  2014    Procedure: ENDOSCOPIC RELEASE CARPAL TUNNEL;  Left endoscopic carpal tunnel release, possible open;  Surgeon: Kody Pittman MD;  Location: [facility] OR      No Known Allergies   Social History     Tobacco Use     Smoking status: Never Smoker     Smokeless tobacco: Never Used   Substance Use Topics     Alcohol use: No     Alcohol/week: 0.0 standard drinks      Wt Readings from Last 1 Encounters:   22 (!) 165.6 kg (365 lb)        Anesthesia Evaluation   Pt has had prior anesthetic.         ROS/MED HX  ENT/Pulmonary:     (+) sleep apnea, uses CPAP,  (-) tobacco use   Neurologic:  - neg neurologic ROS     Cardiovascular:     (+) hypertension-range: 120/80/ ----No previous cardiac testing  (-) RAMÍREZ, orthopnea/PND and syncope   METS/Exercise Tolerance: >4 METS    Hematologic:  - neg hematologic  ROS     Musculoskeletal:  - neg musculoskeletal ROS     GI/Hepatic:  - neg GI/hepatic ROS     Renal/Genitourinary:  - neg Renal ROS     Endo:     (+) type II DM (2021), Last HgA1c: 7.2, date: 2021, Normal glucose range: doesn't check, not previously admitted for DM/DKA. Obesity,     Psychiatric/Substance Use:  - neg psychiatric ROS   (+) psychiatric history depression     Infectious Disease: Comment: Had covid 2 weeks ago.   Fully vaccinated      Malignancy:  - neg malignancy ROS     Other:               OUTSIDE LABS:  CBC:   Lab Results   Component Value  Date    WBC 8.8 11/08/2021    WBC 9.7 12/11/2019    HGB 13.4 11/08/2021    HGB 13.8 12/11/2019    HCT 41.0 11/08/2021    HCT 42.8 12/11/2019     11/08/2021     12/11/2019     BMP:   Lab Results   Component Value Date     11/08/2021     09/21/2020    POTASSIUM 4.2 11/08/2021    POTASSIUM 4.0 09/21/2020    CHLORIDE 103 11/08/2021    CHLORIDE 101 09/21/2020    CO2 29 11/08/2021    CO2 30 09/21/2020    BUN 15 11/08/2021    BUN 10 09/21/2020    CR 0.58 (L) 11/08/2021    CR 0.64 (L) 09/21/2020     (H) 11/08/2021     (H) 09/21/2020     COAGS: No results found for: PTT, INR, FIBR  POC: No results found for: BGM, HCG, HCGS  HEPATIC:   Lab Results   Component Value Date    ALBUMIN 3.9 01/12/2022    PROTTOTAL 7.8 01/12/2022    ALT 46 01/12/2022    AST 27 01/12/2022    ALKPHOS 67 01/12/2022    BILITOTAL 0.5 01/12/2022     OTHER:   Lab Results   Component Value Date    PH 7.39 05/27/2015    A1C 7.2 (H) 09/20/2021    NATALIIA 9.1 11/08/2021    TSH 2.62 11/17/2016               CHANTALE De Paz CNP

## 2022-02-14 NOTE — H&P
"  Pre-Operative H & P     CC:  Preoperative exam to assess for increased cardiopulmonary risk while undergoing surgery and anesthesia.    Date of Encounter: 2/14/2022  Primary Care Physician:  Shelton Mcconnell     Reason for visit: Pre-operative visit  HPI  Larry Bangura is a 36 year old male who presents for pre-operative H & P in preparation for  Procedure Information     Date/Time: 3/14/2022     Procedure: GASTRECTOMY, SLEEVE, LAPAROSCOPIC,HERNIORRHAPHY, HIATAL, LAPAROSCOPIC    Anesthesia type: General    Pre-op diagnosis: Morbid Obesity    Location: [facility]    Providers: Rikki Liriano MD          History is obtained from the patient and chart review    Larry Bangura is a 36 year old male who has a history of morbid obesity. He is 180.3 cm (5' 11\"), Initial Weight (lbs): 400 lbs and Initial BMI: 53.42. He has been unsuccessful with other methods of permanent weight loss and suffers from multiple weight related medical conditions. Due to lack of success in achieving weight loss through other methods, she is interested in undergoing bariatric surgery. She now presents for the above procedure.    His PMH is significant for: HTN, DM2, HLD, and IRWIN    Hx of abnormal bleeding or anti-platelet use: no      Past Medical History  Past Medical History:   Diagnosis Date     HTN (hypertension) 9/10/2013     Morbid obesity (H)        Past Surgical History  Past Surgical History:   Procedure Laterality Date     ENDOSCOPIC RELEASE CARPAL TUNNEL  12/6/2013    Procedure: ENDOSCOPIC RELEASE CARPAL TUNNEL;  Right Hand Endoscopic Carpal Tunnel Release;  Surgeon: Kody Pittman MD;  Location: [facility]     ENDOSCOPIC RELEASE CARPAL TUNNEL  1/21/2014    Procedure: ENDOSCOPIC RELEASE CARPAL TUNNEL;  Left endoscopic carpal tunnel release, possible open;  Surgeon: Kody Pittman MD;  Location: [facility] OR       Prior to Admission Medications  Current Outpatient Medications "   Medication Sig Dispense Refill     acetaminophen (TYLENOL) 500 MG tablet Take 500-1,000 mg by mouth every 6 hours as needed for mild pain       Cholecalciferol (VITAMIN D3) 75 MCG (3000 UT) TABS Take 1 tablet by mouth daily (Patient taking differently: Take 1 tablet by mouth every evening ) 30 tablet 11     lisinopril (ZESTRIL) 20 MG tablet Take 1 tablet (20 mg) by mouth daily (Patient taking differently: Take 20 mg by mouth every evening ) 90 tablet 3     metFORMIN (GLUCOPHAGE) 1000 MG tablet TAKE 1 TABLET BY MOUTH TWICE DAILY WITH MEALS 180 tablet 3     mirtazapine (REMERON) 15 MG tablet Take 1 tablet (15 mg) by mouth At Bedtime 30 tablet 3     Multiple Vitamins-Minerals (CENTRUM MEN) TABS Take 1 tablet by mouth every evening        pseudoePHEDrine (SUDAFED) 60 MG tablet Take 60 mg by mouth once as needed for congestion        fluticasone (FLONASE) 50 MCG/ACT nasal spray Spray 1-2 sprays into both nostrils daily (Patient not taking: Reported on 1/12/2022) 16 g 0     ORDER FOR DME, SET TO FAX, Patient received a [employer] Airsense 10  Auto. Pressures were set at 13 cm H2O.        valACYclovir (VALTREX) 1000 mg tablet TAKE 1 TABLET BY MOUTH TWICE DAILY FOR 2 DAYS FOR  OUTBREAKS (Patient not taking: Reported on 11/8/2021) 12 tablet 3       Allergies  No Known Allergies    Social History  Social History     Socioeconomic History     Marital status:      Spouse name: Not on file     Number of children: Not on file     Years of education: Not on file     Highest education level: Not on file   Occupational History     Not on file   Tobacco Use     Smoking status: Never Smoker     Smokeless tobacco: Never Used   Substance and Sexual Activity     Alcohol use: No     Alcohol/week: 0.0 standard drinks     Drug use: No     Sexual activity: Yes     Partners: Female     Birth control/protection: Surgical, Female Surgical, Male Surgical     Comment: vas   Other Topics Concern     Parent/sibling w/ CABG, MI or angioplasty  before 65F 55M? No   Social History Narrative     Not on file     Social Determinants of Health     Financial Resource Strain: Not on file   Food Insecurity: Not on file   Transportation Needs: Not on file   Physical Activity: Not on file   Stress: Not on file   Social Connections: Not on file   Intimate Partner Violence: Not on file   Housing Stability: Not on file       Family History  Family History   Problem Relation Age of Onset     Diabetes Father      Cancer Sister      C.A.D. Maternal Grandfather        Review of Systems  The complete review of systems is negative other than noted in the HPI or here.      Virtual visit -  No vitals were obtained    Anesthesia Evaluation   Pt has had prior anesthetic.         ROS/MED HX  ENT/Pulmonary:     (+) sleep apnea, uses CPAP,  (-) tobacco use   Neurologic:  - neg neurologic ROS     Cardiovascular:     (+) hypertension-range: 120/80/ ----No previous cardiac testing  (-) RAMÍREZ, orthopnea/PND and syncope   METS/Exercise Tolerance: >4 METS    Hematologic:  - neg hematologic  ROS     Musculoskeletal:  - neg musculoskeletal ROS     GI/Hepatic:  - neg GI/hepatic ROS     Renal/Genitourinary:  - neg Renal ROS     Endo:     (+) type II DM (9/2021), Last HgA1c: 7.2, date: 9/2021, Normal glucose range: doesn't check, not previously admitted for DM/DKA. Obesity,     Psychiatric/Substance Use:  - neg psychiatric ROS   (+) psychiatric history depression     Infectious Disease: Comment: Had covid 2 weeks ago.   Fully vaccinated      Malignancy:  - neg malignancy ROS     Other:            Physical Exam  Constitutional: Awake, alert, cooperative, no apparent distress, and appears stated age.  Eyes: Pupils equal  HENT: Normocephalic  Respiratory: non labored breathing   Neurologic: Awake, alert, oriented to name, place and time.   Neuropsychiatric: Calm, cooperative. Normal affect.      Prior Labs/Diagnostic Studies   All labs and imaging personally reviewed   Last Comprehensive Metabolic  Panel:  Sodium   Date Value Ref Range Status   11/08/2021 136 133 - 144 mmol/L Final   09/21/2020 136 133 - 144 mmol/L Final     Potassium   Date Value Ref Range Status   11/08/2021 4.2 3.4 - 5.3 mmol/L Final   09/21/2020 4.0 3.4 - 5.3 mmol/L Final     Chloride   Date Value Ref Range Status   11/08/2021 103 94 - 109 mmol/L Final   09/21/2020 101 94 - 109 mmol/L Final     Carbon Dioxide   Date Value Ref Range Status   09/21/2020 30 20 - 32 mmol/L Final     Carbon Dioxide (CO2)   Date Value Ref Range Status   11/08/2021 29 20 - 32 mmol/L Final     Anion Gap   Date Value Ref Range Status   11/08/2021 4 3 - 14 mmol/L Final   09/21/2020 5 3 - 14 mmol/L Final     Glucose   Date Value Ref Range Status   11/08/2021 130 (H) 70 - 99 mg/dL Final   09/21/2020 198 (H) 70 - 99 mg/dL Final     Comment:     Non Fasting     Urea Nitrogen   Date Value Ref Range Status   11/08/2021 15 7 - 30 mg/dL Final   09/21/2020 10 7 - 30 mg/dL Final     Creatinine   Date Value Ref Range Status   11/08/2021 0.58 (L) 0.66 - 1.25 mg/dL Final   09/21/2020 0.64 (L) 0.66 - 1.25 mg/dL Final     GFR Estimate   Date Value Ref Range Status   11/08/2021 >90 >60 mL/min/1.73m2 Final     Comment:     As of July 11, 2021, eGFR is calculated by the CKD-EPI creatinine equation, without race adjustment. eGFR can be influenced by muscle mass, exercise, and diet. The reported eGFR is an estimation only and is only applicable if the renal function is stable.   09/21/2020 >90 >60 mL/min/[1.73_m2] Final     Comment:     Non  GFR Calc  Starting 12/18/2018, serum creatinine based estimated GFR (eGFR) will be   calculated using the Chronic Kidney Disease Epidemiology Collaboration   (CKD-EPI) equation.       Calcium   Date Value Ref Range Status   11/08/2021 9.1 8.5 - 10.1 mg/dL Final   09/21/2020 8.8 8.5 - 10.1 mg/dL Final     Lab Results   Component Value Date    AST 27 01/12/2022    AST 27 07/15/2016     Lab Results   Component Value Date    ALT 46  01/12/2022    ALT 50 07/15/2016     No results found for: BILICONJ   Lab Results   Component Value Date    BILITOTAL 0.5 01/12/2022    BILITOTAL 0.4 07/15/2016     Lab Results   Component Value Date    ALBUMIN 3.9 01/12/2022    ALBUMIN 4.1 07/15/2016     Lab Results   Component Value Date    PROTTOTAL 7.8 01/12/2022    PROTTOTAL 7.9 07/15/2016      Lab Results   Component Value Date    ALKPHOS 67 01/12/2022    ALKPHOS 79 07/15/2016     Lab Results   Component Value Date    WBC 8.8 11/08/2021    WBC 9.7 12/11/2019     Lab Results   Component Value Date    RBC 4.88 11/08/2021    RBC 4.99 12/11/2019     Lab Results   Component Value Date    HGB 13.4 11/08/2021    HGB 13.8 12/11/2019     Lab Results   Component Value Date    HCT 41.0 11/08/2021    HCT 42.8 12/11/2019     No components found for: MCT  Lab Results   Component Value Date    MCV 84 11/08/2021    MCV 86 12/11/2019     Lab Results   Component Value Date    MCH 27.5 11/08/2021    MCH 27.7 12/11/2019     Lab Results   Component Value Date    MCHC 32.7 11/08/2021    MCHC 32.2 12/11/2019     Lab Results   Component Value Date    RDW 14.9 11/08/2021    RDW 14.1 12/11/2019     Lab Results   Component Value Date     11/08/2021     12/11/2019         EKG/ stress test - if available please see in ROS above   No results found.  No flowsheet data found.      The patient's records and results personally reviewed by this provider.     Outside records reviewed from: Care Everywhere      Assessment      Larry Bangura is a 36 year old male was seen as a PAC referral for risk assessment and optimization for anesthesia.    Plan/Recommendations  Pt will be optimized for the proposed procedure.  See below for details on the assessment, risk, and preoperative recommendations    NEUROLOGY  - No history of TIA, CVA or seizure        ENT  - No current airway concerns.  Will need to be reassessed day of surgery.    CARDIAC  - No history of CAD. No anginal symptoms,  Denies palpitations, PND, dizziness or syncope.   - HTN- managed on Lisinopril. Will hold DOS. Blood pressures at home 120/80's  - METS (Metabolic Equivalents)  Patient performs 4 or more METS exercise without symptoms  Total Score: 0      RCRI-Very low risk: Class 1 0.4% complication rate  Total Score: 0        PULMONARY  - Obstructive Sleep Apnea  IRWIN with home CPAP.  Patient will be instructed to bring their home CPAP device to the hospital with them.  IRWIN Low Risk  Total Score: 2           IRWIN: BMI over 35 kg/m2    IRWIN: Male      - Denies asthma or inhaler use  - Tobacco History      History   Smoking Status     Never Smoker   Smokeless Tobacco     Never Used       GI    PONV Low Risk  Total Score: 1           1 AN PONV: Patient is not a current smoker        /RENAL  - Baseline Creatinine  0.58    ENDOCRINE  - BMI: There is no height or weight on file to calculate BMI.  Class 3 Obesity (BMI > 40)  - Diabetes  Diabetes Mellitus, Type 2, non-insulin dependent.  Hold morning oral hypoglycemic medications. Recommend close monitoring of the patient's blood glucose levels throughout the perioperative period.    HEME  VTE Low Risk 0.5%  Total Score: 3           VTE: BMI greater than 39    VTE: Male      - No history of abnormal bleeding or antiplatelet use.    PSYCH  - Depression        The patient is optimized for their procedure. AVS with information on surgery time/arrival time, meds and NPO status given by nursing staff. No further diagnostic testing indicated.    This was a video visit no Physical exam was completed. The anesthesiologist the DOS will complete the Physical exam.       Video-Visit Details    Type of service:  Video Visit    Patient verbally consented to video service today: YES    Video Start Time: 8 am  Video End Time (time video stopped): 8:23 am    Originating Location (pt. Location): Home    Distant Location (provider location):  [facility] PREOPERATIVE ASSESSMENT [facility]ENTER     Mode of  Communication:  Video Conference via Seesearch  On the day of service:     Prep time: 10 minutes  Visit time: 23 minutes  Documentation time: 15 minutes  ------------------------------------------  Total time: 48 minutes      CHANTALE De Paz CNP  Preoperative Assessment Center  [facility]  Clinic and Surgery Center  Phone: [phone]  Fax: [phone]

## 2022-02-14 NOTE — PATIENT INSTRUCTIONS
Preparing for Your Surgery      Name:  Larry Bangura   MRN:  [id number]   :  1985   Today's Date:  2022       Arriving for surgery:  Surgery date:  3/14/22  Arrival time:  5:30AM    Restrictions due to COVID 19       Effective 22 [facility] is implementing the following visitor policy:     1 person may accompany the patient through the Pre-Op process.      Then that person will be asked to leave the building.        There will be no visitors in the Surgery Waiting Room.        Inpatients are allowed 1 consistent visitor for the duration of their stay.      Visitors must wear a mask.      Visitors must not be ill.      Visiting hours are 8 am to 8 pm.    [employer] parking is available for anyone with mobility limitations or disabilities.  ([place]  24 hours/ 7 days a week; [facility]  7 am- 3:30 pm, Mon- Fri)    Please come to:     [facility] [employer] [address]  [address] [place] [address]    -   Parking is available in the Patient Visitor Ramp on [facility].     -   When entering the hospital you will be asked COVID screening questions, you will then be directed to Registration.  Registration will direct you to the 3rd floor Surgery waiting room.     -   Please ask if you need an escort or a wheelchair to the Surgery Waiting Room.  Preop number- [phone]?     What can I eat or drink?  -  You may eat and drink normally through 3/12/22. Begin Clear Liquid diet on 3/13/22 through midnight.   -  You may have sips of water until 3 hours before surgery. (Until 3/14/22, 4:30AM)    Examples of clear liquids:  Water  Clear broth  Juices (apple, white grape, white cranberry  and cider) without pulp  Noncarbonated, powder based beverages  (lemonade and Remy-Aid)  Sodas (Sprite, 7-Up, ginger ale and seltzer)  Coffee or tea (without milk or cream)  Gatorade    -  No Alcohol for at least 24 hours before surgery     Which  medicines can I take?    Hold Aspirin for 7 days before surgery.   Hold Multivitamins for 7 days before surgery.  Hold Supplements for 7 days before surgery.  Hold Ibuprofen (Advil, Motrin) for 1 day before surgery--unless otherwise directed by surgeon.  Hold Naproxen (Aleve) for 4 days before surgery.    -  DO NOT take these medications the day of surgery:    Lisinopril   Metformin(Glucophage)    -  PLEASE TAKE these medications the day of surgery:    Acetaminophen(Tylenol) as needed    Pseudophedrine(Sudafed) as needed    How do I prepare myself?  - Please take 2 showers before surgery using Scrubcare or Hibiclens soap.    Use this soap only from the neck to your toes.     Leave the soap on your skin for one minute--then rinse thoroughly.      You may use your own shampoo and conditioner; no other hair products.   - Please remove all jewelry and body piercings.  - No lotions, deodorants or fragrance.  - No makeup or fingernail polish.   - Bring your ID and insurance card.    -If you have a Deep Brain Stimulator, Spinal Cord Stimulator or any neuro stimulator device---you must bring the remote control to the hospital     - All patients are required to have a Covid-19 test within 4 days of surgery/procedure.      -Patients will be contacted by the [facility] scheduling team within 1 week of surgery to make an appointment.      - Patients may call the Scheduling team at [phone] if they have not been scheduled within 4 days of  surgery.          Questions or Concerns:    - For any questions regarding the day of surgery or your hospital stay, please contact the Pre Admission Nursing Office at [phone].       - If you have health changes between today and your surgery please call your surgeon.       For questions after surgery please call your surgeons office.

## 2022-02-17 ENCOUNTER — TELEPHONE (OUTPATIENT)
Dept: ENDOCRINOLOGY | Facility: CLINIC | Age: 37
End: 2022-02-17
Payer: COMMERCIAL

## 2022-02-17 NOTE — TELEPHONE ENCOUNTER
Called patient to schedule his one week and one month postop visits. Discussed one week with Vale Weeks needs to be an in-person visit.   One month postop video visits scheduled with Vale and Aileen Duvall RD.     Patient states he is about one month out from being diagnosed with COVID. He will fax the result to me. He said this was done at a new clinic they opened in [place] through the [place] [facility].

## 2022-02-18 DIAGNOSIS — G47.33 OSA (OBSTRUCTIVE SLEEP APNEA): Primary | ICD-10-CM

## 2022-02-22 ENCOUNTER — TELEPHONE (OUTPATIENT)
Dept: ENDOCRINOLOGY | Facility: CLINIC | Age: 37
End: 2022-02-22
Payer: COMMERCIAL

## 2022-02-22 NOTE — TELEPHONE ENCOUNTER
Received the prior authorization from [employer] for sleeve gastrectomy with Dr. Liriano. Auth #[id number] effective 3/14/22 - 3/20/22 per letter dated 02/22/2022.

## 2022-03-03 ENCOUNTER — VIRTUAL VISIT (OUTPATIENT)
Dept: ENDOCRINOLOGY | Facility: CLINIC | Age: 37
End: 2022-03-03
Payer: COMMERCIAL

## 2022-03-03 DIAGNOSIS — E66.01 MORBID OBESITY (H): Primary | ICD-10-CM

## 2022-03-03 DIAGNOSIS — Z11.59 ENCOUNTER FOR SCREENING FOR OTHER VIRAL DISEASES: Primary | ICD-10-CM

## 2022-03-03 PROCEDURE — 99207 PR NO CHARGE NURSE ONLY: CPT | Mod: 95

## 2022-03-03 RX ORDER — ONDANSETRON 4 MG/1
4 TABLET, ORALLY DISINTEGRATING ORAL EVERY 8 HOURS PRN
Qty: 15 TABLET | Refills: 0 | Status: SHIPPED | OUTPATIENT
Start: 2022-03-03 | End: 2022-03-16

## 2022-03-03 RX ORDER — HYOSCYAMINE SULFATE 0.125 MG
0.12 TABLET ORAL EVERY 4 HOURS PRN
Qty: 30 TABLET | Refills: 1 | Status: SHIPPED | OUTPATIENT
Start: 2022-03-03 | End: 2022-03-16

## 2022-03-03 RX ORDER — AMOXICILLIN 250 MG
2 CAPSULE ORAL DAILY PRN
Qty: 30 TABLET | Refills: 1 | Status: SHIPPED | OUTPATIENT
Start: 2022-03-03 | End: 2022-03-16

## 2022-03-03 NOTE — PROGRESS NOTES
This patient is having sleeve gastrectomy by .    The following handouts were reviewed with the patient :  Before Your Surgery, Patient Checklist, Weight Loss Surgery Pre-operative Class, Preop Recommendations Quick Reference Guide, History and Physical, Medications to Avoid, Shower or Bathing Before Surgery, Bowel Preparation, Powerful Choices and [place] [facility] Directive.  Questions were addressed and understanding of content was verbalized.  Contact information was provided.    Patient goal weight: 350  Weight today: 370    Call [phone] Oliver Judd to confirm surgery date   Call [phone] to schedule your pre-operative history and physical with our PAC clinic.      Patient currently taking opioid/narcotic pain medications? No.     Provider notified of patien'ts current weight.  Patient is to have surgery moved to 4/4/22.  Patient to do shakes 2x day/ x 2 weeks and then 3 shakes/ day for 2 weeks.  To check in 2 weeks from now with weight.    Patient states that he would prefer not to see Nataliya Monsivais as his dietician.  He didn't feel that it was a good fit.  Upcoming appointments are with Aileen Duvall.

## 2022-03-03 NOTE — PATIENT INSTRUCTIONS
Larry Bangura,    Below is a recap of our conversation regarding your upcoming Sleeve Gastrectomy on 3/14/22.    DAY BEFORE YOUR SURGERY     - Follow a clear liquid diet.  Stay away from red liquids and liquids with pulp.     - Ensure you are well hydrated all day!     - Nothing to eat after midnight.  You may have clear liquids up to 3 hours before your surgery.      - Take your medications as directed from the PAC clinic.    SHOWER    - Follow instructions for pre-op shower using the required soap (chlorhexidine).      - You will take a shower the night before surgery and a shower the morning of surgery.  The soap can be very drying.  Do not put lotion on.    TRANSPORTATION & CARE    - You will need a  to take you to the hospital the day of surgery.    - You will need a  to pick you up from the hospital the day of discharge (usually the afternoon/evening the day after surgery).    - You will need someone to stay with you for the first 1-2 days after surgery, especially if you are taking prescription pain medicine.      AFTER SURGERY    MEDICATIONS     - Depending on the size and number of medications you take, you may need to space/change the time you take your medication so you do not overfill your stomach.   - Make sure you follow-up with your primary provider to make medication changes needed.   - If you have diabetes, follow-up with your provider that orders your diabetes medications and check your blood sugar regularly.      You will need to  prescriptions for the first four medications from your pharmacy BEFORE your surgery day.  Have these at home for when you are discharged.      Prilosec (omeprazole): This medication is for control of stomach acid.  Open the capsule and put in water, protein shake or other approve liquid.  Take as directed.  (Please disregard the prescription bottle instructions to not open the capsule).  If you are currently on a medication for GERD or Reflux, you  will just continue that medication.    Levsin (hyoscyamine): This medication is to help control spasms/cramping in the stomach and intestines.  Take as needed for cramping.      Zofran (ondansetron):  This medication is for nausea.  The medication is to be placed on the tongue and allowed to dissolve. Take as directed for nausea.    Senna: This medication is a stool softener:  Take as directed.  You may cut it in half to make it easier to swallow.  It is important to take this medication if you are taking a narcotic pain medicine as the pain medication can be constipating.  Senna can be purchased over-the-counter.      Narcotic pain medicine: Take as directed for pain.  If you are not having a lot of pain, you can take Tylenol or Extra Strength Tylenol per the bottle instructions.  The pain medicine can cause constipation.  Do not drive while taking narcotic pain medication.  This prescription will be provided to you at the time of discharge.      PAIN CONTROL    - Take your pain medicine as needed, as directed.    - You can use Tylenol or Tylenol Extra Strength if you are not having a lot of pain and also to supplement during the day.  DO NOT TAKE NSAIDS: IBUPROFEN, ASPIRIN OR NAPROXEN.    - Use an ice pack on the incisions for the first 24 - 48 hours:  Use a barrier between the ice pack and the skin.  Do not leave the ice on longer than 20 minutes at each time.      - Change positions frequently.  Walking around once an hour will also help.    - You may also try a heating pad on your abdomen to help soothe cramping.  Use a barrier between the heating pad and your skin.  Do not leave on longer than 20 minutes at each time.    HYDRATION    - You will be provided with a small medicine cup after surgery.  It holds one ounce of fluid (30 mL).  You need to drink one of these (1 oz) every 10 to 15 .    - Your goal is 48 to 64 ounces each day.  This amount will help prevent dehydration.      - Keep a tally sheet next to  you and delgado each time you drink one ounce of fluid.  You should have at least 4-6 marks for each hour.    - Try keeping a water bottle by your bed.  Drink a little before going to sleep, if you wake in the middle of the night, and in the morning before getting out of bed.    - Keep an eye on your urine.  It is a good indicator of your hydration status.  Your urine should be clear yellow or clear light yellow.      DIET    For Dr. Zurita and Dr. Eduardo Patients:     - You will be following the Stage 1 - Clear Liquid diet upon discharge.  Ensure you have a variety of proper clear liquids at home to support you in taking in the proper nutrition.     - Please refer to the Stage 1 - Cear Liquid diet handout provided by the Dietician.    BREATHING AND ACTIVITY    - Use your incentive spirometer each hour while awake.  Sitting up or standing, take 5 - 10 slow, deep breaths each time, focusing on expanding your lungs.  This should be done for the first couple of weeks after surgery.    - Get up and walk around each hour while you are awake - at least 10 minutes.  Walking will help with circulation, bowel regulation and will help you feel better.    -  Do not lift anything greater than 10 lb for the first 4 weeks.    WOUND  CARE  You may have surgical glue or steri-strips over your incision after surgery.    - Surgical glue: looks like a clear film over your incisions and will wear off a little at a time over the first 7-10 days after surgery.      - Steri-Strips: Adhesive strips of tape over your incisions.  They will fall off over the first 7-10 days after surgery.    Showering: you may shower the day you get home unless your surgeon tells you differently.    - Wash gently around incisions with warm soapy water, rinse well, and gently pat dry.    - No tub baths until all incisions are healed.      FOLLOW-UP CALL    - You will receive a post-op phone call two days after surgery to check in and see how you are doing (If  your surgery is on a Friday, your phone call will be on the Monday following your surgery).  You can always send us a message via [employer] and the Get Well Loop akin.    EQO  [email or web address]    - Track your fluid intake!    - Reminders to set up follow up appointments.    - Communicate with nursing staff.    - Please use [employer] for any concerns regarding your health.    Please let us know if you have any additional questions.    Sincerely,    Latisha Magallanes RN, BSN  RN Care Coordinator  Bariatric Surgery and Comprehensive Weight Management  Phone: [phone]

## 2022-03-03 NOTE — LETTER
3/3/2022       RE: Larry Bangura  [address] [facility] [address]     Dear Colleague,    Thank you for referring your patient, Larry Bangura, to the [facility] [place] [facility]. Please see a copy of my visit note below.    This patient is having sleeve gastrectomy by .    The following handouts were reviewed with the patient :  Before Your Surgery, Patient Checklist, Weight Loss Surgery Pre-operative Class, Preop Recommendations Quick Reference Guide, History and Physical, Medications to Avoid, Shower or Bathing Before Surgery, Bowel Preparation, Powerful Choices and [place] [facility] Directive.  Questions were addressed and understanding of content was verbalized.  Contact information was provided.    Patient goal weight: 350  Weight today: 370    Call [phone] Oliver Judd to confirm surgery date   Call [phone] to schedule your pre-operative history and physical with our PAC clinic.      Patient currently taking opioid/narcotic pain medications? No.     Provider notified of patien'ts current weight.  Patient is to have surgery moved to 4/4/22.  Patient to do shakes 2x day/ x 2 weeks and then 3 shakes/ day for 2 weeks.  To check in 2 weeks from now with weight.    Patient states that he would prefer not to see Nataliya Monsivais as his dietician.  He didn't feel that it was a good fit.  Upcoming appointments are with Aileen Duvall.      Latisha Magallanes RN

## 2022-03-04 ENCOUNTER — TELEPHONE (OUTPATIENT)
Dept: ENDOCRINOLOGY | Facility: CLINIC | Age: 37
End: 2022-03-04
Payer: COMMERCIAL

## 2022-03-04 NOTE — TELEPHONE ENCOUNTER
Called [facility] [phone] to change the admission date for this patient for sleeve with Dr. Liriano. Moved from 3/14/22 to 4/4/22. Spoke with Herlinda BENNETT In Utilization Management. She gave date range of 4/4/22 - 4/10/22. She states if you go on Avail[facility] it will still show date of 3/14 but states [facility] requests they put a note in the chart, this note is dated today at 10:39 a.m. Eastern Time.

## 2022-03-16 ENCOUNTER — OFFICE VISIT (OUTPATIENT)
Dept: FAMILY MEDICINE | Facility: CLINIC | Age: 37
End: 2022-03-16
Payer: COMMERCIAL

## 2022-03-16 VITALS
SYSTOLIC BLOOD PRESSURE: 128 MMHG | BODY MASS INDEX: 44.1 KG/M2 | HEART RATE: 84 BPM | OXYGEN SATURATION: 98 % | TEMPERATURE: 97.9 F | DIASTOLIC BLOOD PRESSURE: 72 MMHG | WEIGHT: 315 LBS | HEIGHT: 71 IN | RESPIRATION RATE: 16 BRPM

## 2022-03-16 DIAGNOSIS — G47.33 OBSTRUCTIVE SLEEP APNEA: Chronic | ICD-10-CM

## 2022-03-16 DIAGNOSIS — S83.8X2D INJURY OF MENISCUS OF LEFT KNEE, SUBSEQUENT ENCOUNTER: ICD-10-CM

## 2022-03-16 DIAGNOSIS — I10 ESSENTIAL HYPERTENSION WITH GOAL BLOOD PRESSURE LESS THAN 140/90: Chronic | ICD-10-CM

## 2022-03-16 DIAGNOSIS — E66.01 CLASS 3 SEVERE OBESITY DUE TO EXCESS CALORIES WITH SERIOUS COMORBIDITY AND BODY MASS INDEX (BMI) OF 50.0 TO 59.9 IN ADULT (H): ICD-10-CM

## 2022-03-16 DIAGNOSIS — E11.9 TYPE 2 DIABETES MELLITUS WITHOUT COMPLICATION, WITHOUT LONG-TERM CURRENT USE OF INSULIN (H): ICD-10-CM

## 2022-03-16 DIAGNOSIS — Z01.818 PRE-OP EXAM: Primary | ICD-10-CM

## 2022-03-16 DIAGNOSIS — E66.813 CLASS 3 SEVERE OBESITY DUE TO EXCESS CALORIES WITH SERIOUS COMORBIDITY AND BODY MASS INDEX (BMI) OF 50.0 TO 59.9 IN ADULT (H): ICD-10-CM

## 2022-03-16 LAB
CREAT UR-MCNC: 135 MG/DL
HBA1C MFR BLD: 6.3 % (ref 0–5.6)
HCV AB SERPL QL IA: NONREACTIVE
MICROALBUMIN UR-MCNC: 14 MG/L
MICROALBUMIN/CREAT UR: 10.37 MG/G CR (ref 0–17)

## 2022-03-16 PROCEDURE — 99214 OFFICE O/P EST MOD 30 MIN: CPT | Performed by: FAMILY MEDICINE

## 2022-03-16 PROCEDURE — 86803 HEPATITIS C AB TEST: CPT | Performed by: FAMILY MEDICINE

## 2022-03-16 PROCEDURE — 36415 COLL VENOUS BLD VENIPUNCTURE: CPT | Performed by: FAMILY MEDICINE

## 2022-03-16 PROCEDURE — 82043 UR ALBUMIN QUANTITATIVE: CPT | Performed by: FAMILY MEDICINE

## 2022-03-16 PROCEDURE — 83036 HEMOGLOBIN GLYCOSYLATED A1C: CPT | Performed by: FAMILY MEDICINE

## 2022-03-16 RX ORDER — METHYLPREDNISOLONE 4 MG
TABLET, DOSE PACK ORAL SEE ADMIN INSTRUCTIONS
COMMUNITY
Start: 2022-03-04 | End: 2022-03-16

## 2022-03-16 ASSESSMENT — PAIN SCALES - GENERAL: PAINLEVEL: NO PAIN (0)

## 2022-03-16 NOTE — PROGRESS NOTES
[facility]  [address] [place] [address]  Phone: [phone]  Fax: [phone]  Primary Provider: Shelton McconnellPREOPERATIVE EVALUATION:  Today's date: 3/16/2022    Larry Bangura is a 36 year old male who presents for a preoperative evaluation.    Surgical Information:   Surgery/Procedure: LEFT KNEE ARTHROSCOPIC MEDIAL MENISCUS KNEE REPAIR VERSUS PARTIAL MENISCECTOMY    Surgery Location: [facility]   Surgeon: Oc   Surgery Date: 3/22/2022  Time of Surgery:   Where patient plans to recover: At home with family  Fax number for surgical facility:    Type of Anesthesia Anticipated: General        Subjective     HPI related to upcoming procedure: L knee meniscal injury, having repair    DM2: controlled on metformin.  Recheck.   Htn: controlled on lisionpril.  Update labs  Morbid obesity: 51BMI, anticipiating bariatric surgery sometime in next 12 months.    Sleep apnea: cpap at night       Preop Questions 3/16/2022   1. Have you ever had a heart attack or stroke? No   2. Have you ever had surgery on your heart or blood vessels, such as a stent placement, a coronary artery bypass, or surgery on an artery in your head, neck, heart, or legs? No   3. Do you have chest pain with activity? No   4. Do you have a history of  heart failure? No   5. Do you currently have a cold, bronchitis or symptoms of other infection? No   6. Do you have a cough, shortness of breath, or wheezing? No   7. Do you or anyone in your family have previous history of blood clots? No   8. Do you or does anyone in your family have a serious bleeding problem such as prolonged bleeding following surgeries or cuts? No   9. Have you ever had problems with anemia or been told to take iron pills? No   10. Have you had any abnormal blood loss such as black, tarry or bloody stools? No   11. Have you ever had a blood transfusion? No   12. Are you willing to have a blood transfusion if it is medically  needed before, during, or after your surgery? yes -    13. Have you or any of your relatives ever had problems with anesthesia? No   14. Do you have sleep apnea, excessive snoring or daytime drowsiness? YES - sleep apnea    14a. Do you have a CPAP machine? Yes   15. Do you have any artifical heart valves or other implanted medical devices like a pacemaker, defibrillator, or continuous glucose monitor? No   16. Do you have artificial joints? No   17. Are you allergic to latex? No       Health Care Directive:  Patient does not have a Health Care Directive or Living Will:    Preoperative Review of :   reviewed - no record of controlled substances prescribed.          Review of Systems  No cp or sob or LE edema or fever or cough or rash or illness or abd pain or urine/stool changes     Patient Active Problem List    Diagnosis Date Noted     Hyperlipidemia LDL goal <100 10/17/2018     Priority: Medium     Statin at 35-40.  Same with ASA.         Herpes simplex infection of penis 10/03/2018     Priority: Medium     Esophageal reflux 12/03/2013     Priority: Medium     Essential hypertension with goal blood pressure less than 140/90 09/10/2013     Priority: Medium     CARDIOVASCULAR SCREENING; LDL GOAL LESS THAN 160 08/14/2013     Priority: Medium     Seasonal allergic rhinitis 08/07/2013     Priority: Medium     Obstructive sleep apnea 08/07/2013     Priority: Medium     Severe IRWIN: PSG  6/2/2015 ( 414 lbs, , , adithya O2 51%.)  7/15/2016:Uses CPAP every day.          Type 2 diabetes mellitus without complication (H) 08/07/2013     Priority: Medium     Class 3 severe obesity due to excess calories with serious comorbidity and body mass index (BMI) of 50.0 to 59.9 in adult (H) 05/14/2012     Priority: Medium     New bariatric surgery consult Sept 2020  Weight 400 lbs.  15mg phentermine caused insomnia           Past Surgical History:   Procedure Laterality Date     ENDOSCOPIC RELEASE CARPAL TUNNEL   "12/6/2013    Procedure: ENDOSCOPIC RELEASE CARPAL TUNNEL;  Right Hand Endoscopic Carpal Tunnel Release;  Surgeon: Kody Pittman MD;  Location: [facility] OR     ENDOSCOPIC RELEASE CARPAL TUNNEL  1/21/2014    Procedure: ENDOSCOPIC RELEASE CARPAL TUNNEL;  Left endoscopic carpal tunnel release, possible open;  Surgeon: Kody Pittman MD;  Location: [facility] OR     Current Outpatient Medications   Medication Sig Dispense Refill     lisinopril (ZESTRIL) 20 MG tablet Take 1 tablet (20 mg) by mouth daily (Patient taking differently: Take 20 mg by mouth every evening ) 90 tablet 3     metFORMIN (GLUCOPHAGE) 1000 MG tablet TAKE 1 TABLET BY MOUTH TWICE DAILY WITH MEALS 180 tablet 3     acetaminophen (TYLENOL) 500 MG tablet Take 500-1,000 mg by mouth every 6 hours as needed for mild pain (Patient not taking: Reported on 3/16/2022)       Cholecalciferol (VITAMIN D3) 75 MCG (3000 UT) TABS Take 1 tablet by mouth daily (Patient not taking: Reported on 3/16/2022) 30 tablet 11     Multiple Vitamins-Minerals (CENTRUM MEN) TABS Take 1 tablet by mouth every evening  (Patient not taking: Reported on 3/16/2022)       ORDER FOR DME, SET TO FAX, Patient received a AudioCatch Airsense 10  Auto. Pressures were set at 13 cm H2O.  (Patient not taking: Reported on 3/16/2022)         No Known Allergies     Social History     Tobacco Use     Smoking status: Never Smoker     Smokeless tobacco: Never Used   Substance Use Topics     Alcohol use: No     Alcohol/week: 0.0 standard drinks       History   Drug Use No         Objective     /72   Pulse 84   Temp 97.9  F (36.6  C) (Tympanic)   Resp 16   Ht 1.803 m (5' 11\")   Wt (!) 166.7 kg (367 lb 9.6 oz)   SpO2 98%   BMI 51.27 kg/m      Physical Exam  Gen: alert and oriented, in no acute distress, affect within normal limits  Neck: supple with no masses or nodes  Throat: oropharynx clear, no exudate or tonsillar/palate asymmetry.    CV: RRR, no murmur  Lungs: clear bilaterally with good " effort  Abd: nontender, no mass  Ext: no edema or lesions   Neuro: moving all extremities, gait normal, no focal deficts noted      Recent Labs   Lab Test 11/08/21  0811 09/20/21  1043 09/21/20  0851   HGB 13.4  --   --      --   --      --  136   POTASSIUM 4.2  --  4.0   CR 0.58*  --  0.64*   A1C  --  7.2* 7.7*        Diagnostics:  A1C 6.3.  Last basic met fine a few months ago, no dose change on meds.        Revised Cardiac Risk Index (RCRI):  The patient has the following serious cardiovascular risks for perioperative complications:   - No serious cardiac risks = 0 points     RCRI Interpretation: 0 points: Class I (very low risk - 0.4% complication rate)    A: pre op      L meniscal injury      DM2, stable on metformin       Htn, controlled      Morbid obesity, ongoing       Sleep apnea, cpap nightly    P: proceed.  No further workup indicated.  Be aware of apnea status.         Signed Electronically by: Shelton Mcconnell MD  Copy of this evaluation report is provided to requesting physician.

## 2022-03-18 ENCOUNTER — TELEPHONE (OUTPATIENT)
Dept: ENDOCRINOLOGY | Facility: CLINIC | Age: 37
End: 2022-03-18
Payer: COMMERCIAL

## 2022-03-18 NOTE — PROGRESS NOTES
"Larry Bangura is a 36 year old who is being evaluated via a billable video visit.      The patient has been notified of following:     \"This video visit will be conducted via a call between you and your physician/provider. We have found that certain health care needs can be provided without the need for an in-person physical exam.  This service lets us provide the care you need with a video conversation.  If a prescription is necessary we can send it directly to your pharmacy.  If lab work is needed we can place an order for that and you can then stop by our lab to have the test done at a later time.    Video visits are billed at different rates depending on your insurance coverage.  Please reach out to your insurance provider with any questions.    If during the course of the call the physician/provider feels a video visit is not appropriate, you will not be charged for this service.\"    Patient has given verbal consent for Video visit? Yes  How would you like to obtain your AVS? MyChart  If you are dropped from the video visit, the video invite should be resent to: Text to cell phone: [phone]  Will anyone else be joining your video visit? No        Video-Visit Details    Type of service:  Video Visit    Video Start Time: 8:03 am  Video End Time: 8:17 am    Originating Location (pt. Location): Home    Distant Location (provider location):  [facility] [place]     Platform used for Video Visit: [employer]    During this virtual visit the patient is located in [place], patient verifies this as the location during the entirety of this visit.     Return Bariatric Nutrition Consultation Note    Reason For Visit: Nutrition Assessment    Larry Bangura is a 36 year old presenting today for follow-up bariatric nutrition consult and nutrition education regarding clear and low-fat full liquid diet for post-bariatric surgery. Pt is interested in laparoscopic sleeve gastrectomy with Dr. Liriano " "expected surgery on 4/6/22.  Patient is accompanied by self. Patient has completed minimum required nutrition visits prior to surgery.     Pt referred by DELL Duckworth on September 30, 2020.  Patient with Co-morbidities of obesity including:  Type II DM yes  Renal Failure no  Sleep apnea yes  Hypertension yes   Dyslipidemia yes  Joint pain no  Back pain no  GERD no     ANTHROPOMETRICS:  Initial weight: 400 lb     Current weight:  Estimated body mass index is 51.27 kg/m  as calculated from the following:    Height as of 3/16/22: 1.803 m (5' 11\").    Weight as of 3/16/22: 166.7 kg (367 lb 9.6 oz).     Current weight: 367 lbs currently    Required weight loss goal pre-op: 50 lbs from initial consult weight (goal weight 350 lbs or less before surgery)      SUPPLEMENT INFORMATION:  Centrum for mens MVI    NUTRITION HISTORY:  Reports he does not tolerate soy or whey protein powder, uses pea protein instead (Herbalife protein drink (26 gm pro)).   He is eating salad with every dinner. Switched from croissants at breakfast to higher protein option (see diet recall).     Continue to replace 1 meal per day with protein shake. Intends on replacing breakfast to help with meeting weight loss goal prior to surgery. States he had discussed with Dr. Liriano doing a Full Liquid Diet the week prior to surgery if needed to help meet wt loss goal.    Diet Recall:   B: 2 egg +  2pc bacn + 1/2 cup cheese + 2 tortilla  L: protein shake  PM snack: [facility] granola bar protein bar (170 star, 9 gm carb)  D: meat and potatoes; with veggies   Snack: 2 nut bars per day (17 gm pro, 6 gm sugar)  Beverage: water 1 gallon/day    March 2022:    Pt shared he was unable to follow the diet (shakes) due to the steroid he is on. He would get sick if not eating more.     Plans to have knee surgery before bariatric surgery. Date of knee surgery is still up in the air per pt    Pt also shared he was upset by the diet recommendations previously " given - tried skipping lunch and gained weight. He plans to continue with current eating habits as he reports he has seen weight loss.      Recent recall  B - breakfast burrito with eggs/adams and a little salsa oscar (low kcal tortilla)   L - protein shake (herbal life)  D - varies - corn beef and cabbage. Typically something he cooks.  S - if needed carrots with light dill dip    Has to do pea protein as opposed to pea.      NUTRITION DIAGNOSIS:  Obesity r/t long history of self-monitoring deficit and excessive energy intake aeb BMI >30 kg/m2.- Improving, continues     INTERVENTION:  Intervention Provided/Education Provided/Reviewed previous goals and encouraged patient to consistently replace 2 meals per day with meal replacement. Third meal of lean protein and non-starchy vegetable. Provided instruction on pre-op Full Liquid Diet if needed.     Provided instruction on bariatric clear and low-fat full liquid diets.  Provided the following handouts: Diet Guidelines for Bariatric Surgery, Stage 1-5 Post-op Diet Food Lists, Sources of Protein, Keeping Track of Your Fluids, list of recommended vitamin/mineral supplementation after sleeve gastrectomy surgery and RD contact information.     Patient Understanding: good  Expected Compliance: good    Goals after surgery:   1. Follow the bariatric post-op diet advancement schedule (see below)  2. Sip on 48-64 oz (or greater) fluids daily, recording intake to help stay on-track.  - Drink at least 1-2 oz of fluid every 15-30 min.  3. Stop vitamins/minerals 1 week before surgery. We will discuss starting a chewable multivitamin at the one week post-op visit.     Post-op Diet Handouts:  Diet Guidelines after Weight-loss Surgery  [email or web address]     Your Stage 1 Diet: Clear Liquids  [email or web address]     Your Stage 2 Diet: Low-fat Full Liquids  [email or web address]     Your Stage 3 Diet: Pureed Foods  [email or web address]     Pureed  Recipes  [email or web address]    Your Stage 4 Diet: Soft Foods  [email or web address]    Your Stage 5 Diet: Regular Foods  [email or web address]    Supplements after Weight Loss Surgery  [email or web address]     Keeping Track of Fluids  [email or web address]    Chewable Multivitamin Options for After Surgery:  Bariatric Advantage Advanced Multi EA chewable (https://www.bariatricadCircle Biologics.AgentBridge/item/chewable-advanced-multi-ea)  - Discount code: UOFMINN (for 15% off order) and NEW10 (additional 10% off for new customers)   CelCuedrate Multi Complete 45 (https://indico/products/wpuoc-cppzqgiz-02?[email or web address])  Flinstone's Complete, or generic (with 18 mg iron per chew) (https://www.ShareGrove.com/p/kids-39-complete-multivitamin-chewable-tablets-orange-grape-38-cherry-150ct-up-38-up-8482/-/[email or web address]#lnk=sametab)       Time spent with patient: 14 minutes.  MARIANA Romo, RD, LD

## 2022-03-18 NOTE — TELEPHONE ENCOUNTER
Called patient to discuss his upcoming scheduled knee surgery. Discussed that he cannot have bariatric surgery so close to his knee surgery. He is having an issue with his insurance company and states it might not happen. Said he will find out Monday if it is a go.   I did tell him I am off on Monday but to leave a message on my voicemail. Reminded him he has a video visit with our RD next Monday, 3/21 at 8 a.m.  Will make post-op bariatric appointments after there is a decision about his knee surgery.

## 2022-03-21 ENCOUNTER — VIRTUAL VISIT (OUTPATIENT)
Dept: ENDOCRINOLOGY | Facility: CLINIC | Age: 37
End: 2022-03-21
Payer: COMMERCIAL

## 2022-03-21 DIAGNOSIS — E11.9 TYPE 2 DIABETES MELLITUS WITHOUT COMPLICATION, WITHOUT LONG-TERM CURRENT USE OF INSULIN (H): ICD-10-CM

## 2022-03-21 DIAGNOSIS — E66.813 CLASS 3 SEVERE OBESITY DUE TO EXCESS CALORIES WITH SERIOUS COMORBIDITY AND BODY MASS INDEX (BMI) OF 50.0 TO 59.9 IN ADULT (H): ICD-10-CM

## 2022-03-21 DIAGNOSIS — Z71.3 NUTRITIONAL COUNSELING: Primary | ICD-10-CM

## 2022-03-21 DIAGNOSIS — E66.01 CLASS 3 SEVERE OBESITY DUE TO EXCESS CALORIES WITH SERIOUS COMORBIDITY AND BODY MASS INDEX (BMI) OF 50.0 TO 59.9 IN ADULT (H): ICD-10-CM

## 2022-03-21 PROCEDURE — 97803 MED NUTRITION INDIV SUBSEQ: CPT | Mod: 95 | Performed by: DIETITIAN, REGISTERED

## 2022-03-21 NOTE — LETTER
"3/21/2022       RE: Larry Bangura  [address] [facility] [address]     Dear Colleague,    Thank you for referring your patient, Larry Bangura, to the [facility] [place] at [facility]. Please see a copy of my visit note below.    Larry Bangura is a 36 year old who is being evaluated via a billable video visit.      The patient has been notified of following:     \"This video visit will be conducted via a call between you and your physician/provider. We have found that certain health care needs can be provided without the need for an in-person physical exam.  This service lets us provide the care you need with a video conversation.  If a prescription is necessary we can send it directly to your pharmacy.  If lab work is needed we can place an order for that and you can then stop by our lab to have the test done at a later time.    Video visits are billed at different rates depending on your insurance coverage.  Please reach out to your insurance provider with any questions.    If during the course of the call the physician/provider feels a video visit is not appropriate, you will not be charged for this service.\"    Patient has given verbal consent for Video visit? Yes  How would you like to obtain your AVS? MyChart  If you are dropped from the video visit, the video invite should be resent to: Text to cell phone: [phone]  Will anyone else be joining your video visit? No        Video-Visit Details    Type of service:  Video Visit    Video Start Time: 8:03 am  Video End Time: 8:17 am    Originating Location (pt. Location): Home    Distant Location (provider location):  [facility]     Platform used for Video Visit: [employer]    During this virtual visit the patient is located in [place], patient verifies this as the location during the entirety of this visit.     Return Bariatric Nutrition " "Consultation Note    Reason For Visit: Nutrition Assessment    Larry Bangura is a 36 year old presenting today for follow-up bariatric nutrition consult and nutrition education regarding clear and low-fat full liquid diet for post-bariatric surgery. Pt is interested in laparoscopic sleeve gastrectomy with Dr. Liriano expected surgery on 4/6/22.  Patient is accompanied by self. Patient has completed minimum required nutrition visits prior to surgery.     Pt referred by DELL Duckworth on September 30, 2020.  Patient with Co-morbidities of obesity including:  Type II DM yes  Renal Failure no  Sleep apnea yes  Hypertension yes   Dyslipidemia yes  Joint pain no  Back pain no  GERD no     ANTHROPOMETRICS:  Initial weight: 400 lb     Current weight:  Estimated body mass index is 51.27 kg/m  as calculated from the following:    Height as of 3/16/22: 1.803 m (5' 11\").    Weight as of 3/16/22: 166.7 kg (367 lb 9.6 oz).     Current weight: 367 lbs currently    Required weight loss goal pre-op: 50 lbs from initial consult weight (goal weight 350 lbs or less before surgery)      SUPPLEMENT INFORMATION:  Centrum for mens MVI    NUTRITION HISTORY:  Reports he does not tolerate soy or whey protein powder, uses pea protein instead (Herbalife protein drink (26 gm pro)).   He is eating salad with every dinner. Switched from croissants at breakfast to higher protein option (see diet recall).     Continue to replace 1 meal per day with protein shake. Intends on replacing breakfast to help with meeting weight loss goal prior to surgery. States he had discussed with Dr. Liriano doing a Full Liquid Diet the week prior to surgery if needed to help meet wt loss goal.    Diet Recall:   B: 2 egg +  2pc bacn + 1/2 cup cheese + 2 tortilla  L: protein shake  PM snack: Cranberry Chic granola bar protein bar (170 star, 9 gm carb)  D: meat and potatoes; with veggies   Snack: 2 nut bars per day (17 gm pro, 6 gm sugar)  Beverage: water 1 " gallon/day    March 2022:    Pt shared he was unable to follow the diet (shakes) due to the steroid he is on. He would get sick if not eating more.     Plans to have knee surgery before bariatric surgery. Date of knee surgery is still up in the air per pt    Pt also shared he was upset by the diet recommendations previously given - tried skipping lunch and gained weight. He plans to continue with current eating habits as he reports he has seen weight loss.      Recent recall  B - breakfast burrito with eggs/adams and a little salsa oscar (low kcal tortilla)   L - protein shake (herbal life)  D - varies - corn beef and cabbage. Typically something he cooks.  S - if needed carrots with light dill dip    Has to do pea protein as opposed to pea.      NUTRITION DIAGNOSIS:  Obesity r/t long history of self-monitoring deficit and excessive energy intake aeb BMI >30 kg/m2.- Improving, continues     INTERVENTION:  Intervention Provided/Education Provided/Reviewed previous goals and encouraged patient to consistently replace 2 meals per day with meal replacement. Third meal of lean protein and non-starchy vegetable. Provided instruction on pre-op Full Liquid Diet if needed.     Provided instruction on bariatric clear and low-fat full liquid diets.  Provided the following handouts: Diet Guidelines for Bariatric Surgery, Stage 1-5 Post-op Diet Food Lists, Sources of Protein, Keeping Track of Your Fluids, list of recommended vitamin/mineral supplementation after sleeve gastrectomy surgery and RD contact information.     Patient Understanding: good  Expected Compliance: good    Goals after surgery:   1. Follow the bariatric post-op diet advancement schedule (see below)  2. Sip on 48-64 oz (or greater) fluids daily, recording intake to help stay on-track.  - Drink at least 1-2 oz of fluid every 15-30 min.  3. Stop vitamins/minerals 1 week before surgery. We will discuss starting a chewable multivitamin at the one week post-op  visit.     Post-op Diet Handouts:  Diet Guidelines after Weight-loss Surgery  [email or web address]     Your Stage 1 Diet: Clear Liquids  [email or web address]     Your Stage 2 Diet: Low-fat Full Liquids  [email or web address]     Your Stage 3 Diet: Pureed Foods  [email or web address]     Pureed Recipes  [email or web address]    Your Stage 4 Diet: Soft Foods  [email or web address]    Your Stage 5 Diet: Regular Foods  [email or web address]    Supplements after Weight Loss Surgery  [email or web address]     Keeping Track of Fluids  [email or web address]    Chewable Multivitamin Options for After Surgery:  Bariatric Advantage Advanced Multi EA chewable (https://www.bariatricYoubei Game.H[employer] Upstate/item/chewable-advanced-multi-ea)  - Discount code: UOFMINN (for 15% off order) and NEW10 (additional 10% off for new customers)   Celebrate Multi Complete 45 (https://Amobee.H[employer] Upstate/products/hexof-eqeqpjqd-69?ppaqxnu=70609926549486)  Flinstone's Complete, or generic (with 18 mg iron per chew) (https://www.[employer] Corporation.com/p/kids-39-complete-multivitamin-chewable-tablets-orange-grape-38-cherry-150ct-up-38-up-8482/-/[email or web address]#lnk=sametab)       Time spent with patient: 14 minutes.  MARIANA Romo, RD, LD

## 2022-04-06 DIAGNOSIS — G47.33 OBSTRUCTIVE SLEEP APNEA: Primary | ICD-10-CM

## 2022-04-13 NOTE — PATIENT INSTRUCTIONS
Rest your wrist. Take Ibuprofen and/or tylenol for pain.  See your primary care provider this next week for further plan of care.  This may be occupational therapy, bracing or referral to a different specialist but this needs to be discussed with your family provider.    We will call you if the radiologist sees anything on the x-ray that is abnormal.    Follow-up with your primary care provider next week and as needed.    Indications for emergent return to emergency department discussed with patient, who verbalized good understanding and agreement.  Patient understands the limitations of today's evaluation.         
never used

## 2022-06-16 ENCOUNTER — TELEPHONE (OUTPATIENT)
Dept: ENDOCRINOLOGY | Facility: CLINIC | Age: 37
End: 2022-06-16
Payer: COMMERCIAL

## 2022-06-16 NOTE — TELEPHONE ENCOUNTER
Received an email from [employer], Michelle OLIVAS, that [facility] was wondering if the 4/4/22 sleeve was going to be rescheduled. I called the patient and he said he is going to put this off until winter, said they decided to hold on his knee surgery and with summer coming there was a lot of things to do.   Michelle will let [facility] know and I will have to resubmit all clinicals once patient decides to move forward.

## 2022-06-26 ENCOUNTER — HEALTH MAINTENANCE LETTER (OUTPATIENT)
Age: 37
End: 2022-06-26

## 2022-08-03 ENCOUNTER — MYC MEDICAL ADVICE (OUTPATIENT)
Dept: FAMILY MEDICINE | Facility: CLINIC | Age: 37
End: 2022-08-03

## 2022-08-03 DIAGNOSIS — F41.9 ANXIETY: Primary | ICD-10-CM

## 2022-08-03 NOTE — TELEPHONE ENCOUNTER
Will start fluoxetine 20mg/day.  Let him know please.     He s lety make an appt to see me sometime in the next few weeks to discuss in more detail ,see how it's going,etc.

## 2022-09-05 ENCOUNTER — APPOINTMENT (OUTPATIENT)
Dept: GENERAL RADIOLOGY | Facility: CLINIC | Age: 37
End: 2022-09-05
Attending: PHYSICIAN ASSISTANT
Payer: COMMERCIAL

## 2022-09-05 ENCOUNTER — HOSPITAL ENCOUNTER (EMERGENCY)
Facility: CLINIC | Age: 37
Discharge: HOME | End: 2022-09-05
Attending: PHYSICIAN ASSISTANT | Admitting: PHYSICIAN ASSISTANT
Payer: COMMERCIAL

## 2022-09-05 VITALS
DIASTOLIC BLOOD PRESSURE: 69 MMHG | WEIGHT: 315 LBS | HEIGHT: 71 IN | HEART RATE: 77 BPM | BODY MASS INDEX: 44.1 KG/M2 | SYSTOLIC BLOOD PRESSURE: 155 MMHG | TEMPERATURE: 97.9 F | OXYGEN SATURATION: 98 % | RESPIRATION RATE: 18 BRPM

## 2022-09-05 DIAGNOSIS — J20.9 ACUTE BRONCHITIS: ICD-10-CM

## 2022-09-05 PROCEDURE — 71046 X-RAY EXAM CHEST 2 VIEWS: CPT

## 2022-09-05 PROCEDURE — 99213 OFFICE O/P EST LOW 20 MIN: CPT | Performed by: PHYSICIAN ASSISTANT

## 2022-09-05 PROCEDURE — G0463 HOSPITAL OUTPT CLINIC VISIT: HCPCS | Performed by: PHYSICIAN ASSISTANT

## 2022-09-05 RX ORDER — PREDNISONE 20 MG/1
TABLET ORAL
Qty: 10 TABLET | Refills: 0 | Status: SHIPPED | OUTPATIENT
Start: 2022-09-05 | End: 2022-09-14

## 2022-09-05 RX ORDER — ALBUTEROL SULFATE 90 UG/1
2 AEROSOL, METERED RESPIRATORY (INHALATION) EVERY 4 HOURS PRN
Qty: 18 G | Refills: 0 | Status: SHIPPED | OUTPATIENT
Start: 2022-09-05

## 2022-09-05 ASSESSMENT — ENCOUNTER SYMPTOMS
CONSTITUTIONAL NEGATIVE: 1
SHORTNESS OF BREATH: 1
COUGH: 1
FEVER: 0
WHEEZING: 1
CARDIOVASCULAR NEGATIVE: 1

## 2022-09-05 NOTE — ED TRIAGE NOTES
Productive cough x 2 weeks. Uses cpap and he feels like he is not getting enough air at night lying down. Sob. Daughter has had pneumonia

## 2022-09-05 NOTE — ED PROVIDER NOTES
History     Chief Complaint   Patient presents with     Cough     HPI  Larry Bangura is a 37 year old male with hx DM, IRWIN, HTN who presents with complaints of persistent productive cough over the past 1 1/2 to 2 weeks.  Patient states he originally became ill after his niece stayed with them.  He states they all became ill with some sort of respiratory infection.  Patient states his symptoms have persisted for quite some time.  He also complains of associated shortness of breath and wheezing.  Patient uses a CPAP and feels like he does not get enough air when he is lying down.  Patient reports nasal congestion initially that has since improved.  Denies fevers, chills, sinus pressure, nausea, vomiting, diarrhea, abdominal pain, chest pain, or leg pain/swelling.  Patient denies history of asthma.  Patient has previously received the Luis Alfredo & Luis Alfredo COVID vaccination.      Allergies:  No Known Allergies    Problem List:    Patient Active Problem List    Diagnosis Date Noted     Hyperlipidemia LDL goal <100 10/17/2018     Priority: Medium     Statin at 35-40.  Same with ASA.         Herpes simplex infection of penis 10/03/2018     Priority: Medium     Esophageal reflux 12/03/2013     Priority: Medium     Essential hypertension with goal blood pressure less than 140/90 09/10/2013     Priority: Medium     CARDIOVASCULAR SCREENING; LDL GOAL LESS THAN 160 08/14/2013     Priority: Medium     Seasonal allergic rhinitis 08/07/2013     Priority: Medium     Obstructive sleep apnea 08/07/2013     Priority: Medium     Severe IRWIN: PSG  6/2/2015 ( 414 lbs, , , adithya O2 51%.)  7/15/2016:Uses CPAP every day.          Type 2 diabetes mellitus without complication (H) 08/07/2013     Priority: Medium     Class 3 severe obesity due to excess calories with serious comorbidity and body mass index (BMI) of 50.0 to 59.9 in adult (H) 05/14/2012     Priority: Medium     New bariatric surgery consult Sept 2020  Weight 400  "lbs.  15mg phentermine caused insomnia           Past Medical History:    Past Medical History:   Diagnosis Date     HTN (hypertension) 9/10/2013     Morbid obesity (H)        Past Surgical History:    Past Surgical History:   Procedure Laterality Date     ENDOSCOPIC RELEASE CARPAL TUNNEL  12/6/2013    Procedure: ENDOSCOPIC RELEASE CARPAL TUNNEL;  Right Hand Endoscopic Carpal Tunnel Release;  Surgeon: Kody Pittman MD;  Location: [facility] OR     ENDOSCOPIC RELEASE CARPAL TUNNEL  1/21/2014    Procedure: ENDOSCOPIC RELEASE CARPAL TUNNEL;  Left endoscopic carpal tunnel release, possible open;  Surgeon: Kody Pittman MD;  Location: [facility] OR       Family History:    Family History   Problem Relation Age of Onset     Diabetes Father      Cancer Sister      C.A.D. Maternal Grandfather        Social History:  Marital Status:   [2]  Social History     Tobacco Use     Smoking status: Never Smoker     Smokeless tobacco: Never Used   Substance Use Topics     Alcohol use: No     Alcohol/week: 0.0 standard drinks     Drug use: No        Medications:    albuterol (PROAIR HFA/PROVENTIL HFA/VENTOLIN HFA) 108 (90 Base) MCG/ACT inhaler  predniSONE (DELTASONE) 20 MG tablet  acetaminophen (TYLENOL) 500 MG tablet  Cholecalciferol (VITAMIN D3) 75 MCG (3000 UT) TABS  FLUoxetine (PROZAC) 20 MG capsule  lisinopril (ZESTRIL) 20 MG tablet  metFORMIN (GLUCOPHAGE) 1000 MG tablet  Multiple Vitamins-Minerals (CENTRUM MEN) TABS  ORDER FOR DME, SET TO FAX,          Review of Systems   Constitutional: Negative.  Negative for fever.   HENT: Positive for congestion.    Respiratory: Positive for cough, shortness of breath and wheezing.    Cardiovascular: Negative.    Skin: Negative.    All other systems reviewed and are negative.      Physical Exam   BP: (!) 155/69  Pulse: 77  Temp: 97.9  F (36.6  C)  Resp: 18  Height: 180.3 cm (5' 11\")  Weight: (!) 176.9 kg (390 lb)  SpO2: 98 %      Physical Exam  Constitutional:       General: He is not in " acute distress.     Appearance: Normal appearance. He is well-developed. He is not ill-appearing, toxic-appearing or diaphoretic.   HENT:      Head: Normocephalic and atraumatic.      Right Ear: Tympanic membrane, ear canal and external ear normal.      Left Ear: Tympanic membrane, ear canal and external ear normal.      Nose: Nose normal. No mucosal edema, congestion or rhinorrhea.      Mouth/Throat:      Lips: Pink.      Mouth: Mucous membranes are moist.      Pharynx: Oropharynx is clear. Uvula midline. No pharyngeal swelling, oropharyngeal exudate, posterior oropharyngeal erythema or uvula swelling.      Tonsils: No tonsillar exudate or tonsillar abscesses.   Eyes:      Extraocular Movements: Extraocular movements intact.      Conjunctiva/sclera: Conjunctivae normal.      Pupils: Pupils are equal, round, and reactive to light.   Cardiovascular:      Rate and Rhythm: Normal rate and regular rhythm.      Heart sounds: Normal heart sounds.   Pulmonary:      Effort: Pulmonary effort is normal. No respiratory distress.      Breath sounds: No stridor. Wheezing (diffuse expiratory wheezing throughout lung fields) present. No rhonchi or rales.   Musculoskeletal:         General: Normal range of motion.      Cervical back: Full passive range of motion without pain, normal range of motion and neck supple. No rigidity. Normal range of motion.   Lymphadenopathy:      Cervical: No cervical adenopathy.   Skin:     General: Skin is warm and dry.   Neurological:      Mental Status: He is alert and oriented to person, place, and time.   Psychiatric:         Behavior: Behavior is cooperative.         ED Course                 Procedures      Results for orders placed or performed during the hospital encounter of 09/05/22 (from the past 24 hour(s))   XR Chest 2 Views    Narrative    EXAM: XR CHEST 2 VIEWS  LOCATION: [facility]  DATE/TIME: 9/5/2022 12:40 PM    INDICATION: cough x 1.5 weeks,  wheezing  COMPARISON: None.      Impression    IMPRESSION: Negative chest.  The lungs are clear and there are no pleural effusions. Normal heart size.       Medications - No data to display    Assessments & Plan (with Medical Decision Making)     Pt is a 37 year old male with hx DM, IRWIN, HTN who presents with complaints of persistent productive cough over the past 1 1/2 to 2 weeks.  Patient states he originally became ill after his niece stayed with them.  He states they all became ill with some sort of respiratory infection.  Patient states his symptoms have persisted for quite some time.  He also complains of associated shortness of breath and wheezing.  Patient uses a CPAP and feels like he does not get enough air when he is lying down.  Patient has previously received the Luis Alfredo & Naldo COVID vaccination.      Pt is afebrile on arrival.  Exam as above.  Patient is not hypoxic.  He is noted to have diffuse expiratory wheezing throughout lung fields on exam.  CXR was negative for pneumonia or pulmonary edema.  Discussed results with patient.  Encouraged symptomatic treatments at home.  Return precautions were reviewed.  Hand-outs were provided.    Patient was sent with prednisone burst and albuterol inhaler and was instructed to follow-up with PCP in 3-5 days for continued care and management or sooner if new or worsening symptoms.  He is to return to the ED for persistent and/or worsening symptoms.  Patient expressed understanding of the diagnosis and plan and was discharged home in good condition.    I have reviewed the nursing notes.    I have reviewed the findings, diagnosis, plan and need for follow up with the patient.    New Prescriptions    ALBUTEROL (PROAIR HFA/PROVENTIL HFA/VENTOLIN HFA) 108 (90 BASE) MCG/ACT INHALER    Inhale 2 puffs into the lungs every 4 hours as needed for shortness of breath / dyspnea or wheezing    PREDNISONE (DELTASONE) 20 MG TABLET    Take two tablets (= 40mg) each day for 5  (five) days       Final diagnoses:   Acute bronchitis       9/5/2022   [facility] EMERGENCY DEPT      Disclaimer:  This note consists of symbols derived from keyboarding, dictation and/or voice recognition software.  As a result, there may be errors in the script that have gone undetected.  Please consider this when interpreting information found in this chart.\     Shania Blakely PA-C  09/05/22 1256

## 2022-09-14 ENCOUNTER — OFFICE VISIT (OUTPATIENT)
Dept: FAMILY MEDICINE | Facility: CLINIC | Age: 37
End: 2022-09-14
Payer: COMMERCIAL

## 2022-09-14 VITALS
RESPIRATION RATE: 16 BRPM | DIASTOLIC BLOOD PRESSURE: 80 MMHG | BODY MASS INDEX: 44.1 KG/M2 | HEART RATE: 82 BPM | WEIGHT: 315 LBS | SYSTOLIC BLOOD PRESSURE: 134 MMHG | HEIGHT: 71 IN | TEMPERATURE: 98.5 F | OXYGEN SATURATION: 98 %

## 2022-09-14 DIAGNOSIS — Z23 NEED FOR PROPHYLACTIC VACCINATION AND INOCULATION AGAINST INFLUENZA: ICD-10-CM

## 2022-09-14 DIAGNOSIS — E66.01 CLASS 3 SEVERE OBESITY DUE TO EXCESS CALORIES WITH SERIOUS COMORBIDITY AND BODY MASS INDEX (BMI) OF 50.0 TO 59.9 IN ADULT (H): ICD-10-CM

## 2022-09-14 DIAGNOSIS — I10 ESSENTIAL HYPERTENSION WITH GOAL BLOOD PRESSURE LESS THAN 140/90: ICD-10-CM

## 2022-09-14 DIAGNOSIS — E66.813 CLASS 3 SEVERE OBESITY DUE TO EXCESS CALORIES WITH SERIOUS COMORBIDITY AND BODY MASS INDEX (BMI) OF 50.0 TO 59.9 IN ADULT (H): ICD-10-CM

## 2022-09-14 DIAGNOSIS — E11.9 TYPE 2 DIABETES MELLITUS WITHOUT COMPLICATION, WITHOUT LONG-TERM CURRENT USE OF INSULIN (H): Primary | Chronic | ICD-10-CM

## 2022-09-14 DIAGNOSIS — F41.9 ANXIETY: ICD-10-CM

## 2022-09-14 LAB
ANION GAP SERPL CALCULATED.3IONS-SCNC: 10 MMOL/L (ref 7–15)
BUN SERPL-MCNC: 12.5 MG/DL (ref 6–20)
CALCIUM SERPL-MCNC: 9.4 MG/DL (ref 8.6–10)
CHLORIDE SERPL-SCNC: 97 MMOL/L (ref 98–107)
CREAT SERPL-MCNC: 0.7 MG/DL (ref 0.67–1.17)
DEPRECATED HCO3 PLAS-SCNC: 28 MMOL/L (ref 22–29)
GFR SERPL CREATININE-BSD FRML MDRD: >90 ML/MIN/1.73M2
GLUCOSE SERPL-MCNC: 232 MG/DL (ref 70–99)
HBA1C MFR BLD: 7.2 % (ref 0–5.6)
POTASSIUM SERPL-SCNC: 4.4 MMOL/L (ref 3.4–5.3)
SODIUM SERPL-SCNC: 135 MMOL/L (ref 136–145)

## 2022-09-14 PROCEDURE — 90686 IIV4 VACC NO PRSV 0.5 ML IM: CPT | Performed by: FAMILY MEDICINE

## 2022-09-14 PROCEDURE — 80048 BASIC METABOLIC PNL TOTAL CA: CPT | Performed by: FAMILY MEDICINE

## 2022-09-14 PROCEDURE — 99214 OFFICE O/P EST MOD 30 MIN: CPT | Mod: 25 | Performed by: FAMILY MEDICINE

## 2022-09-14 PROCEDURE — 90471 IMMUNIZATION ADMIN: CPT | Performed by: FAMILY MEDICINE

## 2022-09-14 PROCEDURE — 36415 COLL VENOUS BLD VENIPUNCTURE: CPT | Performed by: FAMILY MEDICINE

## 2022-09-14 PROCEDURE — 83036 HEMOGLOBIN GLYCOSYLATED A1C: CPT | Performed by: FAMILY MEDICINE

## 2022-09-14 RX ORDER — FLUOXETINE 40 MG/1
40 CAPSULE ORAL DAILY
Qty: 90 CAPSULE | Refills: 3 | Status: SHIPPED | OUTPATIENT
Start: 2022-09-14 | End: 2023-08-02

## 2022-09-14 RX ORDER — LISINOPRIL 20 MG/1
20 TABLET ORAL DAILY
Qty: 90 TABLET | Refills: 3 | Status: SHIPPED | OUTPATIENT
Start: 2022-09-14 | End: 2023-08-02

## 2022-09-14 ASSESSMENT — PATIENT HEALTH QUESTIONNAIRE - PHQ9
SUM OF ALL RESPONSES TO PHQ QUESTIONS 1-9: 7
SUM OF ALL RESPONSES TO PHQ QUESTIONS 1-9: 7
10. IF YOU CHECKED OFF ANY PROBLEMS, HOW DIFFICULT HAVE THESE PROBLEMS MADE IT FOR YOU TO DO YOUR WORK, TAKE CARE OF THINGS AT HOME, OR GET ALONG WITH OTHER PEOPLE: SOMEWHAT DIFFICULT

## 2022-09-14 ASSESSMENT — ANXIETY QUESTIONNAIRES
7. FEELING AFRAID AS IF SOMETHING AWFUL MIGHT HAPPEN: NOT AT ALL
1. FEELING NERVOUS, ANXIOUS, OR ON EDGE: SEVERAL DAYS
IF YOU CHECKED OFF ANY PROBLEMS ON THIS QUESTIONNAIRE, HOW DIFFICULT HAVE THESE PROBLEMS MADE IT FOR YOU TO DO YOUR WORK, TAKE CARE OF THINGS AT HOME, OR GET ALONG WITH OTHER PEOPLE: SOMEWHAT DIFFICULT
3. WORRYING TOO MUCH ABOUT DIFFERENT THINGS: NOT AT ALL
4. TROUBLE RELAXING: SEVERAL DAYS
GAD7 TOTAL SCORE: 3
8. IF YOU CHECKED OFF ANY PROBLEMS, HOW DIFFICULT HAVE THESE MADE IT FOR YOU TO DO YOUR WORK, TAKE CARE OF THINGS AT HOME, OR GET ALONG WITH OTHER PEOPLE?: SOMEWHAT DIFFICULT
6. BECOMING EASILY ANNOYED OR IRRITABLE: SEVERAL DAYS
GAD7 TOTAL SCORE: 3
2. NOT BEING ABLE TO STOP OR CONTROL WORRYING: NOT AT ALL
7. FEELING AFRAID AS IF SOMETHING AWFUL MIGHT HAPPEN: NOT AT ALL
GAD7 TOTAL SCORE: 3
5. BEING SO RESTLESS THAT IT IS HARD TO SIT STILL: NOT AT ALL

## 2022-09-14 ASSESSMENT — PAIN SCALES - GENERAL: PAINLEVEL: NO PAIN (0)

## 2022-09-14 NOTE — PROGRESS NOTES
"S :Larry Bangura is a 37 year old male with DM2.  Fills and labs    Htn: stable.  Fills and labs    Morbid obesity: bariatric surgery fell through for a lot of reasons.  May consider again in future    Anxiety: better on recent prozac start.  Fills, wants to titrate to 40mg    O:/80   Pulse 82   Temp 98.5  F (36.9  C) (Tympanic)   Resp 16   Ht 1.803 m (5' 11\")   Wt (!) 176.9 kg (390 lb)   SpO2 98%   BMI 54.39 kg/m    GEN: Alert and oriented, in no acute distress  CV: RRR, no murmur  Well groomed, dressed appropriately. Good eye contact.  No abnormal motor movements, oriented x3, speaks clearly with a normal rate and volume.  Thoughts are coherent and linear.  No tangential responses or loose associatons.  No obsessive behaviors discussed or observed, no suicidal thoughts.   Responds to questions appropriately, gives detailed answers.   Attention and concentration normal.  Affect WNL.  Insight and judgment appropriate.       A: DM2, recheck      Htn, s table     Morbid obesity, ongoing       Anxiety, improved    P: fills.  Labs.  Up to 40 on prozac.  Back in 6mo.      Weight management plan: diet/exercise      "

## 2022-09-26 ENCOUNTER — NURSE TRIAGE (OUTPATIENT)
Dept: FAMILY MEDICINE | Facility: CLINIC | Age: 37
End: 2022-09-26

## 2022-09-26 ENCOUNTER — OFFICE VISIT (OUTPATIENT)
Dept: URGENT CARE | Facility: URGENT CARE | Age: 37
End: 2022-09-26
Payer: COMMERCIAL

## 2022-09-26 VITALS
HEART RATE: 75 BPM | SYSTOLIC BLOOD PRESSURE: 140 MMHG | OXYGEN SATURATION: 99 % | WEIGHT: 315 LBS | DIASTOLIC BLOOD PRESSURE: 76 MMHG | BODY MASS INDEX: 54.25 KG/M2 | TEMPERATURE: 98.8 F

## 2022-09-26 DIAGNOSIS — M79.622 PAIN OF LEFT UPPER ARM: Primary | ICD-10-CM

## 2022-09-26 DIAGNOSIS — I10 ESSENTIAL HYPERTENSION WITH GOAL BLOOD PRESSURE LESS THAN 140/90: ICD-10-CM

## 2022-09-26 DIAGNOSIS — M54.2 NECK PAIN: ICD-10-CM

## 2022-09-26 PROCEDURE — 99214 OFFICE O/P EST MOD 30 MIN: CPT | Performed by: PHYSICIAN ASSISTANT

## 2022-09-26 PROCEDURE — 93000 ELECTROCARDIOGRAM COMPLETE: CPT | Performed by: PHYSICIAN ASSISTANT

## 2022-09-26 RX ORDER — TRAZODONE HYDROCHLORIDE 50 MG/1
50 TABLET, FILM COATED ORAL AT BEDTIME
COMMUNITY
Start: 2022-08-08 | End: 2023-06-06

## 2022-09-26 RX ORDER — NAPROXEN 500 MG/1
500 TABLET ORAL 2 TIMES DAILY WITH MEALS
Qty: 30 TABLET | Refills: 0 | Status: SHIPPED | OUTPATIENT
Start: 2022-09-26

## 2022-09-26 RX ORDER — BACLOFEN 10 MG/1
10 TABLET ORAL 3 TIMES DAILY PRN
Qty: 30 TABLET | Refills: 0 | Status: SHIPPED | OUTPATIENT
Start: 2022-09-26 | End: 2023-08-02

## 2022-09-26 NOTE — PROGRESS NOTES
Assessment & Plan     Pain of left upper arm  Reassurance, EKG is within normal limits and palpation of neck and shoulder worsens symptoms. Will treat with naproxen (NAPROSYN) 500 MG tablet; Take 1 tablet (500 mg) by mouth 2 times daily (with meals) and baclofen (LIORESAL) 10 MG tablet; Take 1 tablet (10 mg) by mouth 3 times daily as needed for muscle spasms. Return to clinic if symptoms worsen or do not improve; otherwise follow up as needed      - EKG 12-lead complete w/read - Clinics  Neck pain    - naproxen (NAPROSYN) 500 MG tablet; Take 1 tablet (500 mg) by mouth 2 times daily (with meals)  - baclofen (LIORESAL) 10 MG tablet; Take 1 tablet (10 mg) by mouth 3 times daily as needed for muscle spasms    Essential hypertension with goal blood pressure less than 140/90  Monitor blood pressure at home and if average pressure is greater than or equal to 140/90 follow up with primary care provider                     Return in about 1 week (around 10/3/2022), or if symptoms worsen or fail to improve.    [facility] [place]              Subjective   Chief Complaint   Patient presents with     Chest Pain     Chest pain since 3 days in upper left chest, radiating pain into left arm, numbness in left hand.  Having upset stomach and reflux.       HPI     Left arm pain    Onset of symptoms was 2 day(s) ago.  Course of illness is worsening.    Severity moderate  Current and Associated symptoms: left arm pain, numbness in left arm, neck pain, back pain  Treatment measures tried include Tylenol/Ibuprofen.  Predisposing factors include has chiropractic adjustment today               Review of Systems   Constitutional, HEENT, cardiovascular, pulmonary, gi and gu systems are negative, except as otherwise noted.      Objective    BP (!) 140/76   Pulse 75   Temp 98.8  F (37.1  C) (Tympanic)   Wt (!) 176.4 kg (389 lb)   SpO2 99%   BMI 54.25 kg/m    Body mass index is 54.25  kg/m .  Physical Exam  Constitutional:       General: He is not in acute distress.     Appearance: He is well-developed.   HENT:      Head: Normocephalic and atraumatic.      Right Ear: Tympanic membrane and ear canal normal.      Left Ear: Tympanic membrane and ear canal normal.   Eyes:      Conjunctiva/sclera: Conjunctivae normal.   Cardiovascular:      Rate and Rhythm: Normal rate and regular rhythm.   Pulmonary:      Effort: Pulmonary effort is normal.      Breath sounds: Normal breath sounds.   Musculoskeletal:      Comments: There is tenderness with palpation of left neck and shoulder blade area which worsened pain in left arm.    Skin:     General: Skin is warm and dry.      Findings: No rash.   Psychiatric:         Behavior: Behavior normal.

## 2022-09-26 NOTE — TELEPHONE ENCOUNTER
Patient calling stating the last couple days his neck has been bothering him and now has pain in neck down left arm to fingertips and also in back. Patient states his fingertips in left arm are numb and tingly. Also his L hand feels colder to touch than his R hand. Denies chest pain or jaw pain.     RN advised patient should be seen today given symptoms. RN advised he could go to  in [place] or go to an UC/ED in [place] closer to where he lives if he prefers. Patient did not want to go anywhere in [place]. RN advised patient be seen in . RN reviewed red flag symptoms with patient and when to see emergency care. Patient agreed and understood.     TRISH Oswald, RN         Reason for Disposition    Numbness (i.e., loss of sensation) in hand or fingers of new-onset    Weakness of an arm or hand    Additional Information    Negative: Shock suspected (e.g., cold/pale/clammy skin, too weak to stand, low BP, rapid pulse)    Negative: Similar pain previously and it was from 'heart attack'    Negative: Similar pain previously from 'angina' and not relieved by nitroglycerin    Negative: Sounds like a life-threatening emergency to the triager    Negative: Followed an injury to arm    Negative: Chest pain    Negative: Wound looks infected    Negative: Elbow pain is main symptom    Negative: Hand or wrist pain is main symptom    Negative: Difficulty breathing or unusual sweating (e.g., sweating without exertion)    Negative: Chest pain lasting longer than 5 minutes    Negative: Age > 40 and no obvious cause for pain, pain still present even when not moving the arm    Negative: SEVERE pain (e.g., excruciating, unable to use hand at all)    Negative: Followed an injury    Negative: Wound looks infected    Negative: Caused by an animal bite    Negative: Caused by frostbite    Negative: Hand or wrist pain is main symptom    Negative: Looks infected (spreading redness, red streak, pus) and severe pain with  movement    Negative: Patient sounds very sick or weak to the triager    Negative: Shock suspected (e.g., cold/pale/clammy skin, too weak to stand, low BP, rapid pulse)    Negative: Similar pain previously and it was from 'heart attack'    Negative: Similar pain previously from 'angina' and not relieved by nitroglycerin    Negative: Difficult to awaken or acting confused (e.g., disoriented, slurred speech)    Negative: Sounds like a life-threatening emergency to the triager    Negative: Followed an injury to neck (e.g., MVA, sports, impact or collision)    Negative: Chest pain    Negative: Lymph node in the neck is swollen or painful to the touch    Negative: Sore throat is main symptom    Negative: Patient sounds very sick or weak to the triager    Negative: Problems with bowel or bladder control    Negative: Stiff neck (can't touch chin to chest) and fever    Negative: Stiff neck (can't touch chin to chest) and has headache    Negative: Chest pain lasting longer than 5 minutes    Negative: Difficulty breathing or unusual sweating (e.g., sweating without exertion)    Protocols used: FINGER PAIN-A-OH, NECK PAIN OR PCUDSCFBB-U-CO, ARM PAIN-A-OH

## 2022-10-10 ENCOUNTER — ALLIED HEALTH/NURSE VISIT (OUTPATIENT)
Dept: FAMILY MEDICINE | Facility: CLINIC | Age: 37
End: 2022-10-10
Payer: COMMERCIAL

## 2022-10-10 ENCOUNTER — TELEPHONE (OUTPATIENT)
Dept: FAMILY MEDICINE | Facility: CLINIC | Age: 37
End: 2022-10-10

## 2022-10-10 VITALS — DIASTOLIC BLOOD PRESSURE: 80 MMHG | SYSTOLIC BLOOD PRESSURE: 120 MMHG

## 2022-10-10 DIAGNOSIS — I10 ESSENTIAL HYPERTENSION WITH GOAL BLOOD PRESSURE LESS THAN 140/90: Primary | Chronic | ICD-10-CM

## 2022-10-10 PROCEDURE — 99207 PR NO CHARGE NURSE ONLY: CPT | Performed by: FAMILY MEDICINE

## 2022-10-10 NOTE — TELEPHONE ENCOUNTER
Patient Quality Outreach    Patient is due for the following:   Hypertension -  BP check    Next Steps:   Schedule a nurse only visit for BP check     Type of outreach:    Phone, spoke to patient/parent. checking bp at home 120/80      Questions for provider review:    None     Lynn Hurd MA

## 2022-10-10 NOTE — PROGRESS NOTES
Patient Quality Outreach    Patient is due for the following:   Hypertension -  BP check    Next Steps:   No follow up needed at this time.    Type of outreach:    Phone, spoke to patient/parent. bp abstracted       Questions for provider review:    None     Lynn Hurd MA

## 2022-10-18 ENCOUNTER — TRANSFERRED RECORDS (OUTPATIENT)
Dept: URGENT CARE | Facility: URGENT CARE | Age: 37
End: 2022-10-18

## 2022-10-18 LAB — RETINOPATHY: NEGATIVE

## 2022-11-29 ENCOUNTER — MYC MEDICAL ADVICE (OUTPATIENT)
Dept: FAMILY MEDICINE | Facility: CLINIC | Age: 37
End: 2022-11-29

## 2022-12-26 ENCOUNTER — HEALTH MAINTENANCE LETTER (OUTPATIENT)
Age: 37
End: 2022-12-26

## 2023-02-21 ENCOUNTER — TRANSFERRED RECORDS (OUTPATIENT)
Dept: HEALTH INFORMATION MANAGEMENT | Facility: CLINIC | Age: 38
End: 2023-02-21
Payer: COMMERCIAL

## 2023-02-21 ENCOUNTER — TELEPHONE (OUTPATIENT)
Dept: FAMILY MEDICINE | Facility: CLINIC | Age: 38
End: 2023-02-21
Payer: COMMERCIAL

## 2023-02-21 NOTE — TELEPHONE ENCOUNTER
Reason for Call:  Appointment Request    Patient requesting this type of appt:  ADHA    Requested provider: Shelton Mcconnell    Reason patient unable to be scheduled: Not within requested timeframe    When does patient want to be seen/preferred time: ASAP    Comments: Is willing to do in person or virtual- was diagnosed with ADHD    Could we send this information to you in [facility] or would you prefer to receive a phone call?:   Patient would prefer a phone call   Okay to leave a detailed message?: Yes at Cell number on file:    Telephone Information:   Mobile [phone]       Call taken on 2/21/2023 at 9:56 AM by Jennifer Hawley

## 2023-02-22 ENCOUNTER — OFFICE VISIT (OUTPATIENT)
Dept: FAMILY MEDICINE | Facility: CLINIC | Age: 38
End: 2023-02-22
Payer: COMMERCIAL

## 2023-02-22 VITALS
SYSTOLIC BLOOD PRESSURE: 136 MMHG | HEIGHT: 71 IN | OXYGEN SATURATION: 99 % | DIASTOLIC BLOOD PRESSURE: 88 MMHG | RESPIRATION RATE: 16 BRPM | BODY MASS INDEX: 44.1 KG/M2 | WEIGHT: 315 LBS | TEMPERATURE: 98.7 F | HEART RATE: 76 BPM

## 2023-02-22 DIAGNOSIS — E66.01 CLASS 3 SEVERE OBESITY DUE TO EXCESS CALORIES WITH SERIOUS COMORBIDITY AND BODY MASS INDEX (BMI) OF 50.0 TO 59.9 IN ADULT (H): ICD-10-CM

## 2023-02-22 DIAGNOSIS — E66.813 CLASS 3 SEVERE OBESITY DUE TO EXCESS CALORIES WITH SERIOUS COMORBIDITY AND BODY MASS INDEX (BMI) OF 50.0 TO 59.9 IN ADULT (H): ICD-10-CM

## 2023-02-22 DIAGNOSIS — F90.2 ADHD (ATTENTION DEFICIT HYPERACTIVITY DISORDER), COMBINED TYPE: ICD-10-CM

## 2023-02-22 DIAGNOSIS — E11.9 TYPE 2 DIABETES MELLITUS WITHOUT COMPLICATION, WITHOUT LONG-TERM CURRENT USE OF INSULIN (H): Primary | Chronic | ICD-10-CM

## 2023-02-22 LAB
CHOLEST SERPL-MCNC: 224 MG/DL
CREAT UR-MCNC: 138.1 MG/DL
HBA1C MFR BLD: 7.5 % (ref 0–5.6)
HDLC SERPL-MCNC: 40 MG/DL
LDLC SERPL CALC-MCNC: 116 MG/DL
MICROALBUMIN UR-MCNC: 29.5 MG/L
MICROALBUMIN/CREAT UR: 21.36 MG/G CR (ref 0–17)
NONHDLC SERPL-MCNC: 184 MG/DL
TRIGL SERPL-MCNC: 341 MG/DL

## 2023-02-22 PROCEDURE — 36415 COLL VENOUS BLD VENIPUNCTURE: CPT | Performed by: FAMILY MEDICINE

## 2023-02-22 PROCEDURE — 82043 UR ALBUMIN QUANTITATIVE: CPT | Performed by: FAMILY MEDICINE

## 2023-02-22 PROCEDURE — 82570 ASSAY OF URINE CREATININE: CPT | Performed by: FAMILY MEDICINE

## 2023-02-22 PROCEDURE — 80061 LIPID PANEL: CPT | Performed by: FAMILY MEDICINE

## 2023-02-22 PROCEDURE — 83036 HEMOGLOBIN GLYCOSYLATED A1C: CPT | Performed by: FAMILY MEDICINE

## 2023-02-22 PROCEDURE — 99214 OFFICE O/P EST MOD 30 MIN: CPT | Performed by: FAMILY MEDICINE

## 2023-02-22 RX ORDER — METHYLPHENIDATE HYDROCHLORIDE 20 MG/1
20 CAPSULE, EXTENDED RELEASE ORAL DAILY
Qty: 30 CAPSULE | Refills: 0 | Status: SHIPPED | OUTPATIENT
Start: 2023-03-25 | End: 2023-04-24

## 2023-02-22 RX ORDER — METHYLPHENIDATE HYDROCHLORIDE 20 MG/1
20 CAPSULE, EXTENDED RELEASE ORAL DAILY
Qty: 30 CAPSULE | Refills: 0 | Status: SHIPPED | OUTPATIENT
Start: 2023-04-25 | End: 2023-05-25

## 2023-02-22 RX ORDER — METHYLPHENIDATE HYDROCHLORIDE 20 MG/1
20 CAPSULE, EXTENDED RELEASE ORAL DAILY
Qty: 30 CAPSULE | Refills: 0 | Status: SHIPPED | OUTPATIENT
Start: 2023-02-22 | End: 2023-03-24

## 2023-02-22 ASSESSMENT — PAIN SCALES - GENERAL: PAINLEVEL: NO PAIN (0)

## 2023-02-22 NOTE — PROGRESS NOTES
"S: Larry Bangura is a 37 year old male with DM2, recheck labs, stable    Morbid obesity :ongoing    Adhd: feels he's had it since childhoood, his kids have it.  Wants to try stimulants.  Brings in copy of recent psychological eval from center in [place], will scan.  Combined ADHD diagnosed    O:/88   Pulse 76   Temp 98.7  F (37.1  C)   Resp 16   Ht 1.803 m (5' 11\")   Wt (!) 185.1 kg (408 lb)   SpO2 99%   BMI 56.90 kg/m    GEN: Alert and oriented, in no acute distress      A:  DM2, recheck labs, stable  Morbid obesity :ongoing  Adhd: new  Diagnosis    P: long acting ritalin script for 3 mo, follow up at that time.     Labs due for DM2 as well.      Keep working on wt loss.      Weight management plan: diet/exercise            "

## 2023-02-23 ENCOUNTER — TELEPHONE (OUTPATIENT)
Dept: FAMILY MEDICINE | Facility: CLINIC | Age: 38
End: 2023-02-23
Payer: COMMERCIAL

## 2023-02-23 NOTE — TELEPHONE ENCOUNTER
Prior Authorization Retail Medication Request    Medication/Dose: methelyphenidated LA 20mg caps  ICD code (if different than what is on RX):     Previously Tried and Failed:     Rationale:  Insurance wants Brand Ritalin LA but it is not avail--per pharmacy    Insurance Name:  [place] [phone]  Insurance ID:  [id number]      Pharmacy Information (if different than what is on RX)  [facility]  Phone:

## 2023-02-27 ENCOUNTER — MYC MEDICAL ADVICE (OUTPATIENT)
Dept: FAMILY MEDICINE | Facility: CLINIC | Age: 38
End: 2023-02-27
Payer: COMMERCIAL

## 2023-02-27 DIAGNOSIS — F90.2 ADHD (ATTENTION DEFICIT HYPERACTIVITY DISORDER), COMBINED TYPE: ICD-10-CM

## 2023-02-27 RX ORDER — METHYLPHENIDATE HYDROCHLORIDE 20 MG/1
20 CAPSULE, EXTENDED RELEASE ORAL DAILY
Qty: 30 CAPSULE | Refills: 0 | Status: CANCELLED | OUTPATIENT
Start: 2023-04-25

## 2023-02-27 NOTE — TELEPHONE ENCOUNTER
I wrote for long acting methylphenidate 20mg/day, which is the generic name.      Please clarify with pharmacy.

## 2023-02-27 NOTE — TELEPHONE ENCOUNTER
Patient calling. Script is written for Name Brand and pharmacy does not have in stock. Does have generic. Due to insurance, new script for generic brand. Patient asking for script to be written for generic brand and submitted to pharmacy. Would like a call once RX is sent to pharmacy       Preferred Pharmacy:  [facility] [place], [place] - [address] [facility] [address]  [address] [facility] [address]  Phone: [phone] Fax: [phone]        Could we send this information to you in [facility] or would you prefer to receive a phone call?:   Patient would prefer a phone call   Okay to leave a detailed message?: Yes at Home number on file [phone] (home)

## 2023-02-28 RX ORDER — METHYLPHENIDATE HYDROCHLORIDE 20 MG/1
20 CAPSULE, EXTENDED RELEASE ORAL DAILY
Qty: 30 CAPSULE | Refills: 0 | Status: CANCELLED | OUTPATIENT
Start: 2023-04-25

## 2023-02-28 RX ORDER — METHYLPHENIDATE HYDROCHLORIDE 20 MG/1
20 CAPSULE, EXTENDED RELEASE ORAL DAILY
Qty: 30 CAPSULE | Refills: 0 | Status: CANCELLED | OUTPATIENT
Start: 2023-02-28

## 2023-02-28 NOTE — TELEPHONE ENCOUNTER
Patient called. Asking for ritalin to be transferred to [facility]. [facility] did not have in stock- Patient verified and [facility] does have in stock.     Patient asking for phone call once RX is sent to pharmacy

## 2023-02-28 NOTE — TELEPHONE ENCOUNTER
Pt has been out of his medication for 6 days.  Pt is asking if this can be sent in ASAP.  Dr. Mcconnell is out today Pt is asking if another Provider will fill this for him.  Pt is asking for a call either way if it is going to be filled or has to wait for Dr. Mcconnell to fill.  Anaya [place] [facility]

## 2023-02-28 NOTE — TELEPHONE ENCOUNTER
Central Prior Authorization Team   Phone: [phone]    PA Initiation    Medication: Methelphenidated LA caps  Insurance Company: [facility] - Phone [phone] Fax [phone]  Pharmacy Filling the Rx:    Filling Pharmacy Phone:    Filling Pharmacy Fax:    Start Date: 2/28/2023

## 2023-03-01 NOTE — TELEPHONE ENCOUNTER
Prior Authorization Not Needed per Insurance    Medication: Methelphenidated LA caps  Insurance Company: [employer] [place] - Phone [phone] Fax [phone]  Expected CoPay:      Pharmacy Filling the Rx:    Pharmacy Notified: Yes  Patient Notified: Yes  **Instructed pharmacy to notify patient when script is ready to /ship.**

## 2023-03-27 ENCOUNTER — DOCUMENTATION ONLY (OUTPATIENT)
Dept: SLEEP MEDICINE | Facility: CLINIC | Age: 38
End: 2023-03-27
Payer: COMMERCIAL

## 2023-03-27 DIAGNOSIS — G47.33 OSA (OBSTRUCTIVE SLEEP APNEA): Primary | ICD-10-CM

## 2023-04-05 ENCOUNTER — MYC MEDICAL ADVICE (OUTPATIENT)
Dept: FAMILY MEDICINE | Facility: CLINIC | Age: 38
End: 2023-04-05
Payer: COMMERCIAL

## 2023-04-05 DIAGNOSIS — G89.29 CHRONIC MIDLINE LOW BACK PAIN WITHOUT SCIATICA: ICD-10-CM

## 2023-04-05 DIAGNOSIS — M54.6 CHRONIC BILATERAL THORACIC BACK PAIN: ICD-10-CM

## 2023-04-05 DIAGNOSIS — G89.29 CHRONIC BILATERAL THORACIC BACK PAIN: ICD-10-CM

## 2023-04-05 DIAGNOSIS — M54.50 CHRONIC MIDLINE LOW BACK PAIN WITHOUT SCIATICA: ICD-10-CM

## 2023-04-05 RX ORDER — CYCLOBENZAPRINE HCL 10 MG
10 TABLET ORAL 3 TIMES DAILY PRN
Qty: 30 TABLET | Refills: 3 | Status: SHIPPED | OUTPATIENT
Start: 2023-04-05 | End: 2023-08-02

## 2023-04-16 ENCOUNTER — HEALTH MAINTENANCE LETTER (OUTPATIENT)
Age: 38
End: 2023-04-16

## 2023-04-24 DIAGNOSIS — F90.2 ADHD (ATTENTION DEFICIT HYPERACTIVITY DISORDER), COMBINED TYPE: ICD-10-CM

## 2023-04-24 RX ORDER — METHYLPHENIDATE HYDROCHLORIDE 20 MG/1
20 CAPSULE, EXTENDED RELEASE ORAL DAILY
Qty: 30 CAPSULE | Refills: 0 | Status: SHIPPED | OUTPATIENT
Start: 2023-04-24 | End: 2023-10-23

## 2023-04-24 NOTE — TELEPHONE ENCOUNTER
Per Pharmacy - Pt was given RX for his Methylphenidate .  Pt said he lost his April RX. Requesting it to be E Sc[facility].  Anaya [facility]

## 2023-05-08 ENCOUNTER — TELEPHONE (OUTPATIENT)
Dept: FAMILY MEDICINE | Facility: CLINIC | Age: 38
End: 2023-05-08
Payer: COMMERCIAL

## 2023-05-08 NOTE — TELEPHONE ENCOUNTER
Aleve 2 in am 2 in PM    Or     800mg ibuprofen tid.      Can't just send him some vicodin in [place]

## 2023-05-08 NOTE — TELEPHONE ENCOUNTER
Notified of plan, he will try the ibuprofen as he is taking aleve and it is not helping. Cautioned not to take both and appointment scheduled for next week

## 2023-05-08 NOTE — TELEPHONE ENCOUNTER
Symptoms    Describe your symptoms: Lower Back Pain. Flexeril is not helping enough.  Pt is at [employer] at this time working out of town.  Pt is asking what else he can try.   Pt is asking if he can get a weeks worth of Vicodin until he gets back home to see him 5/12/23  Please Advise   Pref[facility] Pharmacy:   [facility].         Could we send this information to you in [facility] or would you prefer to receive a phone call?:   Patient would prefer a phone call   Okay to leave a detailed message?: Yes at Cell number on file:    Telephone Information:   Mobile [phone]     [facility]

## 2023-05-15 ENCOUNTER — VIRTUAL VISIT (OUTPATIENT)
Dept: FAMILY MEDICINE | Facility: CLINIC | Age: 38
End: 2023-05-15
Payer: COMMERCIAL

## 2023-05-15 DIAGNOSIS — F90.2 ADHD (ATTENTION DEFICIT HYPERACTIVITY DISORDER), COMBINED TYPE: Primary | ICD-10-CM

## 2023-05-15 DIAGNOSIS — M54.50 LOW BACK PAIN, UNSPECIFIED BACK PAIN LATERALITY, UNSPECIFIED CHRONICITY, UNSPECIFIED WHETHER SCIATICA PRESENT: ICD-10-CM

## 2023-05-15 PROCEDURE — 99214 OFFICE O/P EST MOD 30 MIN: CPT | Mod: VID | Performed by: FAMILY MEDICINE

## 2023-05-15 RX ORDER — METHYLPHENIDATE HYDROCHLORIDE 10 MG/1
10 TABLET ORAL DAILY
Qty: 30 TABLET | Refills: 0 | Status: SHIPPED | OUTPATIENT
Start: 2023-05-15 | End: 2023-10-23

## 2023-05-15 RX ORDER — CYCLOBENZAPRINE HCL 10 MG
10 TABLET ORAL 3 TIMES DAILY PRN
Qty: 30 TABLET | Refills: 1 | Status: SHIPPED | OUTPATIENT
Start: 2023-05-15 | End: 2024-05-22

## 2023-05-15 RX ORDER — METHYLPHENIDATE HYDROCHLORIDE 20 MG/1
20 CAPSULE, EXTENDED RELEASE ORAL DAILY
Qty: 30 CAPSULE | Refills: 0 | Status: SHIPPED | OUTPATIENT
Start: 2023-07-16 | End: 2023-08-15

## 2023-05-15 RX ORDER — METHYLPHENIDATE HYDROCHLORIDE 20 MG/1
20 CAPSULE, EXTENDED RELEASE ORAL DAILY
Qty: 30 CAPSULE | Refills: 0 | Status: SHIPPED | OUTPATIENT
Start: 2023-05-15 | End: 2023-06-14

## 2023-05-15 RX ORDER — METHYLPHENIDATE HYDROCHLORIDE 20 MG/1
20 CAPSULE, EXTENDED RELEASE ORAL DAILY
Qty: 30 CAPSULE | Refills: 0 | Status: SHIPPED | OUTPATIENT
Start: 2023-06-15 | End: 2023-07-15

## 2023-05-15 NOTE — PROGRESS NOTES
Carlos is a 38 year old who is being evaluated via a billable video visit.      How would you like to obtain your AVS? MyChart  If the video visit is dropped, the invitation should be resent by: Text to cell phone: [phone]  Will anyone else be joining your video visit? No            Subjective   Carlos is a 38 year old, presenting for the following health issues:  No chief complaint on file.        5/15/2023    10:14 AM   Additional Questions   Roomed by lexi HARTMAN     Medication Followup of  ADHD     Taking Medication as prescribed: yes but needs something for the afternoon feels like the medication wears off     Side Effects:  Decreased appetite      Medication Helping Symptoms:  NO    Adhd: improved with stimulant.  Wants fills.  Something for afternoon prn?    Lumbago: worse.  Flexeril fills?  No new injury, just flaring                Objective           Vitals:  No vitals were obtained today due to virtual visit.    Physical Exam   GENERAL: Healthy, alert and no distress  EYES: Eyes grossly normal to inspection.  No discharge or erythema, or obvious scleral/conjunctival abnormalities.  RESP: No audible wheeze, cough, or visible cyanosis.  No visible retractions or increased work of breathing.    SKIN: Visible skin clear. No significant rash, abnormal pigmentation or lesions.  NEURO: Cranial nerves grossly intact.  Mentation and speech appropriate for age.  PSYCH: Mentation appears normal, affect normal/bright, judgement and insight intact, normal speech and appearance well-groomed.    A: adhd, improved, refills      Lumbago, worse    P: 3 month supply on methylphenidate, some immediate acting to use prn in afternoon.    Fills on flexeril.  F/u if not improving     Back in  Clinic in 3 mo          Video-Visit Details    Type of service:  Video Visit   Video Start Time: 1115  Video End Time:1125    Originating Location (pt. Location): Home  Distant Location (provider location):  On-site  Platform used  for Video Visit: Jenny

## 2023-06-02 ENCOUNTER — HEALTH MAINTENANCE LETTER (OUTPATIENT)
Age: 38
End: 2023-06-02

## 2023-06-04 DIAGNOSIS — G47.00 INSOMNIA, UNSPECIFIED TYPE: Primary | ICD-10-CM

## 2023-06-06 RX ORDER — TRAZODONE HYDROCHLORIDE 50 MG/1
TABLET, FILM COATED ORAL
Qty: 30 TABLET | Refills: 11 | Status: SHIPPED | OUTPATIENT
Start: 2023-06-06 | End: 2024-05-22

## 2023-06-28 ENCOUNTER — TELEPHONE (OUTPATIENT)
Dept: FAMILY MEDICINE | Facility: CLINIC | Age: 38
End: 2023-06-28
Payer: COMMERCIAL

## 2023-06-28 DIAGNOSIS — F90.2 ADHD (ATTENTION DEFICIT HYPERACTIVITY DISORDER), COMBINED TYPE: ICD-10-CM

## 2023-06-28 RX ORDER — METHYLPHENIDATE HYDROCHLORIDE 20 MG/1
20 TABLET ORAL DAILY
Qty: 30 TABLET | Refills: 0 | Status: SHIPPED | OUTPATIENT
Start: 2023-06-28 | End: 2024-05-22

## 2023-06-28 NOTE — TELEPHONE ENCOUNTER
Pt called and would like a refill on     methylphenidate (RITALIN) 10 MG tablet 30 tablet 0         But wanted to know if he could get the 10mg up to 20mg instead. He said he is taking the 10mg 2x a day.    He would like to just take 1 tablet a day at 20mg to help it last for his long days.     Please send refill to [facility].

## 2023-08-02 ENCOUNTER — OFFICE VISIT (OUTPATIENT)
Dept: FAMILY MEDICINE | Facility: CLINIC | Age: 38
End: 2023-08-02
Payer: COMMERCIAL

## 2023-08-02 VITALS
SYSTOLIC BLOOD PRESSURE: 138 MMHG | BODY MASS INDEX: 44.1 KG/M2 | HEART RATE: 99 BPM | HEIGHT: 71 IN | TEMPERATURE: 98.9 F | OXYGEN SATURATION: 98 % | DIASTOLIC BLOOD PRESSURE: 88 MMHG | RESPIRATION RATE: 16 BRPM | WEIGHT: 315 LBS

## 2023-08-02 DIAGNOSIS — E66.813 CLASS 3 SEVERE OBESITY DUE TO EXCESS CALORIES WITH SERIOUS COMORBIDITY AND BODY MASS INDEX (BMI) OF 50.0 TO 59.9 IN ADULT (H): ICD-10-CM

## 2023-08-02 DIAGNOSIS — F90.2 ADHD (ATTENTION DEFICIT HYPERACTIVITY DISORDER), COMBINED TYPE: ICD-10-CM

## 2023-08-02 DIAGNOSIS — F41.9 ANXIETY: ICD-10-CM

## 2023-08-02 DIAGNOSIS — I10 ESSENTIAL HYPERTENSION WITH GOAL BLOOD PRESSURE LESS THAN 140/90: ICD-10-CM

## 2023-08-02 DIAGNOSIS — E66.01 CLASS 3 SEVERE OBESITY DUE TO EXCESS CALORIES WITH SERIOUS COMORBIDITY AND BODY MASS INDEX (BMI) OF 50.0 TO 59.9 IN ADULT (H): ICD-10-CM

## 2023-08-02 DIAGNOSIS — E11.9 TYPE 2 DIABETES MELLITUS WITHOUT COMPLICATION, WITHOUT LONG-TERM CURRENT USE OF INSULIN (H): Primary | Chronic | ICD-10-CM

## 2023-08-02 DIAGNOSIS — M77.12 LATERAL EPICONDYLITIS OF LEFT ELBOW: ICD-10-CM

## 2023-08-02 LAB
ERYTHROCYTE [DISTWIDTH] IN BLOOD BY AUTOMATED COUNT: 13.6 % (ref 10–15)
HBA1C MFR BLD: 10.1 % (ref 0–5.6)
HCT VFR BLD AUTO: 39.5 % (ref 40–53)
HGB BLD-MCNC: 13 G/DL (ref 13.3–17.7)
MCH RBC QN AUTO: 27.9 PG (ref 26.5–33)
MCHC RBC AUTO-ENTMCNC: 32.9 G/DL (ref 31.5–36.5)
MCV RBC AUTO: 85 FL (ref 78–100)
PLATELET # BLD AUTO: 370 10E3/UL (ref 150–450)
RBC # BLD AUTO: 4.66 10E6/UL (ref 4.4–5.9)
WBC # BLD AUTO: 9.3 10E3/UL (ref 4–11)

## 2023-08-02 PROCEDURE — 36415 COLL VENOUS BLD VENIPUNCTURE: CPT | Performed by: FAMILY MEDICINE

## 2023-08-02 PROCEDURE — 80048 BASIC METABOLIC PNL TOTAL CA: CPT | Performed by: FAMILY MEDICINE

## 2023-08-02 PROCEDURE — 85027 COMPLETE CBC AUTOMATED: CPT | Performed by: FAMILY MEDICINE

## 2023-08-02 PROCEDURE — 20551 NJX 1 TENDON ORIGIN/INSJ: CPT | Mod: LT | Performed by: FAMILY MEDICINE

## 2023-08-02 PROCEDURE — 83036 HEMOGLOBIN GLYCOSYLATED A1C: CPT | Performed by: FAMILY MEDICINE

## 2023-08-02 PROCEDURE — 99214 OFFICE O/P EST MOD 30 MIN: CPT | Mod: 25 | Performed by: FAMILY MEDICINE

## 2023-08-02 RX ORDER — METHYLPHENIDATE HYDROCHLORIDE 10 MG/1
10 TABLET ORAL DAILY
Qty: 30 TABLET | Refills: 0 | Status: SHIPPED | OUTPATIENT
Start: 2023-08-02 | End: 2023-09-01

## 2023-08-02 RX ORDER — TRIAMCINOLONE ACETONIDE 40 MG/ML
40 INJECTION, SUSPENSION INTRA-ARTICULAR; INTRAMUSCULAR ONCE
Status: COMPLETED | OUTPATIENT
Start: 2023-08-02 | End: 2023-08-02

## 2023-08-02 RX ORDER — LISINOPRIL 20 MG/1
20 TABLET ORAL DAILY
Qty: 90 TABLET | Refills: 3 | Status: SHIPPED | OUTPATIENT
Start: 2023-08-02 | End: 2024-05-22

## 2023-08-02 RX ORDER — METHYLPHENIDATE HYDROCHLORIDE 20 MG/1
20 CAPSULE, EXTENDED RELEASE ORAL DAILY
Qty: 30 CAPSULE | Refills: 0 | Status: SHIPPED | OUTPATIENT
Start: 2023-09-02 | End: 2023-10-02

## 2023-08-02 RX ORDER — FLUOXETINE 40 MG/1
40 CAPSULE ORAL DAILY
Qty: 90 CAPSULE | Refills: 3 | Status: SHIPPED | OUTPATIENT
Start: 2023-08-02 | End: 2023-12-11

## 2023-08-02 RX ORDER — METHYLPHENIDATE HYDROCHLORIDE 10 MG/1
10 TABLET ORAL DAILY
Qty: 30 TABLET | Refills: 0 | Status: SHIPPED | OUTPATIENT
Start: 2023-10-03 | End: 2023-10-23

## 2023-08-02 RX ORDER — METHYLPHENIDATE HYDROCHLORIDE 20 MG/1
20 CAPSULE, EXTENDED RELEASE ORAL DAILY
Qty: 30 CAPSULE | Refills: 0 | Status: SHIPPED | OUTPATIENT
Start: 2023-08-02 | End: 2023-09-01

## 2023-08-02 RX ORDER — METHYLPHENIDATE HYDROCHLORIDE 20 MG/1
20 CAPSULE, EXTENDED RELEASE ORAL DAILY
Qty: 30 CAPSULE | Refills: 0 | Status: SHIPPED | OUTPATIENT
Start: 2023-10-03 | End: 2023-11-02

## 2023-08-02 RX ORDER — METHYLPHENIDATE HYDROCHLORIDE 10 MG/1
10 TABLET ORAL DAILY
Qty: 30 TABLET | Refills: 0 | Status: SHIPPED | OUTPATIENT
Start: 2023-09-02 | End: 2023-10-02

## 2023-08-02 RX ORDER — BUPIVACAINE HYDROCHLORIDE 5 MG/ML
0.5 INJECTION, SOLUTION PERINEURAL ONCE
Status: COMPLETED | OUTPATIENT
Start: 2023-08-02 | End: 2023-08-02

## 2023-08-02 RX ADMIN — BUPIVACAINE HYDROCHLORIDE 2.5 MG: 5 INJECTION, SOLUTION PERINEURAL at 15:51

## 2023-08-02 RX ADMIN — TRIAMCINOLONE ACETONIDE 20 MG: 40 INJECTION, SUSPENSION INTRA-ARTICULAR; INTRAMUSCULAR at 15:52

## 2023-08-02 ASSESSMENT — PAIN SCALES - GENERAL: PAINLEVEL: MODERATE PAIN (5)

## 2023-08-02 NOTE — PROGRESS NOTES
"S: Larry Bangura is a 38 year old male with DM2.  Fills and labs    Htn: stable, fills and labs  Morbid obesity :ongoing  Hyperlipidemia: statin, stable  Adhd: stable on short/long acting daily.  Updated problem list, due for fills  L elbow pain, suspect tennis elbow.  Lots of use at work, wants to try shot    Current Outpatient Medications   Medication    cyclobenzaprine (FLEXERIL) 10 MG tablet    FLUoxetine (PROZAC) 40 MG capsule    lisinopril (ZESTRIL) 20 MG tablet    metFORMIN (GLUCOPHAGE) 1000 MG tablet    methylphenidate (RITALIN LA) 20 MG 24 hr capsule    [START ON 9/2/2023] methylphenidate (RITALIN LA) 20 MG 24 hr capsule    [START ON 10/3/2023] methylphenidate (RITALIN LA) 20 MG 24 hr capsule    methylphenidate (RITALIN LA) 20 MG 24 hr capsule    methylphenidate (RITALIN LA) 20 MG 24 hr capsule    methylphenidate (RITALIN) 10 MG tablet    [START ON 9/2/2023] methylphenidate (RITALIN) 10 MG tablet    [START ON 10/3/2023] methylphenidate (RITALIN) 10 MG tablet    methylphenidate (RITALIN) 10 MG tablet    methylphenidate (RITALIN) 20 MG tablet    ORDER FOR DME, SET TO FAX,    traZODone (DESYREL) 50 MG tablet    albuterol (PROAIR HFA/PROVENTIL HFA/VENTOLIN HFA) 108 (90 Base) MCG/ACT inhaler    Multiple Vitamins-Minerals (CENTRUM MEN) TABS    naproxen (NAPROSYN) 500 MG tablet     No current facility-administered medications for this visit.       O:/88   Pulse 99   Temp 98.9  F (37.2  C) (Tympanic)   Resp 16   Ht 1.803 m (5' 11\")   Wt (!) 183.7 kg (405 lb)   SpO2 98%   BMI 56.49 kg/m  .  GEN: Alert and oriented, in no acute distress  L elbow tender over flexor insertion at elbow    With his permission, 1/2 ml marcaine with 1/2 ml kenalog over insertion point    A:  DM2.  Fills and labs  Htn: stable, fills and labs  Morbid obesity :ongoing  Hyperlipidemia: statin, stable  Adhd: stable on short/long acting daily.  Updated problem list, due for fills  L elbow pain, suspect tennis elbow.    P: fills on " meds for chronic issues as above in med list and orders.        Labs ordered as appropriate for monitoring the listed medical conditions.      See how shot does.    Weight management plan: diet/exercise    Back 3 months with virtual.

## 2023-08-03 LAB
ANION GAP SERPL CALCULATED.3IONS-SCNC: 17 MMOL/L (ref 7–15)
BUN SERPL-MCNC: 7.2 MG/DL (ref 6–20)
CALCIUM SERPL-MCNC: 9.7 MG/DL (ref 8.6–10)
CHLORIDE SERPL-SCNC: 97 MMOL/L (ref 98–107)
CREAT SERPL-MCNC: 0.73 MG/DL (ref 0.67–1.17)
DEPRECATED HCO3 PLAS-SCNC: 22 MMOL/L (ref 22–29)
GFR SERPL CREATININE-BSD FRML MDRD: >90 ML/MIN/1.73M2
GLUCOSE SERPL-MCNC: 328 MG/DL (ref 70–99)
POTASSIUM SERPL-SCNC: 4.3 MMOL/L (ref 3.4–5.3)
SODIUM SERPL-SCNC: 136 MMOL/L (ref 136–145)

## 2023-08-07 RX ORDER — GLIPIZIDE 10 MG/1
10 TABLET, FILM COATED, EXTENDED RELEASE ORAL DAILY
Qty: 90 TABLET | Refills: 3 | Status: SHIPPED | OUTPATIENT
Start: 2023-08-07 | End: 2023-12-07

## 2023-10-17 ENCOUNTER — TELEPHONE (OUTPATIENT)
Dept: FAMILY MEDICINE | Facility: CLINIC | Age: 38
End: 2023-10-17
Payer: COMMERCIAL

## 2023-10-17 NOTE — TELEPHONE ENCOUNTER
Medication Question or Refill    Contacts         Type Contact Phone/Fax    10/17/2023 02:40 PM CDT Phone (Incoming) Carlos Bangura (Self) [phone] (M)            What medication are you calling about (include dose and sig)?: Methylphenidate    Preferred Pharmacy:       [facility]  Controlled Substance Agreement on file:   CSA -- Patient Level:    CSA: None found at the patient level.       Who prescribed the medication?: Dr. Mcconnell    Do you need a refill? Yes    When did you use the medication last? Daioly    Patient offered an appointment? Yes:     Do you have any questions or concerns?  No, just needs 2/day instead of 1/day    Could we send this information to you in [facility] or would you prefer to receive a phone call?:   Patient would prefer a phone call   Okay to leave a detailed message?: Yes at Cell number on file:    Telephone Information:   Mobile [phone]

## 2023-10-17 NOTE — TELEPHONE ENCOUNTER
Needs an appointment to discuss changing the dose of this medication as it is a controlled substance

## 2023-10-23 ENCOUNTER — VIRTUAL VISIT (OUTPATIENT)
Dept: FAMILY MEDICINE | Facility: CLINIC | Age: 38
End: 2023-10-23
Payer: COMMERCIAL

## 2023-10-23 DIAGNOSIS — F90.2 ADHD (ATTENTION DEFICIT HYPERACTIVITY DISORDER), COMBINED TYPE: ICD-10-CM

## 2023-10-23 PROCEDURE — 99213 OFFICE O/P EST LOW 20 MIN: CPT | Mod: VID | Performed by: FAMILY MEDICINE

## 2023-10-23 RX ORDER — METHYLPHENIDATE HYDROCHLORIDE 20 MG/1
20 CAPSULE, EXTENDED RELEASE ORAL 2 TIMES DAILY
Qty: 60 CAPSULE | Refills: 0 | Status: SHIPPED | OUTPATIENT
Start: 2023-10-23 | End: 2023-12-11

## 2023-10-23 RX ORDER — METHYLPHENIDATE HYDROCHLORIDE 20 MG/1
20 CAPSULE, EXTENDED RELEASE ORAL 2 TIMES DAILY
Qty: 60 CAPSULE | Refills: 0 | Status: SHIPPED | OUTPATIENT
Start: 2023-11-23 | End: 2023-12-22

## 2023-10-23 NOTE — PROGRESS NOTES
"Carlos is a 38 year old who is being evaluated via a billable video visit.      How would you like to obtain your AVS? MyChart  If the video visit is dropped, the invitation should be resent by: Text to cell phone: [phone]  Will anyone else be joining your video visit? No              Subjective   Carlos is a 38 year old, presenting for the following health issues:  A.D.H.D (Pt would like to increase dosage to methylphenidate tablet. )        10/23/2023     4:06 PM   Additional Questions   Roomed by Rubia COLON MA   Accompanied by Self         10/23/2023     4:06 PM   Patient Reported Additional Medications   Patient reports taking the following new medications None       History of Present Illness       Reason for visit:  Adhd    He eats 2-3 servings of fruits and vegetables daily.He consumes 1 sweetened beverage(s) daily.He exercises with enough effort to increase his heart rate 9 or less minutes per day.  He exercises with enough effort to increase his heart rate 3 or less days per week.   He is taking medications regularly.       Medication Followup of methylphenidate 10mg tablet   Taking Medication as prescribed: yes  Side Effects:  None  Medication Helping Symptoms:  yes    Needs more afternoon coverage.  Ritalin 20mg LA runs out about noon.  Best options?                Objective    Vitals - Patient Reported  Weight (Patient Reported): 181.4 kg (400 lb)  Height (Patient Reported): 180.3 cm (5' 11\")  BMI (Based on Pt Reported Ht/Wt): 55.79  Pain Score: No Pain (0)        Physical Exam   GENERAL: Healthy, alert and no distress  EYES: Eyes grossly normal to inspection.  No discharge or erythema, or obvious scleral/conjunctival abnormalities.  RESP: No audible wheeze, cough, or visible cyanosis.  No visible retractions or increased work of breathing.    SKIN: Visible skin clear. No significant rash, abnormal pigmentation or lesions.  NEURO: Cranial nerves grossly intact.  Mentation and speech appropriate for " age.  PSYCH: Mentation appears normal, affect normal/bright, judgement and insight intact, normal speech and appearance well-groomed.      A: adhd, not controlled         P: try bid on the long acting ritalin 20mg.   2 mo supply given.  Follow-up in a couple months, virtual fine.          Video-Visit Details    Type of service:  Video Visit   Video Start Time:  430  Video End Time: 440    Originating Location (pt. Location): Home    Distant Location (provider location):  On-site  Platform used for Video Visit: Jenny

## 2023-11-02 ENCOUNTER — TELEPHONE (OUTPATIENT)
Dept: FAMILY MEDICINE | Facility: CLINIC | Age: 38
End: 2023-11-02
Payer: COMMERCIAL

## 2023-11-02 NOTE — TELEPHONE ENCOUNTER
Prior Authorization Retail Medication Request    Medication/Dose: Methylphenidate 20MG 24hr cap  ICD code (if different than what is on RX):    Previously Tried and Failed:    Rationale:      Insurance Name:      Insurance ID:        Pharmacy Information (if different than what is on RX)  Name:    Phone:        Patient called and due to insurance, patient is needing PA for generic med as insurance only covers name brand. Pharmacy is out of name brand

## 2023-11-06 NOTE — TELEPHONE ENCOUNTER
PA Initiation    Medication: METHYLPHENIDATE HCL 20 MG OR CP24  Insurance Company: [employer] - Phone [phone] Fax [phone]  Pharmacy Filling the Rx: [id number] - [address] [place] AT [facility] & [address]  Filling Pharmacy Phone: [phone]  Filling Pharmacy Fax: [phone]  Start Date: 11/6/2023

## 2023-11-06 NOTE — TELEPHONE ENCOUNTER
Prior Authorization Approval    Medication: METHYLPHENIDATE HCL 20 MG OR CP24  Authorization Effective Date: 8/8/2023  Authorization Expiration Date: 11/6/2024  Approved Dose/Quantity:   Reference #:     Insurance Company: [employer] - Phone [phone] Fax [phone]  Expected CoPay: $    CoPay Card Available:      Financial Assistance Needed:   Which Pharmacy is filling the prescription: [id number] - [place], [place] - [address] [place] [address] AT [facility] & [address]  Pharmacy Notified: YES  Patient Notified: **Instructed pharmacy to notify patient when script is ready to /ship.**

## 2023-11-26 ENCOUNTER — HEALTH MAINTENANCE LETTER (OUTPATIENT)
Age: 38
End: 2023-11-26

## 2023-12-07 ENCOUNTER — TELEPHONE (OUTPATIENT)
Dept: FAMILY MEDICINE | Facility: CLINIC | Age: 38
End: 2023-12-07

## 2023-12-07 ENCOUNTER — OFFICE VISIT (OUTPATIENT)
Dept: FAMILY MEDICINE | Facility: CLINIC | Age: 38
End: 2023-12-07
Payer: COMMERCIAL

## 2023-12-07 VITALS
WEIGHT: 315 LBS | HEART RATE: 85 BPM | RESPIRATION RATE: 20 BRPM | TEMPERATURE: 98.5 F | BODY MASS INDEX: 44.1 KG/M2 | HEIGHT: 71 IN | SYSTOLIC BLOOD PRESSURE: 150 MMHG | DIASTOLIC BLOOD PRESSURE: 78 MMHG | OXYGEN SATURATION: 96 %

## 2023-12-07 DIAGNOSIS — E11.65 TYPE 2 DIABETES MELLITUS WITH HYPERGLYCEMIA, WITHOUT LONG-TERM CURRENT USE OF INSULIN (H): ICD-10-CM

## 2023-12-07 DIAGNOSIS — E66.01 CLASS 3 SEVERE OBESITY DUE TO EXCESS CALORIES WITH SERIOUS COMORBIDITY AND BODY MASS INDEX (BMI) OF 50.0 TO 59.9 IN ADULT (H): ICD-10-CM

## 2023-12-07 DIAGNOSIS — M25.561 ACUTE PAIN OF RIGHT KNEE: Primary | ICD-10-CM

## 2023-12-07 DIAGNOSIS — I10 ESSENTIAL HYPERTENSION WITH GOAL BLOOD PRESSURE LESS THAN 140/90: Chronic | ICD-10-CM

## 2023-12-07 DIAGNOSIS — E66.813 CLASS 3 SEVERE OBESITY DUE TO EXCESS CALORIES WITH SERIOUS COMORBIDITY AND BODY MASS INDEX (BMI) OF 50.0 TO 59.9 IN ADULT (H): ICD-10-CM

## 2023-12-07 LAB — HBA1C MFR BLD: 10.4 % (ref 0–5.6)

## 2023-12-07 PROCEDURE — 83036 HEMOGLOBIN GLYCOSYLATED A1C: CPT | Performed by: PHYSICIAN ASSISTANT

## 2023-12-07 PROCEDURE — 99214 OFFICE O/P EST MOD 30 MIN: CPT | Mod: 25 | Performed by: PHYSICIAN ASSISTANT

## 2023-12-07 PROCEDURE — 36415 COLL VENOUS BLD VENIPUNCTURE: CPT | Performed by: PHYSICIAN ASSISTANT

## 2023-12-07 PROCEDURE — 90715 TDAP VACCINE 7 YRS/> IM: CPT | Performed by: PHYSICIAN ASSISTANT

## 2023-12-07 PROCEDURE — 90471 IMMUNIZATION ADMIN: CPT | Performed by: PHYSICIAN ASSISTANT

## 2023-12-07 RX ORDER — TIRZEPATIDE 5 MG/.5ML
5 INJECTION, SOLUTION SUBCUTANEOUS
Qty: 2 ML | Refills: 0 | Status: SHIPPED | OUTPATIENT
Start: 2024-01-04 | End: 2024-02-01

## 2023-12-07 RX ORDER — TIRZEPATIDE 2.5 MG/.5ML
2.5 INJECTION, SOLUTION SUBCUTANEOUS
Qty: 2 ML | Refills: 0 | Status: SHIPPED | OUTPATIENT
Start: 2023-12-07 | End: 2024-01-04

## 2023-12-07 RX ORDER — TIRZEPATIDE 7.5 MG/.5ML
7.5 INJECTION, SOLUTION SUBCUTANEOUS
Qty: 6 ML | Refills: 3 | Status: SHIPPED | OUTPATIENT
Start: 2024-02-01 | End: 2024-04-01

## 2023-12-07 ASSESSMENT — PAIN SCALES - GENERAL: PAINLEVEL: EXTREME PAIN (8)

## 2023-12-07 NOTE — TELEPHONE ENCOUNTER
Prior Authorization Retail Medication Request    Medication/Dose: Mounjaro 2.5MG / 0.5ML   Diagnosis and ICD code (if different than what is on RX):    New/renewal/insurance change PA/secondary ins. PA:  Previously Tried and Failed:    Rationale:      Insurance   Primary:   Insurance ID:      Secondary (if applicable):  Insurance ID:      Pharmacy Information (if different than what is on RX)  Name:  [facility]  Phone:  [phone]  Fax:[phone]

## 2023-12-07 NOTE — PROGRESS NOTES
Assessment & Plan   Acute pain of right knee  S/p fall onto knee yesterday. Somewhat of a twisting mechanism. Able to ambulate, and pain actually improves with ambulation. Do not suspect an internal derangement of the knee today. Likely knee effusion and hematoma. Pt will monitor symptoms and treat conservatively. TDAP updated due to fall around multiple metal pipes and abrasions on knee.   - TDAP 10-64Y (ADACEL,BOOSTRIX)    Type 2 diabetes mellitus with hyperglycemia, without long-term current use of insulin (H)  Uncontrolled with A1c of 10.4% today. Did not start Glipizide, did not even know why it was prescribed. Counseled today on uncontrolled diabetes. Shared decision making discussed. Pt made the informed decision to start Mounjaro if affordable and available. Would significantly benefit from weight loss and patient agrees. Follow-up with me or PCP in 3 months for recheck.   - Hemoglobin A1c; Future  - tirzepatide (MOUNJARO) 2.5 MG/0.5ML pen; Inject 2.5 mg Subcutaneous every 7 days for 28 days  - tirzepatide (MOUNJARO) 5 MG/0.5ML pen; Inject 5 mg Subcutaneous every 7 days for 28 days  - tirzepatide (MOUNJARO) 7.5 MG/0.5ML pen; Inject 7.5 mg Subcutaneous every 7 days  - Hemoglobin A1c    Essential hypertension with goal blood pressure less than 140/90  Elevated here. Pt in pain. Will recheck at follow-up appointment in 1 week with PCP.     Class 3 severe obesity due to excess calories with serious comorbidity and body mass index (BMI) of 50.0 to 59.9 in adult (H)  Body mass index is 55.29 kg/m .. Associated with DM2, HTN, chronic pain which would improve with weight loss. Encouraged healthy lifestyle changes.     LOUISE [facility]    Devika Gonzalez is a 38 year old, presenting for the following health issues:  Musculoskeletal Problem        12/7/2023     9:18 AM   Additional Questions   Roomed by Kamille LUKE CMA   Accompanied by Self       HPI   Concern - Knee  "injury/pain   Onset: 1 day   Description: Patient states that he fell and injured knee  Intensity: severe, 8/10. Can bare weight.   Progression of Symptoms:  same  Accompanying Signs & Symptoms: Pain and scrapped knee   Previous history of similar problem: na   Precipitating factors:        Worsened by: Certain movement. Hurts when we bends his knee or stands up.   Alleviating factors:        Improved by: na   Therapies tried and outcome:  none . Used cannabis at night.     Did not know that his diabetes was not controlled.   Did not start glipizide. Did not know why it was prescribed.     Review of Systems   See HPI       Objective    BP (!) 150/78 (BP Location: Right arm, Patient Position: Sitting, Cuff Size: Adult Large)   Pulse 85   Temp 98.5  F (36.9  C) (Tympanic)   Resp 20   Ht 1.803 m (5' 11\")   Wt (!) 179.8 kg (396 lb 6.4 oz)   SpO2 96%   BMI 55.29 kg/m    Body mass index is 55.29 kg/m .  Physical Exam   GENERAL: healthy, alert and no distress  MS: no gross musculoskeletal defects noted, no edema  MS: extremities normal- no gross deformities noted. Passive and Active ROM intact.   RIGHT KNEE: Superficial abrasions over the R anterior and lateral knee. No laxity of patella, or crepitus. Tenderness to palpation over the anterior and lateral aspects of the knee. Ambulates with cane.   SKIN: scabbing over the lateral and anterior aspects of knee.   NEURO: Normal strength and tone, mentation intact and speech normal  PSYCH: mentation appears normal, affect normal/bright    Physician Attestation   I, Mata Gonzalez PA-C, was present with the medical/[employer] student who participated in the service and in the documentation of the note.  I have verified the history and personally performed the physical exam and medical decision making.  I agree with the assessment and plan of care as documented in the note.      Mata Gonzalez PA-C              "

## 2023-12-07 NOTE — PATIENT INSTRUCTIONS
Normal conservative measures for your knee. Start Ibuprofen and Tylenol. Use Voltaren gel as well. Rest as needed.     Hopefully Mounjaro is covered. This is one of the best medicines to help control your diabetes and help you lose weight.     Follow-up in 3 months for diabetes recheck.

## 2023-12-11 ENCOUNTER — VIRTUAL VISIT (OUTPATIENT)
Dept: FAMILY MEDICINE | Facility: CLINIC | Age: 38
End: 2023-12-11
Payer: COMMERCIAL

## 2023-12-11 DIAGNOSIS — F41.9 ANXIETY: ICD-10-CM

## 2023-12-11 DIAGNOSIS — F90.2 ADHD (ATTENTION DEFICIT HYPERACTIVITY DISORDER), COMBINED TYPE: Primary | ICD-10-CM

## 2023-12-11 PROCEDURE — 99442 PR PHYSICIAN TELEPHONE EVALUATION 11-20 MIN: CPT | Mod: 93 | Performed by: FAMILY MEDICINE

## 2023-12-11 RX ORDER — METHYLPHENIDATE HYDROCHLORIDE 20 MG/1
20 CAPSULE, EXTENDED RELEASE ORAL 2 TIMES DAILY
Qty: 60 CAPSULE | Refills: 0 | Status: SHIPPED | OUTPATIENT
Start: 2024-02-11 | End: 2023-12-21

## 2023-12-11 RX ORDER — METHYLPHENIDATE HYDROCHLORIDE 20 MG/1
20 CAPSULE, EXTENDED RELEASE ORAL 2 TIMES DAILY
Qty: 60 CAPSULE | Refills: 0 | Status: SHIPPED | OUTPATIENT
Start: 2023-12-11 | End: 2024-03-25

## 2023-12-11 RX ORDER — METHYLPHENIDATE HYDROCHLORIDE 20 MG/1
20 CAPSULE, EXTENDED RELEASE ORAL 2 TIMES DAILY
Qty: 60 CAPSULE | Refills: 0 | Status: SHIPPED | OUTPATIENT
Start: 2024-01-11 | End: 2024-02-05

## 2023-12-11 NOTE — PROGRESS NOTES
Carlos is a 38 year old who is being evaluated via a billable phone visit.      How would you like to obtain your AVS? MyChart  If the video visit is dropped, the invitation should be resent by: Text to cell phone: [phone]  Will anyone else be joining your video visit? No          Subjective   Carlos is a 38 year old, presenting for the following health issues:  A.D.H.D        12/11/2023     4:14 PM   Additional Questions   Roomed by lexi BRADLEY   Accompanied by self       HPI     Medication Followup of ADHD   Taking Medication as prescribed: yes   Side Effects:  fees like he needs an increase, dose is not as effective   Medication Helping Symptoms:  NO      Wondering if he needs prozac anymore.  Feels like anxiety was mostly related to poor concentration, would llke to try tapering off.          Objective           Vitals:  No vitals were obtained today due to virtual visit.    Physical Exam   Normal conversation  A: adhd, stable      Anxeity, wants to try tapering off prozac.          Down to 20mg on prozac.  Continue adhd meds for 3 months. Follow-up in 3 months.     Phone visit 12 min

## 2023-12-12 NOTE — TELEPHONE ENCOUNTER
Prior Authorization Approval    Medication: MOUNJARO 2.5 MG/0.5ML SC SOPN  Authorization Effective Date: 9/13/2023  Authorization Expiration Date: 12/12/2024  Approved Dose/Quantity: 2/28  Reference #: [id number]   Insurance Company: [employer] - Phone [phone] Fax [phone]  Expected CoPay: $    CoPay Card Available:      Financial Assistance Needed:   Which Pharmacy is filling the prescription: [facility][id number] - [address] AT [facility] & [address]  Pharmacy Notified: Yes  Patient Notified: Yes

## 2023-12-12 NOTE — TELEPHONE ENCOUNTER
PA Initiation    Medication: MOUNJARO 2.5 MG/0.5ML SC SOPN  Insurance Company: [employer] - Phone [phone] Fax [phone]  Pharmacy Filling the Rx: [id number] - [place], [place] - [address] [place] [address] [address] AT [facility] & [address]  Filling Pharmacy Phone: [phone]  Filling Pharmacy Fax:    Start Date: 12/12/2023

## 2023-12-19 DIAGNOSIS — F90.2 ADHD (ATTENTION DEFICIT HYPERACTIVITY DISORDER), COMBINED TYPE: ICD-10-CM

## 2023-12-19 RX ORDER — METHYLPHENIDATE HYDROCHLORIDE 20 MG/1
20 CAPSULE, EXTENDED RELEASE ORAL 2 TIMES DAILY
Qty: 60 CAPSULE | Refills: 0 | OUTPATIENT
Start: 2024-02-11

## 2023-12-19 NOTE — TELEPHONE ENCOUNTER
Patient asking RX to be transferred to new pharmacy as [facility] does not have medication in stock.     Please call patient to notify that RX was sent to [facility] pharmacy in [place]

## 2023-12-21 RX ORDER — METHYLPHENIDATE HYDROCHLORIDE 20 MG/1
20 CAPSULE, EXTENDED RELEASE ORAL 2 TIMES DAILY
Qty: 60 CAPSULE | Refills: 0 | Status: SHIPPED | OUTPATIENT
Start: 2024-02-11 | End: 2024-04-26

## 2023-12-21 NOTE — TELEPHONE ENCOUNTER
Patient called again. Medication was not able to be filled at [facility]. Asking for mediation to be sent to [facility] in [place]

## 2023-12-21 NOTE — TELEPHONE ENCOUNTER
Routing refill request to provider for review/approval because:  Drug not on the [facility] refill protocol   Prior pharmacy does not have any methyolphenidate LA 20 mg in stock.  Last Written Prescription Date:  11/22/23  Last Fill Quantity: 60,  # refills: 0   Last office visit: 12/7/2023 ; last virtual visit: 12/11/2023 with prescribing provider:     Future Office Visit:      Julie Behrendt RN

## 2023-12-22 ENCOUNTER — TELEPHONE (OUTPATIENT)
Dept: FAMILY MEDICINE | Facility: CLINIC | Age: 38
End: 2023-12-22
Payer: COMMERCIAL

## 2023-12-22 DIAGNOSIS — F90.2 ADHD (ATTENTION DEFICIT HYPERACTIVITY DISORDER), COMBINED TYPE: ICD-10-CM

## 2023-12-22 RX ORDER — METHYLPHENIDATE HYDROCHLORIDE 20 MG/1
20 CAPSULE, EXTENDED RELEASE ORAL 2 TIMES DAILY
Qty: 60 CAPSULE | Refills: 0 | Status: SHIPPED | OUTPATIENT
Start: 2023-12-22 | End: 2024-06-03

## 2023-12-22 NOTE — TELEPHONE ENCOUNTER
Pharmacy is out of stock for methylphenidate, patient is going out of town [facility] and needing refill of his methylphenidate LA 20 mg tabs sent to [facility] in [place].    RX pended and message routed to covering provider for consideration.     Routing refill request to provider for review/approval because:  Drug not on the [facility] refill protocol   Prior pharmacy does not have any methyolphenidate LA 20 mg in stock.  Last Written Prescription Date:  11/22/23  Last Fill Quantity: 60,  # refills: 0   Last office visit: 12/7/2023 ; last virtual visit: 12/11/2023 with prescribing provider:     Future Office Visit:      Please notify patient when this has been handled.     Julie Behrendt RN

## 2024-01-24 ENCOUNTER — TELEPHONE (OUTPATIENT)
Dept: FAMILY MEDICINE | Facility: CLINIC | Age: 39
End: 2024-01-24

## 2024-01-24 DIAGNOSIS — F90.2 ADHD (ATTENTION DEFICIT HYPERACTIVITY DISORDER), COMBINED TYPE: Primary | ICD-10-CM

## 2024-01-24 RX ORDER — DEXTROAMPHETAMINE SACCHARATE, AMPHETAMINE ASPARTATE MONOHYDRATE, DEXTROAMPHETAMINE SULFATE AND AMPHETAMINE SULFATE 7.5; 7.5; 7.5; 7.5 MG/1; MG/1; MG/1; MG/1
30 CAPSULE, EXTENDED RELEASE ORAL DAILY
Qty: 30 CAPSULE | Refills: 0 | Status: SHIPPED | OUTPATIENT
Start: 2024-01-24 | End: 2024-02-23

## 2024-01-24 RX ORDER — DEXTROAMPHETAMINE SACCHARATE, AMPHETAMINE ASPARTATE MONOHYDRATE, DEXTROAMPHETAMINE SULFATE AND AMPHETAMINE SULFATE 7.5; 7.5; 7.5; 7.5 MG/1; MG/1; MG/1; MG/1
30 CAPSULE, EXTENDED RELEASE ORAL DAILY
Qty: 30 CAPSULE | Refills: 0 | Status: SHIPPED | OUTPATIENT
Start: 2024-02-24 | End: 2024-03-25

## 2024-01-24 RX ORDER — DEXTROAMPHETAMINE SACCHARATE, AMPHETAMINE ASPARTATE MONOHYDRATE, DEXTROAMPHETAMINE SULFATE AND AMPHETAMINE SULFATE 7.5; 7.5; 7.5; 7.5 MG/1; MG/1; MG/1; MG/1
30 CAPSULE, EXTENDED RELEASE ORAL DAILY
Qty: 30 CAPSULE | Refills: 0 | Status: SHIPPED | OUTPATIENT
Start: 2024-03-26 | End: 2024-04-25

## 2024-01-24 NOTE — TELEPHONE ENCOUNTER
General Call - Med Issue    Contacts         Type Contact Phone/Fax    01/24/2024 12:49 PM CST Phone (Incoming) Carlos Bangura (Self) [phone] (H)          Reason for Call:  Rx Issue     What are your questions or concerns:  Pt states that no pharmacies are able to get his Ritalin in stock, due to national back order.  Pt would like med changed to Adderall and new Rx sent to [facility].  Please call patient and advise.      Date of last appointment with provider: 12/11/23    Could we send this information to you in [employer] or would you prefer to receive a phone call?:   Patient would prefer a phone call   Okay to leave a detailed message?: Yes at Cell number on file:    Telephone Information:   Mobile [phone]

## 2024-02-05 ENCOUNTER — TELEPHONE (OUTPATIENT)
Dept: FAMILY MEDICINE | Facility: CLINIC | Age: 39
End: 2024-02-05

## 2024-02-05 DIAGNOSIS — F90.2 ADHD (ATTENTION DEFICIT HYPERACTIVITY DISORDER), COMBINED TYPE: ICD-10-CM

## 2024-02-05 RX ORDER — METHYLPHENIDATE HYDROCHLORIDE 20 MG/1
20 CAPSULE, EXTENDED RELEASE ORAL 2 TIMES DAILY
Qty: 60 CAPSULE | Refills: 0 | Status: SHIPPED | OUTPATIENT
Start: 2024-02-05 | End: 2024-05-22

## 2024-02-05 NOTE — TELEPHONE ENCOUNTER
Transfer Medication Request        What medication are you requesting?: Pt is requesting the Generic  Ritalin to go to [facility] in [place]. Per Pt [facility] does not have the Medication .     Controlled Substance Agreement on file:   CSA -- Patient Level:    CSA: None found at the patient level.         Preferred Pharmacy:     [facility]      Could we send this information to you in [facility] or would you prefer to receive a phone call?:   Patient would prefer a phone call   Okay to leave a detailed message?: Yes at Home number on file [phone] (home)    [facility]

## 2024-03-18 ENCOUNTER — TELEPHONE (OUTPATIENT)
Dept: FAMILY MEDICINE | Facility: CLINIC | Age: 39
End: 2024-03-18

## 2024-03-18 DIAGNOSIS — E66.01 MORBID OBESITY (H): ICD-10-CM

## 2024-03-18 RX ORDER — ONDANSETRON 4 MG/1
4 TABLET, ORALLY DISINTEGRATING ORAL EVERY 8 HOURS PRN
Qty: 15 TABLET | Refills: 0 | Status: SHIPPED | OUTPATIENT
Start: 2024-03-18 | End: 2024-04-01

## 2024-03-18 NOTE — TELEPHONE ENCOUNTER
Symptoms    Describe your symptoms: Pt said he is getting symptoms after his Friday Injection of his Mounjaro 7.5 mg  Pt said he gets nauseated, vomiting, Stomach cramps, diarrhea very fatigued.  Normally the symptoms start Sunday afternoon until Tues.  Pt is wondering what he can do for these symptoms post giving him self Mounjaro injection.    [facility]     Could we send this information to you in [employer] or would you prefer to receive a phone call?:   Patient would prefer a phone call   Okay to leave a detailed message?: Yes at Home number on file [phone] (home)    [facility]

## 2024-03-18 NOTE — TELEPHONE ENCOUNTER
Recommend continuing his dose when he is due next. Symptoms should improve. Could consider Zofran which would be fine.     If symptoms happen again with next injection, then we should decrease down to 5 mg dose.     Mata Gonzalez PA-C

## 2024-03-20 ENCOUNTER — TRANSFERRED RECORDS (OUTPATIENT)
Dept: HEALTH INFORMATION MANAGEMENT | Facility: CLINIC | Age: 39
End: 2024-03-20

## 2024-03-20 LAB
RETINOPATHY: NEGATIVE
RETINOPATHY: NEGATIVE

## 2024-03-25 DIAGNOSIS — F90.2 ADHD (ATTENTION DEFICIT HYPERACTIVITY DISORDER), COMBINED TYPE: ICD-10-CM

## 2024-03-25 RX ORDER — METHYLPHENIDATE HYDROCHLORIDE 20 MG/1
20 CAPSULE, EXTENDED RELEASE ORAL 2 TIMES DAILY
Qty: 60 CAPSULE | Refills: 0 | Status: SHIPPED | OUTPATIENT
Start: 2024-03-25 | End: 2024-05-22

## 2024-03-27 ENCOUNTER — TELEPHONE (OUTPATIENT)
Dept: FAMILY MEDICINE | Facility: CLINIC | Age: 39
End: 2024-03-27

## 2024-03-27 NOTE — TELEPHONE ENCOUNTER
Patient Quality Outreach    Patient is due for the following:   Diabetes -  Eye Exam    Next Steps:   Chart routed to abstraction.      Type of outreach:    Chart review performed, no outreach needed.      Questions for provider review:    None           Lynn Hurd MA

## 2024-04-01 DIAGNOSIS — E11.65 TYPE 2 DIABETES MELLITUS WITH HYPERGLYCEMIA, WITHOUT LONG-TERM CURRENT USE OF INSULIN (H): ICD-10-CM

## 2024-04-01 DIAGNOSIS — E66.01 MORBID OBESITY (H): ICD-10-CM

## 2024-04-01 RX ORDER — ONDANSETRON 4 MG/1
4 TABLET, ORALLY DISINTEGRATING ORAL EVERY 8 HOURS PRN
Qty: 20 TABLET | Refills: 1 | Status: SHIPPED | OUTPATIENT
Start: 2024-04-01 | End: 2024-05-22

## 2024-04-01 RX ORDER — TIRZEPATIDE 7.5 MG/.5ML
7.5 INJECTION, SOLUTION SUBCUTANEOUS
Qty: 6 ML | Refills: 0 | Status: SHIPPED | OUTPATIENT
Start: 2024-04-01 | End: 2024-09-09 | Stop reason: DRUGHIGH

## 2024-04-01 NOTE — TELEPHONE ENCOUNTER
General Call    Contacts         Type Contact Phone/Fax    04/01/2024 01:14 PM CDT Phone (Incoming) Carlos Bangura (Self) [phone] (H)          Reason for Call:  Pt is asking for 30 tablets of his Ondansetron.  Pt said he takes it every 8 hrs on Sat  then Sunday it is 6 hrs.   Pt takes this post Mounjaro as he takes this on  Friday evenings.  Pt is also asking for some advise on sleeping issue he is having with this Medication.   Provider Mata Gonzalez PA-C    Date of last appointment with provider: 12/7/23    Could we send this information to you in [facility] or would you prefer to receive a phone call?:   Patient would prefer a phone call   Okay to leave a detailed message?: Yes at Home number on file [phone] (home)    [facility]

## 2024-04-14 ENCOUNTER — HEALTH MAINTENANCE LETTER (OUTPATIENT)
Age: 39
End: 2024-04-14

## 2024-04-26 DIAGNOSIS — F90.2 ADHD (ATTENTION DEFICIT HYPERACTIVITY DISORDER), COMBINED TYPE: ICD-10-CM

## 2024-04-26 NOTE — TELEPHONE ENCOUNTER
Pending Prescriptions:                       Disp   Refills    methylphenidate (RITALIN LA) 20 MG 24 hr c*60 cap*0        Sig: Take 1 capsule (20 mg) by mouth 2 times daily    Routing refill request to provider for review/approval because:  Drug not on the [facility] refill protocol     Henny Stewart RN  [facility]

## 2024-04-26 NOTE — TELEPHONE ENCOUNTER
Pending Prescriptions:                       Disp   Refills    methylphenidate (RITALIN LA) 20 MG 24 hr *60 cap*0            Sig: Take 1 capsule (20 mg) by mouth 2 times daily    Eliza Elizabeth on 4/26/2024 at 2:16 PM

## 2024-04-29 RX ORDER — METHYLPHENIDATE HYDROCHLORIDE 20 MG/1
20 CAPSULE, EXTENDED RELEASE ORAL 2 TIMES DAILY
Qty: 60 CAPSULE | Refills: 0 | Status: SHIPPED | OUTPATIENT
Start: 2024-04-29 | End: 2024-05-22

## 2024-05-01 NOTE — NURSING NOTE
"Chief Complaint   Patient presents with     RECHECK     Follow-up Weight Management       Vitals:    12/08/21 0807   Weight: (!) 164.2 kg (362 lb)   Height: 1.803 m (5' 11\")       Body mass index is 50.49 kg/m .                     "
HCP form provided to pt/No

## 2024-05-22 ENCOUNTER — OFFICE VISIT (OUTPATIENT)
Dept: FAMILY MEDICINE | Facility: CLINIC | Age: 39
End: 2024-05-22
Payer: COMMERCIAL

## 2024-05-22 VITALS
HEART RATE: 86 BPM | HEIGHT: 70 IN | OXYGEN SATURATION: 100 % | WEIGHT: 315 LBS | BODY MASS INDEX: 45.1 KG/M2 | DIASTOLIC BLOOD PRESSURE: 70 MMHG | RESPIRATION RATE: 18 BRPM | TEMPERATURE: 98.1 F | SYSTOLIC BLOOD PRESSURE: 150 MMHG

## 2024-05-22 DIAGNOSIS — M54.50 LOW BACK PAIN, UNSPECIFIED BACK PAIN LATERALITY, UNSPECIFIED CHRONICITY, UNSPECIFIED WHETHER SCIATICA PRESENT: ICD-10-CM

## 2024-05-22 DIAGNOSIS — G47.00 INSOMNIA, UNSPECIFIED TYPE: ICD-10-CM

## 2024-05-22 DIAGNOSIS — F90.2 ADHD (ATTENTION DEFICIT HYPERACTIVITY DISORDER), COMBINED TYPE: ICD-10-CM

## 2024-05-22 DIAGNOSIS — E11.65 TYPE 2 DIABETES MELLITUS WITH HYPERGLYCEMIA, WITHOUT LONG-TERM CURRENT USE OF INSULIN (H): Primary | ICD-10-CM

## 2024-05-22 DIAGNOSIS — E66.01 MORBID OBESITY (H): ICD-10-CM

## 2024-05-22 DIAGNOSIS — Z98.52 STATUS POST VASECTOMY: ICD-10-CM

## 2024-05-22 DIAGNOSIS — Z83.2 FAMILY HISTORY OF ITP: ICD-10-CM

## 2024-05-22 DIAGNOSIS — A60.01 HERPES SIMPLEX INFECTION OF PENIS: ICD-10-CM

## 2024-05-22 DIAGNOSIS — I10 ESSENTIAL HYPERTENSION WITH GOAL BLOOD PRESSURE LESS THAN 140/90: ICD-10-CM

## 2024-05-22 LAB
ALBUMIN SERPL BCG-MCNC: 4.4 G/DL (ref 3.5–5.2)
ALP SERPL-CCNC: 70 U/L (ref 40–150)
ALT SERPL W P-5'-P-CCNC: 32 U/L (ref 0–70)
ANION GAP SERPL CALCULATED.3IONS-SCNC: 16 MMOL/L (ref 7–15)
AST SERPL W P-5'-P-CCNC: 30 U/L (ref 0–45)
BILIRUB SERPL-MCNC: 0.3 MG/DL
BUN SERPL-MCNC: 10.4 MG/DL (ref 6–20)
CALCIUM SERPL-MCNC: 9.9 MG/DL (ref 8.6–10)
CHLORIDE SERPL-SCNC: 104 MMOL/L (ref 98–107)
CHOLEST SERPL-MCNC: 161 MG/DL
CREAT SERPL-MCNC: 0.87 MG/DL (ref 0.67–1.17)
CREAT UR-MCNC: 277 MG/DL
CREAT UR-MCNC: 277 MG/DL
DEPRECATED HCO3 PLAS-SCNC: 23 MMOL/L (ref 22–29)
EGFRCR SERPLBLD CKD-EPI 2021: >90 ML/MIN/1.73M2
ERYTHROCYTE [DISTWIDTH] IN BLOOD BY AUTOMATED COUNT: 14.1 % (ref 10–15)
FASTING STATUS PATIENT QL REPORTED: YES
FASTING STATUS PATIENT QL REPORTED: YES
GLUCOSE SERPL-MCNC: 129 MG/DL (ref 70–99)
HBA1C MFR BLD: 6.1 % (ref 0–5.6)
HCT VFR BLD AUTO: 44.3 % (ref 40–53)
HDLC SERPL-MCNC: 41 MG/DL
HGB BLD-MCNC: 14 G/DL (ref 13.3–17.7)
LDLC SERPL CALC-MCNC: 102 MG/DL
MCH RBC QN AUTO: 27.2 PG (ref 26.5–33)
MCHC RBC AUTO-ENTMCNC: 31.6 G/DL (ref 31.5–36.5)
MCV RBC AUTO: 86 FL (ref 78–100)
MICROALBUMIN UR-MCNC: 51.3 MG/L
MICROALBUMIN/CREAT UR: 18.52 MG/G CR (ref 0–17)
NONHDLC SERPL-MCNC: 120 MG/DL
PLATELET # BLD AUTO: 408 10E3/UL (ref 150–450)
POTASSIUM SERPL-SCNC: 4.2 MMOL/L (ref 3.4–5.3)
PROT SERPL-MCNC: 7.6 G/DL (ref 6.4–8.3)
RBC # BLD AUTO: 5.15 10E6/UL (ref 4.4–5.9)
SODIUM SERPL-SCNC: 143 MMOL/L (ref 135–145)
TRIGL SERPL-MCNC: 90 MG/DL
WBC # BLD AUTO: 9.5 10E3/UL (ref 4–11)

## 2024-05-22 PROCEDURE — 99215 OFFICE O/P EST HI 40 MIN: CPT | Mod: 25 | Performed by: PHYSICIAN ASSISTANT

## 2024-05-22 PROCEDURE — 90471 IMMUNIZATION ADMIN: CPT | Performed by: PHYSICIAN ASSISTANT

## 2024-05-22 PROCEDURE — 83036 HEMOGLOBIN GLYCOSYLATED A1C: CPT | Performed by: PHYSICIAN ASSISTANT

## 2024-05-22 PROCEDURE — 80053 COMPREHEN METABOLIC PANEL: CPT | Performed by: PHYSICIAN ASSISTANT

## 2024-05-22 PROCEDURE — G0481 DRUG TEST DEF 8-14 CLASSES: HCPCS | Performed by: PHYSICIAN ASSISTANT

## 2024-05-22 PROCEDURE — 85027 COMPLETE CBC AUTOMATED: CPT | Performed by: PHYSICIAN ASSISTANT

## 2024-05-22 PROCEDURE — 90677 PCV20 VACCINE IM: CPT | Performed by: PHYSICIAN ASSISTANT

## 2024-05-22 PROCEDURE — 82043 UR ALBUMIN QUANTITATIVE: CPT | Performed by: PHYSICIAN ASSISTANT

## 2024-05-22 PROCEDURE — 82570 ASSAY OF URINE CREATININE: CPT | Performed by: PHYSICIAN ASSISTANT

## 2024-05-22 PROCEDURE — 80061 LIPID PANEL: CPT | Performed by: PHYSICIAN ASSISTANT

## 2024-05-22 PROCEDURE — 36415 COLL VENOUS BLD VENIPUNCTURE: CPT | Performed by: PHYSICIAN ASSISTANT

## 2024-05-22 RX ORDER — METHYLPHENIDATE HYDROCHLORIDE 20 MG/1
20 CAPSULE, EXTENDED RELEASE ORAL 2 TIMES DAILY
Qty: 60 CAPSULE | Refills: 0 | Status: CANCELLED | OUTPATIENT
Start: 2024-05-22

## 2024-05-22 RX ORDER — METHYLPHENIDATE HYDROCHLORIDE 20 MG/1
20 TABLET ORAL DAILY
Qty: 30 TABLET | Refills: 0 | Status: SHIPPED | OUTPATIENT
Start: 2024-06-21 | End: 2024-07-08

## 2024-05-22 RX ORDER — METHYLPHENIDATE HYDROCHLORIDE 20 MG/1
20 CAPSULE, EXTENDED RELEASE ORAL 2 TIMES DAILY
Qty: 60 CAPSULE | Refills: 0 | Status: SHIPPED | OUTPATIENT
Start: 2024-05-22 | End: 2024-06-21

## 2024-05-22 RX ORDER — ACYCLOVIR 400 MG/1
400 TABLET ORAL EVERY 12 HOURS
Qty: 180 TABLET | Refills: 3 | Status: SHIPPED | OUTPATIENT
Start: 2024-05-22

## 2024-05-22 RX ORDER — CYCLOBENZAPRINE HCL 10 MG
10 TABLET ORAL 3 TIMES DAILY PRN
Qty: 30 TABLET | Refills: 3 | Status: SHIPPED | OUTPATIENT
Start: 2024-05-22

## 2024-05-22 RX ORDER — METHYLPHENIDATE HYDROCHLORIDE 20 MG/1
20 TABLET ORAL DAILY
Qty: 30 TABLET | Refills: 0 | Status: SHIPPED | OUTPATIENT
Start: 2024-05-22 | End: 2024-06-03

## 2024-05-22 RX ORDER — METHYLPHENIDATE HYDROCHLORIDE 20 MG/1
20 CAPSULE, EXTENDED RELEASE ORAL 2 TIMES DAILY
Qty: 60 CAPSULE | Refills: 0 | Status: SHIPPED | OUTPATIENT
Start: 2024-07-21 | End: 2024-08-06

## 2024-05-22 RX ORDER — METHYLPHENIDATE HYDROCHLORIDE 20 MG/1
20 TABLET ORAL DAILY
Qty: 30 TABLET | Refills: 0 | Status: SHIPPED | OUTPATIENT
Start: 2024-07-21 | End: 2024-08-06

## 2024-05-22 RX ORDER — TIRZEPATIDE 7.5 MG/.5ML
7.5 INJECTION, SOLUTION SUBCUTANEOUS
Qty: 6 ML | Refills: 0 | Status: CANCELLED | OUTPATIENT
Start: 2024-05-22

## 2024-05-22 RX ORDER — TRAZODONE HYDROCHLORIDE 50 MG/1
50 TABLET, FILM COATED ORAL AT BEDTIME
Qty: 90 TABLET | Refills: 3 | Status: SHIPPED | OUTPATIENT
Start: 2024-05-22

## 2024-05-22 RX ORDER — METHYLPHENIDATE HYDROCHLORIDE 20 MG/1
20 CAPSULE, EXTENDED RELEASE ORAL 2 TIMES DAILY
Qty: 60 CAPSULE | Refills: 0 | Status: SHIPPED | OUTPATIENT
Start: 2024-06-21 | End: 2024-07-08

## 2024-05-22 RX ORDER — ONDANSETRON 4 MG/1
4 TABLET, ORALLY DISINTEGRATING ORAL EVERY 8 HOURS PRN
Qty: 30 TABLET | Refills: 3 | Status: SHIPPED | OUTPATIENT
Start: 2024-05-22 | End: 2024-09-05

## 2024-05-22 RX ORDER — LISINOPRIL 20 MG/1
20 TABLET ORAL DAILY
Qty: 90 TABLET | Refills: 3 | Status: SHIPPED | OUTPATIENT
Start: 2024-05-22

## 2024-05-22 ASSESSMENT — ANXIETY QUESTIONNAIRES
7. FEELING AFRAID AS IF SOMETHING AWFUL MIGHT HAPPEN: NOT AT ALL
IF YOU CHECKED OFF ANY PROBLEMS ON THIS QUESTIONNAIRE, HOW DIFFICULT HAVE THESE PROBLEMS MADE IT FOR YOU TO DO YOUR WORK, TAKE CARE OF THINGS AT HOME, OR GET ALONG WITH OTHER PEOPLE: SOMEWHAT DIFFICULT
GAD7 TOTAL SCORE: 3
7. FEELING AFRAID AS IF SOMETHING AWFUL MIGHT HAPPEN: NOT AT ALL
GAD7 TOTAL SCORE: 3
2. NOT BEING ABLE TO STOP OR CONTROL WORRYING: NOT AT ALL
5. BEING SO RESTLESS THAT IT IS HARD TO SIT STILL: NOT AT ALL
GAD7 TOTAL SCORE: 3
8. IF YOU CHECKED OFF ANY PROBLEMS, HOW DIFFICULT HAVE THESE MADE IT FOR YOU TO DO YOUR WORK, TAKE CARE OF THINGS AT HOME, OR GET ALONG WITH OTHER PEOPLE?: SOMEWHAT DIFFICULT
6. BECOMING EASILY ANNOYED OR IRRITABLE: NOT AT ALL
1. FEELING NERVOUS, ANXIOUS, OR ON EDGE: SEVERAL DAYS
3. WORRYING TOO MUCH ABOUT DIFFERENT THINGS: SEVERAL DAYS
4. TROUBLE RELAXING: SEVERAL DAYS

## 2024-05-22 ASSESSMENT — PAIN SCALES - GENERAL: PAINLEVEL: NO PAIN (1)

## 2024-05-22 NOTE — PATIENT INSTRUCTIONS
Increase Mounjaro as discussed.     Restart Lisinopril.     Start Acyclovir for chronic suppressive therapy for herpes.     Lab working is pending.

## 2024-05-22 NOTE — PROGRESS NOTES
Assessment & Plan   Type 2 diabetes mellitus with hyperglycemia, without long-term current use of insulin (H)  Follow-up for diabetes after starting Mounjaro.  He has done quite well Mounjaro and has lost already almost 20% of his body weight which is approximately 70 pounds.  Blood sugars are much improved at home as well.  Recheck A1c, CMP, lipid panel as well as albumin creatinine ratio.  Will continue titration of Mounjaro to 50 mg weekly and follow-up 3 months for recheck  - Hemoglobin A1c; Future  - Albumin Random Urine Quantitative with Creat Ratio; Future  - Comprehensive metabolic panel; Future  - Lipid panel reflex to direct LDL Fasting; Future  - metFORMIN (GLUCOPHAGE) 1000 MG tablet; TAKE 1 TABLET BY MOUTH TWICE DAILY WITH MEALS  - tirzepatide (MOUNJARO) 10 MG/0.5ML pen; Inject 10 mg Subcutaneous every 7 days  - tirzepatide (MOUNJARO) 12.5 MG/0.5ML pen; Inject 12.5 mg Subcutaneous every 7 days  - tirzepatide (MOUNJARO) 15 MG/0.5ML pen; Inject 15 mg Subcutaneous every 7 days    Morbid obesity (H)  Body mass index is 47.35 kg/m .. Associated with DM2, HTN which would improve with weight loss.  Has done extremely well on Mounjaro thus far.  Has lost approximately 20% of his body weight.  Continue titration as above.  Encouraged healthy lifestyle changes.   - ondansetron (ZOFRAN ODT) 4 MG ODT tab; Take 1 tablet (4 mg) by mouth every 8 hours as needed for nausea    Essential hypertension with goal blood pressure less than 140/90  Elevated here x 2.  He has not been taking his lisinopril.  Recommend to restart this.  Follow-up in 3 months for recheck.  - lisinopril (ZESTRIL) 20 MG tablet; Take 1 tablet (20 mg) by mouth daily    ADHD (attention deficit hyperactivity disorder), combined type  Refilled for 3 months.   reassuring.  Symptoms are stable.  - methylphenidate (RITALIN LA) 20 MG 24 hr capsule; Take 1 capsule (20 mg) by mouth 2 times daily for 30 days  - methylphenidate (RITALIN LA) 20 MG 24 hr  "capsule; Take 1 capsule (20 mg) by mouth 2 times daily for 30 days  - methylphenidate (RITALIN LA) 20 MG 24 hr capsule; Take 1 capsule (20 mg) by mouth 2 times daily for 30 days  - methylphenidate (RITALIN) 20 MG tablet; Take 1 tablet (20 mg) by mouth daily for 30 days  - methylphenidate (RITALIN) 20 MG tablet; Take 1 tablet (20 mg) by mouth daily for 30 days  - methylphenidate (RITALIN) 20 MG tablet; Take 1 tablet (20 mg) by mouth daily for 30 days  - Drug Confirmation Panel Urine with Creatinine; Future    Herpes simplex infection of penis  Has new partner. Would like to prevent transmission as much as possible. Discussed options. Pt elected to start chronic suppressive therapy.   - acyclovir (ZOVIRAX) 400 MG tablet; Take 1 tablet (400 mg) by mouth every 12 hours    Insomnia, unspecified type  Stable. Continue this for now.   - traZODone (DESYREL) 50 MG tablet; Take 1 tablet (50 mg) by mouth at bedtime    Status post vasectomy  Per patient request.   - Semen Analysis Post Vasectomy; Future    Family history of ITP  Per patient request. Asymptomatic.   - CBC with platelets; Future    Low back pain, unspecified back pain laterality, unspecified chronicity, unspecified whether sciatica present  Stable. Refilled Flexeril.   - cyclobenzaprine (FLEXERIL) 10 MG tablet; Take 1 tablet (10 mg) by mouth 3 times daily as needed for muscle spasms    The longitudinal plan of care for the diagnosis(es)/condition(s) as documented were addressed during this visit. Due to the added complexity in care, I will continue to support Carlos in the subsequent management and with ongoing continuity of care.     I spent a total of 40 minutes on the day of the visit.   Time spent by me doing chart review, history and exam, documentation and further activities per the note    BMI  Estimated body mass index is 47.35 kg/m  as calculated from the following:    Height as of this encounter: 1.778 m (5' 10\").    Weight as of this encounter: 149.7 kg " (330 lb).     Devika Gonzalez is a 39 year old, presenting for the following health issues:  Diabetes        5/22/2024     7:58 AM   Additional Questions   Roomed by Virgen GARCIA   Accompanied by self         5/22/2024     7:58 AM   Patient Reported Additional Medications   Patient reports taking the following new medications no new meds     Discuss pneumo vaccine.   Discuss sperm tests, did have a vasectomy.    History of Present Illness       Mental Health Follow-up:  Patient presents to follow-up on Depression & Anxiety.Patient's depression since last visit has been:  Better  The patient is not having other symptoms associated with depression.  Patient's anxiety since last visit has been:  Better  The patient is not having other symptoms associated with anxiety.  Any significant life events: relationship concerns  Patient is not feeling anxious or having panic attacks.  Patient has concerns about alcohol or drug use.    Diabetes:   He presents for follow up of diabetes.    He is not checking blood glucose.         He has no concerns regarding his diabetes at this time.   He is not experiencing numbness or burning in feet, excessive thirst, blurry vision, weight changes or redness, sores or blisters on feet.           Hypertension: He presents for follow up of hypertension.  He does not check blood pressure  regularly outside of the clinic. Outpatient blood pressures have not been over 140/90. He does not follow a low salt diet.     He eats 2-3 servings of fruits and vegetables daily.He consumes 0 sweetened beverage(s) daily.He exercises with enough effort to increase his heart rate 9 or less minutes per day.  He exercises with enough effort to increase his heart rate 3 or less days per week.   He is taking medications regularly.     Medication Followup of Ritalin   Taking Medication as prescribed: yes  Side Effects:  hard time sleeping occasionally   Medication Helping Symptoms:  yes    Chronic/Recurring Back Pain  "Follow Up    Where is your back pain located? (Select all that apply) low back bilateral and shoulders   How would you describe your back pain?  burning, cramping, dull ache, and sharp  Where does your back pain spread? the right shoulder  Since your last clinic visit for back pain, how has your pain changed? gradually improving  Does your back pain interfere with your job? YES  Since your last visit, have you tried any new treatment? No    Review of Systems  See HPI       Objective    BP (!) 150/70   Pulse 86   Temp 98.1  F (36.7  C) (Tympanic)   Resp 18   Ht 1.778 m (5' 10\")   Wt 149.7 kg (330 lb)   SpO2 100%   BMI 47.35 kg/m    Body mass index is 47.35 kg/m .  Physical Exam   Constitutional: healthy, alert, and no distress  Head: Normocephalic. Atraumatic  Eyes: No conjunctival injection, sclera anicteric  Respiratory: No resp distress.  Musculoskeletal: extremities normal- no gross deformities noted, and normal muscle tone  Neurologic: Gait normal. CN 2-12 grossly intact  Psychiatric: mentation appears normal and affect normal/bright           Signed Electronically by: Mata Gonzalez PA-C    "

## 2024-05-24 LAB
ME-PHENIDATE UR CFM-MCNC: 1285 NG/ML
ME-PHENIDATE UR CFM-MCNC: ABNORMAL NG/ML
ME-PHENIDATE/CREAT UR: 464 NG/MG {CREAT}
ME-PHENIDATE/CREAT UR: ABNORMAL

## 2024-06-03 ENCOUNTER — TELEPHONE (OUTPATIENT)
Dept: FAMILY MEDICINE | Facility: CLINIC | Age: 39
End: 2024-06-03
Payer: COMMERCIAL

## 2024-06-03 DIAGNOSIS — F90.2 ADHD (ATTENTION DEFICIT HYPERACTIVITY DISORDER), COMBINED TYPE: ICD-10-CM

## 2024-06-03 RX ORDER — METHYLPHENIDATE HYDROCHLORIDE 20 MG/1
20 TABLET ORAL DAILY
Qty: 30 TABLET | Refills: 0 | Status: SHIPPED | OUTPATIENT
Start: 2024-06-03 | End: 2024-09-06

## 2024-06-03 RX ORDER — METHYLPHENIDATE HYDROCHLORIDE 20 MG/1
20 CAPSULE, EXTENDED RELEASE ORAL 2 TIMES DAILY
Qty: 60 CAPSULE | Refills: 0 | Status: SHIPPED | OUTPATIENT
Start: 2024-06-03 | End: 2024-09-05

## 2024-06-03 NOTE — TELEPHONE ENCOUNTER
Rx printed. Given to More to leave at  for patient. Only given 1 month. Pt can contact me and I can send the additional few months electronically in the future, once he finds a pharmacy to fill it.     Mata Gonzalez PA-C

## 2024-06-03 NOTE — TELEPHONE ENCOUNTER
Patient is asking for paper script of both Methylphenidate 20MG Capsules and 20MG tablets as he is having trouble finding a pharmacy with this medication in stock. Wonderin if Vern would give him paper scripts?    Could we send this information to you in [employer] or would you prefer to receive a phone call?:   Patient would prefer a phone call   Okay to leave a detailed message?: Yes at Home number on file [phone] (home)

## 2024-06-23 ENCOUNTER — HEALTH MAINTENANCE LETTER (OUTPATIENT)
Age: 39
End: 2024-06-23

## 2024-07-08 ENCOUNTER — TELEPHONE (OUTPATIENT)
Dept: FAMILY MEDICINE | Facility: CLINIC | Age: 39
End: 2024-07-08
Payer: COMMERCIAL

## 2024-07-08 ENCOUNTER — TELEPHONE (OUTPATIENT)
Dept: FAMILY MEDICINE | Facility: CLINIC | Age: 39
End: 2024-07-08

## 2024-07-08 DIAGNOSIS — F90.2 ADHD (ATTENTION DEFICIT HYPERACTIVITY DISORDER), COMBINED TYPE: ICD-10-CM

## 2024-07-08 RX ORDER — METHYLPHENIDATE HYDROCHLORIDE 20 MG/1
20 CAPSULE, EXTENDED RELEASE ORAL 2 TIMES DAILY
Qty: 60 CAPSULE | Refills: 0 | Status: SHIPPED | OUTPATIENT
Start: 2024-07-08 | End: 2025-02-03

## 2024-07-08 RX ORDER — METHYLPHENIDATE HYDROCHLORIDE 20 MG/1
20 TABLET ORAL DAILY
Qty: 30 TABLET | Refills: 0 | Status: SHIPPED | OUTPATIENT
Start: 2024-07-08 | End: 2025-02-03

## 2024-07-08 NOTE — TELEPHONE ENCOUNTER
Patient called to check on his prescription for Methylphenidate 20 mg capsule and 20 mg tablet. Reports that pharmacy did not receive prescription. Writer reviewed order had been sent to [facility] in [place]. Patient is asking for prescription to be sent to the [facility] in [place] instead.    Will route to ordering provider, Mata Gonzalez.    Claire Martines RN

## 2024-07-08 NOTE — TELEPHONE ENCOUNTER
Prior Authorization Retail Medication Request    Medication/Dose: Wegovy 7.5 MG/ 0.5ML  Diagnosis and ICD code (if different than what is on RX):    New/renewal/insurance change PA/secondary ins. PA:  Previously Tried and Failed:    Rationale:      Insurance   Primary:   Insurance ID:      Secondary (if applicable):  Insurance ID:     Pharmacy Information (if different than what is on RX)  Name:    Phone:    Fax:    Cover my meds: [id number]

## 2024-07-10 ENCOUNTER — TELEPHONE (OUTPATIENT)
Dept: FAMILY MEDICINE | Facility: CLINIC | Age: 39
End: 2024-07-10
Payer: COMMERCIAL

## 2024-07-10 DIAGNOSIS — E11.65 TYPE 2 DIABETES MELLITUS WITH HYPERGLYCEMIA, WITHOUT LONG-TERM CURRENT USE OF INSULIN (H): Primary | ICD-10-CM

## 2024-07-10 NOTE — TELEPHONE ENCOUNTER
General Call      Reason for Call:  Mounjaro    What are your questions or concerns:  Pt calling in regards to his Mounjaro medication. Stated the last dose he was on was the 5mg and then had some insurance issues so had to stop taking it for a month. Prior auth was started for the next dose of 7.5mg but pt is concerned about starting at this higher dose since he has been off it for awhile. Wondering if he should start back on the 5mg dosage? Wondering on Vern's recommendation.      Could we send this information to you in [employer] or would you prefer to receive a phone call?:   Patient would prefer a phone call   Okay to leave a detailed message?: Yes at Home number on file [phone] (home)

## 2024-07-11 NOTE — TELEPHONE ENCOUNTER
PA Initiation    Medication: MOUNJARO 7.5 MG/0.5ML SC SOPN  Insurance Company: [employer] Clinical Review - Phone [phone] Fax [phone]  Pharmacy Filling the Rx: [id number] - [place], [place] - [address] [place] [address] [address] AT [facility] & [address]  Filling Pharmacy Phone: [phone]  Filling Pharmacy Fax: [phone]  Start Date: 7/11/2024

## 2024-07-11 NOTE — TELEPHONE ENCOUNTER
Patient does not have 5mg at home. Could you please send the order for 5 mg. He usually takes it Fridays if it's possible to start tomorrow. He will be out for a month tomorrow, so he is worried about side effects.     - [facility].    Please Advise.  Evelina Duff RN on 7/11/2024 at 3:46 PM

## 2024-07-11 NOTE — TELEPHONE ENCOUNTER
If he is able to restart the 5 mg dose within the next week, then he can do that. I agree that 7.5 mg is too much to restart. If he is unable to get 5 mg in the next week, then he actually needs to decrease back down to the 2.5 mg dose and restart the whole series.     Please let me know so I know which prescription to send in.     Mata Gonzalez PA-C

## 2024-07-12 NOTE — TELEPHONE ENCOUNTER
Prior Authorization Approval    Medication: MOUNJARO 7.5 MG/0.5ML SC SOPN  Authorization Effective Date: 6/12/2024  Authorization Expiration Date: 7/12/2025  Approved Dose/Quantity:   Reference #:     Insurance Company: [employer] Clinical Review - Phone [phone] Fax [phone]  Expected CoPay: $    CoPay Card Available:      Financial Assistance Needed:   Which Pharmacy is filling the prescription: [id number] - [address] AT [facility] & [address]  Pharmacy Notified: YES  Patient Notified: **Instructed pharmacy to notify patient when script is ready to /ship.**

## 2024-07-12 NOTE — TELEPHONE ENCOUNTER
Pt called and informed. Pt stated he thinks there should a PA for this medication. Only see a PA for the 7.5mg. Pt will contact pharmacy about this.    Tasia Hair Patient

## 2024-07-15 ENCOUNTER — TELEPHONE (OUTPATIENT)
Dept: FAMILY MEDICINE | Facility: CLINIC | Age: 39
End: 2024-07-15
Payer: COMMERCIAL

## 2024-07-15 NOTE — TELEPHONE ENCOUNTER
RENEWAL Prior Authorization Retail Medication Request    Medication/Dose: methylphenidate ER (LA) 20mg  Diagnosis and ICD code (if different than what is on RX):     New/renewal/insurance change PA/secondary ins. PA:  Previously Tried and Failed:  pt is stable on the 1BID dose.  Please renew  prior auth.   Rationale:       Insurance   Primary: covermymeds [id number]  Insurance ID:       Secondary (if applicable):   Insurance ID:       Pharmacy Information (if different than what is on RX)  Name:     Phone:     Fax:

## 2024-07-17 NOTE — TELEPHONE ENCOUNTER
Central Prior Authorization Team  Phone: [phone]    PA Initiation    Medication: METHYLPHENIDATE HCL 20 MG OR CP24  Insurance Company: [employer] Clinical Review - Phone [phone] Fax [phone]  Pharmacy Filling the Rx: [facility] [address] [place] [address]  Filling Pharmacy Phone: [phone]  Filling Pharmacy Fax: [phone]  Start Date: 7/17/2024

## 2024-07-18 NOTE — TELEPHONE ENCOUNTER
Prior Authorization Approval    Medication: METHYLPHENIDATE HCL 20 MG OR CP24  Authorization Effective Date: 6/17/2024  Authorization Expiration Date: 7/17/2025  Approved Dose/Quantity:   Reference #:     Insurance Company: [employer] Clinical Review - Phone [phone] Fax [phone]  Expected CoPay: $    CoPay Card Available:      Financial Assistance Needed:   Which Pharmacy is filling the prescription: [facility] [address] [place] [address]  Pharmacy Notified: Yes    Patient Notified: No

## 2024-07-29 ENCOUNTER — DOCUMENTATION ONLY (OUTPATIENT)
Dept: SLEEP MEDICINE | Facility: CLINIC | Age: 39
End: 2024-07-29
Payer: COMMERCIAL

## 2024-07-29 DIAGNOSIS — G47.33 OSA (OBSTRUCTIVE SLEEP APNEA): Primary | ICD-10-CM

## 2024-07-31 NOTE — PROGRESS NOTES
LOV was in February 2021. He needs to re-establish care. Please facilitate appointment. DME order signed.

## 2024-08-06 DIAGNOSIS — F90.2 ADHD (ATTENTION DEFICIT HYPERACTIVITY DISORDER), COMBINED TYPE: ICD-10-CM

## 2024-08-06 RX ORDER — METHYLPHENIDATE HYDROCHLORIDE 20 MG/1
20 TABLET ORAL DAILY
Qty: 30 TABLET | Refills: 0 | Status: SHIPPED | OUTPATIENT
Start: 2024-08-06

## 2024-08-06 RX ORDER — METHYLPHENIDATE HYDROCHLORIDE 20 MG/1
20 CAPSULE, EXTENDED RELEASE ORAL 2 TIMES DAILY
Qty: 60 CAPSULE | Refills: 0 | Status: SHIPPED | OUTPATIENT
Start: 2024-08-06

## 2024-09-01 ENCOUNTER — HEALTH MAINTENANCE LETTER (OUTPATIENT)
Age: 39
End: 2024-09-01

## 2024-09-05 DIAGNOSIS — E11.65 TYPE 2 DIABETES MELLITUS WITH HYPERGLYCEMIA, WITHOUT LONG-TERM CURRENT USE OF INSULIN (H): ICD-10-CM

## 2024-09-05 DIAGNOSIS — E66.01 MORBID OBESITY (H): ICD-10-CM

## 2024-09-05 DIAGNOSIS — F90.2 ADHD (ATTENTION DEFICIT HYPERACTIVITY DISORDER), COMBINED TYPE: ICD-10-CM

## 2024-09-05 RX ORDER — ONDANSETRON 4 MG/1
4 TABLET, ORALLY DISINTEGRATING ORAL EVERY 8 HOURS PRN
Qty: 30 TABLET | Refills: 3 | OUTPATIENT
Start: 2024-09-05

## 2024-09-05 RX ORDER — METHYLPHENIDATE HYDROCHLORIDE 20 MG/1
20 TABLET ORAL DAILY
Qty: 30 TABLET | Refills: 0 | OUTPATIENT
Start: 2024-09-05

## 2024-09-05 RX ORDER — METHYLPHENIDATE HYDROCHLORIDE 20 MG/1
20 CAPSULE, EXTENDED RELEASE ORAL 2 TIMES DAILY
Qty: 60 CAPSULE | Refills: 0 | OUTPATIENT
Start: 2024-09-05

## 2024-09-05 NOTE — TELEPHONE ENCOUNTER
Patient scheduled a virtual visit for 9/9 with Vern. States he is due for his next Mounjaro dose on 9/6. Asking for bridge refills

## 2024-09-05 NOTE — TELEPHONE ENCOUNTER
Overdue for needed care. Please call to schedule med check/ADHD. Once appt is scheduled, route back to the pool.    Julie Behrendt RN

## 2024-09-05 NOTE — TELEPHONE ENCOUNTER
Medication Question or Refill    Contacts       Contact Date/Time Type Contact Phone/Fax    09/05/2024 08:58 AM CDT Phone (Incoming) Carlos Bagnura (Self) [phone] (H)          What medication are you calling about (include dose and sig)?: Ritalin, Ritalin LA, Mounjaro & Zofran    Preferred Pharmacy:   [facility] [address] [place] [address] [address] [facility]  [address] [facility] [address]  Phone: [phone] Fax: [phone]    Controlled Substance Agreement on file:   CSA -- Patient Level:    CSA: None found at the patient level.       Who prescribed the medication?: Carlos    Do you need a refill? Yes    When did you use the medication last? Today    Patient offered an appointment? No    Do you have any questions or concerns?  No    Could we send this information to you in [facility] or would you prefer to receive a phone call?:   Patient would prefer a phone call   Okay to leave a detailed message?: Yes at Cell number on file:    Telephone Information:   Mobile [phone]

## 2024-09-06 RX ORDER — METHYLPHENIDATE HYDROCHLORIDE 20 MG/1
20 CAPSULE, EXTENDED RELEASE ORAL 2 TIMES DAILY
Qty: 60 CAPSULE | Refills: 0 | Status: CANCELLED | OUTPATIENT
Start: 2024-09-06

## 2024-09-06 RX ORDER — ONDANSETRON 4 MG/1
4 TABLET, ORALLY DISINTEGRATING ORAL EVERY 8 HOURS PRN
Qty: 30 TABLET | Refills: 0 | Status: SHIPPED | OUTPATIENT
Start: 2024-09-06

## 2024-09-06 RX ORDER — METHYLPHENIDATE HYDROCHLORIDE 20 MG/1
20 CAPSULE, EXTENDED RELEASE ORAL 2 TIMES DAILY
Qty: 14 CAPSULE | Refills: 0 | Status: SHIPPED | OUTPATIENT
Start: 2024-09-06

## 2024-09-06 RX ORDER — METHYLPHENIDATE HYDROCHLORIDE 20 MG/1
20 TABLET ORAL DAILY
Qty: 7 TABLET | Refills: 0 | Status: SHIPPED | OUTPATIENT
Start: 2024-09-06

## 2024-09-06 NOTE — TELEPHONE ENCOUNTER
Has follow up appointment with Vern SHARPE on 9/9/24.  Prescription approved per [facility] Refill Protocol.  Julie Behrendt RN

## 2024-09-06 NOTE — TELEPHONE ENCOUNTER
Spoke with patient who states he just completed his 4th dose of Mounjaro 7.5 mg and is needing the 10 mg dose sent to [facility] in [place]. Patient has follow up appointment with Vern SHARPE on 9/9/24.    Julie Behrendt RN

## 2024-09-06 NOTE — TELEPHONE ENCOUNTER
Routing refill request to provider for review/approval because:  Drug not on the [facility] refill protocol   Last Written Prescription Date:  8/6/24  Last Fill Quantity: 60 for IR, 30 for ER,  # refills: 0   Last office visit: 5/22/2024 ; last virtual visit: 12/11/2023 with prescribing provider:     Future Office Visit:   Next 5 appointments (look out 90 days)      Sep 09, 2024 1:30 PM  (Arrive by 1:25 PM)  Provider Visit with Mata Gonzalez PA-C  [facility] [address] [place] [phone]  [phone]         Julie Behrendt RN

## 2024-09-09 ENCOUNTER — VIRTUAL VISIT (OUTPATIENT)
Dept: FAMILY MEDICINE | Facility: CLINIC | Age: 39
End: 2024-09-09
Payer: COMMERCIAL

## 2024-09-09 DIAGNOSIS — F90.2 ADHD (ATTENTION DEFICIT HYPERACTIVITY DISORDER), COMBINED TYPE: Primary | ICD-10-CM

## 2024-09-09 DIAGNOSIS — E11.9 TYPE 2 DIABETES MELLITUS WITHOUT COMPLICATION, WITHOUT LONG-TERM CURRENT USE OF INSULIN (H): Chronic | ICD-10-CM

## 2024-09-09 DIAGNOSIS — I10 ESSENTIAL HYPERTENSION: ICD-10-CM

## 2024-09-09 PROCEDURE — G2211 COMPLEX E/M VISIT ADD ON: HCPCS | Mod: 95 | Performed by: PHYSICIAN ASSISTANT

## 2024-09-09 PROCEDURE — 99214 OFFICE O/P EST MOD 30 MIN: CPT | Mod: 95 | Performed by: PHYSICIAN ASSISTANT

## 2024-09-09 RX ORDER — METHYLPHENIDATE HYDROCHLORIDE 20 MG/1
20 TABLET ORAL DAILY
Qty: 7 TABLET | Refills: 0 | Status: CANCELLED | OUTPATIENT
Start: 2024-09-09

## 2024-09-09 RX ORDER — METHYLPHENIDATE HYDROCHLORIDE 20 MG/1
20 CAPSULE, EXTENDED RELEASE ORAL 2 TIMES DAILY
Qty: 14 CAPSULE | Refills: 0 | Status: CANCELLED | OUTPATIENT
Start: 2024-09-09

## 2024-09-09 RX ORDER — METHYLPHENIDATE HYDROCHLORIDE 20 MG/1
20 CAPSULE, EXTENDED RELEASE ORAL 2 TIMES DAILY
Qty: 60 CAPSULE | Refills: 0 | Status: SHIPPED | OUTPATIENT
Start: 2024-09-09 | End: 2024-10-09

## 2024-09-09 RX ORDER — METHYLPHENIDATE HYDROCHLORIDE 20 MG/1
20 CAPSULE, EXTENDED RELEASE ORAL 2 TIMES DAILY
Qty: 60 CAPSULE | Refills: 0 | Status: SHIPPED | OUTPATIENT
Start: 2024-10-09 | End: 2024-11-08

## 2024-09-09 RX ORDER — METHYLPHENIDATE HYDROCHLORIDE 20 MG/1
20 TABLET ORAL DAILY
Qty: 30 TABLET | Refills: 0 | Status: SHIPPED | OUTPATIENT
Start: 2024-11-08 | End: 2024-12-08

## 2024-09-09 RX ORDER — METHYLPHENIDATE HYDROCHLORIDE 20 MG/1
20 TABLET ORAL DAILY
Qty: 30 TABLET | Refills: 0 | Status: SHIPPED | OUTPATIENT
Start: 2024-10-09 | End: 2024-11-08

## 2024-09-09 RX ORDER — METHYLPHENIDATE HYDROCHLORIDE 20 MG/1
20 CAPSULE, EXTENDED RELEASE ORAL 2 TIMES DAILY
Qty: 60 CAPSULE | Refills: 0 | Status: SHIPPED | OUTPATIENT
Start: 2024-11-08 | End: 2024-12-08

## 2024-09-09 RX ORDER — METHYLPHENIDATE HYDROCHLORIDE 20 MG/1
20 TABLET ORAL DAILY
Qty: 30 TABLET | Refills: 0 | Status: CANCELLED | OUTPATIENT
Start: 2024-09-09

## 2024-09-09 RX ORDER — METHYLPHENIDATE HYDROCHLORIDE 20 MG/1
20 CAPSULE, EXTENDED RELEASE ORAL 2 TIMES DAILY
Qty: 60 CAPSULE | Refills: 0 | Status: CANCELLED | OUTPATIENT
Start: 2024-09-09

## 2024-09-09 RX ORDER — METHYLPHENIDATE HYDROCHLORIDE 20 MG/1
20 TABLET ORAL DAILY
Qty: 30 TABLET | Refills: 0 | Status: SHIPPED | OUTPATIENT
Start: 2024-09-09 | End: 2024-10-09

## 2024-09-09 NOTE — PROGRESS NOTES
"Carlos is a 39 year old who is being evaluated via a billable video visit.    How would you like to obtain your AVS? MyChart  If the video visit is dropped, the invitation should be resent by: Text to cell phone: [phone]  Will anyone else be joining your video visit? No      Assessment & Plan   ADHD (attention deficit hyperactivity disorder), combined type  Stable on current dose.  reassuring. Refilled for 3 months.   - methylphenidate (RITALIN LA) 20 MG 24 hr capsule; Take 1 capsule (20 mg) by mouth 2 times daily.  - methylphenidate (RITALIN LA) 20 MG 24 hr capsule; Take 1 capsule (20 mg) by mouth 2 times daily.  - methylphenidate (RITALIN LA) 20 MG 24 hr capsule; Take 1 capsule (20 mg) by mouth 2 times daily.  - methylphenidate (RITALIN) 20 MG tablet; Take 1 tablet (20 mg) by mouth daily.  - methylphenidate (RITALIN) 20 MG tablet; Take 1 tablet (20 mg) by mouth daily.  - methylphenidate (RITALIN) 20 MG tablet; Take 1 tablet (20 mg) by mouth daily.    Essential hypertension  Elevated at home. Has not been taking Lisinopril. Encouraged to restart this and recheck BP in 2 weeks.     Type 2 diabetes mellitus without complication, without long-term current use of insulin (H)  Titrating Mounjaro. Has lost an additional 10 lbs since last visit. Will recheck labs at next in person appointment in 3 months.     The longitudinal plan of care for the diagnosis(es)/condition(s) as documented were addressed during this visit. Due to the added complexity in care, I will continue to support Carlos in the subsequent management and with ongoing continuity of care.         BMI  Estimated body mass index is 47.35 kg/m  as calculated from the following:    Height as of 5/22/24: 1.778 m (5' 10\").    Weight as of 5/22/24: 149.7 kg (330 lb).     Subjective   Carlos is a 39 year old, presenting for the following health issues:  A.D.H.D        9/9/2024     1:16 PM   Additional Questions   Roomed by Milagro HARTMAN     ADHD Follow-Up    Date " of last ADHD office visit: 05/2024  Status since last visit: Improving and Stable  Taking controlled (daily) medications as prescribed: Yes                       Parent/Patient Concerns with Medications: None  ADHD Medication       Stimulants - Misc. Disp Start End     methylphenidate (RITALIN LA) 20 MG 24 hr capsule 14 capsule 9/6/2024 --    Sig - Route: Take 1 capsule (20 mg) by mouth 2 times daily. - Oral    Class: E-Prescribe    Earliest Fill Date: 9/6/2024    No prior authorization was found for this prescription.    Found prior authorization for another prescription for the same medication: Approved     methylphenidate (RITALIN LA) 20 MG 24 hr capsule 60 capsule 8/6/2024 --    Sig - Route: Take 1 capsule (20 mg) by mouth 2 times daily - Oral    Class: E-Prescribe    Earliest Fill Date: 8/6/2024    No prior authorization was found for this prescription.    Found prior authorization for another prescription for the same medication: Approved     methylphenidate (RITALIN LA) 20 MG 24 hr capsule 60 capsule 7/8/2024 --    Sig - Route: Take 1 capsule (20 mg) by mouth 2 times daily - Oral    Class: E-Prescribe    Earliest Fill Date: 7/8/2024    No prior authorization was found for this prescription.    Found prior authorization for another prescription for the same medication: Approved     methylphenidate (RITALIN) 20 MG tablet 7 tablet 9/6/2024 --    Sig - Route: Take 1 tablet (20 mg) by mouth daily. - Oral    Class: E-Prescribe    Earliest Fill Date: 9/6/2024     methylphenidate (RITALIN) 20 MG tablet 30 tablet 8/6/2024 --    Sig - Route: Take 1 tablet (20 mg) by mouth daily - Oral    Class: E-Prescribe    Earliest Fill Date: 8/6/2024     methylphenidate (RITALIN) 20 MG tablet 30 tablet 7/8/2024 --    Sig - Route: Take 1 tablet (20 mg) by mouth daily - Oral    Class: E-Prescribe    Earliest Fill Date: 7/8/2024            Sleep: no problems  Home/Family Concerns: Stable  Peer Concerns: Stable      Review of  Systems  See Hpi       Objective           Vitals:  No vitals were obtained today due to virtual visit.    Physical Exam   GENERAL: alert and no distress  EYES: Eyes grossly normal to inspection.  No discharge or erythema, or obvious scleral/conjunctival abnormalities.  RESP: No audible wheeze, cough, or visible cyanosis.    SKIN: Visible skin clear. No significant rash, abnormal pigmentation or lesions.  NEURO: Cranial nerves grossly intact.  Mentation and speech appropriate for age.  PSYCH: Appropriate affect, tone, and pace of words        Video-Visit Details    Type of service:  Video Visit   Originating Location (pt. Location): Home    Distant Location (provider location):  On-site  Platform used for Video Visit: Jenny  Signed Electronically by: Mata Gonzalez PA-C

## 2024-09-09 NOTE — PATIENT INSTRUCTIONS
Restart lisinopril as discussed.     Continue titration of Mounjaro.     Continue Ritalin as prescribed.     Follow-up with me in person in 3 months.

## 2024-09-13 ENCOUNTER — HOSPITAL ENCOUNTER (EMERGENCY)
Facility: CLINIC | Age: 39
Discharge: HOME | End: 2024-09-13
Attending: EMERGENCY MEDICINE | Admitting: EMERGENCY MEDICINE
Payer: COMMERCIAL

## 2024-09-13 VITALS
OXYGEN SATURATION: 99 % | RESPIRATION RATE: 16 BRPM | HEART RATE: 82 BPM | SYSTOLIC BLOOD PRESSURE: 140 MMHG | TEMPERATURE: 98.2 F | DIASTOLIC BLOOD PRESSURE: 95 MMHG | HEIGHT: 71 IN | BODY MASS INDEX: 44.1 KG/M2 | WEIGHT: 315 LBS

## 2024-09-13 DIAGNOSIS — M54.41 ACUTE BILATERAL LOW BACK PAIN WITH BILATERAL SCIATICA: ICD-10-CM

## 2024-09-13 DIAGNOSIS — M54.42 ACUTE BILATERAL LOW BACK PAIN WITH BILATERAL SCIATICA: ICD-10-CM

## 2024-09-13 PROCEDURE — 99284 EMERGENCY DEPT VISIT MOD MDM: CPT | Performed by: EMERGENCY MEDICINE

## 2024-09-13 PROCEDURE — 96372 THER/PROPH/DIAG INJ SC/IM: CPT | Performed by: EMERGENCY MEDICINE

## 2024-09-13 PROCEDURE — 250N000011 HC RX IP 250 OP 636: Performed by: EMERGENCY MEDICINE

## 2024-09-13 PROCEDURE — 99283 EMERGENCY DEPT VISIT LOW MDM: CPT | Performed by: EMERGENCY MEDICINE

## 2024-09-13 RX ORDER — KETOROLAC TROMETHAMINE 30 MG/ML
60 INJECTION, SOLUTION INTRAMUSCULAR; INTRAVENOUS ONCE
Status: COMPLETED | OUTPATIENT
Start: 2024-09-13 | End: 2024-09-13

## 2024-09-13 RX ORDER — LORAZEPAM 1 MG/1
1 TABLET ORAL EVERY 6 HOURS PRN
Qty: 8 TABLET | Refills: 0 | Status: SHIPPED | OUTPATIENT
Start: 2024-09-13

## 2024-09-13 RX ORDER — HYDROCODONE BITARTRATE AND ACETAMINOPHEN 5; 325 MG/1; MG/1
1 TABLET ORAL EVERY 6 HOURS PRN
Qty: 12 TABLET | Refills: 0 | Status: SHIPPED | OUTPATIENT
Start: 2024-09-13 | End: 2024-09-16

## 2024-09-13 RX ORDER — METHYLPREDNISOLONE 4 MG
TABLET, DOSE PACK ORAL
Qty: 21 TABLET | Refills: 0 | Status: SHIPPED | OUTPATIENT
Start: 2024-09-13

## 2024-09-13 RX ADMIN — KETOROLAC TROMETHAMINE 60 MG: 30 INJECTION, SOLUTION INTRAMUSCULAR at 07:27

## 2024-09-13 ASSESSMENT — ACTIVITIES OF DAILY LIVING (ADL): ADLS_ACUITY_SCORE: 35

## 2024-09-13 ASSESSMENT — COLUMBIA-SUICIDE SEVERITY RATING SCALE - C-SSRS
6. HAVE YOU EVER DONE ANYTHING, STARTED TO DO ANYTHING, OR PREPARED TO DO ANYTHING TO END YOUR LIFE?: NO
2. HAVE YOU ACTUALLY HAD ANY THOUGHTS OF KILLING YOURSELF IN THE PAST MONTH?: NO
1. IN THE PAST MONTH, HAVE YOU WISHED YOU WERE DEAD OR WISHED YOU COULD GO TO SLEEP AND NOT WAKE UP?: NO

## 2024-09-13 NOTE — DISCHARGE INSTRUCTIONS
Medrol dose pack to decrease pain and inflammation.  Take with food or milk.    Lorazepam as needed for muscle spasms.  This can cause drowsiness, no driving while on this medication.    Norco if needed for severe pain.  No driving while on this medication.  It can cause constipation so take stool softeners as needed.  You can crush any of these medications and take with yogurt or applesauce.    Rest, ice or heat over painful area.  Okay to add ibuprofen or Aleve as needed.  Can also add limited amounts of Tylenol.  You could try over-the-counter Biofreeze or Salonpas/lidocaine patches.    Contact your primary care provider and consider getting some physical therapy.  If you are not improving you may need a repeat MRI and to see a spine specialist.    Return for significant worsening, changes or concerns.    I hope that you start to improve very quickly!!

## 2024-09-13 NOTE — ED PROVIDER NOTES
History     Chief Complaint   Patient presents with    Back Pain     HPI  History per patient, records    This is a 39-year-old male, history of type 2 diabetes, GERD, hypertension, hyperlipidemia, ADHD presenting with back pain.  Patient has history of low back pain for about 10 years.  He states that he has been seen and worked up and has been told he has a disc protrusion.  The original flare was 10 years ago.  He notes that about every 2 years he will have a big flare.  One week ago he worked outside lifting a lot of trees.  He started feeling low back pain the following day, 5 days ago.  Pain is across the low back with radiation down the buttocks into the legs all the way to the feet.  Pain increases with movement.  He took the week off but has been unable to get the pain under control.  Pain even with lying flat or sitting, but exacerbated by rolling, moving to stand.  No loss or change in bowel or bladder function.  No fevers or chills.  No history of injections or surgeries.  He has seen a chiropractor.  He had some old Flexeril that he tried without relief.  He has had decreased sleep.  Patient notes 100 pound weight loss over the last year and is on Mounjaro.    Allergies:  No Known Allergies    Problem List:    Patient Active Problem List    Diagnosis Date Noted    ADHD (attention deficit hyperactivity disorder), combined type 02/22/2023     Priority: Medium     Trying long acting ritalin. BID on 20mg given 10/23/23.  Had assessment at psychological center, scanned in Epic.  Using 10mg of short acting prn some afternoons.   Due to shortages, 3 month supply of adderall 30 xrr given 1/24/24      Anxiety 09/14/2022     Priority: Medium    Hyperlipidemia LDL goal <100 10/17/2018     Priority: Medium     Statin at 35-40.  Same with ASA.        Herpes simplex infection of penis 10/03/2018     Priority: Medium    Esophageal reflux 12/03/2013     Priority: Medium    Essential hypertension 09/10/2013      Priority: Medium    Seasonal allergic rhinitis 08/07/2013     Priority: Medium    Type 2 diabetes mellitus without complication (H) 08/07/2013     Priority: Medium    Sleep apnea 08/31/2012     Priority: Medium     Severe IRWIN: PSG  6/2/2015 ( 414 lbs, , , adithya O2 51%.)  7/15/2016:Uses CPAP every day.      Class 3 severe obesity due to excess calories with serious comorbidity and body mass index (BMI) of 50.0 to 59.9 in adult (H) 05/14/2012     Priority: Medium     New bariatric surgery consult Sept 2020  Weight 400 lbs.  15mg phentermine caused insomnia           Past Medical History:    Past Medical History:   Diagnosis Date    HTN (hypertension) 9/10/2013    Morbid obesity (H)        Past Surgical History:    Past Surgical History:   Procedure Laterality Date    ENDOSCOPIC RELEASE CARPAL TUNNEL  12/6/2013    Procedure: ENDOSCOPIC RELEASE CARPAL TUNNEL;  Right Hand Endoscopic Carpal Tunnel Release;  Surgeon: Kody Pittman MD;  Location: [facility] OR    ENDOSCOPIC RELEASE CARPAL TUNNEL  1/21/2014    Procedure: ENDOSCOPIC RELEASE CARPAL TUNNEL;  Left endoscopic carpal tunnel release, possible open;  Surgeon: Kody Pittman MD;  Location: [facility] OR       Family History:    Family History   Problem Relation Age of Onset    Diabetes Father     Cancer Sister     C.A.D. Maternal Grandfather        Social History:  Marital Status:   [2]  Social History     Tobacco Use    Smoking status: Never    Smokeless tobacco: Never    Tobacco comments:     Pt smokes marijuana.   Vaping Use    Vaping status: Former    Substances: THC    Devices: Disposable   Substance Use Topics    Alcohol use: No     Alcohol/week: 0.0 standard drinks of alcohol    Drug use: No        Medications:    HYDROcodone-acetaminophen (NORCO) 5-325 MG tablet  LORazepam (ATIVAN) 1 MG tablet  methylPREDNISolone (MEDROL DOSEPAK) 4 MG tablet therapy pack  acyclovir (ZOVIRAX) 400 MG tablet  albuterol (PROAIR HFA/PROVENTIL HFA/VENTOLIN HFA) 108  "(90 Base) MCG/ACT inhaler  cyclobenzaprine (FLEXERIL) 10 MG tablet  FLUoxetine (PROZAC) 20 MG capsule  lisinopril (ZESTRIL) 20 MG tablet  metFORMIN (GLUCOPHAGE) 1000 MG tablet  methylphenidate (RITALIN LA) 20 MG 24 hr capsule  [START ON 10/9/2024] methylphenidate (RITALIN LA) 20 MG 24 hr capsule  [START ON 11/8/2024] methylphenidate (RITALIN LA) 20 MG 24 hr capsule  methylphenidate (RITALIN LA) 20 MG 24 hr capsule  methylphenidate (RITALIN LA) 20 MG 24 hr capsule  methylphenidate (RITALIN LA) 20 MG 24 hr capsule  methylphenidate (RITALIN) 20 MG tablet  [START ON 10/9/2024] methylphenidate (RITALIN) 20 MG tablet  [START ON 11/8/2024] methylphenidate (RITALIN) 20 MG tablet  methylphenidate (RITALIN) 20 MG tablet  methylphenidate (RITALIN) 20 MG tablet  methylphenidate (RITALIN) 20 MG tablet  naproxen (NAPROSYN) 500 MG tablet  ondansetron (ZOFRAN ODT) 4 MG ODT tab  tirzepatide (MOUNJARO) 10 MG/0.5ML pen  tirzepatide (MOUNJARO) 12.5 MG/0.5ML pen  tirzepatide (MOUNJARO) 15 MG/0.5ML pen  traZODone (DESYREL) 50 MG tablet          Review of Systems  All other ROS reviewed and are negative or non-contributory except as stated in HPI.     Physical Exam   BP: (!) 140/95  Pulse: 82  Temp: 98.2  F (36.8  C)  Resp: 16  Height: 180.3 cm (5' 11\")  Weight: 145.2 kg (320 lb)  SpO2: 99 %      Physical Exam  Vitals and nursing note reviewed.   Constitutional:       Appearance: Normal appearance. He is obese.      Comments: Very pleasant, healthy-appearing man sitting in a chair   HENT:      Head: Normocephalic.   Eyes:      Extraocular Movements: Extraocular movements intact.   Cardiovascular:      Rate and Rhythm: Normal rate.   Pulmonary:      Effort: Pulmonary effort is normal.   Musculoskeletal:         General: Normal range of motion.      Cervical back: Normal range of motion.      Comments: Patient able to rise to a standing position with the assistance of a cane.  Normal weightbearing.  Normal range of motion, CMS intact " throughout the hips, knees, ankle and feet.  No external abnormalities across the low back.  He notes pain in the very low lumbar region.  Not reproducible with palpation.   Skin:     General: Skin is warm and dry.      Findings: No bruising, erythema, lesion or rash.   Neurological:      General: No focal deficit present.      Mental Status: He is alert and oriented to person, place, and time.   Psychiatric:         Mood and Affect: Mood normal.         Behavior: Behavior normal.         ED Course (with Medical Decision Making)    Pt seen and examined by me.  RN and EPIC notes reviewed.       Patient with low back pain, radiation into the buttocks and legs.  History of the same in the past, new flare over the last week.  Not responding to rest.  No red flags including IV drug use, fevers, weakness, saddle anesthesia, loss of bowel or bladder function.    Patient drove himself to the ED.  I do not think there is any need for updated radiologic study at this point although he may need an MRI in the future.  We talked about options.  I am going to give him a dose of IM Toradol here in the ED.  Plan to Rx with Medrol Dosepak.  Limited amount of Norco to use if needed for severe pain.  He can crush the tablets if he feels that this would help with his absorption while he is on Mounjaro.  I will also give him some lorazepam for muscle spasm and this should also help with his sleep.    I recommend that he follow-up with his primary care provider.  He would likely do well with physical therapy.  If he is not improving he should follow-up with spine specialist, consider MRI.  Patient comfortable with the plan.  He was discharged home in stable condition.        Procedures    No results found for this or any previous visit (from the past 24 hour(s)).    Medications   ketorolac (TORADOL) injection 60 mg (60 mg Intramuscular $Given 9/13/24 3203)       Assessments & Plan   I have reviewed the findings, diagnosis, plan and need  for follow up with the patient.    Discharge Medication List as of 9/13/2024  7:33 AM        START taking these medications    Details   HYDROcodone-acetaminophen (NORCO) 5-325 MG tablet Take 1 tablet by mouth every 6 hours as needed for pain., Disp-12 tablet, R-0, E-Prescribe      LORazepam (ATIVAN) 1 MG tablet Take 1 tablet (1 mg) by mouth every 6 hours as needed for muscle spasms., Disp-8 tablet, R-0, E-Prescribe      methylPREDNISolone (MEDROL DOSEPAK) 4 MG tablet therapy pack Follow package instructions, Disp-21 tablet, R-0, E-Prescribe             Final diagnoses:   Acute bilateral low back pain with bilateral sciatica     Disposition: Patient discharged home in stable condition.  Plan as above.  Return for concerns.     Note: Chart documentation done in part with Dragon Voice Recognition software. Although reviewed after completion, some word and grammatical errors may remain.   9/13/2024   [facility] EMERGENCY DEPT       Jazzy Villa MD  09/14/24 0218

## 2024-09-13 NOTE — ED TRIAGE NOTES
Pt comes in today with c/o lower back pain. Hx of herniated discs w/out surgery or correction. Pt states that this pain has been present since last Sunday. Was lifting tress last Saturday which may have caused this. No pharmacological intervention     Triage Assessment (Adult)       Row Name 09/13/24 0652          Triage Assessment    Airway WDL WDL        Respiratory WDL    Respiratory WDL WDL        Skin Circulation/Temperature WDL    Skin Circulation/Temperature WDL WDL        Cardiac WDL    Cardiac WDL WDL        Peripheral/Neurovascular WDL    Peripheral Neurovascular WDL WDL        Cognitive/Neuro/Behavioral WDL    Cognitive/Neuro/Behavioral WDL WDL

## 2024-09-16 ENCOUNTER — PATIENT OUTREACH (OUTPATIENT)
Dept: FAMILY MEDICINE | Facility: CLINIC | Age: 39
End: 2024-09-16
Payer: COMMERCIAL

## 2024-09-16 NOTE — TELEPHONE ENCOUNTER
"Transitions of Care Outreach  Chief Complaint   Patient presents with    ER F/U       Most Recent Admission Date: 9/13/2024   Most Recent Admission Diagnosis:      Most Recent Discharge Date: 9/13/2024   Most Recent Discharge Diagnosis: Acute bilateral low back pain with bilateral sciatica - M54.42, M54.41     Transitions of Care Assessment    Discharge Assessment  How are you doing now that you are home?: \"I am still here\"  How are your symptoms? (Red Flag symptoms escalate to triage hotline per guidelines): Improved  Do you know how to contact your clinic care team if you have future questions or changes to your health status? : Yes  Does the patient have their discharge instructions? : Yes  Does the patient have questions regarding their discharge instructions? : No  Were you started on any new medications or were there changes to any of your previous medications? : Yes  Does the patient have all of their medications?: Yes  Do you have questions regarding any of your medications? : No  Do you have all of your needed medical supplies or equipment (DME)?  (i.e. oxygen tank, CPAP, cane, etc.): Yes    Follow up Plan     Discharge Follow-Up  Discharge follow up appointment scheduled in alignment with recommended follow up timeframe or Transitions of Risk Category? (Low = within 30 days; Moderate= within 14 days; High= within 7 days): No  Patient's follow up appointment not scheduled: Patient declined scheduling support. Education on the importance of transitions of care follow up. Provided scheduling phone number.    Future Appointments   Date Time Provider Department Center   11/1/2024 11:30 AM [facility]z [facility]N [facility]       Outpatient Plan as outlined on AVS reviewed with patient.    For any urgent concerns, please contact our 24 hour nurse triage line: [phone] ([phone])       Yaima Clark RN      "

## 2024-09-24 DIAGNOSIS — E11.65 TYPE 2 DIABETES MELLITUS WITH HYPERGLYCEMIA, WITHOUT LONG-TERM CURRENT USE OF INSULIN (H): ICD-10-CM

## 2024-10-02 ENCOUNTER — MYC MEDICAL ADVICE (OUTPATIENT)
Dept: FAMILY MEDICINE | Facility: CLINIC | Age: 39
End: 2024-10-02
Payer: COMMERCIAL

## 2024-10-02 ENCOUNTER — TELEPHONE (OUTPATIENT)
Dept: FAMILY MEDICINE | Facility: CLINIC | Age: 39
End: 2024-10-02
Payer: COMMERCIAL

## 2024-10-02 DIAGNOSIS — E11.65 TYPE 2 DIABETES MELLITUS WITH HYPERGLYCEMIA, WITHOUT LONG-TERM CURRENT USE OF INSULIN (H): ICD-10-CM

## 2024-10-02 NOTE — TELEPHONE ENCOUNTER
Patient Quality Outreach    Patient is due for the following:   Diabetes -  Diabetic Follow-Up Visit    Next Steps:   Schedule a office visit for diabetes     Type of outreach:    Sent letter.      Questions for provider review:    None           Lynn Hurd MA

## 2024-10-02 NOTE — LETTER
October 2, 2024    To  Larry Bangura  [address] [facility] [address]    Your team at [facility] cares about your health. We have reviewed your chart and based on our findings; we are making the following recommendations to better manage your health.     You are in particular need of attention regarding the following:     Schedule a DIABETIC FOLLOW UP appointment for Office Visit. Patients with diabetes should see their provider regularly.  Schedule a DIABETIC EYE EXAM.  This exam is done with an optometrist, annually. You can schedule this appointment with your eye doctor.  If you need a referral, please let us know.    If you have already completed these items, please contact the clinic via phone or   [employer] so your care team can review and update your records. Thank you for   choosing [facility] for your healthcare needs. For any questions,   concerns, or to schedule an appointment please contact our clinic.    Healthy Regards,      Your [facility] Care Team

## 2024-10-02 NOTE — TELEPHONE ENCOUNTER
Patient calling. Asking for refill of Mounjaro. Patient believes he should be increasing his dose. Last dose was 10MG      Preferred Pharmacy:   [facility]         Could we send this information to you in [facility] or would you prefer to receive a phone call?:   Patient would prefer a phone call   Okay to leave a detailed message?: Yes at Home number on file [phone] (home)

## 2024-10-02 NOTE — TELEPHONE ENCOUNTER
Carlos FELIX [place] [facility] (supporting Julie Behrendt, DANNIELLE)29 minutes ago (1:16 PM)       Tolerating it just fine ready for the next dose up. I think I m down 10 pounds from last time. Blood sugar is normal

## 2024-10-30 ENCOUNTER — TELEPHONE (OUTPATIENT)
Dept: FAMILY MEDICINE | Facility: CLINIC | Age: 39
End: 2024-10-30
Payer: COMMERCIAL

## 2024-10-30 DIAGNOSIS — E11.65 TYPE 2 DIABETES MELLITUS WITH HYPERGLYCEMIA, WITHOUT LONG-TERM CURRENT USE OF INSULIN (H): ICD-10-CM

## 2024-10-30 NOTE — TELEPHONE ENCOUNTER
Patient requesting refill on mounjaro 12.5 mg /.5ml or next dose; (having difficult pending medication. )    Patient reports not concerns or problems at current dose of mounjaro 12.5 mg     Next injection will be Friday or Saturday.     [facility] phillips.     Sarita DOBSON  Station

## 2025-01-01 ENCOUNTER — TRANSFERRED RECORDS (OUTPATIENT)
Dept: MULTI SPECIALTY CLINIC | Facility: CLINIC | Age: 40
End: 2025-01-01

## 2025-01-01 LAB — RETINOPATHY: NORMAL

## 2025-01-04 ENCOUNTER — HEALTH MAINTENANCE LETTER (OUTPATIENT)
Age: 40
End: 2025-01-04

## 2025-01-22 ENCOUNTER — OFFICE VISIT (OUTPATIENT)
Dept: URGENT CARE | Facility: URGENT CARE | Age: 40
End: 2025-01-22
Payer: COMMERCIAL

## 2025-01-22 VITALS
OXYGEN SATURATION: 99 % | WEIGHT: 315 LBS | SYSTOLIC BLOOD PRESSURE: 154 MMHG | BODY MASS INDEX: 44.63 KG/M2 | TEMPERATURE: 97.5 F | HEART RATE: 84 BPM | RESPIRATION RATE: 16 BRPM | DIASTOLIC BLOOD PRESSURE: 92 MMHG

## 2025-01-22 DIAGNOSIS — M25.512 ACUTE PAIN OF LEFT SHOULDER: Primary | ICD-10-CM

## 2025-01-22 PROCEDURE — 99213 OFFICE O/P EST LOW 20 MIN: CPT | Performed by: NURSE PRACTITIONER

## 2025-01-22 RX ORDER — HYDROCODONE BITARTRATE AND ACETAMINOPHEN 5; 325 MG/1; MG/1
1 TABLET ORAL EVERY 6 HOURS PRN
Qty: 10 TABLET | Refills: 0 | Status: SHIPPED | OUTPATIENT
Start: 2025-01-22 | End: 2025-01-25

## 2025-01-22 NOTE — PROGRESS NOTES
SUBJECTIVE:   Larry Bangura is a 39 year old male presenting with a chief complaint of   Chief Complaint   Patient presents with    Shoulder Pain     Left shoulder pain x 2 days       Past Medical History:   Diagnosis Date    HTN (hypertension) 9/10/2013    Morbid obesity (H)      Family History   Problem Relation Age of Onset    Diabetes Father     Cancer Sister     C.A.D. Maternal Grandfather      Current Outpatient Medications   Medication Sig Dispense Refill    acyclovir (ZOVIRAX) 400 MG tablet Take 1 tablet (400 mg) by mouth every 12 hours 180 tablet 3    lisinopril (ZESTRIL) 20 MG tablet Take 1 tablet (20 mg) by mouth daily 90 tablet 3    metFORMIN (GLUCOPHAGE) 1000 MG tablet TAKE 1 TABLET BY MOUTH TWICE DAILY WITH MEALS 180 tablet 0    methylphenidate (RITALIN LA) 20 MG 24 hr capsule Take 1 capsule (20 mg) by mouth 2 times daily. 14 capsule 0    methylphenidate (RITALIN LA) 20 MG 24 hr capsule Take 1 capsule (20 mg) by mouth 2 times daily 60 capsule 0    methylphenidate (RITALIN LA) 20 MG 24 hr capsule Take 1 capsule (20 mg) by mouth 2 times daily 60 capsule 0    methylphenidate (RITALIN) 20 MG tablet Take 1 tablet (20 mg) by mouth daily. 7 tablet 0    methylphenidate (RITALIN) 20 MG tablet Take 1 tablet (20 mg) by mouth daily 30 tablet 0    methylphenidate (RITALIN) 20 MG tablet Take 1 tablet (20 mg) by mouth daily 30 tablet 0    tirzepatide (MOUNJARO) 10 MG/0.5ML pen Inject 10 mg subcutaneously every 7 days. 2 mL 0    tirzepatide (MOUNJARO) 12.5 MG/0.5ML pen Inject 12.5 mg subcutaneously every 7 days. 2 mL 0    traZODone (DESYREL) 50 MG tablet Take 1 tablet (50 mg) by mouth at bedtime (Patient taking differently: Take 50 mg by mouth at bedtime. prn) 90 tablet 3    albuterol (PROAIR HFA/PROVENTIL HFA/VENTOLIN HFA) 108 (90 Base) MCG/ACT inhaler Inhale 2 puffs into the lungs every 4 hours as needed for shortness of breath / dyspnea or wheezing (Patient not taking: Reported on 1/22/2025) 18 g 0     cyclobenzaprine (FLEXERIL) 10 MG tablet Take 1 tablet (10 mg) by mouth 3 times daily as needed for muscle spasms (Patient not taking: Reported on 1/22/2025) 30 tablet 3    FLUoxetine (PROZAC) 20 MG capsule Take 1 capsule (20 mg) by mouth daily (Patient not taking: Reported on 1/22/2025) 90 capsule 2    LORazepam (ATIVAN) 1 MG tablet Take 1 tablet (1 mg) by mouth every 6 hours as needed for muscle spasms. (Patient not taking: Reported on 1/22/2025) 8 tablet 0    methylPREDNISolone (MEDROL DOSEPAK) 4 MG tablet therapy pack Follow package instructions (Patient not taking: Reported on 1/22/2025) 21 tablet 0    naproxen (NAPROSYN) 500 MG tablet Take 1 tablet (500 mg) by mouth 2 times daily (with meals) (Patient not taking: Reported on 1/22/2025) 30 tablet 0    ondansetron (ZOFRAN ODT) 4 MG ODT tab Take 1 tablet (4 mg) by mouth every 8 hours as needed for nausea. (Patient not taking: Reported on 1/22/2025) 30 tablet 0    Tirzepatide 15 MG/0.5ML SOAJ Inject 0.5 mLs (15 mg) subcutaneously once a week. (Patient not taking: Reported on 1/22/2025) 6 mL 1     Social History     Tobacco Use    Smoking status: Never    Smokeless tobacco: Never    Tobacco comments:     Pt smokes marijuana.   Substance Use Topics    Alcohol use: No     Alcohol/week: 0.0 standard drinks of alcohol       OBJECTIVE  BP (!) 154/92   Pulse 84   Temp 97.5  F (36.4  C) (Tympanic)   Resp 16   Wt (!) 145.2 kg (320 lb)   SpO2 99%   BMI 44.63 kg/m      Physical Exam        ASSESSMENT:        PLAN:  Continue ibuprofen 800mg every 6 hours as needed for pain.  Norco 1 every 6 hours as needed for severe pain.  Follow up with your primary if pain persists.  A referral to Ortho has been placed today; follow up with them for recheck and next steps if not improving as expected.       HYDROcodone-acetaminophen (NORCO) 5-325 MG tablet; Take 1 tablet by mouth every 6 hours as needed for severe pain.  Dispense: 10 tablet; Refill: 0      PLAN:  Continue ibuprofen 800mg every 6 hours as needed for pain.  Norco 1 every 6 hours as needed for severe pain.  Follow up with your primary if pain persists.  A referral to Ortho has been placed today; follow up with them for recheck and next steps if not improving as expected.

## 2025-01-22 NOTE — PATIENT INSTRUCTIONS
Continue ibuprofen 800mg every 6 hours as needed for pain.  Norco 1 every 6 hours as needed for severe pain.  Follow up with your primary if pain persists.  A referral to Ortho has been placed today; follow up with them for recheck and next steps if not improving as expected.

## 2025-01-23 ENCOUNTER — PATIENT OUTREACH (OUTPATIENT)
Dept: CARE COORDINATION | Facility: CLINIC | Age: 40
End: 2025-01-23
Payer: COMMERCIAL

## 2025-01-27 ENCOUNTER — PATIENT OUTREACH (OUTPATIENT)
Dept: CARE COORDINATION | Facility: CLINIC | Age: 40
End: 2025-01-27
Payer: COMMERCIAL

## 2025-02-03 DIAGNOSIS — F90.2 ADHD (ATTENTION DEFICIT HYPERACTIVITY DISORDER), COMBINED TYPE: ICD-10-CM

## 2025-02-03 RX ORDER — METHYLPHENIDATE HYDROCHLORIDE 20 MG/1
20 TABLET ORAL DAILY
Qty: 30 TABLET | Refills: 0 | OUTPATIENT
Start: 2025-02-03

## 2025-02-03 RX ORDER — METHYLPHENIDATE HYDROCHLORIDE 20 MG/1
20 CAPSULE, EXTENDED RELEASE ORAL 2 TIMES DAILY
Qty: 60 CAPSULE | Refills: 0 | Status: SHIPPED | OUTPATIENT
Start: 2025-02-03

## 2025-02-03 RX ORDER — METHYLPHENIDATE HYDROCHLORIDE 20 MG/1
20 CAPSULE, EXTENDED RELEASE ORAL 2 TIMES DAILY
Qty: 60 CAPSULE | Refills: 0 | OUTPATIENT
Start: 2025-02-03

## 2025-02-03 RX ORDER — METHYLPHENIDATE HYDROCHLORIDE 20 MG/1
20 TABLET ORAL DAILY
Qty: 7 TABLET | Refills: 0 | OUTPATIENT
Start: 2025-02-03

## 2025-02-03 RX ORDER — METHYLPHENIDATE HYDROCHLORIDE 20 MG/1
20 CAPSULE, EXTENDED RELEASE ORAL 2 TIMES DAILY
Qty: 14 CAPSULE | Refills: 0 | OUTPATIENT
Start: 2025-02-03

## 2025-02-03 RX ORDER — METHYLPHENIDATE HYDROCHLORIDE 20 MG/1
20 TABLET ORAL DAILY
Qty: 30 TABLET | Refills: 0 | Status: SHIPPED | OUTPATIENT
Start: 2025-02-03

## 2025-02-03 NOTE — TELEPHONE ENCOUNTER
Notified patient. Scheduled patient    OFFICE VISIT at 11:00 AM (30 min)Arrive by 10:40 AM  Wednesday February 12, 2025  Appointment Provider:Mata Gonzalez PA-C in [facility]    Virgen Waters/ Patient

## 2025-02-03 NOTE — TELEPHONE ENCOUNTER
Medication Question or Refill    Contacts       Contact Date/Time Type Contact Phone/Fax    02/03/2025 11:52 AM CST Phone (Incoming) Carlos Bangura (Self) [phone] (H)            What medication are you calling about (include dose and sig)?: Pending Prescriptions:                       Disp   Refills    methylphenidate (RITALIN LA) 20 MG 24 hr *60 cap*0            Sig: Take 1 capsule (20 mg) by mouth 2 times daily.    methylphenidate (RITALIN LA) 20 MG 24 hr *60 cap*0            Sig: Take 1 capsule (20 mg) by mouth 2 times daily.    methylphenidate (RITALIN LA) 20 MG 24 hr *14 cap*0            Sig: Take 1 capsule (20 mg) by mouth 2 times daily.    methylphenidate (RITALIN) 20 MG tablet    30 tab*0            Sig: Take 1 tablet (20 mg) by mouth daily.    methylphenidate (RITALIN) 20 MG tablet    30 tab*0            Sig: Take 1 tablet (20 mg) by mouth daily.    methylphenidate (RITALIN) 20 MG tablet    7 tabl*0            Sig: Take 1 tablet (20 mg) by mouth daily.     Preferred Pharmacy:  [id number] - [place], [place] - [address] [place] [address] AT [facility] & [facility] [address]  [address] [facility] [address]  Phone: [phone] Fax: [phone]      Controlled Substance Agreement on file:   CSA -- Patient Level:    CSA: None found at the patient level.       Who prescribed the medication?: Mata Gonzalez PA-C     Do you need a refill? Yes    Could we send this information to you in [facility] or would you prefer to receive a phone call?:   Patient would prefer a phone call   Okay to leave a detailed message?: Yes at Home number on file [phone] (home)    Virgen Waters/ Patient

## 2025-02-07 ENCOUNTER — TELEPHONE (OUTPATIENT)
Dept: FAMILY MEDICINE | Facility: CLINIC | Age: 40
End: 2025-02-07
Payer: COMMERCIAL

## 2025-02-07 NOTE — TELEPHONE ENCOUNTER
Prior Authorization Retail Medication Request    Medication/Dose: Methylphenidate LA 20mg  Diagnosis and ICD code (if different than what is on RX):    New/renewal/insurance change PA/secondary ins. PA:  Previously Tried and Failed:    Rationale:      Insurance

## 2025-02-08 NOTE — TELEPHONE ENCOUNTER
PA Initiation    Medication: METHYLPHENIDATE HCL ER (LA) 20 MG PO CP24  Insurance Company: [email or web address] [place] - Phone [phone] Fax [phone]  Pharmacy Filling the Rx: [id number] - [place], [place] - [address] [place] [address] AT [facility] & [address] [facility] [address]  Filling Pharmacy Phone:    Filling Pharmacy Fax:    Start Date: 2/7/2025

## 2025-02-10 NOTE — TELEPHONE ENCOUNTER
Prior Authorization Approval    Medication: METHYLPHENIDATE HCL ER (LA) 20 MG PO CP24  Authorization Effective Date: 1/11/2025  Authorization Expiration Date: 2/10/2026  Approved Dose/Quantity: 60/30  Reference #: [id number]   Insurance Company: [employer] [place] - Phone [phone] Fax [phone]  Expected CoPay: $    CoPay Card Available:      Financial Assistance Needed:   Which Pharmacy is filling the prescription: [facility] #[id number] - [address] AT [facility] & [address]  Pharmacy Notified: yES  Patient Notified: yES

## 2025-03-05 ENCOUNTER — VIRTUAL VISIT (OUTPATIENT)
Dept: FAMILY MEDICINE | Facility: CLINIC | Age: 40
End: 2025-03-05
Payer: COMMERCIAL

## 2025-03-05 DIAGNOSIS — G47.33 OBSTRUCTIVE SLEEP APNEA SYNDROME: ICD-10-CM

## 2025-03-05 DIAGNOSIS — F90.2 ADHD (ATTENTION DEFICIT HYPERACTIVITY DISORDER), COMBINED TYPE: ICD-10-CM

## 2025-03-05 DIAGNOSIS — Z83.2 FAMILY HISTORY OF ITP: ICD-10-CM

## 2025-03-05 DIAGNOSIS — I10 ESSENTIAL HYPERTENSION WITH GOAL BLOOD PRESSURE LESS THAN 140/90: ICD-10-CM

## 2025-03-05 DIAGNOSIS — E11.9 TYPE 2 DIABETES MELLITUS WITHOUT COMPLICATION, WITHOUT LONG-TERM CURRENT USE OF INSULIN (H): Primary | ICD-10-CM

## 2025-03-05 PROCEDURE — 98006 SYNCH AUDIO-VIDEO EST MOD 30: CPT | Performed by: PHYSICIAN ASSISTANT

## 2025-03-05 RX ORDER — LISINOPRIL 20 MG/1
20 TABLET ORAL DAILY
Qty: 90 TABLET | Refills: 0 | Status: SHIPPED | OUTPATIENT
Start: 2025-03-05

## 2025-03-05 RX ORDER — METHYLPHENIDATE HYDROCHLORIDE 20 MG/1
20 CAPSULE, EXTENDED RELEASE ORAL 2 TIMES DAILY
Qty: 60 CAPSULE | Refills: 0 | Status: SHIPPED | OUTPATIENT
Start: 2025-03-05

## 2025-03-05 RX ORDER — METHYLPHENIDATE HYDROCHLORIDE 20 MG/1
20 TABLET ORAL DAILY
Qty: 30 TABLET | Refills: 0 | Status: SHIPPED | OUTPATIENT
Start: 2025-03-05

## 2025-03-05 NOTE — PROGRESS NOTES
"Carlos is a 39 year old who is being evaluated via a billable video visit.    How would you like to obtain your AVS? friendfund   If the video visit is dropped, the invitation should be resent by: Text to cell phone: [phone] logged into [facility]   Will anyone else be joining your video visit? No      Assessment & Plan   Type 2 diabetes mellitus without complication, without long-term current use of insulin (H)  Needs to complete lab work. Refilled for 3 months. Needs to complete lab work for longer refills.   - metFORMIN (GLUCOPHAGE) 1000 MG tablet; TAKE 1 TABLET BY MOUTH TWICE DAILY WITH MEALS  - tirzepatide (MOUNJARO) 15 MG/0.5ML SOAJ auto-injector pen; Inject 0.5 mLs (15 mg) subcutaneously once a week.    Essential hypertension with goal blood pressure less than 140/90  He has not been taking this at home. Similar to above, will only refill for 3 months. Needs BP log/nurse visit as well as lab work for longer refill   - lisinopril (ZESTRIL) 20 MG tablet; Take 1 tablet (20 mg) by mouth daily.    ADHD (attention deficit hyperactivity disorder), combined type  Needs drug testing and BP rechecked. Will refill for 1 month.  reassuring.   - methylphenidate (RITALIN LA) 20 MG 24 hr capsule; Take 1 capsule (20 mg) by mouth 2 times daily.  - methylphenidate (RITALIN) 20 MG tablet; Take 1 tablet (20 mg) by mouth daily.    Family history of ITP  Per patient request.   - CBC with platelets; Future    Obstructive sleep apnea syndrome  Supplies ordered.   - CPAP/Comprehensive Sleep Order    The longitudinal plan of care for the diagnosis(es)/condition(s) as documented were addressed during this visit. Due to the added complexity in care, I will continue to support Carlos in the subsequent management and with ongoing continuity of care.     BMI  Estimated body mass index is 44.63 kg/m  as calculated from the following:    Height as of 9/13/24: 1.803 m (5' 11\").    Weight as of 1/22/25: 145.2 kg (320 lb).     Subjective   Carlos " is a 39 year old, presenting for the following health issues:  Weight Loss, Hypertension, Diabetes, and A.D.H.D        3/5/2025     1:18 PM   Additional Questions   Roomed by lexi   Accompanied by self     HPI      Diabetes Follow-up    How often are you checking your blood sugar? Not at all  What concerns do you have today about your diabetes? None   Do you have any of these symptoms? (Select all that apply)  No numbness or tingling in feet.  No redness, sores or blisters on feet.  No complaints of excessive thirst.  No reports of blurry vision.  No significant changes to weight.  Have you had a diabetic eye exam in the last 12 months? No        BP Readings from Last 2 Encounters:   01/22/25 (!) 154/92   09/13/24 (!) 140/95     Hemoglobin A1C (%)   Date Value   05/22/2024 6.1 (H)   12/07/2023 10.4 (H)   09/21/2020 7.7 (H)   12/11/2019 6.5 (H)     LDL Cholesterol Calculated (mg/dL)   Date Value   05/22/2024 102 (H)   02/22/2023 116 (H)   09/21/2020 76   07/15/2016 100 (H)             Hypertension Follow-up    Do you check your blood pressure regularly outside of the clinic? No   Are you following a low salt diet? No  Are your blood pressures ever more than 140 on the top number (systolic) OR more   than 90 on the bottom number (diastolic), for example 140/90? Not checking at home     Medication Followup of ADHD   Taking Medication as prescribed: yes  Side Effects:  None  Medication Helping Symptoms:  yes      Review of Systems  See HPI       Objective    Vitals - Patient Reported  Weight (Patient Reported): 145.2 kg (320 lb)    Vitals:  No vitals were obtained today due to virtual visit.    Physical Exam   GENERAL: alert and no distress  EYES: Eyes grossly normal to inspection.  No discharge or erythema, or obvious scleral/conjunctival abnormalities.  RESP: No audible wheeze, cough, or visible cyanosis.    SKIN: Visible skin clear. No significant rash, abnormal pigmentation or lesions.  NEURO: Cranial nerves  grossly intact.  Mentation and speech appropriate for age.  PSYCH: Appropriate affect, tone, and pace of words       Video-Visit Details  Type of service:  Video Visit   Originating Location (pt. Location): Home    Distant Location (provider location):  On-site  Platform used for Video Visit: Jenny  Signed Electronically by: Mata Gonzalez PA-C

## 2025-03-08 ENCOUNTER — DOCUMENTATION ONLY (OUTPATIENT)
Dept: FAMILY MEDICINE | Facility: CLINIC | Age: 40
End: 2025-03-08

## 2025-03-08 ENCOUNTER — LAB (OUTPATIENT)
Dept: LAB | Facility: CLINIC | Age: 40
End: 2025-03-08
Attending: PHYSICIAN ASSISTANT
Payer: COMMERCIAL

## 2025-03-08 DIAGNOSIS — Z83.2 FAMILY HISTORY OF ITP: ICD-10-CM

## 2025-03-08 DIAGNOSIS — Z98.52 STATUS POST VASECTOMY: ICD-10-CM

## 2025-03-08 DIAGNOSIS — F90.2 ADHD (ATTENTION DEFICIT HYPERACTIVITY DISORDER), COMBINED TYPE: ICD-10-CM

## 2025-03-08 DIAGNOSIS — E11.9 TYPE 2 DIABETES MELLITUS WITHOUT COMPLICATION, WITHOUT LONG-TERM CURRENT USE OF INSULIN (H): Primary | ICD-10-CM

## 2025-03-08 LAB
ERYTHROCYTE [DISTWIDTH] IN BLOOD BY AUTOMATED COUNT: 13.1 % (ref 10–15)
EST. AVERAGE GLUCOSE BLD GHB EST-MCNC: 123 MG/DL
HBA1C MFR BLD: 5.9 % (ref 0–5.6)
HCT VFR BLD AUTO: 45.7 % (ref 40–53)
HGB BLD-MCNC: 14.9 G/DL (ref 13.3–17.7)
HOLD SPECIMEN: NORMAL
HOLD SPECIMEN: NORMAL
MCH RBC QN AUTO: 27.8 PG (ref 26.5–33)
MCHC RBC AUTO-ENTMCNC: 32.6 G/DL (ref 31.5–36.5)
MCV RBC AUTO: 85 FL (ref 78–100)
PLATELET # BLD AUTO: 390 10E3/UL (ref 150–450)
RBC # BLD AUTO: 5.36 10E6/UL (ref 4.4–5.9)
SEMEN ANALYSIS P VAS PNL: NORMAL
WBC # BLD AUTO: 9.4 10E3/UL (ref 4–11)

## 2025-03-08 PROCEDURE — 85027 COMPLETE CBC AUTOMATED: CPT

## 2025-03-08 PROCEDURE — 36415 COLL VENOUS BLD VENIPUNCTURE: CPT

## 2025-03-08 PROCEDURE — 80053 COMPREHEN METABOLIC PANEL: CPT

## 2025-03-08 PROCEDURE — 80061 LIPID PANEL: CPT

## 2025-03-08 PROCEDURE — 89321 SEMEN ANAL SPERM DETECTION: CPT

## 2025-03-08 PROCEDURE — 83036 HEMOGLOBIN GLYCOSYLATED A1C: CPT

## 2025-03-08 NOTE — PROGRESS NOTES
Patient was in for labs on 3/8/2025 and thought he needed additional labs.  There are labs pended from January 2025.  Please sign those orders or contact patient ASAP.  Thanks!

## 2025-03-10 LAB
ALBUMIN SERPL BCG-MCNC: 4.5 G/DL (ref 3.5–5.2)
ALP SERPL-CCNC: 66 U/L (ref 40–150)
ALT SERPL W P-5'-P-CCNC: 26 U/L (ref 0–70)
ANION GAP SERPL CALCULATED.3IONS-SCNC: 13 MMOL/L (ref 7–15)
AST SERPL W P-5'-P-CCNC: 25 U/L (ref 0–45)
BILIRUB SERPL-MCNC: 0.4 MG/DL
BUN SERPL-MCNC: 9.1 MG/DL (ref 6–20)
CALCIUM SERPL-MCNC: 9.8 MG/DL (ref 8.8–10.4)
CHLORIDE SERPL-SCNC: 101 MMOL/L (ref 98–107)
CHOLEST SERPL-MCNC: 154 MG/DL
CREAT SERPL-MCNC: 0.83 MG/DL (ref 0.67–1.17)
EGFRCR SERPLBLD CKD-EPI 2021: >90 ML/MIN/1.73M2
GLUCOSE SERPL-MCNC: 100 MG/DL (ref 70–99)
HCO3 SERPL-SCNC: 26 MMOL/L (ref 22–29)
HDLC SERPL-MCNC: 44 MG/DL
LDLC SERPL CALC-MCNC: 92 MG/DL
NONHDLC SERPL-MCNC: 110 MG/DL
POTASSIUM SERPL-SCNC: 4.1 MMOL/L (ref 3.4–5.3)
PROT SERPL-MCNC: 7.7 G/DL (ref 6.4–8.3)
SODIUM SERPL-SCNC: 140 MMOL/L (ref 135–145)
TRIGL SERPL-MCNC: 92 MG/DL

## 2025-03-24 ENCOUNTER — TELEPHONE (OUTPATIENT)
Dept: FAMILY MEDICINE | Facility: CLINIC | Age: 40
End: 2025-03-24
Payer: COMMERCIAL

## 2025-03-24 NOTE — TELEPHONE ENCOUNTER
Patient Quality Outreach    Patient is due for the following:   Hypertension -  BP check    Action(s) Taken:   Sent message to patient     Type of outreach:    Sent [employer] message.    Questions for provider review:    None         Kamille Freitas CMA  Chart routed to Care Team.

## 2025-04-01 ENCOUNTER — HOSPITAL ENCOUNTER (EMERGENCY)
Facility: CLINIC | Age: 40
Discharge: HOME | End: 2025-04-01
Attending: NURSE PRACTITIONER | Admitting: NURSE PRACTITIONER
Payer: COMMERCIAL

## 2025-04-01 ENCOUNTER — APPOINTMENT (OUTPATIENT)
Dept: CT IMAGING | Facility: CLINIC | Age: 40
End: 2025-04-01
Attending: NURSE PRACTITIONER
Payer: COMMERCIAL

## 2025-04-01 ENCOUNTER — TELEPHONE (OUTPATIENT)
Dept: FAMILY MEDICINE | Facility: CLINIC | Age: 40
End: 2025-04-01
Payer: COMMERCIAL

## 2025-04-01 VITALS
WEIGHT: 314 LBS | HEIGHT: 71 IN | BODY MASS INDEX: 43.96 KG/M2 | TEMPERATURE: 98.6 F | HEART RATE: 94 BPM | SYSTOLIC BLOOD PRESSURE: 132 MMHG | OXYGEN SATURATION: 97 % | DIASTOLIC BLOOD PRESSURE: 75 MMHG | RESPIRATION RATE: 20 BRPM

## 2025-04-01 DIAGNOSIS — E86.0 DEHYDRATION: ICD-10-CM

## 2025-04-01 DIAGNOSIS — R19.7 DIARRHEA, UNSPECIFIED TYPE: ICD-10-CM

## 2025-04-01 DIAGNOSIS — R11.2 NAUSEA AND VOMITING, UNSPECIFIED VOMITING TYPE: ICD-10-CM

## 2025-04-01 LAB
ALBUMIN SERPL BCG-MCNC: 4.3 G/DL (ref 3.5–5.2)
ALP SERPL-CCNC: 72 U/L (ref 40–150)
ALT SERPL W P-5'-P-CCNC: 22 U/L (ref 0–70)
ANION GAP SERPL CALCULATED.3IONS-SCNC: 9 MMOL/L (ref 7–15)
AST SERPL W P-5'-P-CCNC: 19 U/L (ref 0–45)
BASOPHILS # BLD AUTO: 0 10E3/UL (ref 0–0.2)
BASOPHILS NFR BLD AUTO: 0 %
BILIRUB SERPL-MCNC: 0.8 MG/DL
BUN SERPL-MCNC: 9.5 MG/DL (ref 6–20)
CALCIUM SERPL-MCNC: 9.4 MG/DL (ref 8.8–10.4)
CHLORIDE SERPL-SCNC: 103 MMOL/L (ref 98–107)
CREAT SERPL-MCNC: 0.76 MG/DL (ref 0.67–1.17)
EGFRCR SERPLBLD CKD-EPI 2021: >90 ML/MIN/1.73M2
EOSINOPHIL # BLD AUTO: 0.2 10E3/UL (ref 0–0.7)
EOSINOPHIL NFR BLD AUTO: 2 %
ERYTHROCYTE [DISTWIDTH] IN BLOOD BY AUTOMATED COUNT: 13.7 % (ref 10–15)
GLUCOSE SERPL-MCNC: 113 MG/DL (ref 70–99)
HCO3 SERPL-SCNC: 27 MMOL/L (ref 22–29)
HCT VFR BLD AUTO: 51.3 % (ref 40–53)
HGB BLD-MCNC: 17.3 G/DL (ref 13.3–17.7)
HOLD SPECIMEN: NORMAL
HOLD SPECIMEN: NORMAL
IMM GRANULOCYTES # BLD: 0.1 10E3/UL
IMM GRANULOCYTES NFR BLD: 0 %
LYMPHOCYTES # BLD AUTO: 2 10E3/UL (ref 0.8–5.3)
LYMPHOCYTES NFR BLD AUTO: 14 %
MAGNESIUM SERPL-MCNC: 1.7 MG/DL (ref 1.7–2.3)
MCH RBC QN AUTO: 28.2 PG (ref 26.5–33)
MCHC RBC AUTO-ENTMCNC: 33.7 G/DL (ref 31.5–36.5)
MCV RBC AUTO: 84 FL (ref 78–100)
MONOCYTES # BLD AUTO: 1.2 10E3/UL (ref 0–1.3)
MONOCYTES NFR BLD AUTO: 9 %
NEUTROPHILS # BLD AUTO: 10.4 10E3/UL (ref 1.6–8.3)
NEUTROPHILS NFR BLD AUTO: 75 %
NRBC # BLD AUTO: 0 10E3/UL
NRBC BLD AUTO-RTO: 0 /100
PLATELET # BLD AUTO: 389 10E3/UL (ref 150–450)
POTASSIUM SERPL-SCNC: 4.5 MMOL/L (ref 3.4–5.3)
PROT SERPL-MCNC: 7.7 G/DL (ref 6.4–8.3)
RBC # BLD AUTO: 6.14 10E6/UL (ref 4.4–5.9)
SODIUM SERPL-SCNC: 139 MMOL/L (ref 135–145)
WBC # BLD AUTO: 13.9 10E3/UL (ref 4–11)

## 2025-04-01 PROCEDURE — 96374 THER/PROPH/DIAG INJ IV PUSH: CPT | Mod: 59 | Performed by: NURSE PRACTITIONER

## 2025-04-01 PROCEDURE — 250N000009 HC RX 250: Performed by: NURSE PRACTITIONER

## 2025-04-01 PROCEDURE — 96361 HYDRATE IV INFUSION ADD-ON: CPT | Performed by: NURSE PRACTITIONER

## 2025-04-01 PROCEDURE — 84132 ASSAY OF SERUM POTASSIUM: CPT | Performed by: NURSE PRACTITIONER

## 2025-04-01 PROCEDURE — 250N000011 HC RX IP 250 OP 636: Performed by: NURSE PRACTITIONER

## 2025-04-01 PROCEDURE — 99284 EMERGENCY DEPT VISIT MOD MDM: CPT | Performed by: NURSE PRACTITIONER

## 2025-04-01 PROCEDURE — 82040 ASSAY OF SERUM ALBUMIN: CPT | Performed by: NURSE PRACTITIONER

## 2025-04-01 PROCEDURE — 74177 CT ABD & PELVIS W/CONTRAST: CPT

## 2025-04-01 PROCEDURE — 83735 ASSAY OF MAGNESIUM: CPT | Performed by: NURSE PRACTITIONER

## 2025-04-01 PROCEDURE — 36415 COLL VENOUS BLD VENIPUNCTURE: CPT | Performed by: NURSE PRACTITIONER

## 2025-04-01 PROCEDURE — 85004 AUTOMATED DIFF WBC COUNT: CPT | Performed by: NURSE PRACTITIONER

## 2025-04-01 PROCEDURE — 84155 ASSAY OF PROTEIN SERUM: CPT | Performed by: NURSE PRACTITIONER

## 2025-04-01 PROCEDURE — 85018 HEMOGLOBIN: CPT | Performed by: NURSE PRACTITIONER

## 2025-04-01 PROCEDURE — 99285 EMERGENCY DEPT VISIT HI MDM: CPT | Mod: 25 | Performed by: NURSE PRACTITIONER

## 2025-04-01 PROCEDURE — 258N000003 HC RX IP 258 OP 636: Performed by: NURSE PRACTITIONER

## 2025-04-01 RX ORDER — IOPAMIDOL 755 MG/ML
500 INJECTION, SOLUTION INTRAVASCULAR ONCE
Status: COMPLETED | OUTPATIENT
Start: 2025-04-01 | End: 2025-04-01

## 2025-04-01 RX ORDER — ONDANSETRON 2 MG/ML
4 INJECTION INTRAMUSCULAR; INTRAVENOUS ONCE
Status: COMPLETED | OUTPATIENT
Start: 2025-04-01 | End: 2025-04-01

## 2025-04-01 RX ADMIN — SODIUM CHLORIDE 100 ML: 9 INJECTION, SOLUTION INTRAVENOUS at 17:01

## 2025-04-01 RX ADMIN — SODIUM CHLORIDE 1000 ML: 0.9 INJECTION, SOLUTION INTRAVENOUS at 16:08

## 2025-04-01 RX ADMIN — IOPAMIDOL 100 ML: 755 INJECTION, SOLUTION INTRAVENOUS at 17:01

## 2025-04-01 RX ADMIN — ONDANSETRON 4 MG: 2 INJECTION, SOLUTION INTRAMUSCULAR; INTRAVENOUS at 16:09

## 2025-04-01 ASSESSMENT — ACTIVITIES OF DAILY LIVING (ADL)
ADLS_ACUITY_SCORE: 41
ADLS_ACUITY_SCORE: 41

## 2025-04-01 ASSESSMENT — COLUMBIA-SUICIDE SEVERITY RATING SCALE - C-SSRS
1. IN THE PAST MONTH, HAVE YOU WISHED YOU WERE DEAD OR WISHED YOU COULD GO TO SLEEP AND NOT WAKE UP?: NO
2. HAVE YOU ACTUALLY HAD ANY THOUGHTS OF KILLING YOURSELF IN THE PAST MONTH?: NO
6. HAVE YOU EVER DONE ANYTHING, STARTED TO DO ANYTHING, OR PREPARED TO DO ANYTHING TO END YOUR LIFE?: NO

## 2025-04-01 NOTE — TELEPHONE ENCOUNTER
Patient called the clinic and stated he has had vomiting and diarrhea since Thursday. He has not been able to take anything in my mouth. He knows he needs to be seen for IV fluids. Wants to know if he should go to an urgent care or ED. RN recommended ED. He will be seen at [facility].   Tiana Ctoa RN on 4/1/2025 at 2:08 PM

## 2025-04-01 NOTE — DISCHARGE INSTRUCTIONS
Frequent sips of fluids to stay hydrated.        Incidental finding of a right adrenal adenoma, this can be followed with your primary care provider.    Return for fever, worsening abdominal pain, vomiting and unable to keep fluids down, black or bloody stool, or any other symptoms of concern.

## 2025-04-01 NOTE — ED TRIAGE NOTES
Patient reports vomiting and diarrhea. Last emesis was yesterday. Now c/o dizzy. He is on monjouro.     Triage Assessment (Adult)       Row Name 04/01/25 7068          Triage Assessment    Airway WDL WDL        Respiratory WDL    Respiratory WDL WDL        Skin Circulation/Temperature WDL    Skin Circulation/Temperature WDL WDL

## 2025-04-02 NOTE — ED PROVIDER NOTES
History     Chief Complaint   Patient presents with    Nausea, Vomiting, & Diarrhea     HPI  Larry Bangura is a 39 year old male with history of class III severe obesity, T2DM, IRWIN, hypertension, ADHD, and esophageal reflux who presents for evaluation of nausea, vomiting, and diarrhea that started 4 to 5 days ago.  Initially was having frequent diarrhea episodes, continues to be loose stools but less frequent.  He is having intermittent vomiting and today was unable to keep any fluids down.  He is feeling a bit lightheaded.  He is on Mounjaro and notes frequent side effects with nausea and vomiting.  He does not check his blood sugars.  No questionable food or undercooked meat.  No fevers or chills.  No black or bloody stool.  No prior history of abdominal surgeries.    Allergies:  No Known Allergies    Problem List:    Patient Active Problem List    Diagnosis Date Noted    ADHD (attention deficit hyperactivity disorder), combined type 02/22/2023     Priority: Medium     Trying long acting ritalin. BID on 20mg given 10/23/23.  Had assessment at [facility], scanned in Epic.  Using 10mg of short acting prn some afternoons.   Due to shortages, 3 month supply of adderall 30 xrr given 1/24/24      Anxiety 09/14/2022     Priority: Medium    Hyperlipidemia LDL goal <100 10/17/2018     Priority: Medium     Statin at 35-40.  Same with ASA.        Herpes simplex infection of penis 10/03/2018     Priority: Medium    Esophageal reflux 12/03/2013     Priority: Medium    Essential hypertension 09/10/2013     Priority: Medium    Seasonal allergic rhinitis 08/07/2013     Priority: Medium    Type 2 diabetes mellitus without complication (H) 08/07/2013     Priority: Medium    Sleep apnea 08/31/2012     Priority: Medium     Severe IRWIN: PSG  6/2/2015 ( 414 lbs, , , adithya O2 51%.)  7/15/2016:Uses CPAP every day.      Class 3 severe obesity due to excess calories with serious comorbidity and body mass index  (BMI) of 50.0 to 59.9 in adult (H) 05/14/2012     Priority: Medium     New bariatric surgery consult Sept 2020  Weight 400 lbs.  15mg phentermine caused insomnia           Past Medical History:    Past Medical History:   Diagnosis Date    HTN (hypertension) 9/10/2013    Morbid obesity (H)        Past Surgical History:    Past Surgical History:   Procedure Laterality Date    ENDOSCOPIC RELEASE CARPAL TUNNEL  12/6/2013    Procedure: ENDOSCOPIC RELEASE CARPAL TUNNEL;  Right Hand Endoscopic Carpal Tunnel Release;  Surgeon: Kody Pittman MD;  Location: [facility] OR    ENDOSCOPIC RELEASE CARPAL TUNNEL  1/21/2014    Procedure: ENDOSCOPIC RELEASE CARPAL TUNNEL;  Left endoscopic carpal tunnel release, possible open;  Surgeon: Kody Pittman MD;  Location: [facility] OR       Family History:    Family History   Problem Relation Age of Onset    Diabetes Father     Cancer Sister     C.A.D. Maternal Grandfather        Social History:  Marital Status:   [2]  Social History     Tobacco Use    Smoking status: Never    Smokeless tobacco: Never    Tobacco comments:     Pt smokes marijuana.   Vaping Use    Vaping status: Former    Substances: THC    Devices: Disposable   Substance Use Topics    Alcohol use: No     Alcohol/week: 0.0 standard drinks of alcohol    Drug use: No        Medications:    acyclovir (ZOVIRAX) 400 MG tablet  albuterol (PROAIR HFA/PROVENTIL HFA/VENTOLIN HFA) 108 (90 Base) MCG/ACT inhaler  lisinopril (ZESTRIL) 20 MG tablet  metFORMIN (GLUCOPHAGE) 1000 MG tablet  methylphenidate (RITALIN LA) 20 MG 24 hr capsule  methylphenidate (RITALIN) 20 MG tablet  naproxen (NAPROSYN) 500 MG tablet  ondansetron (ZOFRAN ODT) 4 MG ODT tab  tirzepatide (MOUNJARO) 15 MG/0.5ML SOAJ auto-injector pen          Review of Systems  As mentioned above in the history present illness. All other systems were reviewed and are negative.    Physical Exam   BP: (!) 153/107  Pulse: 102  Temp: 98.6  F (37  C)  Resp: 20  Height: 180.3 cm (5'  "11\")  Weight: (!) 142.4 kg (314 lb)  SpO2: 98 %      Physical Exam  Constitutional:       General: He is not in acute distress.     Appearance: Normal appearance. He is well-developed. He is not ill-appearing.   HENT:      Head: Normocephalic and atraumatic.      Right Ear: External ear normal.      Left Ear: External ear normal.      Nose: Nose normal.      Mouth/Throat:      Mouth: Mucous membranes are moist.   Eyes:      Conjunctiva/sclera: Conjunctivae normal.   Cardiovascular:      Rate and Rhythm: Normal rate and regular rhythm.      Heart sounds: Normal heart sounds. No murmur heard.  Pulmonary:      Effort: Pulmonary effort is normal. No respiratory distress.      Breath sounds: Normal breath sounds.   Abdominal:      General: Bowel sounds are normal. There is no distension.      Palpations: Abdomen is soft.      Tenderness: There is generalized abdominal tenderness.   Musculoskeletal:         General: Normal range of motion.   Skin:     General: Skin is warm and dry.      Findings: No rash.   Neurological:      General: No focal deficit present.      Mental Status: He is alert and oriented to person, place, and time.         ED Course        Procedures              Results for orders placed or performed during the hospital encounter of 04/01/25 (from the past 24 hours)   [place] Draw    Narrative    The following orders were created for panel order [place] Draw.  Procedure                               Abnormality         Status                     ---------                               -----------         ------                     Extra Green Top (Lithiu...[[id number]]                      Final result               Extra Purple Top Tube[[id number]]                           Final result                 Please view results for these tests on the individual orders.   Extra Green Top (Lithium Heparin) Tube   Result Value Ref Range    Hold Specimen [facility]    Extra Purple Top Tube   Result Value Ref Range    " Hold Specimen [facility]    CBC with platelets differential    Narrative    The following orders were created for panel order CBC with platelets differential.  Procedure                               Abnormality         Status                     ---------                               -----------         ------                     CBC with platelets and ...[[id number]]  Abnormal            Final result                 Please view results for these tests on the individual orders.   Comprehensive metabolic panel   Result Value Ref Range    Sodium 139 135 - 145 mmol/L    Potassium 4.5 3.4 - 5.3 mmol/L    Carbon Dioxide (CO2) 27 22 - 29 mmol/L    Anion Gap 9 7 - 15 mmol/L    Urea Nitrogen 9.5 6.0 - 20.0 mg/dL    Creatinine 0.76 0.67 - 1.17 mg/dL    GFR Estimate >90 >60 mL/min/1.73m2    Calcium 9.4 8.8 - 10.4 mg/dL    Chloride 103 98 - 107 mmol/L    Glucose 113 (H) 70 - 99 mg/dL    Alkaline Phosphatase 72 40 - 150 U/L    AST 19 0 - 45 U/L    ALT 22 0 - 70 U/L    Protein Total 7.7 6.4 - 8.3 g/dL    Albumin 4.3 3.5 - 5.2 g/dL    Bilirubin Total 0.8 <=1.2 mg/dL   Magnesium   Result Value Ref Range    Magnesium 1.7 1.7 - 2.3 mg/dL   CBC with platelets and differential   Result Value Ref Range    WBC Count 13.9 (H) 4.0 - 11.0 10e3/uL    RBC Count 6.14 (H) 4.40 - 5.90 10e6/uL    Hemoglobin 17.3 13.3 - 17.7 g/dL    Hematocrit 51.3 40.0 - 53.0 %    MCV 84 78 - 100 fL    MCH 28.2 26.5 - 33.0 pg    MCHC 33.7 31.5 - 36.5 g/dL    RDW 13.7 10.0 - 15.0 %    Platelet Count 389 150 - 450 10e3/uL    % Neutrophils 75 %    % Lymphocytes 14 %    % Monocytes 9 %    % Eosinophils 2 %    % Basophils 0 %    % Immature Granulocytes 0 %    NRBCs per 100 WBC 0 <1 /100    Absolute Neutrophils 10.4 (H) 1.6 - 8.3 10e3/uL    Absolute Lymphocytes 2.0 0.8 - 5.3 10e3/uL    Absolute Monocytes 1.2 0.0 - 1.3 10e3/uL    Absolute Eosinophils 0.2 0.0 - 0.7 10e3/uL    Absolute Basophils 0.0 0.0 - 0.2 10e3/uL    Absolute Immature Granulocytes 0.1 <=0.4 10e3/uL     Absolute NRBCs 0.0 10e3/uL   CT Abdomen Pelvis w Contrast    Narrative    EXAM: CT ABDOMEN PELVIS W CONTRAST  LOCATION: [facility]  DATE: 4/1/2025    INDICATION: Generalized abdominal pain, vomiting, and diarrhea  COMPARISON: None.  TECHNIQUE: CT scan of the abdomen and pelvis was performed following injection of IV contrast. Multiplanar reformats were obtained. Dose reduction techniques were used.  CONTRAST: Isovue 370, 100 mL    FINDINGS:   LOWER CHEST: Normal.    HEPATOBILIARY: Normal.    PANCREAS: Normal.    SPLEEN: Normal.    ADRENAL GLANDS: 1.7 cm nodule right adrenal gland compatible with an adenoma.    KIDNEYS/BLADDER: Normal.    BOWEL: Normal appendix. Diverticula sigmoid colon, without evidence for diverticulitis. Several fluid-filled small bowel loops, a few of which are borderline dilated. No findings for obstruction.    LYMPH NODES: Subtle haziness and several prominent lymph nodes within the left mid abdominal mesentery.    VASCULATURE: Normal.    PELVIC ORGANS: Mild prostatic enlargement. No free pelvic fluid.    MUSCULOSKELETAL: Small fat-containing umbilical hernia. Hypertrophic changes lower thoracic spine.      Impression    IMPRESSION:   1.  Sigmoid diverticulosis, without evidence for diverticulitis.  2.  A few mildly distended fluid-filled small bowel loops, nonspecific. Enteritis possible, but no additional inflammatory changes.  3.  A few prominent lymph nodes with haziness in the left central mesentery can be seen with mesenteric adenitis.   4.  Fat-containing umbilical hernia.  5.  Right adrenal adenoma.       Medications   ondansetron (ZOFRAN) injection 4 mg (4 mg Intravenous $Given 4/1/25 1609)   sodium chloride 0.9% BOLUS 1,000 mL (0 mLs Intravenous Stopped 4/1/25 8921)   iopamidol (ISOVUE-370) solution 500 mL (100 mLs Intravenous $Given 4/1/25 1701)   sodium chloride 0.9 % bag for CT scan flush (100 mLs Intravenous $Given 4/1/25 1701)       Assessments &  Plan (with Medical Decision Making)   39-year-old male with nausea, vomiting, and diarrhea.  He attributes his nausea and vomiting to side effects from his Mounjaro.  However he developed diarrhea 4 days ago.  It is started to become less frequent but today has been unable to keep any food or fluids down.  Concern for dehydration.  He is afebrile.  Mild tachycardia.  BP is mildly elevated.  He is generalized abdominal tenderness.  Labs reveal WBC 13.9.  Normal hemoglobin.  Normal electrolytes.  Glucose 113.  LFTs and normal kidney function.  Abdominal/pelvis CT reveals no evidence of diverticulitis.  No bowel obstruction.  There is few mildly distended fluid-filled small loops of bowel which is nonspecific but could be related to an enteritis.  There are few prominent lymph nodes in the left central mesentery which can be seen with mesenteric adenitis.  Other incidental findings include fat-containing buccal hernia and a right adrenal adenoma.  I discussed the imaging and lab findings with patient.  I suspect he has a gastroenteritis illness.  He responded well to IV fluids.  He is now normotensive.  Heart rate improved.  He feels well for discharge home.    Plan as follows:  Frequent sips of fluids to stay hydrated.      Incidental finding of a right adrenal adenoma, this can be followed with your primary care provider.    Return for fever, worsening abdominal pain, vomiting and unable to keep fluids down, black or bloody stool, or any other symptoms of concern.    Discharge Medication List as of 4/1/2025  5:54 PM          Final diagnoses:   Diarrhea, unspecified type   Dehydration       4/1/2025   [facility] EMERGENCY DEPT       Kyle, CHANTALE Ambrocio CNP  04/02/25 0001

## 2025-04-28 ENCOUNTER — TELEPHONE (OUTPATIENT)
Dept: FAMILY MEDICINE | Facility: CLINIC | Age: 40
End: 2025-04-28
Payer: COMMERCIAL

## 2025-04-28 DIAGNOSIS — E66.813 CLASS 3 SEVERE OBESITY DUE TO EXCESS CALORIES WITH SERIOUS COMORBIDITY AND BODY MASS INDEX (BMI) OF 50.0 TO 59.9 IN ADULT (H): ICD-10-CM

## 2025-04-28 DIAGNOSIS — E11.9 TYPE 2 DIABETES MELLITUS WITHOUT COMPLICATION, WITHOUT LONG-TERM CURRENT USE OF INSULIN (H): Primary | ICD-10-CM

## 2025-04-28 NOTE — TELEPHONE ENCOUNTER
Medication Question or Refill    Patient calling regarding Mounjaro (15 mg). He has been on this for 3 months with no issues. He is all of a sudden becoming sick and dehydrated after taking injection. Patient reports taking injection on a Friday, usually on Sunday he is very sleepy and worn out. Around 2 am he will wake up in the middle of the night with diarrhea and vomiting. He was supposed to take it on Friday but skipped it. Patient would like to go down to 12.5 mg. He has been staying consistent as far as appetite and weight loss.     Please advise.      What medication are you calling about (include dose and sig)?: Mounjaro     Preferred Pharmacy:    [id number] - [place], [place] - [address] [place] [address] AT [facility] & [facility] [address]  Phone: [phone] Fax: [phone]      Controlled Substance Agreement on file:   CSA -- Patient Level:    CSA: None found at the patient level.       Who prescribed the medication?: Mata Gonzalez      Could we send this information to you in [email or web address] or would you prefer to receive a phone call?:   Patient would prefer a phone call   Okay to leave a detailed message?: Yes at Cell number on file:    Telephone Information:   Mobile [phone]       ARPIT Gutierrez

## 2025-05-23 NOTE — PROGRESS NOTES
Carlos is a 36 year old who is being evaluated via a billable video visit.      How would you like to obtain your AVS? MyChart  If the video visit is dropped, the invitation should be resent by: Text to cell phone: [phone]  Will anyone else be joining your video visit? No  Take this is an 18-minute [facility] video visit 36-year-old gentleman who has a body mass index of 50 here for consideration of a sleeve gastrectomy.  The patient has been overweight almost all his life when he was in high school he lost almost 100 pounds with the help of a .  Now that he is older he has 2 young children he wishes to exercise with them he finds that the pain that he experiences after hiking to last a few days and this is limiting to his desire and ability to exercise.  He has tried multiple attempts of weight loss in the past.  He currently is completing his work-up for bariatric surgery and has lost 40 of his prescribed 50 pounds.  We talked about the procedure, its risks, and potential complications and he agrees to proceed and he is clear on the postoperative diet.  I encouraged him to keep losing weight.  He also understands that we may need to perform a hiatal hernia repair at the time of surgery.    Past Medical History:   Diagnosis Date     HTN (hypertension) 9/10/2013     Morbid obesity (H)      Past Surgical History:   Procedure Laterality Date     ENDOSCOPIC RELEASE CARPAL TUNNEL  12/6/2013    Procedure: ENDOSCOPIC RELEASE CARPAL TUNNEL;  Right Hand Endoscopic Carpal Tunnel Release;  Surgeon: Kody Pittman MD;  Location: [facility]     ENDOSCOPIC RELEASE CARPAL TUNNEL  1/21/2014    Procedure: ENDOSCOPIC RELEASE CARPAL TUNNEL;  Left endoscopic carpal tunnel release, possible open;  Surgeon: Kody Pittman MD;  Location: [facility]     Current Outpatient Medications   Medication     amoxicillin-clavulanate (AUGMENTIN) 875-125 MG tablet     Cholecalciferol (VITAMIN D3) 75 MCG (3000 UT) TABS     fluticasone  (FLONASE) 50 MCG/ACT nasal spray     lisinopril (ZESTRIL) 20 MG tablet     metFORMIN (GLUCOPHAGE) 1000 MG tablet     ORDER FOR DME, SET TO FAX,     valACYclovir (VALTREX) 1000 mg tablet     valACYclovir (VALTREX) 500 MG tablet     No current facility-administered medications for this visit.     Plan as listed above the patient understands the procedure risks and potential complications I will ask my office to reach out to him with the potential date.   No.

## 2025-06-12 ENCOUNTER — LAB (OUTPATIENT)
Dept: LAB | Facility: CLINIC | Age: 40
End: 2025-06-12
Payer: COMMERCIAL

## 2025-06-12 DIAGNOSIS — E11.9 TYPE 2 DIABETES MELLITUS WITHOUT COMPLICATION, WITHOUT LONG-TERM CURRENT USE OF INSULIN (H): ICD-10-CM

## 2025-06-12 DIAGNOSIS — F90.2 ADHD (ATTENTION DEFICIT HYPERACTIVITY DISORDER), COMBINED TYPE: ICD-10-CM

## 2025-06-12 DIAGNOSIS — E11.9 TYPE 2 DIABETES MELLITUS WITHOUT COMPLICATION (H): Primary | ICD-10-CM

## 2025-06-12 LAB
CREAT UR-MCNC: 201.7 MG/DL
CREAT UR-MCNC: 202 MG/DL
MICROALBUMIN UR-MCNC: 22.8 MG/L
MICROALBUMIN/CREAT UR: 11.3 MG/G CR (ref 0–17)

## 2025-06-13 ENCOUNTER — RESULTS FOLLOW-UP (OUTPATIENT)
Dept: FAMILY MEDICINE | Facility: CLINIC | Age: 40
End: 2025-06-13

## 2025-06-15 LAB
ME-PHENIDATE UR CFM-MCNC: 84 NG/ML
ME-PHENIDATE UR CFM-MCNC: ABNORMAL NG/ML
ME-PHENIDATE/CREAT UR: 42 NG/MG {CREAT}
ME-PHENIDATE/CREAT UR: ABNORMAL

## 2025-06-21 ENCOUNTER — HEALTH MAINTENANCE LETTER (OUTPATIENT)
Age: 40
End: 2025-06-21

## 2025-07-12 ENCOUNTER — HEALTH MAINTENANCE LETTER (OUTPATIENT)
Age: 40
End: 2025-07-12

## 2025-08-05 DIAGNOSIS — F90.2 ADHD (ATTENTION DEFICIT HYPERACTIVITY DISORDER), COMBINED TYPE: ICD-10-CM

## 2025-08-06 RX ORDER — METHYLPHENIDATE HYDROCHLORIDE 20 MG/1
20 TABLET ORAL DAILY
Qty: 30 TABLET | Refills: 0 | Status: SHIPPED | OUTPATIENT
Start: 2025-08-06

## 2025-08-06 RX ORDER — METHYLPHENIDATE HYDROCHLORIDE 20 MG/1
20 CAPSULE, EXTENDED RELEASE ORAL 2 TIMES DAILY
Qty: 60 CAPSULE | Refills: 0 | Status: SHIPPED | OUTPATIENT
Start: 2025-08-06